# Patient Record
Sex: FEMALE | Race: WHITE | NOT HISPANIC OR LATINO | Employment: OTHER | URBAN - NONMETROPOLITAN AREA
[De-identification: names, ages, dates, MRNs, and addresses within clinical notes are randomized per-mention and may not be internally consistent; named-entity substitution may affect disease eponyms.]

---

## 2017-05-15 ENCOUNTER — LAB REQUISITION (OUTPATIENT)
Dept: LAB | Facility: HOSPITAL | Age: 72
End: 2017-05-15

## 2017-05-15 DIAGNOSIS — K21.9 GASTRO-ESOPHAGEAL REFLUX DISEASE WITHOUT ESOPHAGITIS: ICD-10-CM

## 2017-05-15 DIAGNOSIS — I10 ESSENTIAL (PRIMARY) HYPERTENSION: ICD-10-CM

## 2017-05-15 LAB
ANION GAP SERPL CALCULATED.3IONS-SCNC: 6.3 MMOL/L (ref 3.6–11.2)
BACTERIA UR QL AUTO: ABNORMAL /HPF
BASOPHILS # BLD AUTO: 0.05 10*3/MM3 (ref 0–0.3)
BASOPHILS NFR BLD AUTO: 0.7 % (ref 0–2)
BILIRUB UR QL STRIP: NEGATIVE
BUN BLD-MCNC: 24 MG/DL (ref 7–21)
BUN/CREAT SERPL: 17.3 (ref 7–25)
CALCIUM SPEC-SCNC: 10.1 MG/DL (ref 7.7–10)
CHLORIDE SERPL-SCNC: 106 MMOL/L (ref 99–112)
CLARITY UR: CLEAR
CO2 SERPL-SCNC: 29.7 MMOL/L (ref 24.3–31.9)
COLOR UR: YELLOW
CREAT BLD-MCNC: 1.39 MG/DL (ref 0.43–1.29)
DEPRECATED RDW RBC AUTO: 47.3 FL (ref 37–54)
EOSINOPHIL # BLD AUTO: 0.73 10*3/MM3 (ref 0–0.7)
EOSINOPHIL NFR BLD AUTO: 9.6 % (ref 0–7)
ERYTHROCYTE [DISTWIDTH] IN BLOOD BY AUTOMATED COUNT: 14.7 % (ref 11.5–14.5)
GFR SERPL CREATININE-BSD FRML MDRD: 37 ML/MIN/1.73
GLUCOSE BLD-MCNC: 93 MG/DL (ref 70–110)
GLUCOSE UR STRIP-MCNC: NEGATIVE MG/DL
HCT VFR BLD AUTO: 38.1 % (ref 37–47)
HGB BLD-MCNC: 12.2 G/DL (ref 12–16)
HGB UR QL STRIP.AUTO: NEGATIVE
HYALINE CASTS UR QL AUTO: ABNORMAL /LPF
IMM GRANULOCYTES # BLD: 0.03 10*3/MM3 (ref 0–0.03)
IMM GRANULOCYTES NFR BLD: 0.4 % (ref 0–0.5)
KETONES UR QL STRIP: NEGATIVE
LEUKOCYTE ESTERASE UR QL STRIP.AUTO: ABNORMAL
LYMPHOCYTES # BLD AUTO: 2.22 10*3/MM3 (ref 1–3)
LYMPHOCYTES NFR BLD AUTO: 29.1 % (ref 16–46)
MCH RBC QN AUTO: 29.1 PG (ref 27–33)
MCHC RBC AUTO-ENTMCNC: 32 G/DL (ref 33–37)
MCV RBC AUTO: 90.9 FL (ref 80–94)
MONOCYTES # BLD AUTO: 0.63 10*3/MM3 (ref 0.1–0.9)
MONOCYTES NFR BLD AUTO: 8.2 % (ref 0–12)
NEUTROPHILS # BLD AUTO: 3.98 10*3/MM3 (ref 1.4–6.5)
NEUTROPHILS NFR BLD AUTO: 52 % (ref 40–75)
NITRITE UR QL STRIP: POSITIVE
OSMOLALITY SERPL CALC.SUM OF ELEC: 286.9 MOSM/KG (ref 273–305)
PH UR STRIP.AUTO: <=5 [PH] (ref 5–8)
PLATELET # BLD AUTO: 223 10*3/MM3 (ref 130–400)
PMV BLD AUTO: 11 FL (ref 6–10)
POTASSIUM BLD-SCNC: 4.7 MMOL/L (ref 3.5–5.3)
PROT UR QL STRIP: NEGATIVE
RBC # BLD AUTO: 4.19 10*6/MM3 (ref 4.2–5.4)
RBC # UR: ABNORMAL /HPF
REF LAB TEST METHOD: ABNORMAL
SODIUM BLD-SCNC: 142 MMOL/L (ref 135–153)
SP GR UR STRIP: 1.01 (ref 1–1.03)
SQUAMOUS #/AREA URNS HPF: ABNORMAL /HPF
TSH SERPL DL<=0.05 MIU/L-ACNC: 1.47 MIU/ML (ref 0.55–4.78)
UROBILINOGEN UR QL STRIP: ABNORMAL
WBC NRBC COR # BLD: 7.64 10*3/MM3 (ref 4.5–12.5)
WBC UR QL AUTO: ABNORMAL /HPF

## 2017-05-15 PROCEDURE — 87077 CULTURE AEROBIC IDENTIFY: CPT | Performed by: INTERNAL MEDICINE

## 2017-05-15 PROCEDURE — 80048 BASIC METABOLIC PNL TOTAL CA: CPT | Performed by: INTERNAL MEDICINE

## 2017-05-15 PROCEDURE — 84443 ASSAY THYROID STIM HORMONE: CPT | Performed by: INTERNAL MEDICINE

## 2017-05-15 PROCEDURE — 85025 COMPLETE CBC W/AUTO DIFF WBC: CPT | Performed by: INTERNAL MEDICINE

## 2017-05-15 PROCEDURE — 87186 SC STD MICRODIL/AGAR DIL: CPT | Performed by: INTERNAL MEDICINE

## 2017-05-15 PROCEDURE — 81001 URINALYSIS AUTO W/SCOPE: CPT | Performed by: INTERNAL MEDICINE

## 2017-05-15 PROCEDURE — 87086 URINE CULTURE/COLONY COUNT: CPT | Performed by: INTERNAL MEDICINE

## 2017-05-19 LAB — BACTERIA SPEC AEROBE CULT: ABNORMAL

## 2017-08-14 ENCOUNTER — LAB REQUISITION (OUTPATIENT)
Dept: LAB | Facility: HOSPITAL | Age: 72
End: 2017-08-14

## 2017-08-14 DIAGNOSIS — I10 ESSENTIAL (PRIMARY) HYPERTENSION: ICD-10-CM

## 2017-08-14 DIAGNOSIS — D64.9 ANEMIA: ICD-10-CM

## 2017-08-14 LAB
ALBUMIN SERPL-MCNC: 4 G/DL (ref 3.4–4.8)
ALBUMIN/GLOB SERPL: 1.7 G/DL (ref 1.5–2.5)
ALP SERPL-CCNC: 113 U/L (ref 35–104)
ALT SERPL W P-5'-P-CCNC: 14 U/L (ref 10–36)
ANION GAP SERPL CALCULATED.3IONS-SCNC: 5.5 MMOL/L (ref 3.6–11.2)
AST SERPL-CCNC: 21 U/L (ref 10–30)
BASOPHILS # BLD AUTO: 0.03 10*3/MM3 (ref 0–0.3)
BASOPHILS NFR BLD AUTO: 0.4 % (ref 0–2)
BILIRUB SERPL-MCNC: 0.4 MG/DL (ref 0.2–1.8)
BUN BLD-MCNC: 22 MG/DL (ref 7–21)
BUN/CREAT SERPL: 19.6 (ref 7–25)
CALCIUM SPEC-SCNC: 9.9 MG/DL (ref 7.7–10)
CHLORIDE SERPL-SCNC: 107 MMOL/L (ref 99–112)
CO2 SERPL-SCNC: 27.5 MMOL/L (ref 24.3–31.9)
CREAT BLD-MCNC: 1.12 MG/DL (ref 0.43–1.29)
DEPRECATED RDW RBC AUTO: 43.6 FL (ref 37–54)
EOSINOPHIL # BLD AUTO: 0.79 10*3/MM3 (ref 0–0.7)
EOSINOPHIL NFR BLD AUTO: 9.7 % (ref 0–7)
ERYTHROCYTE [DISTWIDTH] IN BLOOD BY AUTOMATED COUNT: 13.2 % (ref 11.5–14.5)
GFR SERPL CREATININE-BSD FRML MDRD: 48 ML/MIN/1.73
GLOBULIN UR ELPH-MCNC: 2.4 GM/DL
GLUCOSE BLD-MCNC: 84 MG/DL (ref 70–110)
HCT VFR BLD AUTO: 38.6 % (ref 37–47)
HGB BLD-MCNC: 12.7 G/DL (ref 12–16)
IMM GRANULOCYTES # BLD: 0.03 10*3/MM3 (ref 0–0.03)
IMM GRANULOCYTES NFR BLD: 0.4 % (ref 0–0.5)
LYMPHOCYTES # BLD AUTO: 2.47 10*3/MM3 (ref 1–3)
LYMPHOCYTES NFR BLD AUTO: 30.5 % (ref 16–46)
MCH RBC QN AUTO: 30 PG (ref 27–33)
MCHC RBC AUTO-ENTMCNC: 32.9 G/DL (ref 33–37)
MCV RBC AUTO: 91.3 FL (ref 80–94)
MONOCYTES # BLD AUTO: 0.56 10*3/MM3 (ref 0.1–0.9)
MONOCYTES NFR BLD AUTO: 6.9 % (ref 0–12)
NEUTROPHILS # BLD AUTO: 4.23 10*3/MM3 (ref 1.4–6.5)
NEUTROPHILS NFR BLD AUTO: 52.1 % (ref 40–75)
OSMOLALITY SERPL CALC.SUM OF ELEC: 281.9 MOSM/KG (ref 273–305)
PLATELET # BLD AUTO: 228 10*3/MM3 (ref 130–400)
PMV BLD AUTO: 11 FL (ref 6–10)
POTASSIUM BLD-SCNC: 4.7 MMOL/L (ref 3.5–5.3)
PROT SERPL-MCNC: 6.4 G/DL (ref 6–8)
RBC # BLD AUTO: 4.23 10*6/MM3 (ref 4.2–5.4)
SODIUM BLD-SCNC: 140 MMOL/L (ref 135–153)
WBC NRBC COR # BLD: 8.11 10*3/MM3 (ref 4.5–12.5)

## 2017-08-14 PROCEDURE — 85025 COMPLETE CBC W/AUTO DIFF WBC: CPT | Performed by: INTERNAL MEDICINE

## 2017-08-14 PROCEDURE — 80053 COMPREHEN METABOLIC PANEL: CPT | Performed by: INTERNAL MEDICINE

## 2017-08-19 ENCOUNTER — LAB REQUISITION (OUTPATIENT)
Dept: LAB | Facility: HOSPITAL | Age: 72
End: 2017-08-19

## 2017-08-19 DIAGNOSIS — J02.9 ACUTE PHARYNGITIS: ICD-10-CM

## 2017-08-19 LAB — S PYO AG THROAT QL: NEGATIVE

## 2017-08-19 PROCEDURE — 87880 STREP A ASSAY W/OPTIC: CPT | Performed by: INTERNAL MEDICINE

## 2017-08-19 PROCEDURE — 87081 CULTURE SCREEN ONLY: CPT | Performed by: INTERNAL MEDICINE

## 2017-08-20 LAB — BACTERIA SPEC AEROBE CULT: NORMAL

## 2017-08-22 ENCOUNTER — APPOINTMENT (OUTPATIENT)
Dept: GENERAL RADIOLOGY | Facility: HOSPITAL | Age: 72
End: 2017-08-22

## 2017-08-22 ENCOUNTER — APPOINTMENT (OUTPATIENT)
Dept: CT IMAGING | Facility: HOSPITAL | Age: 72
End: 2017-08-22

## 2017-08-22 ENCOUNTER — HOSPITAL ENCOUNTER (OUTPATIENT)
Facility: HOSPITAL | Age: 72
Setting detail: OBSERVATION
Discharge: NURSING, REHAB OR LONG-TERM CARE | End: 2017-08-23
Attending: EMERGENCY MEDICINE | Admitting: INTERNAL MEDICINE

## 2017-08-22 DIAGNOSIS — R41.82 ALTERED MENTAL STATUS, UNSPECIFIED ALTERED MENTAL STATUS TYPE: Primary | ICD-10-CM

## 2017-08-22 LAB
ALBUMIN SERPL-MCNC: 4.3 G/DL (ref 3.4–4.8)
ALBUMIN/GLOB SERPL: 1.6 G/DL (ref 1.5–2.5)
ALP SERPL-CCNC: 107 U/L (ref 35–104)
ALT SERPL W P-5'-P-CCNC: 13 U/L (ref 10–36)
ANION GAP SERPL CALCULATED.3IONS-SCNC: 5.1 MMOL/L (ref 3.6–11.2)
AST SERPL-CCNC: 20 U/L (ref 10–30)
BACTERIA UR QL AUTO: ABNORMAL /HPF
BASOPHILS # BLD AUTO: 0.02 10*3/MM3 (ref 0–0.3)
BASOPHILS NFR BLD AUTO: 0.3 % (ref 0–2)
BILIRUB SERPL-MCNC: 0.6 MG/DL (ref 0.2–1.8)
BILIRUB UR QL STRIP: NEGATIVE
BUN BLD-MCNC: 20 MG/DL (ref 7–21)
BUN/CREAT SERPL: 15.6 (ref 7–25)
CALCIUM SPEC-SCNC: 10.3 MG/DL (ref 7.7–10)
CHLORIDE SERPL-SCNC: 109 MMOL/L (ref 99–112)
CK MB SERPL-CCNC: 1.43 NG/ML (ref 0–5)
CK MB SERPL-RTO: 2.3 % (ref 0–3)
CK SERPL-CCNC: 63 U/L (ref 24–173)
CLARITY UR: CLEAR
CO2 SERPL-SCNC: 27.9 MMOL/L (ref 24.3–31.9)
COLOR UR: YELLOW
CREAT BLD-MCNC: 1.28 MG/DL (ref 0.43–1.29)
DEPRECATED RDW RBC AUTO: 43.2 FL (ref 37–54)
EOSINOPHIL # BLD AUTO: 0.58 10*3/MM3 (ref 0–0.7)
EOSINOPHIL NFR BLD AUTO: 8.6 % (ref 0–7)
ERYTHROCYTE [DISTWIDTH] IN BLOOD BY AUTOMATED COUNT: 13.3 % (ref 11.5–14.5)
GFR SERPL CREATININE-BSD FRML MDRD: 41 ML/MIN/1.73
GLOBULIN UR ELPH-MCNC: 2.7 GM/DL
GLUCOSE BLD-MCNC: 103 MG/DL (ref 70–110)
GLUCOSE UR STRIP-MCNC: NEGATIVE MG/DL
HCT VFR BLD AUTO: 41.3 % (ref 37–47)
HGB BLD-MCNC: 13.6 G/DL (ref 12–16)
HGB UR QL STRIP.AUTO: NEGATIVE
HYALINE CASTS UR QL AUTO: ABNORMAL /LPF
IMM GRANULOCYTES # BLD: 0.03 10*3/MM3 (ref 0–0.03)
IMM GRANULOCYTES NFR BLD: 0.4 % (ref 0–0.5)
KETONES UR QL STRIP: NEGATIVE
LEUKOCYTE ESTERASE UR QL STRIP.AUTO: ABNORMAL
LIPASE SERPL-CCNC: 35 U/L (ref 13–60)
LYMPHOCYTES # BLD AUTO: 2.07 10*3/MM3 (ref 1–3)
LYMPHOCYTES NFR BLD AUTO: 30.8 % (ref 16–46)
MCH RBC QN AUTO: 30 PG (ref 27–33)
MCHC RBC AUTO-ENTMCNC: 32.9 G/DL (ref 33–37)
MCV RBC AUTO: 91 FL (ref 80–94)
MONOCYTES # BLD AUTO: 0.46 10*3/MM3 (ref 0.1–0.9)
MONOCYTES NFR BLD AUTO: 6.8 % (ref 0–12)
MYOGLOBIN SERPL-MCNC: 72 NG/ML (ref 0–109)
NEUTROPHILS # BLD AUTO: 3.56 10*3/MM3 (ref 1.4–6.5)
NEUTROPHILS NFR BLD AUTO: 53.1 % (ref 40–75)
NITRITE UR QL STRIP: NEGATIVE
OSMOLALITY SERPL CALC.SUM OF ELEC: 286 MOSM/KG (ref 273–305)
PH UR STRIP.AUTO: 6 [PH] (ref 5–8)
PLATELET # BLD AUTO: 225 10*3/MM3 (ref 130–400)
PMV BLD AUTO: 10.6 FL (ref 6–10)
POTASSIUM BLD-SCNC: 4.4 MMOL/L (ref 3.5–5.3)
PROT SERPL-MCNC: 7 G/DL (ref 6–8)
PROT UR QL STRIP: NEGATIVE
RBC # BLD AUTO: 4.54 10*6/MM3 (ref 4.2–5.4)
RBC # UR: ABNORMAL /HPF
REF LAB TEST METHOD: ABNORMAL
SODIUM BLD-SCNC: 142 MMOL/L (ref 135–153)
SP GR UR STRIP: 1.01 (ref 1–1.03)
SQUAMOUS #/AREA URNS HPF: ABNORMAL /HPF
TROPONIN I SERPL-MCNC: <0.006 NG/ML
UROBILINOGEN UR QL STRIP: ABNORMAL
WBC NRBC COR # BLD: 6.72 10*3/MM3 (ref 4.5–12.5)
WBC UR QL AUTO: ABNORMAL /HPF

## 2017-08-22 PROCEDURE — 70450 CT HEAD/BRAIN W/O DYE: CPT

## 2017-08-22 PROCEDURE — 82553 CREATINE MB FRACTION: CPT | Performed by: EMERGENCY MEDICINE

## 2017-08-22 PROCEDURE — G0378 HOSPITAL OBSERVATION PER HR: HCPCS

## 2017-08-22 PROCEDURE — 70450 CT HEAD/BRAIN W/O DYE: CPT | Performed by: RADIOLOGY

## 2017-08-22 PROCEDURE — 83690 ASSAY OF LIPASE: CPT | Performed by: EMERGENCY MEDICINE

## 2017-08-22 PROCEDURE — 93005 ELECTROCARDIOGRAM TRACING: CPT | Performed by: EMERGENCY MEDICINE

## 2017-08-22 PROCEDURE — 96360 HYDRATION IV INFUSION INIT: CPT

## 2017-08-22 PROCEDURE — 82550 ASSAY OF CK (CPK): CPT | Performed by: EMERGENCY MEDICINE

## 2017-08-22 PROCEDURE — P9612 CATHETERIZE FOR URINE SPEC: HCPCS

## 2017-08-22 PROCEDURE — 84484 ASSAY OF TROPONIN QUANT: CPT | Performed by: EMERGENCY MEDICINE

## 2017-08-22 PROCEDURE — 71010 HC CHEST PA OR AP: CPT

## 2017-08-22 PROCEDURE — 81001 URINALYSIS AUTO W/SCOPE: CPT | Performed by: EMERGENCY MEDICINE

## 2017-08-22 PROCEDURE — 85025 COMPLETE CBC W/AUTO DIFF WBC: CPT | Performed by: EMERGENCY MEDICINE

## 2017-08-22 PROCEDURE — 80053 COMPREHEN METABOLIC PANEL: CPT | Performed by: EMERGENCY MEDICINE

## 2017-08-22 PROCEDURE — 71010 XR CHEST 1 VW: CPT | Performed by: RADIOLOGY

## 2017-08-22 PROCEDURE — 83874 ASSAY OF MYOGLOBIN: CPT | Performed by: EMERGENCY MEDICINE

## 2017-08-22 PROCEDURE — 99285 EMERGENCY DEPT VISIT HI MDM: CPT

## 2017-08-22 PROCEDURE — 93010 ELECTROCARDIOGRAM REPORT: CPT | Performed by: INTERNAL MEDICINE

## 2017-08-22 RX ORDER — ARIPIPRAZOLE 2 MG/1
2 TABLET ORAL DAILY
Status: ON HOLD | COMMUNITY
End: 2017-09-22

## 2017-08-22 RX ORDER — CIPROFLOXACIN 250 MG/1
250 TABLET, FILM COATED ORAL 2 TIMES DAILY
COMMUNITY
End: 2017-08-23 | Stop reason: HOSPADM

## 2017-08-22 RX ORDER — FERROUS SULFATE 325(65) MG
325 TABLET ORAL DAILY
COMMUNITY

## 2017-08-22 RX ORDER — ACETAMINOPHEN 500 MG
500 TABLET ORAL EVERY 4 HOURS PRN
COMMUNITY

## 2017-08-22 RX ORDER — AMOXICILLIN 875 MG/1
875 TABLET, COATED ORAL EVERY 12 HOURS
COMMUNITY
End: 2017-08-23 | Stop reason: HOSPADM

## 2017-08-22 RX ORDER — ASPIRIN 81 MG/1
324 TABLET, CHEWABLE ORAL ONCE
Status: COMPLETED | OUTPATIENT
Start: 2017-08-22 | End: 2017-08-22

## 2017-08-22 RX ORDER — FLUOXETINE HYDROCHLORIDE 20 MG/1
20 CAPSULE ORAL DAILY
COMMUNITY
End: 2017-09-25 | Stop reason: HOSPADM

## 2017-08-22 RX ORDER — SODIUM CHLORIDE 9 MG/ML
125 INJECTION, SOLUTION INTRAVENOUS CONTINUOUS
Status: DISCONTINUED | OUTPATIENT
Start: 2017-08-22 | End: 2017-08-23 | Stop reason: HOSPADM

## 2017-08-22 RX ADMIN — ASPIRIN 324 MG: 81 TABLET, CHEWABLE ORAL at 21:50

## 2017-08-22 RX ADMIN — SODIUM CHLORIDE 500 ML: 9 INJECTION, SOLUTION INTRAVENOUS at 18:46

## 2017-08-22 RX ADMIN — SODIUM CHLORIDE 125 ML/HR: 9 INJECTION, SOLUTION INTRAVENOUS at 19:00

## 2017-08-22 NOTE — ED PROVIDER NOTES
"Subjective   HPI Comments: Patient is a 72-year-old white female sent from a local nursing home for evaluation.  They report a possible seizure and an episode of unresponsiveness.  No other acute history is available to me.  The family that is here at the time of the patient's arrival advise that she seems more confused than usual to them.  The patient states that she feels \"pretty good\", states that nothing is bothering her.  She does not answer my questions appropriately however.  No other family members are here and the patient is unable to provide further history; I'm just unable to obtain further history at this time.     Patient is a 72 y.o. female presenting with altered mental status.   History provided by:  Nursing home (Cousin and nephew)  Altered Mental Status   Context: dementia and nursing home resident        Review of Systems   Reason unable to perform ROS: Dementia/receptive aphasia.       Past Medical History:   Diagnosis Date   • Alzheimer disease    • Alzheimer's disease    • Anxiety    • Anxiety disorder, unspecified    • Constipation    • Deafness    • Delirium due to known physiological condition    • Depression    • Difficulty in walking, not elsewhere classified    • Gastro-esophageal reflux disease with esophagitis    • GERD (gastroesophageal reflux disease)    • Hyperlipidemia    • Hyperlipidemia    • Hypertension    • Major depressive disorder, single episode    • Muscle weakness (generalized)    • Other lack of coordination    • Paranoid personality (disorder)    • Shared psychotic disorder    • Unspecified dementia without behavioral disturbance    • Unspecified hearing loss, unspecified ear    • Unspecified lack of coordination    • Unspecified psychosis not due to a substance or known physiological condition        No Known Allergies    History reviewed. No pertinent surgical history.    History reviewed. No pertinent family history.    Social History     Social History   • Marital " status:      Spouse name: N/A   • Number of children: N/A   • Years of education: N/A     Social History Main Topics   • Smoking status: Never Smoker   • Smokeless tobacco: None   • Alcohol use No   • Drug use: No   • Sexual activity: Defer     Other Topics Concern   • None     Social History Narrative           Objective   Physical Exam   Constitutional: She appears well-developed and well-nourished. No distress.   HENT:   Head: Normocephalic and atraumatic.   Eyes: EOM are normal. Pupils are equal, round, and reactive to light. No scleral icterus.   Neck: Normal range of motion. Neck supple. No rigidity. No tracheal deviation present.   Cardiovascular: Normal rate, regular rhythm and intact distal pulses.    Pulmonary/Chest: Effort normal and breath sounds normal. No respiratory distress. She exhibits no tenderness.   Abdominal: Soft. Bowel sounds are normal. There is no tenderness. There is no rebound and no guarding.   Musculoskeletal: Normal range of motion. She exhibits no tenderness.   Neurological: She is alert. She has normal strength. No sensory deficit. GCS eye subscore is 4. GCS verbal subscore is 5. GCS motor subscore is 6.   There are no detectable focal motor deficits.  I cannot assess for sensory deficits.  The patient seems to have a receptive aphasia.  She talks to me fluently and says seemingly appropriate things to me, but she is unable to answer questions that I ask her.   Skin: Skin is warm and dry. She is not diaphoretic. No cyanosis. No pallor.   Psychiatric: Her behavior is normal.   Vitals reviewed.      Procedures  EKG shows sinus rhythm with rate 61.  No apparent acute ischemia.  XR Chest 1 View   ED Interpretation   No apparent acute disease pending radiologist.      CT Head Without Contrast   ED Interpretation   Per virtual radiology, mild chronic microvascular disease.  No   acute lesion or injury.        Results for orders placed or performed during the hospital encounter of  08/22/17   Comprehensive Metabolic Panel   Result Value Ref Range    Glucose 103 70 - 110 mg/dL    BUN 20 7 - 21 mg/dL    Creatinine 1.28 0.43 - 1.29 mg/dL    Sodium 142 135 - 153 mmol/L    Potassium 4.4 3.5 - 5.3 mmol/L    Chloride 109 99 - 112 mmol/L    CO2 27.9 24.3 - 31.9 mmol/L    Calcium 10.3 (H) 7.7 - 10.0 mg/dL    Total Protein 7.0 6.0 - 8.0 g/dL    Albumin 4.30 3.40 - 4.80 g/dL    ALT (SGPT) 13 10 - 36 U/L    AST (SGOT) 20 10 - 30 U/L    Alkaline Phosphatase 107 (H) 35 - 104 U/L    Total Bilirubin 0.6 0.2 - 1.8 mg/dL    eGFR Non African Amer 41 (L) >60 mL/min/1.73    Globulin 2.7 gm/dL    A/G Ratio 1.6 1.5 - 2.5 g/dL    BUN/Creatinine Ratio 15.6 7.0 - 25.0    Anion Gap 5.1 3.6 - 11.2 mmol/L   Lipase   Result Value Ref Range    Lipase 35 13 - 60 U/L   Urinalysis With / Culture If Indicated   Result Value Ref Range    Color, UA Yellow Yellow, Straw    Appearance, UA Clear Clear    pH, UA 6.0 5.0 - 8.0    Specific Gravity, UA 1.012 1.005 - 1.030    Glucose, UA Negative Negative    Ketones, UA Negative Negative    Bilirubin, UA Negative Negative    Blood, UA Negative Negative    Protein, UA Negative Negative    Leuk Esterase, UA Trace (A) Negative    Nitrite, UA Negative Negative    Urobilinogen, UA 0.2 E.U./dL 0.2 - 1.0 E.U./dL   CK   Result Value Ref Range    Creatine Kinase 63 24 - 173 U/L   Myoglobin, Serum   Result Value Ref Range    Myoglobin 72.0 0.0 - 109.0 ng/mL   CK-MB   Result Value Ref Range    CKMB 1.43 0.00 - 5.00 ng/mL   Troponin   Result Value Ref Range    Troponin I <0.006 <=0.040 ng/mL   CBC Auto Differential   Result Value Ref Range    WBC 6.72 4.50 - 12.50 10*3/mm3    RBC 4.54 4.20 - 5.40 10*6/mm3    Hemoglobin 13.6 12.0 - 16.0 g/dL    Hematocrit 41.3 37.0 - 47.0 %    MCV 91.0 80.0 - 94.0 fL    MCH 30.0 27.0 - 33.0 pg    MCHC 32.9 (L) 33.0 - 37.0 g/dL    RDW 13.3 11.5 - 14.5 %    RDW-SD 43.2 37.0 - 54.0 fl    MPV 10.6 (H) 6.0 - 10.0 fL    Platelets 225 130 - 400 10*3/mm3    Neutrophil % 53.1  40.0 - 75.0 %    Lymphocyte % 30.8 16.0 - 46.0 %    Monocyte % 6.8 0.0 - 12.0 %    Eosinophil % 8.6 (H) 0.0 - 7.0 %    Basophil % 0.3 0.0 - 2.0 %    Immature Grans % 0.4 0.0 - 0.5 %    Neutrophils, Absolute 3.56 1.40 - 6.50 10*3/mm3    Lymphocytes, Absolute 2.07 1.00 - 3.00 10*3/mm3    Monocytes, Absolute 0.46 0.10 - 0.90 10*3/mm3    Eosinophils, Absolute 0.58 0.00 - 0.70 10*3/mm3    Basophils, Absolute 0.02 0.00 - 0.30 10*3/mm3    Immature Grans, Absolute 0.03 0.00 - 0.03 10*3/mm3   Osmolality, Calculated   Result Value Ref Range    Osmolality Calc 286.0 273.0 - 305.0 mOsm/kg   CK-MB Index   Result Value Ref Range    CK-MB Index 2.3 0.0 - 3.0 %   Urinalysis, Microscopic Only   Result Value Ref Range    RBC, UA 0-2 (A) None Seen /HPF    WBC, UA 0-2 (A) None Seen /HPF    Bacteria, UA None Seen None Seen /HPF    Squamous Epithelial Cells, UA 0-2 None Seen, 0-2 /HPF    Hyaline Casts, UA None Seen None Seen /LPF    Methodology Automated Microscopy               ED Course  ED Course   Comment By Time   Patient's emergency department stay has been uneventful.  Never has she shown any signs of acute distress.  I discussed the case with Dr. Middleton, and he is admitting the patient to the MedSurg unit under the service of Dr. Alamo for further care. Panda Jones MD 08/22 2112                  [facility]    Final diagnoses:   Altered mental status, unspecified altered mental status type             Please note that portions of this note were completed with a voice recognition program. Efforts were made to edit the dictations, but occasionally words are mistranscribed.       Panda Jones MD  08/22/17 5501

## 2017-08-22 NOTE — ED NOTES
Patients family states that the Nursing home called them and told her the patient had a temp of 106.0 and was foaming at the mouth and lethargic. Patients family states they only call her when patient is bad off. Upon assessment patient was AAO and could answer questions correctly. Patient reports her left arm and hand hurts and feels cold. Patients family reports patient is hard of hearing. Patient currently afebrile.  Pt resting quietly on stretcher with no complaints.  Pt AAOx4 with no resp distress noted, respirations even and unlabored.  Pt denies any needs at this time.  Skin PWD.  Pt family at bedside. Will continue to monitor and follow plan of care.  Bed rails up x2, bed in lowest position, call light in reach.       Marlena Dunn RN  08/22/17 [id number]

## 2017-08-22 NOTE — ED NOTES
Patient reports she needs to use the bathroom. Assisted patient to use bed turcios.      Marlena Dunn RN  08/22/17 [id number]

## 2017-08-22 NOTE — ED NOTES
Patient noted to have swelling above IV site that was in place upon arrival. Fluid stopped, and able to obtain blood return. IV removed, warm compress placed on RAC.     Marlena Dunn RN  08/22/17 [id number]

## 2017-08-23 VITALS
OXYGEN SATURATION: 95 % | DIASTOLIC BLOOD PRESSURE: 64 MMHG | HEIGHT: 68 IN | RESPIRATION RATE: 16 BRPM | SYSTOLIC BLOOD PRESSURE: 116 MMHG | BODY MASS INDEX: 25.92 KG/M2 | HEART RATE: 52 BPM | TEMPERATURE: 98.1 F | WEIGHT: 171.01 LBS

## 2017-08-23 PROCEDURE — 96372 THER/PROPH/DIAG INJ SC/IM: CPT

## 2017-08-23 PROCEDURE — 25010000002 ENOXAPARIN PER 10 MG: Performed by: INTERNAL MEDICINE

## 2017-08-23 PROCEDURE — G0378 HOSPITAL OBSERVATION PER HR: HCPCS

## 2017-08-23 RX ORDER — ATORVASTATIN CALCIUM 10 MG/1
10 TABLET, FILM COATED ORAL NIGHTLY
Status: DISCONTINUED | OUTPATIENT
Start: 2017-08-23 | End: 2017-08-23 | Stop reason: HOSPADM

## 2017-08-23 RX ORDER — SENNA PLUS 8.6 MG/1
1 TABLET ORAL DAILY PRN
Status: CANCELLED | OUTPATIENT
Start: 2017-08-23

## 2017-08-23 RX ORDER — BUSPIRONE HYDROCHLORIDE 10 MG/1
5 TABLET ORAL 2 TIMES DAILY
Qty: 60 TABLET | Refills: 3 | Status: ON HOLD | OUTPATIENT
Start: 2017-08-23 | End: 2017-09-21

## 2017-08-23 RX ORDER — BUSPIRONE HYDROCHLORIDE 10 MG/1
10 TABLET ORAL 3 TIMES DAILY
Status: CANCELLED | OUTPATIENT
Start: 2017-08-23

## 2017-08-23 RX ORDER — LISINOPRIL 10 MG/1
10 TABLET ORAL DAILY
Status: CANCELLED | OUTPATIENT
Start: 2017-08-23

## 2017-08-23 RX ORDER — ASPIRIN 81 MG/1
81 TABLET, CHEWABLE ORAL DAILY
Status: CANCELLED | OUTPATIENT
Start: 2017-08-23

## 2017-08-23 RX ORDER — AMOXICILLIN 500 MG/1
1000 CAPSULE ORAL EVERY 12 HOURS SCHEDULED
Status: CANCELLED | OUTPATIENT
Start: 2017-08-23

## 2017-08-23 RX ORDER — RIVASTIGMINE TARTRATE 1.5 MG/1
6 CAPSULE ORAL 2 TIMES DAILY WITH MEALS
Status: CANCELLED | OUTPATIENT
Start: 2017-08-23

## 2017-08-23 RX ORDER — ARIPIPRAZOLE 2 MG/1
2 TABLET ORAL DAILY
Status: CANCELLED | OUTPATIENT
Start: 2017-08-23

## 2017-08-23 RX ORDER — LEVOFLOXACIN 250 MG/1
250 TABLET ORAL
Status: CANCELLED | OUTPATIENT
Start: 2017-08-23 | End: 2017-08-25

## 2017-08-23 RX ORDER — MEMANTINE HYDROCHLORIDE 5 MG/1
5 TABLET ORAL EVERY 12 HOURS SCHEDULED
Status: DISCONTINUED | OUTPATIENT
Start: 2017-08-23 | End: 2017-08-23 | Stop reason: HOSPADM

## 2017-08-23 RX ORDER — MEMANTINE HYDROCHLORIDE 5 MG/1
2.5 TABLET ORAL EVERY 12 HOURS SCHEDULED
Status: CANCELLED | OUTPATIENT
Start: 2017-08-23

## 2017-08-23 RX ORDER — FLUOXETINE HYDROCHLORIDE 20 MG/1
20 CAPSULE ORAL DAILY
Status: CANCELLED | OUTPATIENT
Start: 2017-08-23

## 2017-08-23 RX ORDER — LEVOFLOXACIN 250 MG/1
250 TABLET ORAL
Status: CANCELLED | OUTPATIENT
Start: 2017-08-23

## 2017-08-23 RX ORDER — FERROUS SULFATE 325(65) MG
325 TABLET ORAL
Status: CANCELLED | OUTPATIENT
Start: 2017-08-23

## 2017-08-23 RX ORDER — FAMOTIDINE 20 MG/1
20 TABLET, FILM COATED ORAL DAILY
Status: CANCELLED | OUTPATIENT
Start: 2017-08-23

## 2017-08-23 RX ORDER — ACETAMINOPHEN 325 MG/1
500 TABLET ORAL EVERY 4 HOURS PRN
Status: CANCELLED | OUTPATIENT
Start: 2017-08-23

## 2017-08-23 RX ORDER — AMOXICILLIN 500 MG/1
1000 CAPSULE ORAL EVERY 12 HOURS SCHEDULED
Status: DISCONTINUED | OUTPATIENT
Start: 2017-08-23 | End: 2017-08-23 | Stop reason: HOSPADM

## 2017-08-23 RX ORDER — GABAPENTIN 100 MG/1
100 CAPSULE ORAL 2 TIMES DAILY
Qty: 60 CAPSULE | Refills: 3 | Status: ON HOLD | OUTPATIENT
Start: 2017-08-23 | End: 2017-09-22

## 2017-08-23 RX ADMIN — ATORVASTATIN CALCIUM 10 MG: 10 TABLET, FILM COATED ORAL at 02:04

## 2017-08-23 RX ADMIN — MEMANTINE HYDROCHLORIDE 5 MG: 5 TABLET, FILM COATED ORAL at 02:04

## 2017-08-23 RX ADMIN — AMOXICILLIN 1000 MG: 500 CAPSULE ORAL at 10:14

## 2017-08-23 RX ADMIN — MEMANTINE HYDROCHLORIDE 5 MG: 5 TABLET, FILM COATED ORAL at 10:15

## 2017-08-23 RX ADMIN — ENOXAPARIN SODIUM 40 MG: 40 INJECTION SUBCUTANEOUS at 10:15

## 2017-08-23 RX ADMIN — AMOXICILLIN 1000 MG: 500 CAPSULE ORAL at 02:04

## 2017-08-23 NOTE — PLAN OF CARE
Problem: Patient Care Overview (Adult)  Goal: Discharge Needs Assessment  Outcome: Ongoing (interventions implemented as appropriate)  Discharge Planning Assessment  [facility]     Patient Name: Catherine Snyder                        MRN: [id number]  Today's Date: 8/23/2017                     Admit Date: 8/22/2017             Discharge Needs Assessment        08/23/17 0838     Discharge Needs Assessment     Discharge Disposition skilled nursing facility   SS contacted [facility] per Aruna [phone] who states pt has a 14 day skilled bed hold.        [facility]       Discharge Plan        08/23/17 0838     Case Management/Social Work Plan     Plan Pt was admitted from [facility] and has a 14 day skilled bed hold. SS to follow and assist as needed with discharge planning.          Discharge Placement      Facility/Agency Request Status Selected? Address Phone Number Fax Number     [facility] Pending - No Request Sent       [address] [facility] [address] [phone]                     Demographic Summary        08/23/17 0826     Referral Information     Referral Source nursing     Reason For Consult --   SS received consult NH resident. Pt was admitted from [facility].      Primary Care Physician Information     Name Dr. Jasmeet Sequeira

## 2017-08-23 NOTE — DISCHARGE PLACEMENT REQUEST
"Catherine Ocasio (72 y.o. Female)     Date of Birth Social Security Number Address Home Phone MRN    1945  [address]   [facility] [address] [phone] [id number]    [employer] Mar[employer] Status          None        Admission Date Admission Type Admitting Provider Attending Provider Department, Room/Bed    8/22/17 Emergency  Ricky Alamo MD [address], 3346/1S    Discharge Date Discharge Disposition Discharge Destination         Skilled Nursing Facility (DC - External)             Attending Provider: Ricky Alamo MD     Allergies:  No Known Allergies    Isolation:  None   Infection:  None   Code Status:  FULL    Ht:  68\" (172.7 cm)   Wt:  171 lb 0.2 oz (77.6 kg)    Admission Cmt:  None   Principal Problem:  None                Active Insurance as of 8/22/2017     Primary Coverage     Payor Plan Insurance Group Employer/Plan Group    MEDICARE MEDICARE B ONLY      Payor Plan Address Payor Plan Phone Number Effective From Effective To    [address] [address] [phone] 7/1/2008     [place] [address]       Subscriber Name Subscriber Birth Date Member ID       CATHERINE OCASIO 1945 [id number]           Secondary Coverage     Payor Plan Insurance Group Employer/Plan Group    [place]      Payor Plan Address Payor Plan Phone Number Effective From Effective To    [address] [phone] 12/15/2016     [place] [address]       Subscriber Name Subscriber Birth Date Member ID       CATHERINE OCASIO 1945 [id number]                 Emergency Contacts      (Rel.) Home Phone Work Phone Mobile Phone    LillianGita jain (Other) [phone] -- --               History & Physical      Ricky Alamo MD at 8/23/2017  8:34 AM                  Chief complaint confusion    Subjective     Patient is a 72 y.o. female resident of the [facility] rehabilitation has been there for several years due to severe dementia.  Nurses report that yesterday she " became more confused, agitated and seemed uncontrollable.  Due to increased agitation and confusion she was sent to the emergency room.  In the emergency room she was seen and evaluated.  She was diagnosed with mental status change.  A CT scan of the head revealed no acute intracranial process.  Laboratory studies including urinalysis comprehensive panel , UA and CBC were essentially normal with no obvious etiology for confusion.     This morning Mrs. Snyder is arousable.  She is answering questions with attempts but continues with ongoing confusion.  I seen her multiple times in the past over the last several years at the nursing home.  She has a chronic dementia.  At this point she doesn't seem off from her baseline.  Nurses describe an uneventful night with no acute changes or events.    Review of Systems -unobtainable secondary to confusion.    Past Medical History  Past Medical History:   Diagnosis Date   • Alzheimer disease    • Alzheimer's disease    • Anxiety    • Anxiety disorder, unspecified    • Constipation    • Deafness    • Delirium due to known physiological condition    • Depression    • Difficulty in walking, not elsewhere classified    • Gastro-esophageal reflux disease with esophagitis    • GERD (gastroesophageal reflux disease)    • Hyperlipidemia    • Hyperlipidemia    • Hypertension    • Major depressive disorder, single episode    • Muscle weakness (generalized)    • Other lack of coordination    • Paranoid personality (disorder)    • Shared psychotic disorder    • Unspecified dementia without behavioral disturbance    • Unspecified hearing loss, unspecified ear    • Unspecified lack of coordination    • Unspecified psychosis not due to a substance or known physiological condition      Surgical History  History reviewed. No pertinent surgical history.  Family History  History reviewed. No pertinent family history.  Social History  Social History   Substance Use Topics   • Smoking status:  Never Smoker   • Smokeless tobacco: None   • Alcohol use No     Home Medications  Prescriptions Prior to Admission   Medication Sig Dispense Refill Last Dose   • acetaminophen (TYLENOL) 500 MG tablet Take 500 mg by mouth Every 4 (Four) Hours As Needed for Mild Pain  or Fever.   8/19/2017 at unknown   • amoxicillin (AMOXIL) 875 MG tablet Take 875 mg by mouth Every 12 (Twelve) Hours. Started 8/21 for 7 days    8/22/2017 at 0800   • ARIPiprazole (ABILIFY) 2 MG tablet Take 2 mg by mouth Daily.   8/22/2017 at 0800   • aspirin 81 MG chewable tablet Chew 81 mg Daily.   8/22/2017 at 0800   • atorvastatin (LIPITOR) 10 MG tablet Take 10 mg by mouth Every Night.   8/21/2017 at 2000   • busPIRone (BUSPAR) 10 MG tablet Take 10 mg by mouth 3 (Three) Times a Day.   8/22/2017 at 1600   • ciprofloxacin (CIPRO) 250 MG tablet Take 250 mg by mouth 2 (Two) Times a Day. For 7 days, therapy started on 8/18/17 8/22/2017 at 0800   • ferrous sulfate 325 (65 FE) MG tablet Take 325 mg by mouth Daily.   8/22/2017 at 0800   • FLUoxetine (PROzac) 20 MG capsule Take 20 mg by mouth Daily.   8/20/2017 at 0800   • lisinopril (PRINIVIL,ZESTRIL) 10 MG tablet Take 10 mg by mouth Daily.   8/22/2017 at 0800   • memantine (NAMENDA XR) 7 MG capsule sustained-release 24 hr extended release capsule Take 14 mg by mouth Every Night.   8/21/2017 at 2000   • raNITIdine (ZANTAC) 150 MG tablet Take 150 mg by mouth Daily.   8/22/2017 at 0800   • rivastigmine (EXELON) 6 MG capsule Take 6 mg by mouth 2 (Two) Times a Day.   8/22/2017 at 1600   • magnesium hydroxide (MILK OF MAGNESIA) 400 MG/5ML suspension Take 30 mL by mouth Daily As Needed for constipation.   8/4/2017 at unknown   • senna (SENOKOT) 8.6 MG tablet tablet Take 1 tablet by mouth Daily As Needed for constipation.   8/4/2017 at unknown         Allergies:  Review of patient's allergies indicates no known allergies.    Objective     Vital Signs  Temp:  [98.1 °F (36.7 °C)-99.1 °F (37.3 °C)] 98.1 °F (36.7  °C)  Heart Rate:  [52-63] 52  Resp:  [16] 16  BP: (116-170)/(62-83) 116/64    Physical Exam:    Physical Exam   Constitutional: She appears well-developed and well-nourished. No distress.   HENT:   Head: Normocephalic and atraumatic.   Right Ear: External ear normal.   Left Ear: External ear normal.   Nose: Nose normal.   Mouth/Throat: Oropharynx is clear and moist. No oropharyngeal exudate.   Eyes: Conjunctivae and EOM are normal. Pupils are equal, round, and reactive to light. Right eye exhibits no discharge. Left eye exhibits no discharge. No scleral icterus.   Neck: Normal range of motion. Neck supple. No JVD present. No tracheal deviation present. No thyromegaly present.   Cardiovascular: Normal rate, regular rhythm, normal heart sounds and intact distal pulses.  Exam reveals no gallop and no friction rub.    No murmur heard.  Pulmonary/Chest: Effort normal and breath sounds normal. No stridor. No respiratory distress. She has no rales. She exhibits no tenderness.   Abdominal: Soft. Bowel sounds are normal. She exhibits no distension and no mass. There is no tenderness. There is no rebound and no guarding. No hernia.   Genitourinary:   Genitourinary Comments: No Dorado catheter   Musculoskeletal: Normal range of motion. She exhibits no edema or deformity.   Lymphadenopathy:     She has no cervical adenopathy.   Neurological: She is alert. She displays normal reflexes. No cranial nerve deficit. She exhibits normal muscle tone. Coordination normal.   Confused and unable to give a detailed answer.   Skin: Skin is warm and dry. No rash noted. She is not diaphoretic. No erythema. No pallor.   Psychiatric:   Blunted affect and confusion   Nursing note and vitals reviewed.      Results Review:    I reviewed the patient's clinical results from admission:  Imaging Results (last 72 hours)     Procedure Component Value Units Date/Time    CT Head Without Contrast [[id number]] Collected:  08/23/17 0729     Updated:  08/23/17  0732    Narrative:          EXAMINATION: CT HEAD WO CONTRAST-      CLINICAL INDICATION:     ams     TECHNIQUE: Contiguous axial CT images of the head were obtained without  contrast administration.      Radiation dose reduction techniques were utilized per ALARA protocol.  Automated exposure control was initiated through either or OKpanda or  DoseRight software packages by  protocol.       730.54 mGy.cm     COMPARISON:  None.       FINDINGS: Generalized cerebral atrophy is present. There is no mass  effect, midline displacement, or hydrocephalus. There are patchy areas  of decreased density within the periventricular white matter which  likely reflect chronic small vessel ischemic changes. There is no CT  evidence of acute infarct or hemorrhage. Bone windows reveal no osseous  abnormalities or fractures.        Impression:       Atrophy and chronic small vessel ischemic change, but there  are no acute intracranial abnormalities identified.         This report was finalized on 8/23/2017 7:29 AM by Dr. Teo Araujo MD.       XR Chest 1 View [[id number]] Collected:  08/23/17 0730     Updated:  08/23/17 0732    Narrative:       EXAMINATION: XR CHEST 1 VW-      CLINICAL INDICATION:     ams     TECHNIQUE:  XR CHEST 1 VW-      COMPARISON: NONE      FINDINGS:   The lungs remain aerated.  Heart and mediastinum contours are unremarkable.  No pleural effusion.  No pneumothorax.   Bony and soft tissue structures are unremarkable.       Impression:       No radiographic evidence of acute cardiac or pulmonary  disease.     This report was finalized on 8/23/2017 7:30 AM by Dr. Teo Araujo MD.             LABS:    Lab Results   Component Value Date    GLUCOSE 103 08/22/2017    GLUCOSE 84 08/14/2017    GLUCOSE 93 05/15/2017    BUN 20 08/22/2017    CREATININE 1.28 08/22/2017    EGFRIFNONA 41 (L) 08/22/2017    BCR 15.6 08/22/2017    CO2 27.9 08/22/2017    CALCIUM 10.3 (H) 08/22/2017    ALBUMIN 4.30 08/22/2017    LABIL2  1.6 08/22/2017    AST 20 08/22/2017    ALT 13 08/22/2017    WBC 6.72 08/22/2017    WBC 8.11 08/14/2017    WBC 7.64 05/15/2017    HGB 13.6 08/22/2017    HCT 41.3 08/22/2017    MCV 91.0 08/22/2017     08/22/2017     08/22/2017     08/14/2017     05/15/2017    K 4.4 08/22/2017    K 4.7 08/14/2017    K 4.7 05/15/2017     08/22/2017    ANIONGAP 5.1 08/22/2017       Lab Results   Component Value Date    INR 1.01 12/23/2016    INR <0.90 01/09/2016    PROTIME 11.4 12/23/2016    PROTIME 10.1 01/09/2016                 Assessment/Plan     1) acute mental status change.  Patient's been admitted to observation for monitoring.  Her mental status change this morning seems to be back at her baseline.  A CT scan reveals no acute intracranial pathology.  She has no focal deficits and is currently able to move all 4 extremities.  2) dementia  3) advanced age  4) hypertension  5) hyperlipidemia      I discussed the patients findings and my recommendations with patient and nursing staff.     Ricky Alamo MD  08/23/17  8:34 AM    Time: 33 minutes of time with history and physical Court next of care     Electronically signed by Ricky Alamo MD at 8/23/2017  8:39 AM        Vital Signs (last 24 hours)       08/22 0700  -  08/23 0659 08/23 0700  -  08/23 1133   Most Recent    Temp (°F) 98.2 -  99.1      98.1     98.1 (36.7)    Heart Rate 55 -  63      52     52    Resp   16      16     16    /62 -  170/74      116/64     116/64    SpO2 (%) 95 -  98       95          Intake & Output (last day)       08/22 0701 - 08/23 0700 08/23 0701 - 08/24 0700    P.O. 240     IV Piggyback 500     Total Intake(mL/kg) 740 (9.5)     Urine (mL/kg/hr) 0     Stool 0     Total Output 0      Net +740            Unmeasured Urine Occurrence 2 x         Hospital Medications (active)       Dose Frequency Start End    amoxicillin (AMOXIL) capsule 1,000 mg 1,000 mg Every 12 Hours Scheduled 8/23/2017 8/27/2017    Sig -  Route: Take 2 capsules by mouth Every 12 (Twelve) Hours. - Oral    aspirin chewable tablet 324 mg 324 mg Once 8/22/2017 8/22/2017    Sig - Route: Chew 4 tablets 1 (One) Time. - Oral    atorvastatin (LIPITOR) tablet 10 mg 10 mg Nightly 8/23/2017     Sig - Route: Take 1 tablet by mouth Every Night. - Oral    enoxaparin (LOVENOX) syringe 40 mg 40 mg Daily 8/23/2017     Sig - Route: Inject 0.4 mL under the skin Daily. - Subcutaneous    memantine (NAMENDA) tablet 5 mg 5 mg Every 12 Hours Scheduled 8/23/2017     Sig - Route: Take 1 tablet by mouth Every 12 (Twelve) Hours. - Oral    sodium chloride 0.9 % bolus 500 mL 500 mL Once 8/22/2017 8/22/2017    Sig - Route: Infuse 500 mL into a venous catheter 1 (One) Time. - Intravenous    sodium chloride 0.9 % infusion 125 mL/hr Continuous 8/22/2017     Sig - Route: Infuse 125 mL/hr into a venous catheter Continuous. - Intravenous            Lab Results (last 24 hours)     Procedure Component Value Units Date/Time    CBC & Differential [[id number]] Collected:  08/22/17 1848    Specimen:  Blood Updated:  08/22/17 1900    Narrative:       The following orders were created for panel order CBC & Differential.  Procedure                               Abnormality         Status                     ---------                               -----------         ------                     CBC Auto Differential[[id number]]         Abnormal            Final result                 Please view results for these tests on the individual orders.    CBC Auto Differential [[id number]]  (Abnormal) Collected:  08/22/17 1848    Specimen:  Blood from Arm, Left Updated:  08/22/17 1900     WBC 6.72 10*3/mm3      RBC 4.54 10*6/mm3      Hemoglobin 13.6 g/dL      Hematocrit 41.3 %      MCV 91.0 fL      MCH 30.0 pg      MCHC 32.9 (L) g/dL      RDW 13.3 %      RDW-SD 43.2 fl      MPV 10.6 (H) fL      Platelets 225 10*3/mm3      Neutrophil % 53.1 %      Lymphocyte % 30.8 %      Monocyte % 6.8 %      Eosinophil % 8.6  (H) %      Basophil % 0.3 %      Immature Grans % 0.4 %      Neutrophils, Absolute 3.56 10*3/mm3      Lymphocytes, Absolute 2.07 10*3/mm3      Monocytes, Absolute 0.46 10*3/mm3      Eosinophils, Absolute 0.58 10*3/mm3      Basophils, Absolute 0.02 10*3/mm3      Immature Grans, Absolute 0.03 10*3/mm3     Lipase [[id number]]  (Normal) Collected:  08/22/17 1848    Specimen:  Blood from Arm, Left Updated:  08/22/17 1920     Lipase 35 U/L     Comprehensive Metabolic Panel [[id number]]  (Abnormal) Collected:  08/22/17 1848    Specimen:  Blood from Arm, Left Updated:  08/22/17 1921     Glucose 103 mg/dL      BUN 20 mg/dL      Creatinine 1.28 mg/dL      Sodium 142 mmol/L      Potassium 4.4 mmol/L      Chloride 109 mmol/L      CO2 27.9 mmol/L      Calcium 10.3 (H) mg/dL      Total Protein 7.0 g/dL      Albumin 4.30 g/dL      ALT (SGPT) 13 U/L      AST (SGOT) 20 U/L      Alkaline Phosphatase 107 (H) U/L       Note New Reference Ranges        Total Bilirubin 0.6 mg/dL      eGFR Non African Amer 41 (L) mL/min/1.73      Globulin 2.7 gm/dL      A/G Ratio 1.6 g/dL      BUN/Creatinine Ratio 15.6     Anion Gap 5.1 mmol/L     Narrative:       The MDRD GFR formula is only valid for adults with stable renal function between ages 18 and 70.    Osmolality, Calculated [[id number]]  (Normal) Collected:  08/22/17 1848    Specimen:  Blood from Arm, Left Updated:  08/22/17 1921     Osmolality Calc 286.0 mOsm/kg     CK [[id number]]  (Normal) Collected:  08/22/17 1848    Specimen:  Blood from Arm, Left Updated:  08/22/17 1928     Creatine Kinase 63 U/L     Myoglobin, Serum [[id number]]  (Normal) Collected:  08/22/17 1848    Specimen:  Blood from Arm, Left Updated:  08/22/17 1928     Myoglobin 72.0 ng/mL     CK-MB [[id number]]  (Normal) Collected:  08/22/17 1848    Specimen:  Blood from Arm, Left Updated:  08/22/17 1928     CKMB 1.43 ng/mL     Troponin [[id number]]  (Normal) Collected:  08/22/17 1848    Specimen:  Blood from Arm, Left Updated:  08/22/17 1928      Troponin I <0.006 ng/mL     Narrative:       Ultra Troponin I Reference Range:         <=0.039 ng/mL: Negative    0.04-0.779 ng/mL: Indeterminate Range. Suspicious of MI.  Clinical correlation required.       >=0.78  ng/mL: Consistent with myocardial injury.  Clinical correlation required.    CK-MB Index [[id number]]  (Normal) Collected:  08/22/17 1848    Specimen:  Blood from Arm, Left Updated:  08/22/17 1928     CK-MB Index 2.3 %     Urinalysis With / Culture If Indicated [[id number]]  (Abnormal) Collected:  08/22/17 1924    Specimen:  Urine from Urine, Catheter Updated:  08/22/17 1934     Color, UA Yellow     Appearance, UA Clear     pH, UA 6.0     Specific Gravity, UA 1.012     Glucose, UA Negative     Ketones, UA Negative     Bilirubin, UA Negative     Blood, UA Negative     Protein, UA Negative     Leuk Esterase, UA Trace (A)     Nitrite, UA Negative     Urobilinogen, UA 0.2 E.U./dL    Urinalysis, Microscopic Only [[id number]]  (Abnormal) Collected:  08/22/17 1924    Specimen:  Urine from Urine, Catheter Updated:  08/22/17 1934     RBC, UA 0-2 (A) /HPF      WBC, UA 0-2 (A) /HPF      Bacteria, UA None Seen /HPF      Squamous Epithelial Cells, UA 0-2 /HPF      Hyaline Casts, UA None Seen /LPF      Methodology Automated Microscopy        Orders (last 24 hrs)     Start     Ordered    08/23/17 1000  enoxaparin (LOVENOX) syringe 40 mg  Daily      08/23/17 0832    08/23/17 0844  Discontinue IV  Once      08/23/17 0844    08/23/17 0843  Discharge patient  Once      08/23/17 0844    08/23/17 0836  scud pump  Once      08/23/17 0835    08/23/17 0833  VTE Risk Assessment - High Risk  Once      08/23/17 0832    08/23/17 0833  Place Sequential Compression Device  Once      08/23/17 0832    08/23/17 0833  Maintain Sequential Compression Device  Continuous      08/23/17 0832    08/23/17 0801  Inpatient Consult to Case Management   Once     Provider:  (Not yet assigned)    08/23/17 0800    08/23/17 0230   atorvastatin (LIPITOR) tablet 10 mg  Nightly      08/23/17 0118    08/23/17 0230  amoxicillin (AMOXIL) capsule 1,000 mg  Every 12 Hours Scheduled      08/23/17 0118    08/23/17 0230  memantine (NAMENDA) tablet 5 mg  Every 12 Hours Scheduled      08/23/17 0118    08/23/17 0014  Inpatient Consult to Case Management   Once     Provider:  (Not yet assigned)    08/23/17 0013    08/23/17 0014  Inpatient Consult to Nutrition  Once     Provider:  (Not yet assigned)    08/23/17 0013    08/23/17 0000  Vital Signs  Every 8 Hours      08/22/17 2322    08/23/17 0000  busPIRone (BUSPAR) 10 MG tablet  2 Times Daily      08/23/17 0844    08/23/17 0000  gabapentin (NEURONTIN) 100 MG capsule  2 Times Daily      08/23/17 0844    08/23/17 0000  Discharge Follow-up with PCP      08/23/17 0844    08/23/17 0000  Activity as Tolerated      08/23/17 0844    08/23/17 0000  Advance Diet As Tolerated      08/23/17 0844    08/22/17 2323  Full Code  Continuous      08/22/17 2322    08/22/17 2322  Diet Regular  Diet Effective Now      08/22/17 2322    08/22/17 2322  Saline Lock IV  Continuous      08/22/17 2322    08/22/17 2118  aspirin chewable tablet 324 mg  Once      08/22/17 2116 08/22/17 2115  Nursing Communication Upon arrival to unit, call Dr. Middleton for orders.  Continuous     Comments:  Upon arrival to unit, call Dr. Middleton for orders.    08/22/17 2115 08/22/17 2114  Internal Medicine (Gerard/Rashaun/Jasmeet)  Once     Specialty:  Internal Medicine  Provider:  Max Middleton MD    08/22/17 2113 08/22/17 2114  Med / Surg Bed Request  Once      08/22/17 2113 08/22/17 2114  Initiate Observation Status  Once      08/22/17 2115 08/22/17 1932  Urinalysis, Microscopic Only  Once      08/22/17 1931 08/22/17 1929  CK-MB Index  Once      08/22/17 1928 08/22/17 1922  Osmolality, Calculated  Once      08/22/17 1921 08/22/17 1821  sodium chloride 0.9 % bolus 500 mL  Once      08/22/17 1819 08/22/17 1821   sodium chloride 0.9 % infusion  Continuous      08/22/17 1819    08/22/17 1820  Fall Precautions  Continuous      08/22/17 1819    08/22/17 1819  CBC & Differential  Once      08/22/17 1819    08/22/17 1819  Comprehensive Metabolic Panel  Once      08/22/17 1819    08/22/17 1819  Lipase  Once      08/22/17 1819    08/22/17 1819  Urinalysis With / Culture If Indicated  Once      08/22/17 1819    08/22/17 1819  Straight Cath  Once      08/22/17 1819    08/22/17 1819  CK  Once      08/22/17 1819    08/22/17 1819  Myoglobin, Serum  Once      08/22/17 1819 08/22/17 1819  CK-MB  Once      08/22/17 1819    08/22/17 1819  Troponin  Once      08/22/17 1819 08/22/17 1819  ECG 12 Lead  Once      08/22/17 1819    08/22/17 1819  XR Chest 1 View  1 Time Imaging      08/22/17 1819 08/22/17 1819  CT Head Without Contrast  1 Time Imaging      08/22/17 1819 08/22/17 1819  CBC Auto Differential  PROCEDURE ONCE      08/22/17 1819    --  ARIPiprazole (ABILIFY) 2 MG tablet  Daily      08/22/17 2354    --  ferrous sulfate 325 (65 FE) MG tablet  Daily      08/22/17 2354    --  FLUoxetine (PROzac) 20 MG capsule  Daily      08/22/17 2354    --  ciprofloxacin (CIPRO) 250 MG tablet  2 Times Daily      08/22/17 2354    --  acetaminophen (TYLENOL) 500 MG tablet  Every 4 Hours PRN      08/22/17 2354    --  amoxicillin (AMOXIL) 875 MG tablet  Every 12 Hours      08/22/17 2354    Pending  acetaminophen (TYLENOL) tablet 487.5 mg  Every 4 Hours PRN      Pending    Pending  ARIPiprazole (ABILIFY) tablet 2 mg  Daily      Pending    Pending  aspirin chewable tablet 81 mg  Daily      Pending    Pending  busPIRone (BUSPAR) tablet 10 mg  3 Times Daily      Pending    Pending  levoFLOXacin (LEVAQUIN) tablet 250 mg  Every 24 Hours Scheduled      Pending    Pending  ferrous sulfate tablet 325 mg  Daily With Breakfast      Pending    Pending  FLUoxetine (PROzac) capsule 20 mg  Daily      Pending    Pending  lisinopril (PRINIVIL,ZESTRIL) tablet 10  mg  Daily      Pending    Pending  magnesium hydroxide (MILK OF MAGNESIA) suspension 2400 mg/10mL 10 mL  Daily PRN      Pending    Pending  famotidine (PEPCID) tablet 20 mg  Daily      Pending    Pending  rivastigmine (EXELON) capsule 6 mg  2 Times Daily With Meals      Pending    Pending  senna (SENOKOT) tablet 1 tablet  Daily PRN      Pending          Operative/Procedure Notes (most recent note)     No notes of this type exist for this encounter.        Physician Progress Notes (most recent note)     No notes of this type exist for this encounter.        Consult Notes (most recent note)     No notes of this type exist for this encounter.        Nutrition Notes (most recent note)     No notes of this type exist for this encounter.        Physical Therapy Notes (most recent note)     No notes of this type exist for this encounter.        Occupational Therapy Notes (most recent note)     No notes of this type exist for this encounter.        Speech Language Pathology Notes (most recent note)     No notes of this type exist for this encounter.        Respiratory Therapy Notes (most recent note)     No notes of this type exist for this encounter.               Discharge Summary      Ricky Alamo MD at 8/23/2017  8:45 AM          Date of Admission:   8/22/2017  5:40 PM    Date of Discharge: 08/23/2017       Discharge Diagnosis:   Active Problems:   1) acute mental status change.  Patient's been admitted to observation for monitoring.  Her mental status change this morning seems to be back at her baseline.  A CT scan reveals no acute intracranial pathology.  She has no focal deficits and is currently able to move all 4 extremities.  2) dementia  3) advanced age  4) hypertension  5) hyperlipidemia    Presenting Problem/History of Present Illness  See History and Physical for Presenting Problems / Illnesses.       Hospital Course  Patient is a 72 y.o. female presented with increasing confusion and agitation from the  nursing home.  Evaluation with a CT scan of the head revealed no acute intracranial pathology.  She does have age associated vascular changes as well as atrophy.  Otherwise her CBC, CMP and urinalysis are unremarkable.  This morning she is alert and talking but remains confused.  She seems to be at her baseline status.  There was some question as to whether she was having some possible seizure activity.  I see no obvious seizure activity this morning and nurses from the nursing home reported no obvious agitation.  This morning she does complain of some mild feet pain.  Pulses seem intact she's been started on gabapentin for neuropathy.  Otherwise she is being discharged back to the nursing home..      Procedures Performed         Consults:   Consults     Date and Time Order Name Status Description    8/22/2017 2113 Internal Medicine (Gerard/Rashaun/Jasmeet)            Condition on Discharge:    Poor   .  Vital Signs  Temp:  [98.1 °F (36.7 °C)-99.1 °F (37.3 °C)] 98.1 °F (36.7 °C)  Heart Rate:  [52-63] 52  Resp:  [16] 16  BP: (116-170)/(62-83) 116/64    Physical Exam:     Constitutional: She appears well-developed and well-nourished. No distress.   HENT:   Head: Normocephalic and atraumatic.   Right Ear: External ear normal.   Left Ear: External ear normal.   Nose: Nose normal.   Mouth/Throat: Oropharynx is clear and moist. No oropharyngeal exudate.   Eyes: Conjunctivae and EOM are normal. Pupils are equal, round, and reactive to light. Right eye exhibits no discharge. Left eye exhibits no discharge. No scleral icterus.   Neck: Normal range of motion. Neck supple. No JVD present. No tracheal deviation present. No thyromegaly present.   Cardiovascular: Normal rate, regular rhythm, normal heart sounds and intact distal pulses.  Exam reveals no gallop and no friction rub.    No murmur heard.  Pulmonary/Chest: Effort normal and breath sounds normal. No stridor. No respiratory distress. She has no rales. She exhibits no  tenderness.   Abdominal: Soft. Bowel sounds are normal. She exhibits no distension and no mass. There is no tenderness. There is no rebound and no guarding. No hernia.   Genitourinary:   Genitourinary Comments: No Dorado catheter   Musculoskeletal: Normal range of motion. She exhibits no edema or deformity.   Lymphadenopathy:     She has no cervical adenopathy.   Neurological: She is alert. She displays normal reflexes. No cranial nerve deficit. She exhibits normal muscle tone. Coordination normal.   Confused and unable to give a detailed answer.   Skin: Skin is warm and dry. No rash noted. She is not diaphoretic. No erythema. No pallor.   Psychiatric:   Blunted affect and confusion   Nursing note and vitals reviewed.  Discharge Disposition  Skilled Nursing Facility (DC - External)    Discharge Medications   LillianCatherine   Home Medication Instructions MELINDA:[id number]    Printed on:08/23/17 6706   Medication Information                      acetaminophen (TYLENOL) 500 MG tablet  Take 500 mg by mouth Every 4 (Four) Hours As Needed for Mild Pain  or Fever.             ARIPiprazole (ABILIFY) 2 MG tablet  Take 2 mg by mouth Daily.             aspirin 81 MG chewable tablet  Chew 81 mg Daily.             busPIRone (BUSPAR) 10 MG tablet  Take 0.5 tablets by mouth 2 (Two) Times a Day.             ferrous sulfate 325 (65 FE) MG tablet  Take 325 mg by mouth Daily.             FLUoxetine (PROzac) 20 MG capsule  Take 20 mg by mouth Daily.             gabapentin (NEURONTIN) 100 MG capsule  Take 1 capsule by mouth 2 (Two) Times a Day.             magnesium hydroxide (MILK OF MAGNESIA) 400 MG/5ML suspension  Take 30 mL by mouth Daily As Needed for constipation.             memantine (NAMENDA XR) 7 MG capsule sustained-release 24 hr extended release capsule  Take 14 mg by mouth Every Night.             rivastigmine (EXELON) 6 MG capsule  Take 6 mg by mouth 2 (Two) Times a Day.             senna (SENOKOT) 8.6 MG tablet tablet  Take  1 tablet by mouth Daily As Needed for constipation.                 Discharge Diet:   Diet Instructions     Advance Diet As Tolerated                     Activity at Discharge:   Activity Instructions     Activity as Tolerated                     Follow-up Appointments  Additional Instructions for the Follow-ups that You Need to Schedule     Discharge Follow-up with PCP    As directed    Follow Up Details:  Stephanie BRANDT at [facility] August 24             Follow-up Information     Follow up with Ricky Alamo MD .    Specialty:  Internal Medicine    Why:  Stephanie BRANDT at [facility] August 24    Contact information:    [address] [place] [address] [phone]             Ricky Alamo MD  08/23/17  8:45 AM    Time: Discharge 33 min           Electronically signed by Ricky Alamo MD at 8/23/2017  8:51 AM        Discharge Order     Start     Ordered    08/23/17 0843  Discharge patient  Once     Expected Discharge Date:  08/23/17    Discharge Disposition:  Skilled Nursing Facility (DC - External)        08/23/17 0844

## 2017-08-23 NOTE — PROGRESS NOTES
Discharge Planning Assessment  [facility]     Patient Name: Catherine Snyder  MRN: [id number]  Today's Date: 8/23/2017    Admit Date: 8/22/2017          Discharge Needs Assessment       08/23/17 0838    Discharge Needs Assessment    Discharge Disposition skilled nursing facility   SS contacted [facility] per Aruna [phone] who states pt has a 14 day skilled bed hold.             Discharge Plan       08/23/17 0838    Case Management/Social Work Plan    Plan Pt was admitted from [facility] and has a 14 day skilled bed hold. SS to follow and assist as needed with discharge planning.         Discharge Placement     Facility/Agency Request Status Selected? Address Phone Number Fax Number    [facility] Pending - No Request Sent     [address] [facility] [address] [phone]                Demographic Summary       08/23/17 0826    Referral Information    Referral Source nursing    Reason For Consult --   SS received consult NH resident. Pt was admitted from [facility].     Primary Care Physician Information    Name Dr. Jasmeet Sequeira

## 2017-08-23 NOTE — PROGRESS NOTES
Discharge Planning Assessment  [facility]     Patient Name: Catherine Snyder  MRN: [id number]  Today's Date: 8/23/2017    Admit Date: 8/22/2017       Discharge Plan       08/23/17 7235    Final Note    Final Note Pt was discharged back to [facility] today. SS contacted [facility] per Dariela and pt has bed available. SS faxed information including prescriptions and AVS to [facility]. SS contacted granddaughter, Gita Snyder [phone] who was agreeable for pt to be discharged back to [facility] today. Nurse to call report to [facility]. SS completed [facility] and contacted EMS per Regan. No other needs identified.         Discharge Placement     Facility/Agency Request Status Selected? Address Phone Number Fax Number    [facility] Accepted     [address], [facility] [address] [phone]        Expected Discharge Date and Time     Expected Discharge Date Expected Discharge Time    Aug 23, 2017                Ambika Sequeira

## 2017-08-23 NOTE — H&P
Chief complaint confusion    Subjective     Patient is a 72 y.o. female resident of the [facility] rehabilitation has been there for several years due to severe dementia.  Nurses report that yesterday she became more confused, agitated and seemed uncontrollable.  Due to increased agitation and confusion she was sent to the emergency room.  In the emergency room she was seen and evaluated.  She was diagnosed with mental status change.  A CT scan of the head revealed no acute intracranial process.  Laboratory studies including urinalysis comprehensive panel , UA and CBC were essentially normal with no obvious etiology for confusion.     This morning Mrs. Snyder is arousable.  She is answering questions with attempts but continues with ongoing confusion.  I seen her multiple times in the past over the last several years at the nursing home.  She has a chronic dementia.  At this point she doesn't seem off from her baseline.  Nurses describe an uneventful night with no acute changes or events.    Review of Systems -unobtainable secondary to confusion.    Past Medical History  Past Medical History:   Diagnosis Date   • Alzheimer disease    • Alzheimer's disease    • Anxiety    • Anxiety disorder, unspecified    • Constipation    • Deafness    • Delirium due to known physiological condition    • Depression    • Difficulty in walking, not elsewhere classified    • Gastro-esophageal reflux disease with esophagitis    • GERD (gastroesophageal reflux disease)    • Hyperlipidemia    • Hyperlipidemia    • Hypertension    • Major depressive disorder, single episode    • Muscle weakness (generalized)    • Other lack of coordination    • Paranoid personality (disorder)    • Shared psychotic disorder    • Unspecified dementia without behavioral disturbance    • Unspecified hearing loss, unspecified ear    • Unspecified lack of coordination    • Unspecified psychosis not due to a substance or known physiological condition       Surgical History  History reviewed. No pertinent surgical history.  Family History  History reviewed. No pertinent family history.  Social History  Social History   Substance Use Topics   • Smoking status: Never Smoker   • Smokeless tobacco: None   • Alcohol use No     Home Medications  Prescriptions Prior to Admission   Medication Sig Dispense Refill Last Dose   • acetaminophen (TYLENOL) 500 MG tablet Take 500 mg by mouth Every 4 (Four) Hours As Needed for Mild Pain  or Fever.   8/19/2017 at unknown   • amoxicillin (AMOXIL) 875 MG tablet Take 875 mg by mouth Every 12 (Twelve) Hours. Started 8/21 for 7 days    8/22/2017 at 0800   • ARIPiprazole (ABILIFY) 2 MG tablet Take 2 mg by mouth Daily.   8/22/2017 at 0800   • aspirin 81 MG chewable tablet Chew 81 mg Daily.   8/22/2017 at 0800   • atorvastatin (LIPITOR) 10 MG tablet Take 10 mg by mouth Every Night.   8/21/2017 at 2000   • busPIRone (BUSPAR) 10 MG tablet Take 10 mg by mouth 3 (Three) Times a Day.   8/22/2017 at 1600   • ciprofloxacin (CIPRO) 250 MG tablet Take 250 mg by mouth 2 (Two) Times a Day. For 7 days, therapy started on 8/18/17 8/22/2017 at 0800   • ferrous sulfate 325 (65 FE) MG tablet Take 325 mg by mouth Daily.   8/22/2017 at 0800   • FLUoxetine (PROzac) 20 MG capsule Take 20 mg by mouth Daily.   8/20/2017 at 0800   • lisinopril (PRINIVIL,ZESTRIL) 10 MG tablet Take 10 mg by mouth Daily.   8/22/2017 at 0800   • memantine (NAMENDA XR) 7 MG capsule sustained-release 24 hr extended release capsule Take 14 mg by mouth Every Night.   8/21/2017 at 2000   • raNITIdine (ZANTAC) 150 MG tablet Take 150 mg by mouth Daily.   8/22/2017 at 0800   • rivastigmine (EXELON) 6 MG capsule Take 6 mg by mouth 2 (Two) Times a Day.   8/22/2017 at 1600   • magnesium hydroxide (MILK OF MAGNESIA) 400 MG/5ML suspension Take 30 mL by mouth Daily As Needed for constipation.   8/4/2017 at unknown   • senna (SENOKOT) 8.6 MG tablet tablet Take 1 tablet by mouth Daily As Needed  for constipation.   8/4/2017 at unknown         Allergies:  Review of patient's allergies indicates no known allergies.    Objective     Vital Signs  Temp:  [98.1 °F (36.7 °C)-99.1 °F (37.3 °C)] 98.1 °F (36.7 °C)  Heart Rate:  [52-63] 52  Resp:  [16] 16  BP: (116-170)/(62-83) 116/64    Physical Exam:    Physical Exam   Constitutional: She appears well-developed and well-nourished. No distress.   HENT:   Head: Normocephalic and atraumatic.   Right Ear: External ear normal.   Left Ear: External ear normal.   Nose: Nose normal.   Mouth/Throat: Oropharynx is clear and moist. No oropharyngeal exudate.   Eyes: Conjunctivae and EOM are normal. Pupils are equal, round, and reactive to light. Right eye exhibits no discharge. Left eye exhibits no discharge. No scleral icterus.   Neck: Normal range of motion. Neck supple. No JVD present. No tracheal deviation present. No thyromegaly present.   Cardiovascular: Normal rate, regular rhythm, normal heart sounds and intact distal pulses.  Exam reveals no gallop and no friction rub.    No murmur heard.  Pulmonary/Chest: Effort normal and breath sounds normal. No stridor. No respiratory distress. She has no rales. She exhibits no tenderness.   Abdominal: Soft. Bowel sounds are normal. She exhibits no distension and no mass. There is no tenderness. There is no rebound and no guarding. No hernia.   Genitourinary:   Genitourinary Comments: No Dorado catheter   Musculoskeletal: Normal range of motion. She exhibits no edema or deformity.   Lymphadenopathy:     She has no cervical adenopathy.   Neurological: She is alert. She displays normal reflexes. No cranial nerve deficit. She exhibits normal muscle tone. Coordination normal.   Confused and unable to give a detailed answer.   Skin: Skin is warm and dry. No rash noted. She is not diaphoretic. No erythema. No pallor.   Psychiatric:   Blunted affect and confusion   Nursing note and vitals reviewed.      Results Review:    I reviewed the  patient's clinical results from admission:  Imaging Results (last 72 hours)     Procedure Component Value Units Date/Time    CT Head Without Contrast [[id number]] Collected:  08/23/17 0729     Updated:  08/23/17 0732    Narrative:          EXAMINATION: CT HEAD WO CONTRAST-      CLINICAL INDICATION:     ams     TECHNIQUE: Contiguous axial CT images of the head were obtained without  contrast administration.      Radiation dose reduction techniques were utilized per ALARA protocol.  Automated exposure control was initiated through either or Notorious or  DoseRight software packages by  protocol.       730.54 mGy.cm     COMPARISON:  None.       FINDINGS: Generalized cerebral atrophy is present. There is no mass  effect, midline displacement, or hydrocephalus. There are patchy areas  of decreased density within the periventricular white matter which  likely reflect chronic small vessel ischemic changes. There is no CT  evidence of acute infarct or hemorrhage. Bone windows reveal no osseous  abnormalities or fractures.        Impression:       Atrophy and chronic small vessel ischemic change, but there  are no acute intracranial abnormalities identified.         This report was finalized on 8/23/2017 7:29 AM by Dr. Teo Araujo MD.       XR Chest 1 View [[id number]] Collected:  08/23/17 0730     Updated:  08/23/17 0732    Narrative:       EXAMINATION: XR CHEST 1 VW-      CLINICAL INDICATION:     ams     TECHNIQUE:  XR CHEST 1 VW-      COMPARISON: NONE      FINDINGS:   The lungs remain aerated.  Heart and mediastinum contours are unremarkable.  No pleural effusion.  No pneumothorax.   Bony and soft tissue structures are unremarkable.       Impression:       No radiographic evidence of acute cardiac or pulmonary  disease.     This report was finalized on 8/23/2017 7:30 AM by Dr. Teo Araujo MD.             LABS:    Lab Results   Component Value Date    GLUCOSE 103 08/22/2017    GLUCOSE 84 08/14/2017    GLUCOSE 93  05/15/2017    BUN 20 08/22/2017    CREATININE 1.28 08/22/2017    EGFRIFNONA 41 (L) 08/22/2017    BCR 15.6 08/22/2017    CO2 27.9 08/22/2017    CALCIUM 10.3 (H) 08/22/2017    ALBUMIN 4.30 08/22/2017    LABIL2 1.6 08/22/2017    AST 20 08/22/2017    ALT 13 08/22/2017    WBC 6.72 08/22/2017    WBC 8.11 08/14/2017    WBC 7.64 05/15/2017    HGB 13.6 08/22/2017    HCT 41.3 08/22/2017    MCV 91.0 08/22/2017     08/22/2017     08/22/2017     08/14/2017     05/15/2017    K 4.4 08/22/2017    K 4.7 08/14/2017    K 4.7 05/15/2017     08/22/2017    ANIONGAP 5.1 08/22/2017       Lab Results   Component Value Date    INR 1.01 12/23/2016    INR <0.90 01/09/2016    PROTIME 11.4 12/23/2016    PROTIME 10.1 01/09/2016                 Assessment/Plan     1) acute mental status change.  Patient's been admitted to observation for monitoring.  Her mental status change this morning seems to be back at her baseline.  A CT scan reveals no acute intracranial pathology.  She has no focal deficits and is currently able to move all 4 extremities.  2) dementia  3) advanced age  4) hypertension  5) hyperlipidemia      I discussed the patients findings and my recommendations with patient and nursing staff.     Ricky Alamo MD  08/23/17  8:34 AM    Time: 33 minutes of time with history and physical Court next of care

## 2017-08-23 NOTE — PLAN OF CARE
Problem: Activity Intolerance (Adult)  Goal: Identify Related Risk Factors and Signs and Symptoms  Outcome: Ongoing (interventions implemented as appropriate)    Problem: Skin Integrity Impairment, Risk/Actual (Adult)  Goal: Skin Integrity/Wound Healing  Outcome: Ongoing (interventions implemented as appropriate)

## 2017-08-23 NOTE — DISCHARGE SUMMARY
Date of Admission:   8/22/2017  5:40 PM    Date of Discharge: 08/23/2017       Discharge Diagnosis:   Active Problems:   1) acute mental status change.  Patient's been admitted to observation for monitoring.  Her mental status change this morning seems to be back at her baseline.  A CT scan reveals no acute intracranial pathology.  She has no focal deficits and is currently able to move all 4 extremities.  2) dementia  3) advanced age  4) hypertension  5) hyperlipidemia    Presenting Problem/History of Present Illness  See History and Physical for Presenting Problems / Illnesses.       Hospital Course  Patient is a 72 y.o. female presented with increasing confusion and agitation from the nursing home.  Evaluation with a CT scan of the head revealed no acute intracranial pathology.  She does have age associated vascular changes as well as atrophy.  Otherwise her CBC, CMP and urinalysis are unremarkable.  This morning she is alert and talking but remains confused.  She seems to be at her baseline status.  There was some question as to whether she was having some possible seizure activity.  I see no obvious seizure activity this morning and nurses from the nursing home reported no obvious agitation.  This morning she does complain of some mild feet pain.  Pulses seem intact she's been started on gabapentin for neuropathy.  Otherwise she is being discharged back to the nursing home..      Procedures Performed         Consults:   Consults     Date and Time Order Name Status Description    8/22/2017 2113 Internal Medicine (Gerard/Rashaun/Jasmeet)            Condition on Discharge:    Poor   .  Vital Signs  Temp:  [98.1 °F (36.7 °C)-99.1 °F (37.3 °C)] 98.1 °F (36.7 °C)  Heart Rate:  [52-63] 52  Resp:  [16] 16  BP: (116-170)/(62-83) 116/64    Physical Exam:     Constitutional: She appears well-developed and well-nourished. No distress.   HENT:   Head: Normocephalic and atraumatic.   Right Ear: External ear normal.   Left  Ear: External ear normal.   Nose: Nose normal.   Mouth/Throat: Oropharynx is clear and moist. No oropharyngeal exudate.   Eyes: Conjunctivae and EOM are normal. Pupils are equal, round, and reactive to light. Right eye exhibits no discharge. Left eye exhibits no discharge. No scleral icterus.   Neck: Normal range of motion. Neck supple. No JVD present. No tracheal deviation present. No thyromegaly present.   Cardiovascular: Normal rate, regular rhythm, normal heart sounds and intact distal pulses.  Exam reveals no gallop and no friction rub.    No murmur heard.  Pulmonary/Chest: Effort normal and breath sounds normal. No stridor. No respiratory distress. She has no rales. She exhibits no tenderness.   Abdominal: Soft. Bowel sounds are normal. She exhibits no distension and no mass. There is no tenderness. There is no rebound and no guarding. No hernia.   Genitourinary:   Genitourinary Comments: No Dorado catheter   Musculoskeletal: Normal range of motion. She exhibits no edema or deformity.   Lymphadenopathy:     She has no cervical adenopathy.   Neurological: She is alert. She displays normal reflexes. No cranial nerve deficit. She exhibits normal muscle tone. Coordination normal.   Confused and unable to give a detailed answer.   Skin: Skin is warm and dry. No rash noted. She is not diaphoretic. No erythema. No pallor.   Psychiatric:   Blunted affect and confusion   Nursing note and vitals reviewed.  Discharge Disposition  Skilled Nursing Facility (DC - External)    Discharge Medications   Catherine Snyder   Home Medication Instructions MELINDA:[id number]    Printed on:08/23/17 5483   Medication Information                      acetaminophen (TYLENOL) 500 MG tablet  Take 500 mg by mouth Every 4 (Four) Hours As Needed for Mild Pain  or Fever.             ARIPiprazole (ABILIFY) 2 MG tablet  Take 2 mg by mouth Daily.             aspirin 81 MG chewable tablet  Chew 81 mg Daily.             busPIRone (BUSPAR) 10 MG  tablet  Take 0.5 tablets by mouth 2 (Two) Times a Day.             ferrous sulfate 325 (65 FE) MG tablet  Take 325 mg by mouth Daily.             FLUoxetine (PROzac) 20 MG capsule  Take 20 mg by mouth Daily.             gabapentin (NEURONTIN) 100 MG capsule  Take 1 capsule by mouth 2 (Two) Times a Day.             magnesium hydroxide (MILK OF MAGNESIA) 400 MG/5ML suspension  Take 30 mL by mouth Daily As Needed for constipation.             memantine (NAMENDA XR) 7 MG capsule sustained-release 24 hr extended release capsule  Take 14 mg by mouth Every Night.             rivastigmine (EXELON) 6 MG capsule  Take 6 mg by mouth 2 (Two) Times a Day.             senna (SENOKOT) 8.6 MG tablet tablet  Take 1 tablet by mouth Daily As Needed for constipation.                 Discharge Diet:   Diet Instructions     Advance Diet As Tolerated                     Activity at Discharge:   Activity Instructions     Activity as Tolerated                     Follow-up Appointments  Additional Instructions for the Follow-ups that You Need to Schedule     Discharge Follow-up with PCP    As directed    Follow Up Details:  Stephanie Keira BRANDT at [facility] August 24             Follow-up Information     Follow up with Ricky Alamo MD .    Specialty:  Internal Medicine    Why:  Stephanie Crockett RIKKI at [facility] August 24    Contact information:    [address] [place] [address]  [phone]             Ricky Alamo MD  08/23/17  8:45 AM    Time: Discharge 33 min

## 2017-09-21 ENCOUNTER — APPOINTMENT (OUTPATIENT)
Dept: CT IMAGING | Facility: HOSPITAL | Age: 72
End: 2017-09-21

## 2017-09-21 ENCOUNTER — HOSPITAL ENCOUNTER (INPATIENT)
Facility: HOSPITAL | Age: 72
LOS: 3 days | Discharge: NURSING, REHAB OR LONG-TERM CARE | End: 2017-09-25
Attending: EMERGENCY MEDICINE | Admitting: INTERNAL MEDICINE

## 2017-09-21 ENCOUNTER — APPOINTMENT (OUTPATIENT)
Dept: GENERAL RADIOLOGY | Facility: HOSPITAL | Age: 72
End: 2017-09-21

## 2017-09-21 DIAGNOSIS — I63.9 CEREBROVASCULAR ACCIDENT (CVA), UNSPECIFIED MECHANISM (HCC): Primary | ICD-10-CM

## 2017-09-21 LAB
ALBUMIN SERPL-MCNC: 3.8 G/DL (ref 3.4–4.8)
ALBUMIN/GLOB SERPL: 1.6 G/DL (ref 1.5–2.5)
ALP SERPL-CCNC: 102 U/L (ref 35–104)
ALT SERPL W P-5'-P-CCNC: 17 U/L (ref 10–36)
ANION GAP SERPL CALCULATED.3IONS-SCNC: 7 MMOL/L (ref 3.6–11.2)
APTT PPP: 28.4 SECONDS (ref 23.8–36.1)
AST SERPL-CCNC: 24 U/L (ref 10–30)
BASOPHILS # BLD AUTO: 0.02 10*3/MM3 (ref 0–0.3)
BASOPHILS NFR BLD AUTO: 0.3 % (ref 0–2)
BILIRUB SERPL-MCNC: 0.3 MG/DL (ref 0.2–1.8)
BUN BLD-MCNC: 20 MG/DL (ref 7–21)
BUN/CREAT SERPL: 18 (ref 7–25)
CALCIUM SPEC-SCNC: 9.9 MG/DL (ref 7.7–10)
CHLORIDE SERPL-SCNC: 105 MMOL/L (ref 99–112)
CO2 SERPL-SCNC: 28 MMOL/L (ref 24.3–31.9)
CREAT BLD-MCNC: 1.11 MG/DL (ref 0.43–1.29)
DEPRECATED RDW RBC AUTO: 43.4 FL (ref 37–54)
EOSINOPHIL # BLD AUTO: 0.43 10*3/MM3 (ref 0–0.7)
EOSINOPHIL NFR BLD AUTO: 6.5 % (ref 0–7)
ERYTHROCYTE [DISTWIDTH] IN BLOOD BY AUTOMATED COUNT: 13.2 % (ref 11.5–14.5)
GFR SERPL CREATININE-BSD FRML MDRD: 48 ML/MIN/1.73
GLOBULIN UR ELPH-MCNC: 2.4 GM/DL
GLUCOSE BLD-MCNC: 87 MG/DL (ref 70–110)
GLUCOSE BLDC GLUCOMTR-MCNC: 83 MG/DL (ref 70–130)
HCT VFR BLD AUTO: 40.2 % (ref 37–47)
HGB BLD-MCNC: 12.9 G/DL (ref 12–16)
HOLD SPECIMEN: NORMAL
HOLD SPECIMEN: NORMAL
IMM GRANULOCYTES # BLD: 0.02 10*3/MM3 (ref 0–0.03)
IMM GRANULOCYTES NFR BLD: 0.3 % (ref 0–0.5)
INR PPP: 0.98 (ref 0.9–1.1)
LYMPHOCYTES # BLD AUTO: 2.56 10*3/MM3 (ref 1–3)
LYMPHOCYTES NFR BLD AUTO: 38.5 % (ref 16–46)
MCH RBC QN AUTO: 29.2 PG (ref 27–33)
MCHC RBC AUTO-ENTMCNC: 32.1 G/DL (ref 33–37)
MCV RBC AUTO: 91 FL (ref 80–94)
MONOCYTES # BLD AUTO: 0.56 10*3/MM3 (ref 0.1–0.9)
MONOCYTES NFR BLD AUTO: 8.4 % (ref 0–12)
NEUTROPHILS # BLD AUTO: 3.06 10*3/MM3 (ref 1.4–6.5)
NEUTROPHILS NFR BLD AUTO: 46 % (ref 40–75)
OSMOLALITY SERPL CALC.SUM OF ELEC: 281.4 MOSM/KG (ref 273–305)
PLATELET # BLD AUTO: 226 10*3/MM3 (ref 130–400)
PMV BLD AUTO: 10.6 FL (ref 6–10)
POTASSIUM BLD-SCNC: 4.4 MMOL/L (ref 3.5–5.3)
PROT SERPL-MCNC: 6.2 G/DL (ref 6–8)
PROTHROMBIN TIME: 13.1 SECONDS (ref 11–15.4)
RBC # BLD AUTO: 4.42 10*6/MM3 (ref 4.2–5.4)
SODIUM BLD-SCNC: 140 MMOL/L (ref 135–153)
TROPONIN I SERPL-MCNC: <0.006 NG/ML
WBC NRBC COR # BLD: 6.65 10*3/MM3 (ref 4.5–12.5)
WHOLE BLOOD HOLD SPECIMEN: NORMAL
WHOLE BLOOD HOLD SPECIMEN: NORMAL

## 2017-09-21 PROCEDURE — 93010 ELECTROCARDIOGRAM REPORT: CPT | Performed by: INTERNAL MEDICINE

## 2017-09-21 PROCEDURE — 83036 HEMOGLOBIN GLYCOSYLATED A1C: CPT | Performed by: INTERNAL MEDICINE

## 2017-09-21 PROCEDURE — G0378 HOSPITAL OBSERVATION PER HR: HCPCS

## 2017-09-21 PROCEDURE — 71010 HC CHEST PA OR AP: CPT

## 2017-09-21 PROCEDURE — 85025 COMPLETE CBC W/AUTO DIFF WBC: CPT | Performed by: EMERGENCY MEDICINE

## 2017-09-21 PROCEDURE — 70450 CT HEAD/BRAIN W/O DYE: CPT

## 2017-09-21 PROCEDURE — 85730 THROMBOPLASTIN TIME PARTIAL: CPT | Performed by: EMERGENCY MEDICINE

## 2017-09-21 PROCEDURE — 82962 GLUCOSE BLOOD TEST: CPT

## 2017-09-21 PROCEDURE — 80053 COMPREHEN METABOLIC PANEL: CPT | Performed by: EMERGENCY MEDICINE

## 2017-09-21 PROCEDURE — 84484 ASSAY OF TROPONIN QUANT: CPT | Performed by: EMERGENCY MEDICINE

## 2017-09-21 PROCEDURE — 85610 PROTHROMBIN TIME: CPT | Performed by: EMERGENCY MEDICINE

## 2017-09-21 PROCEDURE — 99284 EMERGENCY DEPT VISIT MOD MDM: CPT

## 2017-09-21 PROCEDURE — 93005 ELECTROCARDIOGRAM TRACING: CPT | Performed by: EMERGENCY MEDICINE

## 2017-09-21 PROCEDURE — 70450 CT HEAD/BRAIN W/O DYE: CPT | Performed by: RADIOLOGY

## 2017-09-21 PROCEDURE — 71010 XR CHEST 1 VW: CPT | Performed by: RADIOLOGY

## 2017-09-21 RX ORDER — BUSPIRONE HYDROCHLORIDE 5 MG/1
5 TABLET ORAL 2 TIMES DAILY
Status: ON HOLD | COMMUNITY
End: 2017-09-22

## 2017-09-21 RX ORDER — GABAPENTIN 100 MG/1
100 CAPSULE ORAL 2 TIMES DAILY
Status: CANCELLED | OUTPATIENT
Start: 2017-09-21

## 2017-09-21 RX ORDER — BISACODYL 10 MG
10 SUPPOSITORY, RECTAL RECTAL DAILY PRN
COMMUNITY

## 2017-09-21 RX ORDER — MEMANTINE HYDROCHLORIDE 14 MG/1
14 CAPSULE, EXTENDED RELEASE ORAL NIGHTLY
COMMUNITY
End: 2017-09-25 | Stop reason: HOSPADM

## 2017-09-21 RX ORDER — SODIUM CHLORIDE 0.9 % (FLUSH) 0.9 %
10 SYRINGE (ML) INJECTION AS NEEDED
Status: DISCONTINUED | OUTPATIENT
Start: 2017-09-21 | End: 2017-09-25 | Stop reason: HOSPADM

## 2017-09-22 ENCOUNTER — APPOINTMENT (OUTPATIENT)
Dept: GENERAL RADIOLOGY | Facility: HOSPITAL | Age: 72
End: 2017-09-22

## 2017-09-22 ENCOUNTER — APPOINTMENT (OUTPATIENT)
Dept: ULTRASOUND IMAGING | Facility: HOSPITAL | Age: 72
End: 2017-09-22
Attending: INTERNAL MEDICINE

## 2017-09-22 ENCOUNTER — APPOINTMENT (OUTPATIENT)
Dept: CARDIOLOGY | Facility: HOSPITAL | Age: 72
End: 2017-09-22
Attending: INTERNAL MEDICINE

## 2017-09-22 LAB
GLUCOSE BLDC GLUCOMTR-MCNC: 89 MG/DL (ref 70–130)
HBA1C MFR BLD: 5.6 % (ref 4.5–5.7)
TROPONIN I SERPL-MCNC: <0.006 NG/ML
TROPONIN I SERPL-MCNC: <0.006 NG/ML

## 2017-09-22 PROCEDURE — 94799 UNLISTED PULMONARY SVC/PX: CPT

## 2017-09-22 PROCEDURE — 74230 X-RAY XM SWLNG FUNCJ C+: CPT | Performed by: RADIOLOGY

## 2017-09-22 PROCEDURE — 74230 X-RAY XM SWLNG FUNCJ C+: CPT

## 2017-09-22 PROCEDURE — 97116 GAIT TRAINING THERAPY: CPT

## 2017-09-22 PROCEDURE — G8997 SWALLOW GOAL STATUS: HCPCS | Performed by: SPEECH-LANGUAGE PATHOLOGIST

## 2017-09-22 PROCEDURE — 97163 PT EVAL HIGH COMPLEX 45 MIN: CPT

## 2017-09-22 PROCEDURE — 97530 THERAPEUTIC ACTIVITIES: CPT

## 2017-09-22 PROCEDURE — 25010000002 ENOXAPARIN PER 10 MG: Performed by: INTERNAL MEDICINE

## 2017-09-22 PROCEDURE — 84484 ASSAY OF TROPONIN QUANT: CPT | Performed by: INTERNAL MEDICINE

## 2017-09-22 PROCEDURE — G8978 MOBILITY CURRENT STATUS: HCPCS

## 2017-09-22 PROCEDURE — 92611 MOTION FLUOROSCOPY/SWALLOW: CPT | Performed by: SPEECH-LANGUAGE PATHOLOGIST

## 2017-09-22 PROCEDURE — G8996 SWALLOW CURRENT STATUS: HCPCS | Performed by: SPEECH-LANGUAGE PATHOLOGIST

## 2017-09-22 PROCEDURE — G8998 SWALLOW D/C STATUS: HCPCS | Performed by: SPEECH-LANGUAGE PATHOLOGIST

## 2017-09-22 PROCEDURE — G8979 MOBILITY GOAL STATUS: HCPCS

## 2017-09-22 PROCEDURE — 82962 GLUCOSE BLOOD TEST: CPT

## 2017-09-22 RX ORDER — ACETAMINOPHEN 325 MG/1
650 TABLET ORAL EVERY 4 HOURS PRN
Status: DISCONTINUED | OUTPATIENT
Start: 2017-09-22 | End: 2017-09-25 | Stop reason: HOSPADM

## 2017-09-22 RX ORDER — FLUOXETINE HYDROCHLORIDE 20 MG/1
20 CAPSULE ORAL DAILY
Status: DISCONTINUED | OUTPATIENT
Start: 2017-09-22 | End: 2017-09-23

## 2017-09-22 RX ORDER — ASPIRIN 81 MG/1
81 TABLET ORAL DAILY
Status: DISCONTINUED | OUTPATIENT
Start: 2017-09-22 | End: 2017-09-22 | Stop reason: SDUPTHER

## 2017-09-22 RX ORDER — GABAPENTIN 100 MG/1
100 CAPSULE ORAL EVERY 12 HOURS SCHEDULED
Status: CANCELLED | OUTPATIENT
Start: 2017-09-22

## 2017-09-22 RX ORDER — RIVASTIGMINE TARTRATE 1.5 MG/1
6 CAPSULE ORAL 2 TIMES DAILY WITH MEALS
Status: DISCONTINUED | OUTPATIENT
Start: 2017-09-22 | End: 2017-09-23

## 2017-09-22 RX ORDER — ONDANSETRON 4 MG/1
4 TABLET, ORALLY DISINTEGRATING ORAL EVERY 6 HOURS PRN
Status: DISCONTINUED | OUTPATIENT
Start: 2017-09-22 | End: 2017-09-25 | Stop reason: HOSPADM

## 2017-09-22 RX ORDER — ARIPIPRAZOLE 2 MG/1
2 TABLET ORAL DAILY
Status: CANCELLED | OUTPATIENT
Start: 2017-09-22

## 2017-09-22 RX ORDER — ATORVASTATIN CALCIUM 10 MG/1
10 TABLET, FILM COATED ORAL NIGHTLY
Status: DISCONTINUED | OUTPATIENT
Start: 2017-09-22 | End: 2017-09-25 | Stop reason: HOSPADM

## 2017-09-22 RX ORDER — FERROUS SULFATE 325(65) MG
325 TABLET ORAL DAILY
Status: DISCONTINUED | OUTPATIENT
Start: 2017-09-22 | End: 2017-09-25 | Stop reason: HOSPADM

## 2017-09-22 RX ORDER — SENNA PLUS 8.6 MG/1
1 TABLET ORAL DAILY PRN
Status: DISCONTINUED | OUTPATIENT
Start: 2017-09-22 | End: 2017-09-25 | Stop reason: HOSPADM

## 2017-09-22 RX ORDER — ASPIRIN 81 MG/1
81 TABLET, CHEWABLE ORAL DAILY
Status: DISCONTINUED | OUTPATIENT
Start: 2017-09-22 | End: 2017-09-25 | Stop reason: HOSPADM

## 2017-09-22 RX ORDER — SODIUM CHLORIDE 0.9 % (FLUSH) 0.9 %
1-10 SYRINGE (ML) INJECTION AS NEEDED
Status: DISCONTINUED | OUTPATIENT
Start: 2017-09-22 | End: 2017-09-25 | Stop reason: HOSPADM

## 2017-09-22 RX ORDER — ACETAMINOPHEN 325 MG/1
500 TABLET ORAL EVERY 4 HOURS PRN
Status: DISCONTINUED | OUTPATIENT
Start: 2017-09-22 | End: 2017-09-22 | Stop reason: SDUPTHER

## 2017-09-22 RX ORDER — MEMANTINE HYDROCHLORIDE 5 MG/1
5 TABLET ORAL EVERY 12 HOURS SCHEDULED
Status: DISCONTINUED | OUTPATIENT
Start: 2017-09-22 | End: 2017-09-25 | Stop reason: HOSPADM

## 2017-09-22 RX ORDER — ONDANSETRON 4 MG/1
4 TABLET, FILM COATED ORAL EVERY 6 HOURS PRN
Status: DISCONTINUED | OUTPATIENT
Start: 2017-09-22 | End: 2017-09-25 | Stop reason: HOSPADM

## 2017-09-22 RX ORDER — NITROGLYCERIN 0.4 MG/1
0.4 TABLET SUBLINGUAL
Status: DISCONTINUED | OUTPATIENT
Start: 2017-09-22 | End: 2017-09-25 | Stop reason: HOSPADM

## 2017-09-22 RX ORDER — BUSPIRONE HYDROCHLORIDE 5 MG/1
5 TABLET ORAL EVERY 12 HOURS SCHEDULED
Status: CANCELLED | OUTPATIENT
Start: 2017-09-22

## 2017-09-22 RX ORDER — CLOPIDOGREL BISULFATE 75 MG/1
75 TABLET ORAL DAILY
Status: DISCONTINUED | OUTPATIENT
Start: 2017-09-22 | End: 2017-09-25 | Stop reason: HOSPADM

## 2017-09-22 RX ORDER — BISACODYL 10 MG
10 SUPPOSITORY, RECTAL RECTAL DAILY PRN
Status: DISCONTINUED | OUTPATIENT
Start: 2017-09-22 | End: 2017-09-25 | Stop reason: HOSPADM

## 2017-09-22 RX ORDER — ONDANSETRON 2 MG/ML
4 INJECTION INTRAMUSCULAR; INTRAVENOUS EVERY 6 HOURS PRN
Status: DISCONTINUED | OUTPATIENT
Start: 2017-09-22 | End: 2017-09-25 | Stop reason: HOSPADM

## 2017-09-22 RX ADMIN — ATORVASTATIN CALCIUM 10 MG: 10 TABLET, FILM COATED ORAL at 20:02

## 2017-09-22 RX ADMIN — ASPIRIN 81 MG: 81 TABLET, CHEWABLE ORAL at 09:56

## 2017-09-22 RX ADMIN — ENOXAPARIN SODIUM 40 MG: 40 INJECTION SUBCUTANEOUS at 09:40

## 2017-09-22 RX ADMIN — MEMANTINE HYDROCHLORIDE 5 MG: 5 TABLET ORAL at 09:40

## 2017-09-22 RX ADMIN — FERROUS SULFATE TAB 325 MG (65 MG ELEMENTAL FE) 325 MG: 325 (65 FE) TAB at 09:40

## 2017-09-22 RX ADMIN — CLOPIDOGREL 75 MG: 75 TABLET, FILM COATED ORAL at 09:59

## 2017-09-22 RX ADMIN — RIVASTIGMINE TARTRATE 6 MG: 1.5 CAPSULE ORAL at 09:40

## 2017-09-22 RX ADMIN — MEMANTINE HYDROCHLORIDE 5 MG: 5 TABLET ORAL at 20:02

## 2017-09-22 RX ADMIN — FLUOXETINE 20 MG: 20 CAPSULE ORAL at 09:40

## 2017-09-23 ENCOUNTER — APPOINTMENT (OUTPATIENT)
Dept: CARDIOLOGY | Facility: HOSPITAL | Age: 72
End: 2017-09-23
Attending: INTERNAL MEDICINE

## 2017-09-23 ENCOUNTER — APPOINTMENT (OUTPATIENT)
Dept: ULTRASOUND IMAGING | Facility: HOSPITAL | Age: 72
End: 2017-09-23
Attending: INTERNAL MEDICINE

## 2017-09-23 LAB
ALBUMIN SERPL-MCNC: 3.7 G/DL (ref 3.4–4.8)
ALBUMIN/GLOB SERPL: 1.4 G/DL (ref 1.5–2.5)
ALP SERPL-CCNC: 105 U/L (ref 35–104)
ALT SERPL W P-5'-P-CCNC: 18 U/L (ref 10–36)
ANION GAP SERPL CALCULATED.3IONS-SCNC: 5.6 MMOL/L (ref 3.6–11.2)
AST SERPL-CCNC: 23 U/L (ref 10–30)
BASOPHILS # BLD AUTO: 0.03 10*3/MM3 (ref 0–0.3)
BASOPHILS NFR BLD AUTO: 0.4 % (ref 0–2)
BH CV ECHO MEAS - % IVS THICK: 12 %
BH CV ECHO MEAS - % LVPW THICK: 3.4 %
BH CV ECHO MEAS - ACS: 1.8 CM
BH CV ECHO MEAS - AO ROOT AREA (BSA CORRECTED): 2
BH CV ECHO MEAS - AO ROOT AREA: 10.9 CM^2
BH CV ECHO MEAS - AO ROOT DIAM: 3.7 CM
BH CV ECHO MEAS - BSA(HAYCOCK): 1.9 M^2
BH CV ECHO MEAS - BSA: 1.8 M^2
BH CV ECHO MEAS - BZI_BMI: 28.1 KILOGRAMS/M^2
BH CV ECHO MEAS - BZI_METRIC_HEIGHT: 165.1 CM
BH CV ECHO MEAS - BZI_METRIC_WEIGHT: 76.7 KG
BH CV ECHO MEAS - CONTRAST EF 4CH: 54.8 ML/M^2
BH CV ECHO MEAS - EDV(CUBED): 144.9 ML
BH CV ECHO MEAS - EDV(MOD-SP4): 31 ML
BH CV ECHO MEAS - EDV(TEICH): 132.6 ML
BH CV ECHO MEAS - EF(CUBED): 74.2 %
BH CV ECHO MEAS - EF(MOD-SP4): 54.8 %
BH CV ECHO MEAS - EF(TEICH): 65.7 %
BH CV ECHO MEAS - ESV(CUBED): 37.4 ML
BH CV ECHO MEAS - ESV(MOD-SP4): 14 ML
BH CV ECHO MEAS - ESV(TEICH): 45.5 ML
BH CV ECHO MEAS - FS: 36.4 %
BH CV ECHO MEAS - IVS/LVPW: 0.86
BH CV ECHO MEAS - IVSD: 1.1 CM
BH CV ECHO MEAS - IVSS: 1.2 CM
BH CV ECHO MEAS - LA DIMENSION: 2.3 CM
BH CV ECHO MEAS - LA/AO: 0.62
BH CV ECHO MEAS - LV DIASTOLIC VOL/BSA (35-75): 16.8 ML/M^2
BH CV ECHO MEAS - LV MASS(C)D: 244.8 GRAMS
BH CV ECHO MEAS - LV MASS(C)DI: 133 GRAMS/M^2
BH CV ECHO MEAS - LV MASS(C)S: 137 GRAMS
BH CV ECHO MEAS - LV MASS(C)SI: 74.4 GRAMS/M^2
BH CV ECHO MEAS - LV SYSTOLIC VOL/BSA (12-30): 7.6 ML/M^2
BH CV ECHO MEAS - LVIDD: 5.3 CM
BH CV ECHO MEAS - LVIDS: 3.3 CM
BH CV ECHO MEAS - LVLD AP4: 6.1 CM
BH CV ECHO MEAS - LVLS AP4: 4.7 CM
BH CV ECHO MEAS - LVOT AREA (M): 3.1 CM^2
BH CV ECHO MEAS - LVOT AREA: 3 CM^2
BH CV ECHO MEAS - LVOT DIAM: 2 CM
BH CV ECHO MEAS - LVPWD: 1.3 CM
BH CV ECHO MEAS - LVPWS: 1.3 CM
BH CV ECHO MEAS - MV A MAX VEL: 79.5 CM/SEC
BH CV ECHO MEAS - MV DEC SLOPE: 278.3 CM/SEC^2
BH CV ECHO MEAS - MV E MAX VEL: 65.2 CM/SEC
BH CV ECHO MEAS - MV E/A: 0.82
BH CV ECHO MEAS - MV P1/2T MAX VEL: 65.6 CM/SEC
BH CV ECHO MEAS - MV P1/2T: 69.1 MSEC
BH CV ECHO MEAS - MVA P1/2T LCG: 3.4 CM^2
BH CV ECHO MEAS - MVA(P1/2T): 3.2 CM^2
BH CV ECHO MEAS - RAP SYSTOLE: 10 MMHG
BH CV ECHO MEAS - RVDD: 1.9 CM
BH CV ECHO MEAS - RVSP: 35 MMHG
BH CV ECHO MEAS - SI(CUBED): 58.4 ML/M^2
BH CV ECHO MEAS - SI(MOD-SP4): 9.2 ML/M^2
BH CV ECHO MEAS - SI(TEICH): 47.3 ML/M^2
BH CV ECHO MEAS - SV(CUBED): 107.6 ML
BH CV ECHO MEAS - SV(MOD-SP4): 17 ML
BH CV ECHO MEAS - SV(TEICH): 87 ML
BH CV ECHO MEAS - TR MAX VEL: 249.8 CM/SEC
BILIRUB SERPL-MCNC: 0.4 MG/DL (ref 0.2–1.8)
BNP SERPL-MCNC: 13 PG/ML (ref 0–100)
BUN BLD-MCNC: 24 MG/DL (ref 7–21)
BUN/CREAT SERPL: 20.9 (ref 7–25)
CALCIUM SPEC-SCNC: 10.1 MG/DL (ref 7.7–10)
CHLORIDE SERPL-SCNC: 107 MMOL/L (ref 99–112)
CHOLEST SERPL-MCNC: 289 MG/DL (ref 0–200)
CHOLEST SERPL-MCNC: 294 MG/DL (ref 0–200)
CO2 SERPL-SCNC: 27.4 MMOL/L (ref 24.3–31.9)
CREAT BLD-MCNC: 1.15 MG/DL (ref 0.43–1.29)
DEPRECATED RDW RBC AUTO: 42.4 FL (ref 37–54)
EOSINOPHIL # BLD AUTO: 0.56 10*3/MM3 (ref 0–0.7)
EOSINOPHIL NFR BLD AUTO: 8.3 % (ref 0–7)
ERYTHROCYTE [DISTWIDTH] IN BLOOD BY AUTOMATED COUNT: 13.1 % (ref 11.5–14.5)
GFR SERPL CREATININE-BSD FRML MDRD: 46 ML/MIN/1.73
GLOBULIN UR ELPH-MCNC: 2.6 GM/DL
GLUCOSE BLD-MCNC: 101 MG/DL (ref 70–110)
GLUCOSE BLDC GLUCOMTR-MCNC: 89 MG/DL (ref 70–130)
GLUCOSE BLDC GLUCOMTR-MCNC: 91 MG/DL (ref 70–130)
HCT VFR BLD AUTO: 38.3 % (ref 37–47)
HDLC SERPL-MCNC: 40 MG/DL (ref 60–100)
HDLC SERPL-MCNC: 41 MG/DL (ref 60–100)
HGB BLD-MCNC: 12.7 G/DL (ref 12–16)
IMM GRANULOCYTES # BLD: 0.01 10*3/MM3 (ref 0–0.03)
IMM GRANULOCYTES NFR BLD: 0.1 % (ref 0–0.5)
LDLC SERPL CALC-MCNC: 220 MG/DL (ref 0–100)
LDLC SERPL CALC-MCNC: 224 MG/DL (ref 0–100)
LDLC/HDLC SERPL: 5.46 {RATIO}
LDLC/HDLC SERPL: 5.5 {RATIO}
LV EF 2D ECHO EST: 60 %
LYMPHOCYTES # BLD AUTO: 1.88 10*3/MM3 (ref 1–3)
LYMPHOCYTES NFR BLD AUTO: 27.8 % (ref 16–46)
MCH RBC QN AUTO: 30.1 PG (ref 27–33)
MCHC RBC AUTO-ENTMCNC: 33.2 G/DL (ref 33–37)
MCV RBC AUTO: 90.8 FL (ref 80–94)
MONOCYTES # BLD AUTO: 0.57 10*3/MM3 (ref 0.1–0.9)
MONOCYTES NFR BLD AUTO: 8.4 % (ref 0–12)
NEUTROPHILS # BLD AUTO: 3.71 10*3/MM3 (ref 1.4–6.5)
NEUTROPHILS NFR BLD AUTO: 55 % (ref 40–75)
OSMOLALITY SERPL CALC.SUM OF ELEC: 283.6 MOSM/KG (ref 273–305)
PLATELET # BLD AUTO: 236 10*3/MM3 (ref 130–400)
PMV BLD AUTO: 10.4 FL (ref 6–10)
POTASSIUM BLD-SCNC: 4 MMOL/L (ref 3.5–5.3)
PROT SERPL-MCNC: 6.3 G/DL (ref 6–8)
RBC # BLD AUTO: 4.22 10*6/MM3 (ref 4.2–5.4)
SODIUM BLD-SCNC: 140 MMOL/L (ref 135–153)
TRIGL SERPL-MCNC: 145 MG/DL (ref 0–150)
TRIGL SERPL-MCNC: 146 MG/DL (ref 0–150)
TROPONIN I SERPL-MCNC: <0.006 NG/ML
VLDLC SERPL-MCNC: 29 MG/DL
VLDLC SERPL-MCNC: 29.2 MG/DL
WBC NRBC COR # BLD: 6.76 10*3/MM3 (ref 4.5–12.5)

## 2017-09-23 PROCEDURE — 93306 TTE W/DOPPLER COMPLETE: CPT | Performed by: INTERNAL MEDICINE

## 2017-09-23 PROCEDURE — 85025 COMPLETE CBC W/AUTO DIFF WBC: CPT | Performed by: INTERNAL MEDICINE

## 2017-09-23 PROCEDURE — 94799 UNLISTED PULMONARY SVC/PX: CPT

## 2017-09-23 PROCEDURE — 82962 GLUCOSE BLOOD TEST: CPT

## 2017-09-23 PROCEDURE — 25010000002 ENOXAPARIN PER 10 MG: Performed by: INTERNAL MEDICINE

## 2017-09-23 PROCEDURE — 80061 LIPID PANEL: CPT | Performed by: INTERNAL MEDICINE

## 2017-09-23 PROCEDURE — 83880 ASSAY OF NATRIURETIC PEPTIDE: CPT | Performed by: INTERNAL MEDICINE

## 2017-09-23 PROCEDURE — 93880 EXTRACRANIAL BILAT STUDY: CPT | Performed by: RADIOLOGY

## 2017-09-23 PROCEDURE — 80053 COMPREHEN METABOLIC PANEL: CPT | Performed by: INTERNAL MEDICINE

## 2017-09-23 PROCEDURE — 93880 EXTRACRANIAL BILAT STUDY: CPT

## 2017-09-23 PROCEDURE — 93306 TTE W/DOPPLER COMPLETE: CPT

## 2017-09-23 PROCEDURE — 84484 ASSAY OF TROPONIN QUANT: CPT | Performed by: INTERNAL MEDICINE

## 2017-09-23 RX ORDER — FLUOXETINE 10 MG/1
10 CAPSULE ORAL DAILY
Status: DISCONTINUED | OUTPATIENT
Start: 2017-09-24 | End: 2017-09-25 | Stop reason: HOSPADM

## 2017-09-23 RX ADMIN — ASPIRIN 81 MG: 81 TABLET, CHEWABLE ORAL at 08:06

## 2017-09-23 RX ADMIN — MEMANTINE HYDROCHLORIDE 5 MG: 5 TABLET ORAL at 19:58

## 2017-09-23 RX ADMIN — ENOXAPARIN SODIUM 40 MG: 40 INJECTION SUBCUTANEOUS at 08:06

## 2017-09-23 RX ADMIN — RIVASTIGMINE TARTRATE 6 MG: 1.5 CAPSULE ORAL at 08:08

## 2017-09-23 RX ADMIN — FERROUS SULFATE TAB 325 MG (65 MG ELEMENTAL FE) 325 MG: 325 (65 FE) TAB at 08:06

## 2017-09-23 RX ADMIN — FLUOXETINE 20 MG: 20 CAPSULE ORAL at 08:06

## 2017-09-23 RX ADMIN — ATORVASTATIN CALCIUM 10 MG: 10 TABLET, FILM COATED ORAL at 19:58

## 2017-09-23 RX ADMIN — CLOPIDOGREL 75 MG: 75 TABLET, FILM COATED ORAL at 08:06

## 2017-09-23 RX ADMIN — MEMANTINE HYDROCHLORIDE 5 MG: 5 TABLET ORAL at 08:07

## 2017-09-24 LAB
ALBUMIN SERPL-MCNC: 3.7 G/DL (ref 3.4–4.8)
ALBUMIN/GLOB SERPL: 1.5 G/DL (ref 1.5–2.5)
ALP SERPL-CCNC: 103 U/L (ref 35–104)
ALT SERPL W P-5'-P-CCNC: 15 U/L (ref 10–36)
ANION GAP SERPL CALCULATED.3IONS-SCNC: 5.3 MMOL/L (ref 3.6–11.2)
AST SERPL-CCNC: 15 U/L (ref 10–30)
BASOPHILS # BLD AUTO: 0.04 10*3/MM3 (ref 0–0.3)
BASOPHILS NFR BLD AUTO: 0.6 % (ref 0–2)
BILIRUB SERPL-MCNC: 0.4 MG/DL (ref 0.2–1.8)
BUN BLD-MCNC: 22 MG/DL (ref 7–21)
BUN/CREAT SERPL: 19.1 (ref 7–25)
CALCIUM SPEC-SCNC: 10 MG/DL (ref 7.7–10)
CHLORIDE SERPL-SCNC: 108 MMOL/L (ref 99–112)
CO2 SERPL-SCNC: 26.7 MMOL/L (ref 24.3–31.9)
CREAT BLD-MCNC: 1.15 MG/DL (ref 0.43–1.29)
CRP SERPL-MCNC: <0.5 MG/DL (ref 0–0.99)
DEPRECATED RDW RBC AUTO: 43.3 FL (ref 37–54)
EOSINOPHIL # BLD AUTO: 0.68 10*3/MM3 (ref 0–0.7)
EOSINOPHIL NFR BLD AUTO: 9.6 % (ref 0–7)
ERYTHROCYTE [DISTWIDTH] IN BLOOD BY AUTOMATED COUNT: 13.1 % (ref 11.5–14.5)
GFR SERPL CREATININE-BSD FRML MDRD: 46 ML/MIN/1.73
GLOBULIN UR ELPH-MCNC: 2.5 GM/DL
GLUCOSE BLD-MCNC: 103 MG/DL (ref 70–110)
HCT VFR BLD AUTO: 39.2 % (ref 37–47)
HGB BLD-MCNC: 12.8 G/DL (ref 12–16)
IMM GRANULOCYTES # BLD: 0.02 10*3/MM3 (ref 0–0.03)
IMM GRANULOCYTES NFR BLD: 0.3 % (ref 0–0.5)
LYMPHOCYTES # BLD AUTO: 2.16 10*3/MM3 (ref 1–3)
LYMPHOCYTES NFR BLD AUTO: 30.6 % (ref 16–46)
MCH RBC QN AUTO: 29.8 PG (ref 27–33)
MCHC RBC AUTO-ENTMCNC: 32.7 G/DL (ref 33–37)
MCV RBC AUTO: 91.4 FL (ref 80–94)
MONOCYTES # BLD AUTO: 0.62 10*3/MM3 (ref 0.1–0.9)
MONOCYTES NFR BLD AUTO: 8.8 % (ref 0–12)
NEUTROPHILS # BLD AUTO: 3.53 10*3/MM3 (ref 1.4–6.5)
NEUTROPHILS NFR BLD AUTO: 50.1 % (ref 40–75)
OSMOLALITY SERPL CALC.SUM OF ELEC: 283 MOSM/KG (ref 273–305)
PLATELET # BLD AUTO: 226 10*3/MM3 (ref 130–400)
PMV BLD AUTO: 10.6 FL (ref 6–10)
POTASSIUM BLD-SCNC: 4.1 MMOL/L (ref 3.5–5.3)
PROT SERPL-MCNC: 6.2 G/DL (ref 6–8)
RBC # BLD AUTO: 4.29 10*6/MM3 (ref 4.2–5.4)
SODIUM BLD-SCNC: 140 MMOL/L (ref 135–153)
WBC NRBC COR # BLD: 7.05 10*3/MM3 (ref 4.5–12.5)

## 2017-09-24 PROCEDURE — 80053 COMPREHEN METABOLIC PANEL: CPT | Performed by: INTERNAL MEDICINE

## 2017-09-24 PROCEDURE — 85025 COMPLETE CBC W/AUTO DIFF WBC: CPT | Performed by: INTERNAL MEDICINE

## 2017-09-24 PROCEDURE — 86140 C-REACTIVE PROTEIN: CPT | Performed by: INTERNAL MEDICINE

## 2017-09-24 PROCEDURE — 94799 UNLISTED PULMONARY SVC/PX: CPT

## 2017-09-24 PROCEDURE — 25010000002 ENOXAPARIN PER 10 MG: Performed by: INTERNAL MEDICINE

## 2017-09-24 RX ADMIN — ATORVASTATIN CALCIUM 10 MG: 10 TABLET, FILM COATED ORAL at 20:54

## 2017-09-24 RX ADMIN — FLUOXETINE HYDROCHLORIDE 10 MG: 10 CAPSULE ORAL at 09:49

## 2017-09-24 RX ADMIN — ASPIRIN 81 MG: 81 TABLET, CHEWABLE ORAL at 09:49

## 2017-09-24 RX ADMIN — CLOPIDOGREL 75 MG: 75 TABLET, FILM COATED ORAL at 09:49

## 2017-09-24 RX ADMIN — FERROUS SULFATE TAB 325 MG (65 MG ELEMENTAL FE) 325 MG: 325 (65 FE) TAB at 09:49

## 2017-09-24 RX ADMIN — MEMANTINE HYDROCHLORIDE 5 MG: 5 TABLET ORAL at 20:54

## 2017-09-24 RX ADMIN — ENOXAPARIN SODIUM 40 MG: 40 INJECTION SUBCUTANEOUS at 09:49

## 2017-09-24 RX ADMIN — MEMANTINE HYDROCHLORIDE 5 MG: 5 TABLET ORAL at 09:49

## 2017-09-25 VITALS
BODY MASS INDEX: 28.32 KG/M2 | TEMPERATURE: 98.4 F | SYSTOLIC BLOOD PRESSURE: 114 MMHG | WEIGHT: 170 LBS | DIASTOLIC BLOOD PRESSURE: 63 MMHG | HEIGHT: 65 IN | OXYGEN SATURATION: 94 % | RESPIRATION RATE: 18 BRPM | HEART RATE: 63 BPM

## 2017-09-25 PROCEDURE — 25010000002 ENOXAPARIN PER 10 MG: Performed by: INTERNAL MEDICINE

## 2017-09-25 PROCEDURE — 94799 UNLISTED PULMONARY SVC/PX: CPT

## 2017-09-25 RX ORDER — ATORVASTATIN CALCIUM 10 MG/1
10 TABLET, FILM COATED ORAL NIGHTLY
Qty: 30 TABLET | Refills: 3 | Status: SHIPPED | OUTPATIENT
Start: 2017-09-25

## 2017-09-25 RX ORDER — FLUOXETINE 10 MG/1
10 CAPSULE ORAL DAILY
Qty: 30 CAPSULE | Refills: 3 | Status: ON HOLD | OUTPATIENT
Start: 2017-09-25 | End: 2021-10-15

## 2017-09-25 RX ORDER — CLOPIDOGREL BISULFATE 75 MG/1
75 TABLET ORAL DAILY
Qty: 30 TABLET | Refills: 3 | Status: SHIPPED | OUTPATIENT
Start: 2017-09-25

## 2017-09-25 RX ORDER — MEMANTINE HYDROCHLORIDE 5 MG/1
5 TABLET ORAL EVERY 12 HOURS SCHEDULED
Qty: 60 TABLET | Refills: 3 | Status: SHIPPED | OUTPATIENT
Start: 2017-09-25

## 2017-09-25 RX ADMIN — FLUOXETINE HYDROCHLORIDE 10 MG: 10 CAPSULE ORAL at 09:09

## 2017-09-25 RX ADMIN — MEMANTINE HYDROCHLORIDE 5 MG: 5 TABLET ORAL at 09:09

## 2017-09-25 RX ADMIN — CLOPIDOGREL 75 MG: 75 TABLET, FILM COATED ORAL at 09:09

## 2017-09-25 RX ADMIN — ENOXAPARIN SODIUM 40 MG: 40 INJECTION SUBCUTANEOUS at 09:09

## 2017-09-25 RX ADMIN — ASPIRIN 81 MG: 81 TABLET, CHEWABLE ORAL at 09:09

## 2017-09-25 RX ADMIN — FERROUS SULFATE TAB 325 MG (65 MG ELEMENTAL FE) 325 MG: 325 (65 FE) TAB at 09:09

## 2019-02-23 ENCOUNTER — LAB REQUISITION (OUTPATIENT)
Dept: LAB | Facility: HOSPITAL | Age: 74
End: 2019-02-23

## 2019-02-23 DIAGNOSIS — R50.9 FEVER: ICD-10-CM

## 2019-02-23 LAB
FLUAV AG NPH QL: NEGATIVE
FLUBV AG NPH QL IA: NEGATIVE

## 2019-02-23 PROCEDURE — 87804 INFLUENZA ASSAY W/OPTIC: CPT | Performed by: INTERNAL MEDICINE

## 2019-05-27 ENCOUNTER — LAB REQUISITION (OUTPATIENT)
Dept: LAB | Facility: HOSPITAL | Age: 74
End: 2019-05-27

## 2019-05-27 DIAGNOSIS — M62.81 MUSCLE WEAKNESS (GENERALIZED): ICD-10-CM

## 2019-05-27 DIAGNOSIS — R41.82 ALTERED MENTAL STATUS: ICD-10-CM

## 2019-05-27 LAB
ALBUMIN SERPL-MCNC: 4.17 G/DL (ref 3.5–5.2)
ALBUMIN/GLOB SERPL: 1.6 G/DL
ALP SERPL-CCNC: 130 U/L (ref 39–117)
ALT SERPL W P-5'-P-CCNC: 17 U/L (ref 1–33)
ANION GAP SERPL CALCULATED.3IONS-SCNC: 12.1 MMOL/L
AST SERPL-CCNC: 16 U/L (ref 1–32)
BASOPHILS # BLD AUTO: 0.05 10*3/MM3 (ref 0–0.2)
BASOPHILS NFR BLD AUTO: 0.5 % (ref 0–1.5)
BILIRUB SERPL-MCNC: 0.3 MG/DL (ref 0.2–1.2)
BUN BLD-MCNC: 26 MG/DL (ref 8–23)
BUN/CREAT SERPL: 20.2 (ref 7–25)
CALCIUM SPEC-SCNC: 10.4 MG/DL (ref 8.6–10.5)
CHLORIDE SERPL-SCNC: 103 MMOL/L (ref 98–107)
CO2 SERPL-SCNC: 24.9 MMOL/L (ref 22–29)
CREAT BLD-MCNC: 1.29 MG/DL (ref 0.57–1)
DEPRECATED RDW RBC AUTO: 46.1 FL (ref 37–54)
EOSINOPHIL # BLD AUTO: 1.58 10*3/MM3 (ref 0–0.4)
EOSINOPHIL NFR BLD AUTO: 17 % (ref 0.3–6.2)
ERYTHROCYTE [DISTWIDTH] IN BLOOD BY AUTOMATED COUNT: 14.1 % (ref 12.3–15.4)
GFR SERPL CREATININE-BSD FRML MDRD: 40 ML/MIN/1.73
GLOBULIN UR ELPH-MCNC: 2.5 GM/DL
GLUCOSE BLD-MCNC: 106 MG/DL (ref 65–99)
HCT VFR BLD AUTO: 42.3 % (ref 34–46.6)
HGB BLD-MCNC: 13.9 G/DL (ref 12–15.9)
IMM GRANULOCYTES # BLD AUTO: 0.03 10*3/MM3 (ref 0–0.05)
IMM GRANULOCYTES NFR BLD AUTO: 0.3 % (ref 0–0.5)
LYMPHOCYTES # BLD AUTO: 2.91 10*3/MM3 (ref 0.7–3.1)
LYMPHOCYTES NFR BLD AUTO: 31.4 % (ref 19.6–45.3)
MCH RBC QN AUTO: 30 PG (ref 26.6–33)
MCHC RBC AUTO-ENTMCNC: 32.9 G/DL (ref 31.5–35.7)
MCV RBC AUTO: 91.2 FL (ref 79–97)
MONOCYTES # BLD AUTO: 0.64 10*3/MM3 (ref 0.1–0.9)
MONOCYTES NFR BLD AUTO: 6.9 % (ref 5–12)
NEUTROPHILS # BLD AUTO: 4.06 10*3/MM3 (ref 1.7–7)
NEUTROPHILS NFR BLD AUTO: 43.9 % (ref 42.7–76)
PLATELET # BLD AUTO: 224 10*3/MM3 (ref 140–450)
PMV BLD AUTO: 11 FL (ref 6–12)
POTASSIUM BLD-SCNC: 4.6 MMOL/L (ref 3.5–5.2)
PROT SERPL-MCNC: 6.7 G/DL (ref 6–8.5)
RBC # BLD AUTO: 4.64 10*6/MM3 (ref 3.77–5.28)
SODIUM BLD-SCNC: 140 MMOL/L (ref 136–145)
WBC NRBC COR # BLD: 9.27 10*3/MM3 (ref 3.4–10.8)

## 2019-05-27 PROCEDURE — 80053 COMPREHEN METABOLIC PANEL: CPT | Performed by: INTERNAL MEDICINE

## 2019-05-27 PROCEDURE — 85025 COMPLETE CBC W/AUTO DIFF WBC: CPT | Performed by: INTERNAL MEDICINE

## 2020-06-14 ENCOUNTER — LAB (OUTPATIENT)
Dept: LAB | Facility: HOSPITAL | Age: 75
End: 2020-06-14

## 2020-06-14 ENCOUNTER — TRANSCRIBE ORDERS (OUTPATIENT)
Dept: ADMINISTRATIVE | Facility: HOSPITAL | Age: 75
End: 2020-06-14

## 2020-06-14 DIAGNOSIS — Z20.828 EXPOSURE TO SARS-ASSOCIATED CORONAVIRUS: ICD-10-CM

## 2020-06-14 DIAGNOSIS — Z20.828 EXPOSURE TO SARS-ASSOCIATED CORONAVIRUS: Primary | ICD-10-CM

## 2020-06-14 PROCEDURE — U0002 COVID-19 LAB TEST NON-CDC: HCPCS

## 2020-06-14 PROCEDURE — C9803 HOPD COVID-19 SPEC COLLECT: HCPCS

## 2020-06-14 PROCEDURE — U0004 COV-19 TEST NON-CDC HGH THRU: HCPCS

## 2020-06-15 LAB
REF LAB TEST METHOD: NORMAL
SARS-COV-2 RNA RESP QL NAA+PROBE: NOT DETECTED

## 2021-03-17 ENCOUNTER — HOSPITAL ENCOUNTER (EMERGENCY)
Facility: HOSPITAL | Age: 76
Discharge: NURSING, REHAB OR LONG-TERM CARE | End: 2021-03-17
Attending: EMERGENCY MEDICINE | Admitting: EMERGENCY MEDICINE

## 2021-03-17 ENCOUNTER — APPOINTMENT (OUTPATIENT)
Dept: GENERAL RADIOLOGY | Facility: HOSPITAL | Age: 76
End: 2021-03-17

## 2021-03-17 VITALS
TEMPERATURE: 98.6 F | OXYGEN SATURATION: 97 % | HEART RATE: 78 BPM | SYSTOLIC BLOOD PRESSURE: 143 MMHG | WEIGHT: 190 LBS | HEIGHT: 65 IN | DIASTOLIC BLOOD PRESSURE: 84 MMHG | RESPIRATION RATE: 18 BRPM | BODY MASS INDEX: 31.65 KG/M2

## 2021-03-17 DIAGNOSIS — R07.89 NON-CARDIAC CHEST PAIN: Primary | ICD-10-CM

## 2021-03-17 LAB
ALBUMIN SERPL-MCNC: 3.62 G/DL (ref 3.5–5.2)
ALBUMIN/GLOB SERPL: 1.5 G/DL
ALP SERPL-CCNC: 109 U/L (ref 39–117)
ALT SERPL W P-5'-P-CCNC: 23 U/L (ref 1–33)
ANION GAP SERPL CALCULATED.3IONS-SCNC: 12.4 MMOL/L (ref 5–15)
AST SERPL-CCNC: 22 U/L (ref 1–32)
BASOPHILS # BLD AUTO: 0.05 10*3/MM3 (ref 0–0.2)
BASOPHILS NFR BLD AUTO: 0.7 % (ref 0–1.5)
BILIRUB SERPL-MCNC: 0.3 MG/DL (ref 0–1.2)
BUN SERPL-MCNC: 26 MG/DL (ref 8–23)
BUN/CREAT SERPL: 22.8 (ref 7–25)
CALCIUM SPEC-SCNC: 9.9 MG/DL (ref 8.6–10.5)
CHLORIDE SERPL-SCNC: 106 MMOL/L (ref 98–107)
CO2 SERPL-SCNC: 22.6 MMOL/L (ref 22–29)
CREAT SERPL-MCNC: 1.14 MG/DL (ref 0.57–1)
DEPRECATED RDW RBC AUTO: 43.4 FL (ref 37–54)
EOSINOPHIL # BLD AUTO: 0.93 10*3/MM3 (ref 0–0.4)
EOSINOPHIL NFR BLD AUTO: 12.3 % (ref 0.3–6.2)
ERYTHROCYTE [DISTWIDTH] IN BLOOD BY AUTOMATED COUNT: 13.1 % (ref 12.3–15.4)
GFR SERPL CREATININE-BSD FRML MDRD: 46 ML/MIN/1.73
GLOBULIN UR ELPH-MCNC: 2.5 GM/DL
GLUCOSE SERPL-MCNC: 117 MG/DL (ref 65–99)
HCT VFR BLD AUTO: 42.9 % (ref 34–46.6)
HGB BLD-MCNC: 14.1 G/DL (ref 12–15.9)
HOLD SPECIMEN: NORMAL
HOLD SPECIMEN: NORMAL
IMM GRANULOCYTES # BLD AUTO: 0.02 10*3/MM3 (ref 0–0.05)
IMM GRANULOCYTES NFR BLD AUTO: 0.3 % (ref 0–0.5)
LYMPHOCYTES # BLD AUTO: 2.3 10*3/MM3 (ref 0.7–3.1)
LYMPHOCYTES NFR BLD AUTO: 30.3 % (ref 19.6–45.3)
MCH RBC QN AUTO: 29.9 PG (ref 26.6–33)
MCHC RBC AUTO-ENTMCNC: 32.9 G/DL (ref 31.5–35.7)
MCV RBC AUTO: 90.9 FL (ref 79–97)
MONOCYTES # BLD AUTO: 0.68 10*3/MM3 (ref 0.1–0.9)
MONOCYTES NFR BLD AUTO: 9 % (ref 5–12)
NEUTROPHILS NFR BLD AUTO: 3.6 10*3/MM3 (ref 1.7–7)
NEUTROPHILS NFR BLD AUTO: 47.4 % (ref 42.7–76)
NRBC BLD AUTO-RTO: 0 /100 WBC (ref 0–0.2)
PLATELET # BLD AUTO: 188 10*3/MM3 (ref 140–450)
PMV BLD AUTO: 10.6 FL (ref 6–12)
POTASSIUM SERPL-SCNC: 4.3 MMOL/L (ref 3.5–5.2)
PROT SERPL-MCNC: 6.1 G/DL (ref 6–8.5)
RBC # BLD AUTO: 4.72 10*6/MM3 (ref 3.77–5.28)
SODIUM SERPL-SCNC: 141 MMOL/L (ref 136–145)
TROPONIN T SERPL-MCNC: <0.01 NG/ML (ref 0–0.03)
TROPONIN T SERPL-MCNC: <0.01 NG/ML (ref 0–0.03)
WBC # BLD AUTO: 7.58 10*3/MM3 (ref 3.4–10.8)
WHOLE BLOOD HOLD SPECIMEN: NORMAL
WHOLE BLOOD HOLD SPECIMEN: NORMAL

## 2021-03-17 PROCEDURE — 84484 ASSAY OF TROPONIN QUANT: CPT | Performed by: PHYSICIAN ASSISTANT

## 2021-03-17 PROCEDURE — 71045 X-RAY EXAM CHEST 1 VIEW: CPT

## 2021-03-17 PROCEDURE — 99284 EMERGENCY DEPT VISIT MOD MDM: CPT

## 2021-03-17 PROCEDURE — 85025 COMPLETE CBC W/AUTO DIFF WBC: CPT | Performed by: PHYSICIAN ASSISTANT

## 2021-03-17 PROCEDURE — 80053 COMPREHEN METABOLIC PANEL: CPT | Performed by: PHYSICIAN ASSISTANT

## 2021-03-17 PROCEDURE — 93005 ELECTROCARDIOGRAM TRACING: CPT | Performed by: EMERGENCY MEDICINE

## 2021-03-17 PROCEDURE — 93010 ELECTROCARDIOGRAM REPORT: CPT | Performed by: INTERNAL MEDICINE

## 2021-03-17 PROCEDURE — 71045 X-RAY EXAM CHEST 1 VIEW: CPT | Performed by: RADIOLOGY

## 2021-03-17 PROCEDURE — 36415 COLL VENOUS BLD VENIPUNCTURE: CPT

## 2021-03-17 RX ORDER — SODIUM CHLORIDE 0.9 % (FLUSH) 0.9 %
10 SYRINGE (ML) INJECTION AS NEEDED
Status: DISCONTINUED | OUTPATIENT
Start: 2021-03-17 | End: 2021-03-17 | Stop reason: HOSPADM

## 2021-03-17 NOTE — ED NOTES
Patient's granddaughter asks for us to call her with updates when test results come back. Gita Snyder - [phone].     Mohit Hollis RN  03/17/21 0284

## 2021-03-17 NOTE — ED PROVIDER NOTES
Subjective   75-year-old female presents to the ER with complaints of left arm pain.  Patient verbalized that she received a shot within her left arm for her diabetes 1 night prior.  Patient verbalized that this caused an onset of sharp brief chest pain.  Patient verbalized she does have a cardiovascular history.  Patient states she is not having active chest pain right now.  Patient is a resident of [facility].          Review of Systems   Constitutional: Negative.  Negative for fever.   HENT: Positive for hearing loss.    Respiratory: Negative.    Cardiovascular: Positive for chest pain.   Gastrointestinal: Negative.  Negative for abdominal pain.   Endocrine: Negative.    Genitourinary: Negative.  Negative for dysuria.   Musculoskeletal: Positive for myalgias.   Skin: Negative.    Neurological: Negative.    Psychiatric/Behavioral: Negative.    All other systems reviewed and are negative.      Past Medical History:   Diagnosis Date   • Alzheimer disease (CMS/[facility])    • Alzheimer's disease (CMS/[facility])    • Anxiety    • Anxiety disorder, unspecified    • Constipation    • Deafness    • Delirium due to known physiological condition    • Depression    • Difficulty in walking, not elsewhere classified    • Gastro-esophageal reflux disease with esophagitis    • GERD (gastroesophageal reflux disease)    • Hyperlipidemia    • Hyperlipidemia    • Hypertension    • Major depressive disorder, single episode    • Muscle weakness (generalized)    • Other lack of coordination    • 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    No family history on file.    Social History      Socioeconomic History   • Marital status:      Spouse name: Not on file   • Number of children: Not on file   • Years of education: Not on file   • Highest education level: Not on file   Tobacco Use   • Smoking status: Never Smoker   Substance and Sexual Activity   • Alcohol use: No   • Drug use: No   • Sexual activity: Defer           Objective   Physical Exam  Vitals and nursing note reviewed.   Constitutional:       General: She is not in acute distress.     Appearance: She is well-developed. She is not diaphoretic.   HENT:      Head: Normocephalic and atraumatic.      Right Ear: External ear normal.      Left Ear: External ear normal.      Nose: Nose normal.   Eyes:      Conjunctiva/sclera: Conjunctivae normal.   Neck:      Vascular: No JVD.      Trachea: No tracheal deviation.   Cardiovascular:      Rate and Rhythm: Normal rate and regular rhythm.      Heart sounds: Normal heart sounds. No murmur heard.     Pulmonary:      Effort: Pulmonary effort is normal. No respiratory distress.      Breath sounds: Normal breath sounds. No wheezing.   Abdominal:      General: Bowel sounds are normal.      Palpations: Abdomen is soft.      Tenderness: There is no abdominal tenderness.   Musculoskeletal:         General: No deformity.      Cervical back: Normal range of motion and neck supple.   Skin:     General: Skin is warm and dry.      Coloration: Skin is not pale.      Findings: No erythema or rash.      Comments: No noticeable abnormalities to the left upper extremity.   Neurological:      Mental Status: She is alert and oriented to person, place, and time.      Cranial Nerves: No cranial nerve deficit.   Psychiatric:         Behavior: Behavior normal.         Thought Content: Thought content normal.         Procedures           ED Course  ED Course as of Mar 24 1222   Wed Mar 17, 2021   1040 EKG at 1033 shows sinus rhythm, rate 85.  WA interval 156, QRS duration 78, QTc 435 ms.  Baseline artifact present,  nonspecific ST-T changes.  No apparent acute ischemia.  No evidence for STEMI.    [CM]   1214 CXR rad interpreted:  No acute cardiopulmonary findings identified.    [RB]   1214 Pt daughter reports patient receiving JJ covid vac last week.     [RB]   1316 Patient's cardiac work-up has been unremarkable.  Patient will be discharged back to [facility].  No abnormalities were noted to the left upper extremity.    [RB]      ED Course User Index  [CM] Panda Jones MD  [RB] Hakeem Porter II, PA                                           MDM  Number of Diagnoses or Management Options  Non-cardiac chest pain: new and requires workup     Amount and/or Complexity of Data Reviewed  Clinical lab tests: ordered and reviewed  Tests in the radiology section of CPT®: ordered and reviewed    Risk of Complications, Morbidity, and/or Mortality  Presenting problems: moderate  Diagnostic procedures: moderate  Management options: low    Patient Progress  Patient progress: stable      Final diagnoses:   Non-cardiac chest pain       ED Disposition  ED Disposition     ED Disposition Condition Comment    Discharge Stable           Ricky Alamo MD  [address] [id number] [phone]    Schedule an appointment as soon as possible for a visit            Medication List      No changes were made to your prescriptions during this visit.          Hakeem Porter II, PA  03/17/21 1317       Hakeem Porter II, PA  03/24/21 1223

## 2021-03-17 NOTE — ED NOTES
ekg preformed by tech at 1033 given to Dr. Jones at 1035     [facility]  03/17/21 1046

## 2021-03-17 NOTE — ED NOTES
Called report to Joe BLACKWELL at [facility].   Awaiting EMS for patient transfer back to nursing [place].      Mohit Hollis, RN  03/17/21 6745

## 2021-03-17 NOTE — ED NOTES
Called [facility] for transport, she stated she would page it out.      Tash Moser  03/17/21 8310

## 2021-03-19 LAB
QT INTERVAL: 366 MS
QTC INTERVAL: 435 MS

## 2021-06-02 ENCOUNTER — LAB REQUISITION (OUTPATIENT)
Dept: LAB | Facility: HOSPITAL | Age: 76
End: 2021-06-02

## 2021-06-02 DIAGNOSIS — D64.9 ANEMIA, UNSPECIFIED: ICD-10-CM

## 2021-06-02 LAB
BASOPHILS # BLD AUTO: 0.06 10*3/MM3 (ref 0–0.2)
BASOPHILS NFR BLD AUTO: 0.7 % (ref 0–1.5)
DEPRECATED RDW RBC AUTO: 44.1 FL (ref 37–54)
EOSINOPHIL # BLD AUTO: 1.37 10*3/MM3 (ref 0–0.4)
EOSINOPHIL NFR BLD AUTO: 15.7 % (ref 0.3–6.2)
ERYTHROCYTE [DISTWIDTH] IN BLOOD BY AUTOMATED COUNT: 13.2 % (ref 12.3–15.4)
HCT VFR BLD AUTO: 42.7 % (ref 34–46.6)
HGB BLD-MCNC: 14.1 G/DL (ref 12–15.9)
IMM GRANULOCYTES # BLD AUTO: 0.03 10*3/MM3 (ref 0–0.05)
IMM GRANULOCYTES NFR BLD AUTO: 0.3 % (ref 0–0.5)
LYMPHOCYTES # BLD AUTO: 2.79 10*3/MM3 (ref 0.7–3.1)
LYMPHOCYTES NFR BLD AUTO: 32 % (ref 19.6–45.3)
MCH RBC QN AUTO: 29.9 PG (ref 26.6–33)
MCHC RBC AUTO-ENTMCNC: 33 G/DL (ref 31.5–35.7)
MCV RBC AUTO: 90.5 FL (ref 79–97)
MONOCYTES # BLD AUTO: 0.6 10*3/MM3 (ref 0.1–0.9)
MONOCYTES NFR BLD AUTO: 6.9 % (ref 5–12)
NEUTROPHILS NFR BLD AUTO: 3.87 10*3/MM3 (ref 1.7–7)
NEUTROPHILS NFR BLD AUTO: 44.4 % (ref 42.7–76)
NRBC BLD AUTO-RTO: 0 /100 WBC (ref 0–0.2)
PLATELET # BLD AUTO: 205 10*3/MM3 (ref 140–450)
PMV BLD AUTO: 10.7 FL (ref 6–12)
RBC # BLD AUTO: 4.72 10*6/MM3 (ref 3.77–5.28)
WBC # BLD AUTO: 8.72 10*3/MM3 (ref 3.4–10.8)

## 2021-07-04 ENCOUNTER — HOSPITAL ENCOUNTER (EMERGENCY)
Facility: HOSPITAL | Age: 76
Discharge: HOME | End: 2021-07-04
Attending: EMERGENCY MEDICINE | Admitting: FAMILY MEDICINE

## 2021-07-04 ENCOUNTER — APPOINTMENT (OUTPATIENT)
Dept: CT IMAGING | Facility: HOSPITAL | Age: 76
End: 2021-07-04

## 2021-07-04 ENCOUNTER — APPOINTMENT (OUTPATIENT)
Dept: GENERAL RADIOLOGY | Facility: HOSPITAL | Age: 76
End: 2021-07-04

## 2021-07-04 VITALS
SYSTOLIC BLOOD PRESSURE: 142 MMHG | OXYGEN SATURATION: 96 % | DIASTOLIC BLOOD PRESSURE: 72 MMHG | WEIGHT: 194 LBS | HEART RATE: 63 BPM | RESPIRATION RATE: 16 BRPM | BODY MASS INDEX: 31.18 KG/M2 | TEMPERATURE: 97.8 F | HEIGHT: 66 IN

## 2021-07-04 DIAGNOSIS — G30.1 BEHAVIORAL DISTURBANCE DUE TO LATE ONSET ALZHEIMER DEMENTIA (HCC): ICD-10-CM

## 2021-07-04 DIAGNOSIS — N39.0 ACUTE UTI: Primary | ICD-10-CM

## 2021-07-04 DIAGNOSIS — K57.92 DIVERTICULITIS: ICD-10-CM

## 2021-07-04 DIAGNOSIS — F02.818 BEHAVIORAL DISTURBANCE DUE TO LATE ONSET ALZHEIMER DEMENTIA (HCC): ICD-10-CM

## 2021-07-04 LAB
ALBUMIN SERPL-MCNC: 3.81 G/DL (ref 3.5–5.2)
ALBUMIN/GLOB SERPL: 1.5 G/DL
ALP SERPL-CCNC: 107 U/L (ref 39–117)
ALT SERPL W P-5'-P-CCNC: 17 U/L (ref 1–33)
ANION GAP SERPL CALCULATED.3IONS-SCNC: 13.2 MMOL/L (ref 5–15)
AST SERPL-CCNC: 17 U/L (ref 1–32)
BACTERIA UR QL AUTO: ABNORMAL /HPF
BASOPHILS # BLD AUTO: 0.07 10*3/MM3 (ref 0–0.2)
BASOPHILS NFR BLD AUTO: 0.7 % (ref 0–1.5)
BILIRUB SERPL-MCNC: 0.3 MG/DL (ref 0–1.2)
BILIRUB UR QL STRIP: NEGATIVE
BUN SERPL-MCNC: 21 MG/DL (ref 8–23)
BUN/CREAT SERPL: 16.2 (ref 7–25)
CALCIUM SPEC-SCNC: 10.1 MG/DL (ref 8.6–10.5)
CHLORIDE SERPL-SCNC: 103 MMOL/L (ref 98–107)
CLARITY UR: ABNORMAL
CO2 SERPL-SCNC: 22.8 MMOL/L (ref 22–29)
COLOR UR: YELLOW
CREAT SERPL-MCNC: 1.3 MG/DL (ref 0.57–1)
DEPRECATED RDW RBC AUTO: 44.3 FL (ref 37–54)
EOSINOPHIL # BLD AUTO: 1.31 10*3/MM3 (ref 0–0.4)
EOSINOPHIL NFR BLD AUTO: 13.5 % (ref 0.3–6.2)
ERYTHROCYTE [DISTWIDTH] IN BLOOD BY AUTOMATED COUNT: 13.6 % (ref 12.3–15.4)
FLUAV RNA RESP QL NAA+PROBE: NOT DETECTED
FLUBV RNA RESP QL NAA+PROBE: NOT DETECTED
GFR SERPL CREATININE-BSD FRML MDRD: 40 ML/MIN/1.73
GLOBULIN UR ELPH-MCNC: 2.5 GM/DL
GLUCOSE SERPL-MCNC: 159 MG/DL (ref 65–99)
GLUCOSE UR STRIP-MCNC: NEGATIVE MG/DL
HCT VFR BLD AUTO: 42.2 % (ref 34–46.6)
HGB BLD-MCNC: 13.8 G/DL (ref 12–15.9)
HGB UR QL STRIP.AUTO: NEGATIVE
HYALINE CASTS UR QL AUTO: ABNORMAL /LPF
IMM GRANULOCYTES # BLD AUTO: 0.05 10*3/MM3 (ref 0–0.05)
IMM GRANULOCYTES NFR BLD AUTO: 0.5 % (ref 0–0.5)
KETONES UR QL STRIP: NEGATIVE
LEUKOCYTE ESTERASE UR QL STRIP.AUTO: ABNORMAL
LYMPHOCYTES # BLD AUTO: 3.25 10*3/MM3 (ref 0.7–3.1)
LYMPHOCYTES NFR BLD AUTO: 33.5 % (ref 19.6–45.3)
MAGNESIUM SERPL-MCNC: 1.8 MG/DL (ref 1.6–2.4)
MCH RBC QN AUTO: 29.4 PG (ref 26.6–33)
MCHC RBC AUTO-ENTMCNC: 32.7 G/DL (ref 31.5–35.7)
MCV RBC AUTO: 90 FL (ref 79–97)
MONOCYTES # BLD AUTO: 0.63 10*3/MM3 (ref 0.1–0.9)
MONOCYTES NFR BLD AUTO: 6.5 % (ref 5–12)
NEUTROPHILS NFR BLD AUTO: 4.4 10*3/MM3 (ref 1.7–7)
NEUTROPHILS NFR BLD AUTO: 45.3 % (ref 42.7–76)
NITRITE UR QL STRIP: POSITIVE
NRBC BLD AUTO-RTO: 0 /100 WBC (ref 0–0.2)
NT-PROBNP SERPL-MCNC: 32.2 PG/ML (ref 0–1800)
PH UR STRIP.AUTO: <=5 [PH] (ref 5–8)
PLATELET # BLD AUTO: 221 10*3/MM3 (ref 140–450)
PMV BLD AUTO: 10.5 FL (ref 6–12)
POTASSIUM SERPL-SCNC: 3.9 MMOL/L (ref 3.5–5.2)
PROT SERPL-MCNC: 6.3 G/DL (ref 6–8.5)
PROT UR QL STRIP: NEGATIVE
RBC # BLD AUTO: 4.69 10*6/MM3 (ref 3.77–5.28)
RBC # UR: ABNORMAL /HPF
REF LAB TEST METHOD: ABNORMAL
SARS-COV-2 RNA RESP QL NAA+PROBE: NOT DETECTED
SODIUM SERPL-SCNC: 139 MMOL/L (ref 136–145)
SP GR UR STRIP: 1.01 (ref 1–1.03)
SQUAMOUS #/AREA URNS HPF: ABNORMAL /HPF
TROPONIN T SERPL-MCNC: <0.01 NG/ML (ref 0–0.03)
UROBILINOGEN UR QL STRIP: ABNORMAL
WBC # BLD AUTO: 9.71 10*3/MM3 (ref 3.4–10.8)
WBC UR QL AUTO: ABNORMAL /HPF

## 2021-07-04 PROCEDURE — 87636 SARSCOV2 & INF A&B AMP PRB: CPT | Performed by: EMERGENCY MEDICINE

## 2021-07-04 PROCEDURE — 80053 COMPREHEN METABOLIC PANEL: CPT | Performed by: EMERGENCY MEDICINE

## 2021-07-04 PROCEDURE — 85025 COMPLETE CBC W/AUTO DIFF WBC: CPT | Performed by: EMERGENCY MEDICINE

## 2021-07-04 PROCEDURE — 99284 EMERGENCY DEPT VISIT MOD MDM: CPT

## 2021-07-04 PROCEDURE — 74176 CT ABD & PELVIS W/O CONTRAST: CPT

## 2021-07-04 PROCEDURE — 70450 CT HEAD/BRAIN W/O DYE: CPT

## 2021-07-04 PROCEDURE — 87086 URINE CULTURE/COLONY COUNT: CPT | Performed by: EMERGENCY MEDICINE

## 2021-07-04 PROCEDURE — 87186 SC STD MICRODIL/AGAR DIL: CPT | Performed by: EMERGENCY MEDICINE

## 2021-07-04 PROCEDURE — 83735 ASSAY OF MAGNESIUM: CPT | Performed by: EMERGENCY MEDICINE

## 2021-07-04 PROCEDURE — 87077 CULTURE AEROBIC IDENTIFY: CPT | Performed by: EMERGENCY MEDICINE

## 2021-07-04 PROCEDURE — 81001 URINALYSIS AUTO W/SCOPE: CPT | Performed by: EMERGENCY MEDICINE

## 2021-07-04 PROCEDURE — P9612 CATHETERIZE FOR URINE SPEC: HCPCS

## 2021-07-04 PROCEDURE — 84484 ASSAY OF TROPONIN QUANT: CPT | Performed by: EMERGENCY MEDICINE

## 2021-07-04 PROCEDURE — 83880 ASSAY OF NATRIURETIC PEPTIDE: CPT | Performed by: EMERGENCY MEDICINE

## 2021-07-04 PROCEDURE — 71045 X-RAY EXAM CHEST 1 VIEW: CPT

## 2021-07-04 RX ORDER — CIPROFLOXACIN 500 MG/1
250 TABLET, FILM COATED ORAL ONCE
Status: COMPLETED | OUTPATIENT
Start: 2021-07-04 | End: 2021-07-04

## 2021-07-04 RX ORDER — AMOXICILLIN AND CLAVULANATE POTASSIUM 500; 125 MG/1; MG/1
1 TABLET, FILM COATED ORAL 2 TIMES DAILY
Qty: 14 TABLET | Refills: 0 | OUTPATIENT
Start: 2021-07-04 | End: 2021-10-06

## 2021-07-04 RX ADMIN — CIPROFLOXACIN 250 MG: 500 TABLET, FILM COATED ORAL at 18:26

## 2021-07-04 NOTE — ED NOTES
Report called to Xiao at [facility]. Told her patient had a UTI and that we gave her an antibiotic when she was here and we are sending her back with a prescription for augmentin     Tania Sandhu RN  07/04/21 9390

## 2021-07-04 NOTE — ED PROVIDER NOTES
Subjective   History of Present Illness    Review of Systems    Past Medical History:   Diagnosis Date   • Alzheimer disease (CMS/[facility])    • Alzheimer's disease (CMS/[facility])    • Anxiety    • Anxiety disorder, unspecified    • Cerebrovascular accident (CVA) (CMS/[facility])    • Constipation    • Deafness    • Delirium due to known physiological condition    • Depression    • Difficulty in walking, not elsewhere classified    • Gastro-esophageal reflux disease with esophagitis    • GERD (gastroesophageal reflux disease)    • Hyperlipidemia    • Hyperlipidemia    • Hypertension    • Major depressive disorder, single episode    • Muscle weakness (generalized)    • Other lack of coordination    • 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    No family history on file.    Social History     Socioeconomic History   • Marital status:      Spouse name: Not on file   • Number of children: Not on file   • Years of education: Not on file   • Highest education level: Not on file   Tobacco Use   • Smoking status: Never Smoker   Substance and Sexual Activity   • Alcohol use: No   • Drug use: No   • Sexual activity: Defer           Objective   Physical Exam    Procedures           ED Course  ED Course as of Jul 04 1904   Sun Jul 04, 2021   1743 CXR : negative      [MARÍA]   1743 CT abdomen : hiatal hernia    [MARÍA]   1744 CT head : negative    [MARÍA]   1901 Patient CT scan of abdomen pelvis is concerning for possible diverticulitis.  Patient was evaluated by Dr. Morelos who felt patient was medically clear for psych evaluation patient was made by psychiatry who felt the patient could be discharged  back to nursing home.  Patient replaced on oral antibiotics and return back to nursing home.    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           Where to Get Your Medications      You can get these medications from any pharmacy    Bring a paper prescription for each of these medications  · amoxicillin-clavulanate 500-125 MG per tablet          Jon Gallo MD  07/04/21 1167

## 2021-07-04 NOTE — NURSING NOTE
Unable to complete intake assessment d/t patient mental status. Patient rambles about different things such as her medicine and the blood pressure cuff. Unable to assess if patient is si, hi, or avh. According to [facility] patient has been more violent and hypersexual than her baseline.

## 2021-07-04 NOTE — ED PROVIDER NOTES
Subjective   Patient here in ER for psychiatric evaluation      Mental Health Problem  Presenting symptoms: aggressive behavior, agitation and depression    Timing:  Constant  Progression:  Worsening  Relieved by:  Nothing  Worsened by:  Nothing  Associated symptoms: abdominal pain and anhedonia        Review of Systems   Constitutional: Positive for activity change.   HENT: Negative.    Eyes: Negative.    Respiratory: Negative.    Cardiovascular: Negative.    Gastrointestinal: Positive for abdominal pain.   Endocrine: Negative.    Genitourinary: Positive for dysuria.   Musculoskeletal: Negative.    Skin: Negative.    Allergic/Immunologic: Negative.    Hematological: Negative.    Psychiatric/Behavioral: Positive for agitation.       Past Medical History:   Diagnosis Date   • Alzheimer disease (CMS/[facility])    • Alzheimer's disease (CMS/[facility])    • Anxiety    • Anxiety disorder, unspecified    • Cerebrovascular accident (CVA) (CMS/[facility])    • Constipation    • Deafness    • Delirium due to known physiological condition    • Depression    • Difficulty in walking, not elsewhere classified    • Gastro-esophageal reflux disease with esophagitis    • GERD (gastroesophageal reflux disease)    • Hyperlipidemia    • Hyperlipidemia    • Hypertension    • Major depressive disorder, single episode    • Muscle weakness (generalized)    • Other lack of coordination    • 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    No family history on file.    Social History     Socioeconomic History   • Marital status:      Spouse name: Not on file   • Number of children: Not on file   • Years of  education: Not on file   • Highest education level: Not on file   Tobacco Use   • Smoking status: Never Smoker   Substance and Sexual Activity   • Alcohol use: No   • Drug use: No   • Sexual activity: Defer           Objective   Physical Exam  Constitutional:       Appearance: She is well-developed.   HENT:      Head: Normocephalic.      Mouth/Throat:      Mouth: Mucous membranes are moist.   Eyes:      Extraocular Movements: Extraocular movements intact.   Cardiovascular:      Rate and Rhythm: Normal rate.      Heart sounds: Normal heart sounds.   Pulmonary:      Effort: Pulmonary effort is normal.   Abdominal:      General: Bowel sounds are normal. There is distension.      Palpations: Abdomen is soft.      Tenderness: There is abdominal tenderness in the suprapubic area.   Skin:     General: Skin is warm.      Capillary Refill: Capillary refill takes less than 2 seconds.   Neurological:      General: No focal deficit present.      Mental Status: She is alert.         Procedures           ED Course  ED Course as of Jul 05 1822   Sun Jul 04, 2021   1743 CXR : negative      [MARÍA]   1743 CT abdomen : hiatal hernia    [MARÍA]   1744 CT head : negative    [MARÍA]   1901 Patient CT scan of abdomen pelvis is concerning for possible diverticulitis.  Patient was evaluated by Dr. Morelos who felt patient was medically clear for psych evaluation patient was made by psychiatry who felt the patient could be discharged back to nursing home.  Patient replaced on oral antibiotics and return back to nursing home.    [BB]      ED Course User Index  [BB] Jon Gallo MD  [MARÍA] Kermit Morelos MD                                           [facility]    Final diagnoses:   Acute UTI   Behavioral disturbance due to late onset Alzheimer dementia (CMS/[facility])   Diverticulitis       ED Disposition  ED Disposition     ED Disposition Condition Comment    Discharge Stable           Ricky Alamo MD  [address] [facility]  [address]  [phone]    In 2 days           Medication List      New Prescriptions    amoxicillin-clavulanate 500-125 MG per tablet  Commonly known as: AUGMENTIN  Take 1 tablet by mouth 2 (two) times a day.           Where to Get Your Medications      You can get these medications from any pharmacy    Bring a paper prescription for each of these medications  · amoxicillin-clavulanate 500-125 MG per tablet          Kermit Morelos MD  07/04/21 1748       Kermit Morelos MD  07/04/21 1757       Jon Gallo MD  07/05/21 6968

## 2021-07-04 NOTE — NURSING NOTE
Presented pt to Dr. Lowe. He feels that patients UTI needs to be treated and other possible causes for patients change in behavior. He recommends patient be treated for her UTI, he also recommends refraining from giving the patient any benzos, antihistamines, and anticholinergics. He feels patient does not meet inpatient criteria and should return to the nursing home.

## 2021-07-04 NOTE — ED NOTES
Called [facility] to transport patient back to [facility].     Selena Headley  07/04/21 1926

## 2021-07-05 NOTE — ED NOTES
Report given to [facility] ems. Face sheet and pcs given to ems along with patient's prescription and discharge paperwork     Sandhu, Tania, RN  07/04/21 6466

## 2021-07-06 LAB — BACTERIA SPEC AEROBE CULT: ABNORMAL

## 2021-10-06 ENCOUNTER — APPOINTMENT (OUTPATIENT)
Dept: CT IMAGING | Facility: HOSPITAL | Age: 76
End: 2021-10-06

## 2021-10-06 ENCOUNTER — HOSPITAL ENCOUNTER (EMERGENCY)
Facility: HOSPITAL | Age: 76
Discharge: HOME | End: 2021-10-06
Attending: STUDENT IN AN ORGANIZED HEALTH CARE EDUCATION/TRAINING PROGRAM | Admitting: STUDENT IN AN ORGANIZED HEALTH CARE EDUCATION/TRAINING PROGRAM

## 2021-10-06 ENCOUNTER — APPOINTMENT (OUTPATIENT)
Dept: GENERAL RADIOLOGY | Facility: HOSPITAL | Age: 76
End: 2021-10-06

## 2021-10-06 VITALS
WEIGHT: 190 LBS | HEART RATE: 70 BPM | SYSTOLIC BLOOD PRESSURE: 117 MMHG | HEIGHT: 66 IN | RESPIRATION RATE: 17 BRPM | OXYGEN SATURATION: 94 % | BODY MASS INDEX: 30.53 KG/M2 | TEMPERATURE: 97.6 F | DIASTOLIC BLOOD PRESSURE: 64 MMHG

## 2021-10-06 DIAGNOSIS — F03.91 DEMENTIA WITH BEHAVIORAL DISTURBANCE, UNSPECIFIED DEMENTIA TYPE: ICD-10-CM

## 2021-10-06 DIAGNOSIS — N39.0 URINARY TRACT INFECTION WITHOUT HEMATURIA, SITE UNSPECIFIED: Primary | ICD-10-CM

## 2021-10-06 LAB
ALBUMIN SERPL-MCNC: 4.12 G/DL (ref 3.5–5.2)
ALBUMIN/GLOB SERPL: 1.6 G/DL
ALP SERPL-CCNC: 106 U/L (ref 39–117)
ALT SERPL W P-5'-P-CCNC: 25 U/L (ref 1–33)
AMPHET+METHAMPHET UR QL: NEGATIVE
AMPHETAMINES UR QL: NEGATIVE
ANION GAP SERPL CALCULATED.3IONS-SCNC: 15 MMOL/L (ref 5–15)
AST SERPL-CCNC: 25 U/L (ref 1–32)
BACTERIA UR QL AUTO: ABNORMAL /HPF
BARBITURATES UR QL SCN: NEGATIVE
BASOPHILS # BLD AUTO: 0.06 10*3/MM3 (ref 0–0.2)
BASOPHILS NFR BLD AUTO: 0.6 % (ref 0–1.5)
BENZODIAZ UR QL SCN: NEGATIVE
BILIRUB SERPL-MCNC: 0.4 MG/DL (ref 0–1.2)
BILIRUB UR QL STRIP: NEGATIVE
BUN SERPL-MCNC: 20 MG/DL (ref 8–23)
BUN/CREAT SERPL: 14.2 (ref 7–25)
BUPRENORPHINE SERPL-MCNC: NEGATIVE NG/ML
CALCIUM SPEC-SCNC: 10.5 MG/DL (ref 8.6–10.5)
CANNABINOIDS SERPL QL: NEGATIVE
CHLORIDE SERPL-SCNC: 102 MMOL/L (ref 98–107)
CLARITY UR: CLEAR
CO2 SERPL-SCNC: 19 MMOL/L (ref 22–29)
COCAINE UR QL: NEGATIVE
COLOR UR: YELLOW
CREAT SERPL-MCNC: 1.41 MG/DL (ref 0.57–1)
DEPRECATED RDW RBC AUTO: 42.5 FL (ref 37–54)
EOSINOPHIL # BLD AUTO: 0.9 10*3/MM3 (ref 0–0.4)
EOSINOPHIL NFR BLD AUTO: 9.6 % (ref 0.3–6.2)
ERYTHROCYTE [DISTWIDTH] IN BLOOD BY AUTOMATED COUNT: 13.2 % (ref 12.3–15.4)
ETHANOL BLD-MCNC: <10 MG/DL (ref 0–10)
ETHANOL UR QL: <0.01 %
FLUAV RNA RESP QL NAA+PROBE: NOT DETECTED
FLUBV RNA RESP QL NAA+PROBE: NOT DETECTED
GFR SERPL CREATININE-BSD FRML MDRD: 36 ML/MIN/1.73
GLOBULIN UR ELPH-MCNC: 2.6 GM/DL
GLUCOSE SERPL-MCNC: 194 MG/DL (ref 65–99)
GLUCOSE UR STRIP-MCNC: NEGATIVE MG/DL
HCT VFR BLD AUTO: 41.6 % (ref 34–46.6)
HGB BLD-MCNC: 13.8 G/DL (ref 12–15.9)
HGB UR QL STRIP.AUTO: NEGATIVE
HOLD SPECIMEN: NORMAL
HOLD SPECIMEN: NORMAL
HYALINE CASTS UR QL AUTO: ABNORMAL /LPF
IMM GRANULOCYTES # BLD AUTO: 0.02 10*3/MM3 (ref 0–0.05)
IMM GRANULOCYTES NFR BLD AUTO: 0.2 % (ref 0–0.5)
KETONES UR QL STRIP: NEGATIVE
LEUKOCYTE ESTERASE UR QL STRIP.AUTO: ABNORMAL
LYMPHOCYTES # BLD AUTO: 2.9 10*3/MM3 (ref 0.7–3.1)
LYMPHOCYTES NFR BLD AUTO: 30.9 % (ref 19.6–45.3)
MAGNESIUM SERPL-MCNC: 1.9 MG/DL (ref 1.6–2.4)
MCH RBC QN AUTO: 29.3 PG (ref 26.6–33)
MCHC RBC AUTO-ENTMCNC: 33.2 G/DL (ref 31.5–35.7)
MCV RBC AUTO: 88.3 FL (ref 79–97)
METHADONE UR QL SCN: NEGATIVE
MONOCYTES # BLD AUTO: 0.55 10*3/MM3 (ref 0.1–0.9)
MONOCYTES NFR BLD AUTO: 5.9 % (ref 5–12)
NEUTROPHILS NFR BLD AUTO: 4.94 10*3/MM3 (ref 1.7–7)
NEUTROPHILS NFR BLD AUTO: 52.8 % (ref 42.7–76)
NITRITE UR QL STRIP: POSITIVE
NRBC BLD AUTO-RTO: 0 /100 WBC (ref 0–0.2)
OPIATES UR QL: NEGATIVE
OXYCODONE UR QL SCN: NEGATIVE
PCP UR QL SCN: NEGATIVE
PH UR STRIP.AUTO: <=5 [PH] (ref 5–8)
PLATELET # BLD AUTO: 220 10*3/MM3 (ref 140–450)
PMV BLD AUTO: 10.5 FL (ref 6–12)
POTASSIUM SERPL-SCNC: 4.3 MMOL/L (ref 3.5–5.2)
PROPOXYPH UR QL: NEGATIVE
PROT SERPL-MCNC: 6.7 G/DL (ref 6–8.5)
PROT UR QL STRIP: NEGATIVE
QT INTERVAL: 194 MS
QTC INTERVAL: 218 MS
RBC # BLD AUTO: 4.71 10*6/MM3 (ref 3.77–5.28)
RBC # UR: ABNORMAL /HPF
REF LAB TEST METHOD: ABNORMAL
SARS-COV-2 RNA RESP QL NAA+PROBE: NOT DETECTED
SODIUM SERPL-SCNC: 136 MMOL/L (ref 136–145)
SP GR UR STRIP: 1.01 (ref 1–1.03)
SQUAMOUS #/AREA URNS HPF: ABNORMAL /HPF
TRICYCLICS UR QL SCN: NEGATIVE
TROPONIN T SERPL-MCNC: <0.01 NG/ML (ref 0–0.03)
TSH SERPL DL<=0.05 MIU/L-ACNC: 6.17 UIU/ML (ref 0.27–4.2)
UROBILINOGEN UR QL STRIP: ABNORMAL
WBC # BLD AUTO: 9.37 10*3/MM3 (ref 3.4–10.8)
WBC UR QL AUTO: ABNORMAL /HPF
WHOLE BLOOD HOLD SPECIMEN: NORMAL
WHOLE BLOOD HOLD SPECIMEN: NORMAL

## 2021-10-06 PROCEDURE — 84484 ASSAY OF TROPONIN QUANT: CPT | Performed by: PHYSICIAN ASSISTANT

## 2021-10-06 PROCEDURE — 96365 THER/PROPH/DIAG IV INF INIT: CPT

## 2021-10-06 PROCEDURE — 87186 SC STD MICRODIL/AGAR DIL: CPT | Performed by: PHYSICIAN ASSISTANT

## 2021-10-06 PROCEDURE — 82077 ASSAY SPEC XCP UR&BREATH IA: CPT | Performed by: PHYSICIAN ASSISTANT

## 2021-10-06 PROCEDURE — 85025 COMPLETE CBC W/AUTO DIFF WBC: CPT | Performed by: PHYSICIAN ASSISTANT

## 2021-10-06 PROCEDURE — 87088 URINE BACTERIA CULTURE: CPT | Performed by: PHYSICIAN ASSISTANT

## 2021-10-06 PROCEDURE — 80306 DRUG TEST PRSMV INSTRMNT: CPT | Performed by: PHYSICIAN ASSISTANT

## 2021-10-06 PROCEDURE — 70450 CT HEAD/BRAIN W/O DYE: CPT

## 2021-10-06 PROCEDURE — P9612 CATHETERIZE FOR URINE SPEC: HCPCS

## 2021-10-06 PROCEDURE — 71045 X-RAY EXAM CHEST 1 VIEW: CPT

## 2021-10-06 PROCEDURE — 87086 URINE CULTURE/COLONY COUNT: CPT | Performed by: PHYSICIAN ASSISTANT

## 2021-10-06 PROCEDURE — 93010 ELECTROCARDIOGRAM REPORT: CPT | Performed by: INTERNAL MEDICINE

## 2021-10-06 PROCEDURE — 81001 URINALYSIS AUTO W/SCOPE: CPT | Performed by: PHYSICIAN ASSISTANT

## 2021-10-06 PROCEDURE — 25010000002 CEFTRIAXONE PER 250 MG: Performed by: PHYSICIAN ASSISTANT

## 2021-10-06 PROCEDURE — 93005 ELECTROCARDIOGRAM TRACING: CPT | Performed by: PHYSICIAN ASSISTANT

## 2021-10-06 PROCEDURE — 71045 X-RAY EXAM CHEST 1 VIEW: CPT | Performed by: RADIOLOGY

## 2021-10-06 PROCEDURE — 83735 ASSAY OF MAGNESIUM: CPT | Performed by: PHYSICIAN ASSISTANT

## 2021-10-06 PROCEDURE — 84443 ASSAY THYROID STIM HORMONE: CPT | Performed by: PHYSICIAN ASSISTANT

## 2021-10-06 PROCEDURE — 99285 EMERGENCY DEPT VISIT HI MDM: CPT

## 2021-10-06 PROCEDURE — 70450 CT HEAD/BRAIN W/O DYE: CPT | Performed by: RADIOLOGY

## 2021-10-06 PROCEDURE — 80053 COMPREHEN METABOLIC PANEL: CPT | Performed by: PHYSICIAN ASSISTANT

## 2021-10-06 PROCEDURE — 87636 SARSCOV2 & INF A&B AMP PRB: CPT | Performed by: PHYSICIAN ASSISTANT

## 2021-10-06 RX ORDER — CEFDINIR 300 MG/1
300 CAPSULE ORAL 2 TIMES DAILY
Qty: 20 CAPSULE | Refills: 0 | Status: ON HOLD | OUTPATIENT
Start: 2021-10-06 | End: 2021-10-15

## 2021-10-06 RX ORDER — ACETAMINOPHEN 325 MG/1
650 TABLET ORAL ONCE
Status: COMPLETED | OUTPATIENT
Start: 2021-10-06 | End: 2021-10-06

## 2021-10-06 RX ORDER — LORAZEPAM 0.5 MG/1
0.5 TABLET ORAL ONCE
Status: COMPLETED | OUTPATIENT
Start: 2021-10-06 | End: 2021-10-06

## 2021-10-06 RX ADMIN — LORAZEPAM 0.5 MG: 0.5 TABLET ORAL at 11:07

## 2021-10-06 RX ADMIN — SODIUM CHLORIDE 500 ML: 9 INJECTION, SOLUTION INTRAVENOUS at 09:41

## 2021-10-06 RX ADMIN — ACETAMINOPHEN 650 MG: 325 TABLET ORAL at 09:55

## 2021-10-06 RX ADMIN — CEFTRIAXONE SODIUM 1 G: 1 INJECTION, POWDER, FOR SOLUTION INTRAMUSCULAR; INTRAVENOUS at 10:55

## 2021-10-06 NOTE — ED NOTES
Attempted to call report to [facility] x 2, placed on hold both times without a .      Zuleika Ortiz, RN  10/06/21 9491

## 2021-10-06 NOTE — NURSING NOTE
Intake assessment on the behest of ED PA Patient was not cooperative on intake assessment I spoke PA Pt. Has a UTI This information will be relayed to DR Jin

## 2021-10-06 NOTE — ED NOTES
Attempted to once again call report to [facility] transport has arrived to transport patient per discharge order.      Zuleika Ortiz, RN  10/06/21 4732
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
## 2021-10-06 NOTE — ED NOTES
[facility] to transport pt back to [facility]     Beata Linares  10/06/21 5112

## 2021-10-06 NOTE — NURSING NOTE
Called and provided intake information including all abnormal  labs to Dr Jin .discharge orders received.RBVOx2. Patient and ED provider aware.

## 2021-10-08 LAB — BACTERIA SPEC AEROBE CULT: ABNORMAL

## 2021-10-14 ENCOUNTER — HOSPITAL ENCOUNTER (EMERGENCY)
Facility: HOSPITAL | Age: 76
Discharge: NURSING, REHAB OR LONG-TERM CARE | End: 2021-10-15
Attending: EMERGENCY MEDICINE | Admitting: EMERGENCY MEDICINE

## 2021-10-14 VITALS
TEMPERATURE: 97.9 F | DIASTOLIC BLOOD PRESSURE: 92 MMHG | SYSTOLIC BLOOD PRESSURE: 168 MMHG | RESPIRATION RATE: 20 BRPM | OXYGEN SATURATION: 97 % | HEIGHT: 66 IN | HEART RATE: 65 BPM | BODY MASS INDEX: 30.53 KG/M2 | WEIGHT: 190 LBS

## 2021-10-14 DIAGNOSIS — F41.9 ANXIETY: Primary | ICD-10-CM

## 2021-10-14 DIAGNOSIS — G30.9 ALZHEIMER'S DEMENTIA WITH BEHAVIORAL DISTURBANCE, UNSPECIFIED TIMING OF DEMENTIA ONSET: ICD-10-CM

## 2021-10-14 DIAGNOSIS — F02.81 ALZHEIMER'S DEMENTIA WITH BEHAVIORAL DISTURBANCE, UNSPECIFIED TIMING OF DEMENTIA ONSET: ICD-10-CM

## 2021-10-14 LAB
ALBUMIN SERPL-MCNC: 3.98 G/DL (ref 3.5–5.2)
ALBUMIN/GLOB SERPL: 1.6 G/DL
ALP SERPL-CCNC: 99 U/L (ref 39–117)
ALT SERPL W P-5'-P-CCNC: 25 U/L (ref 1–33)
AMPHET+METHAMPHET UR QL: NEGATIVE
AMPHETAMINES UR QL: NEGATIVE
ANION GAP SERPL CALCULATED.3IONS-SCNC: 11.2 MMOL/L (ref 5–15)
AST SERPL-CCNC: 22 U/L (ref 1–32)
BARBITURATES UR QL SCN: NEGATIVE
BASOPHILS # BLD AUTO: 0.05 10*3/MM3 (ref 0–0.2)
BASOPHILS NFR BLD AUTO: 0.6 % (ref 0–1.5)
BENZODIAZ UR QL SCN: NEGATIVE
BILIRUB SERPL-MCNC: 0.3 MG/DL (ref 0–1.2)
BILIRUB UR QL STRIP: NEGATIVE
BUN SERPL-MCNC: 19 MG/DL (ref 8–23)
BUN/CREAT SERPL: 13.6 (ref 7–25)
BUPRENORPHINE SERPL-MCNC: NEGATIVE NG/ML
CALCIUM SPEC-SCNC: 9.8 MG/DL (ref 8.6–10.5)
CANNABINOIDS SERPL QL: NEGATIVE
CHLORIDE SERPL-SCNC: 106 MMOL/L (ref 98–107)
CLARITY UR: CLEAR
CO2 SERPL-SCNC: 22.8 MMOL/L (ref 22–29)
COCAINE UR QL: NEGATIVE
COLOR UR: YELLOW
CREAT SERPL-MCNC: 1.4 MG/DL (ref 0.57–1)
DEPRECATED RDW RBC AUTO: 44.5 FL (ref 37–54)
EOSINOPHIL # BLD AUTO: 0.94 10*3/MM3 (ref 0–0.4)
EOSINOPHIL NFR BLD AUTO: 11 % (ref 0.3–6.2)
ERYTHROCYTE [DISTWIDTH] IN BLOOD BY AUTOMATED COUNT: 13.3 % (ref 12.3–15.4)
ETHANOL BLD-MCNC: <10 MG/DL (ref 0–10)
ETHANOL UR QL: <0.01 %
FLUAV RNA RESP QL NAA+PROBE: NOT DETECTED
FLUBV RNA RESP QL NAA+PROBE: NOT DETECTED
GFR SERPL CREATININE-BSD FRML MDRD: 36 ML/MIN/1.73
GLOBULIN UR ELPH-MCNC: 2.4 GM/DL
GLUCOSE SERPL-MCNC: 130 MG/DL (ref 65–99)
GLUCOSE UR STRIP-MCNC: NEGATIVE MG/DL
HCT VFR BLD AUTO: 40.6 % (ref 34–46.6)
HGB BLD-MCNC: 13.2 G/DL (ref 12–15.9)
HGB UR QL STRIP.AUTO: NEGATIVE
IMM GRANULOCYTES # BLD AUTO: 0.03 10*3/MM3 (ref 0–0.05)
IMM GRANULOCYTES NFR BLD AUTO: 0.4 % (ref 0–0.5)
KETONES UR QL STRIP: NEGATIVE
LEUKOCYTE ESTERASE UR QL STRIP.AUTO: NEGATIVE
LYMPHOCYTES # BLD AUTO: 2.63 10*3/MM3 (ref 0.7–3.1)
LYMPHOCYTES NFR BLD AUTO: 30.7 % (ref 19.6–45.3)
MAGNESIUM SERPL-MCNC: 2.1 MG/DL (ref 1.6–2.4)
MCH RBC QN AUTO: 29.5 PG (ref 26.6–33)
MCHC RBC AUTO-ENTMCNC: 32.5 G/DL (ref 31.5–35.7)
MCV RBC AUTO: 90.8 FL (ref 79–97)
METHADONE UR QL SCN: NEGATIVE
MONOCYTES # BLD AUTO: 0.63 10*3/MM3 (ref 0.1–0.9)
MONOCYTES NFR BLD AUTO: 7.4 % (ref 5–12)
NEUTROPHILS NFR BLD AUTO: 4.28 10*3/MM3 (ref 1.7–7)
NEUTROPHILS NFR BLD AUTO: 49.9 % (ref 42.7–76)
NITRITE UR QL STRIP: NEGATIVE
NRBC BLD AUTO-RTO: 0 /100 WBC (ref 0–0.2)
OPIATES UR QL: NEGATIVE
OXYCODONE UR QL SCN: NEGATIVE
PCP UR QL SCN: NEGATIVE
PH UR STRIP.AUTO: 7 [PH] (ref 5–8)
PLATELET # BLD AUTO: 185 10*3/MM3 (ref 140–450)
PMV BLD AUTO: 10.2 FL (ref 6–12)
POTASSIUM SERPL-SCNC: 4 MMOL/L (ref 3.5–5.2)
PROPOXYPH UR QL: NEGATIVE
PROT SERPL-MCNC: 6.4 G/DL (ref 6–8.5)
PROT UR QL STRIP: NEGATIVE
RBC # BLD AUTO: 4.47 10*6/MM3 (ref 3.77–5.28)
SARS-COV-2 RNA RESP QL NAA+PROBE: NOT DETECTED
SODIUM SERPL-SCNC: 140 MMOL/L (ref 136–145)
SP GR UR STRIP: 1.01 (ref 1–1.03)
TRICYCLICS UR QL SCN: NEGATIVE
UROBILINOGEN UR QL STRIP: NORMAL
WBC # BLD AUTO: 8.56 10*3/MM3 (ref 3.4–10.8)

## 2021-10-14 PROCEDURE — 87636 SARSCOV2 & INF A&B AMP PRB: CPT | Performed by: EMERGENCY MEDICINE

## 2021-10-14 PROCEDURE — 81003 URINALYSIS AUTO W/O SCOPE: CPT | Performed by: EMERGENCY MEDICINE

## 2021-10-14 PROCEDURE — 36415 COLL VENOUS BLD VENIPUNCTURE: CPT

## 2021-10-14 PROCEDURE — 85025 COMPLETE CBC W/AUTO DIFF WBC: CPT | Performed by: EMERGENCY MEDICINE

## 2021-10-14 PROCEDURE — 82077 ASSAY SPEC XCP UR&BREATH IA: CPT | Performed by: EMERGENCY MEDICINE

## 2021-10-14 PROCEDURE — 80306 DRUG TEST PRSMV INSTRMNT: CPT | Performed by: EMERGENCY MEDICINE

## 2021-10-14 PROCEDURE — 99285 EMERGENCY DEPT VISIT HI MDM: CPT

## 2021-10-14 PROCEDURE — 83735 ASSAY OF MAGNESIUM: CPT | Performed by: EMERGENCY MEDICINE

## 2021-10-14 PROCEDURE — C9803 HOPD COVID-19 SPEC COLLECT: HCPCS

## 2021-10-14 PROCEDURE — 80053 COMPREHEN METABOLIC PANEL: CPT | Performed by: EMERGENCY MEDICINE

## 2021-10-14 PROCEDURE — P9612 CATHETERIZE FOR URINE SPEC: HCPCS

## 2021-10-14 RX ORDER — LORAZEPAM 1 MG/1
1 TABLET ORAL ONCE
Status: COMPLETED | OUTPATIENT
Start: 2021-10-14 | End: 2021-10-14

## 2021-10-14 RX ADMIN — LORAZEPAM 1 MG: 1 TABLET ORAL at 22:44

## 2021-10-15 ENCOUNTER — HOSPITAL ENCOUNTER (INPATIENT)
Facility: HOSPITAL | Age: 76
LOS: 4 days | Discharge: NURSING, REHAB OR LONG-TERM CARE | End: 2021-10-19
Attending: PSYCHIATRY & NEUROLOGY | Admitting: PSYCHIATRY & NEUROLOGY

## 2021-10-15 DIAGNOSIS — F03.92 DEMENTIA WITH PSYCHOSIS (HCC): Primary | ICD-10-CM

## 2021-10-15 PROBLEM — R46.89 AGGRESSIVE BEHAVIOR: Status: ACTIVE | Noted: 2021-10-15

## 2021-10-15 LAB
QT INTERVAL: 378 MS
QTC INTERVAL: 422 MS

## 2021-10-15 PROCEDURE — 99223 1ST HOSP IP/OBS HIGH 75: CPT | Performed by: PSYCHIATRY & NEUROLOGY

## 2021-10-15 PROCEDURE — 93005 ELECTROCARDIOGRAM TRACING: CPT | Performed by: PSYCHIATRY & NEUROLOGY

## 2021-10-15 PROCEDURE — 93010 ELECTROCARDIOGRAM REPORT: CPT | Performed by: INTERNAL MEDICINE

## 2021-10-15 RX ORDER — ACETAMINOPHEN 500 MG
500 TABLET ORAL EVERY 4 HOURS PRN
Status: CANCELLED | OUTPATIENT
Start: 2021-10-15

## 2021-10-15 RX ORDER — ASPIRIN 81 MG/1
81 TABLET, CHEWABLE ORAL DAILY
Status: DISCONTINUED | OUTPATIENT
Start: 2021-10-15 | End: 2021-10-19 | Stop reason: HOSPADM

## 2021-10-15 RX ORDER — TRAZODONE HYDROCHLORIDE 50 MG/1
12.5 TABLET ORAL NIGHTLY PRN
Status: DISPENSED | OUTPATIENT
Start: 2021-10-15 | End: 2021-10-17

## 2021-10-15 RX ORDER — ALUMINA, MAGNESIA, AND SIMETHICONE 2400; 2400; 240 MG/30ML; MG/30ML; MG/30ML
15 SUSPENSION ORAL EVERY 6 HOURS PRN
Status: DISCONTINUED | OUTPATIENT
Start: 2021-10-15 | End: 2021-10-19 | Stop reason: HOSPADM

## 2021-10-15 RX ORDER — FLUOXETINE HYDROCHLORIDE 20 MG/1
20 CAPSULE ORAL DAILY
Status: DISCONTINUED | OUTPATIENT
Start: 2021-10-15 | End: 2021-10-15

## 2021-10-15 RX ORDER — FLUOXETINE HYDROCHLORIDE 20 MG/1
20 CAPSULE ORAL DAILY
COMMUNITY
End: 2021-10-19 | Stop reason: HOSPADM

## 2021-10-15 RX ORDER — HYDROXYZINE HYDROCHLORIDE 10 MG/1
10 TABLET, FILM COATED ORAL EVERY 8 HOURS PRN
Status: DISCONTINUED | OUTPATIENT
Start: 2021-10-15 | End: 2021-10-19 | Stop reason: HOSPADM

## 2021-10-15 RX ORDER — OLANZAPINE 5 MG/1
5 TABLET, ORALLY DISINTEGRATING ORAL NIGHTLY
Status: DISCONTINUED | OUTPATIENT
Start: 2021-10-15 | End: 2021-10-16

## 2021-10-15 RX ORDER — MELATONIN
2000 DAILY
Status: CANCELLED | OUTPATIENT
Start: 2021-10-15

## 2021-10-15 RX ORDER — BISACODYL 5 MG/1
5 TABLET, DELAYED RELEASE ORAL DAILY PRN
Status: DISCONTINUED | OUTPATIENT
Start: 2021-10-15 | End: 2021-10-19 | Stop reason: HOSPADM

## 2021-10-15 RX ORDER — HYDROXYZINE HYDROCHLORIDE 10 MG/1
10 TABLET, FILM COATED ORAL EVERY 8 HOURS PRN
COMMUNITY

## 2021-10-15 RX ORDER — ONDANSETRON 4 MG/1
4 TABLET, FILM COATED ORAL EVERY 6 HOURS PRN
Status: DISCONTINUED | OUTPATIENT
Start: 2021-10-15 | End: 2021-10-19 | Stop reason: HOSPADM

## 2021-10-15 RX ORDER — QUETIAPINE FUMARATE 100 MG/1
25 TABLET, FILM COATED ORAL NIGHTLY
Status: DISCONTINUED | OUTPATIENT
Start: 2021-10-15 | End: 2021-10-15

## 2021-10-15 RX ORDER — QUETIAPINE FUMARATE 25 MG/1
25 TABLET, FILM COATED ORAL NIGHTLY
COMMUNITY
End: 2021-10-19 | Stop reason: HOSPADM

## 2021-10-15 RX ORDER — ECHINACEA PURPUREA EXTRACT 125 MG
2 TABLET ORAL AS NEEDED
Status: DISCONTINUED | OUTPATIENT
Start: 2021-10-15 | End: 2021-10-19 | Stop reason: HOSPADM

## 2021-10-15 RX ORDER — IBUPROFEN 400 MG/1
400 TABLET ORAL EVERY 6 HOURS PRN
Status: DISCONTINUED | OUTPATIENT
Start: 2021-10-15 | End: 2021-10-19 | Stop reason: HOSPADM

## 2021-10-15 RX ORDER — BISACODYL 10 MG
10 SUPPOSITORY, RECTAL RECTAL DAILY PRN
Status: DISCONTINUED | OUTPATIENT
Start: 2021-10-15 | End: 2021-10-19 | Stop reason: HOSPADM

## 2021-10-15 RX ORDER — ACETAMINOPHEN 325 MG/1
650 TABLET ORAL EVERY 6 HOURS PRN
Status: DISCONTINUED | OUTPATIENT
Start: 2021-10-15 | End: 2021-10-19 | Stop reason: HOSPADM

## 2021-10-15 RX ORDER — LANOLIN ALCOHOL/MO/W.PET/CERES
3 CREAM (GRAM) TOPICAL NIGHTLY PRN
Status: DISCONTINUED | OUTPATIENT
Start: 2021-10-17 | End: 2021-10-19 | Stop reason: HOSPADM

## 2021-10-15 RX ORDER — KETOTIFEN FUMARATE 0.35 MG/ML
1 SOLUTION/ DROPS OPHTHALMIC 2 TIMES DAILY
Status: DISCONTINUED | OUTPATIENT
Start: 2021-10-15 | End: 2021-10-19 | Stop reason: HOSPADM

## 2021-10-15 RX ORDER — MELATONIN
2000 DAILY
COMMUNITY

## 2021-10-15 RX ORDER — ASCORBIC ACID 500 MG
500 TABLET ORAL DAILY
COMMUNITY

## 2021-10-15 RX ORDER — BENZONATATE 100 MG/1
100 CAPSULE ORAL 3 TIMES DAILY PRN
Status: DISCONTINUED | OUTPATIENT
Start: 2021-10-15 | End: 2021-10-19 | Stop reason: HOSPADM

## 2021-10-15 RX ORDER — FERROUS SULFATE 325(65) MG
325 TABLET ORAL DAILY
Status: DISCONTINUED | OUTPATIENT
Start: 2021-10-15 | End: 2021-10-19 | Stop reason: HOSPADM

## 2021-10-15 RX ORDER — OLOPATADINE HYDROCHLORIDE 2 MG/ML
1 SOLUTION/ DROPS OPHTHALMIC DAILY
COMMUNITY

## 2021-10-15 RX ORDER — LOPERAMIDE HYDROCHLORIDE 2 MG/1
2 CAPSULE ORAL
Status: DISCONTINUED | OUTPATIENT
Start: 2021-10-15 | End: 2021-10-19 | Stop reason: HOSPADM

## 2021-10-15 RX ORDER — OMEGA-3S/DHA/EPA/FISH OIL/D3 300MG-1000
2000 CAPSULE ORAL DAILY
Status: DISCONTINUED | OUTPATIENT
Start: 2021-10-15 | End: 2021-10-19 | Stop reason: HOSPADM

## 2021-10-15 RX ORDER — ASCORBIC ACID 500 MG
500 TABLET ORAL DAILY
Status: DISCONTINUED | OUTPATIENT
Start: 2021-10-15 | End: 2021-10-19 | Stop reason: HOSPADM

## 2021-10-15 RX ORDER — FLUOXETINE 10 MG/1
10 CAPSULE ORAL DAILY
Status: DISCONTINUED | OUTPATIENT
Start: 2021-10-16 | End: 2021-10-18

## 2021-10-15 RX ORDER — ZINC SULFATE 50(220)MG
220 CAPSULE ORAL 2 TIMES DAILY
COMMUNITY

## 2021-10-15 RX ORDER — GABAPENTIN 100 MG/1
100 CAPSULE ORAL 3 TIMES DAILY
Status: DISCONTINUED | OUTPATIENT
Start: 2021-10-15 | End: 2021-10-19 | Stop reason: HOSPADM

## 2021-10-15 RX ORDER — ATORVASTATIN CALCIUM 10 MG/1
10 TABLET, FILM COATED ORAL NIGHTLY
Status: DISCONTINUED | OUTPATIENT
Start: 2021-10-15 | End: 2021-10-18

## 2021-10-15 RX ORDER — CLOPIDOGREL BISULFATE 75 MG/1
75 TABLET ORAL DAILY
Status: DISCONTINUED | OUTPATIENT
Start: 2021-10-15 | End: 2021-10-19 | Stop reason: HOSPADM

## 2021-10-15 RX ORDER — ZINC SULFATE 50(220)MG
220 CAPSULE ORAL 2 TIMES DAILY
Status: DISCONTINUED | OUTPATIENT
Start: 2021-10-15 | End: 2021-10-19 | Stop reason: HOSPADM

## 2021-10-15 RX ORDER — MEMANTINE HYDROCHLORIDE 5 MG/1
5 TABLET ORAL EVERY 12 HOURS SCHEDULED
Status: DISCONTINUED | OUTPATIENT
Start: 2021-10-15 | End: 2021-10-19 | Stop reason: HOSPADM

## 2021-10-15 RX ORDER — SENNA PLUS 8.6 MG/1
1 TABLET ORAL DAILY PRN
Status: DISCONTINUED | OUTPATIENT
Start: 2021-10-15 | End: 2021-10-19 | Stop reason: HOSPADM

## 2021-10-15 RX ADMIN — KETOTIFEN FUMARATE 1 DROP: 0.35 SOLUTION/ DROPS OPHTHALMIC at 08:16

## 2021-10-15 RX ADMIN — MEMANTINE HYDROCHLORIDE 5 MG: 5 TABLET, FILM COATED ORAL at 20:03

## 2021-10-15 RX ADMIN — TRAZODONE HYDROCHLORIDE 12.5 MG: 50 TABLET ORAL at 20:03

## 2021-10-15 RX ADMIN — OLANZAPINE 5 MG: 5 TABLET, ORALLY DISINTEGRATING ORAL at 20:03

## 2021-10-15 RX ADMIN — Medication 220 MG: at 20:04

## 2021-10-15 RX ADMIN — Medication 220 MG: at 08:15

## 2021-10-15 RX ADMIN — FLUOXETINE HYDROCHLORIDE 20 MG: 20 CAPSULE ORAL at 08:16

## 2021-10-15 RX ADMIN — MEMANTINE HYDROCHLORIDE 5 MG: 5 TABLET, FILM COATED ORAL at 08:16

## 2021-10-15 RX ADMIN — OXYCODONE HYDROCHLORIDE AND ACETAMINOPHEN 500 MG: 500 TABLET ORAL at 08:15

## 2021-10-15 RX ADMIN — CLOPIDOGREL 75 MG: 75 TABLET, FILM COATED ORAL at 08:16

## 2021-10-15 RX ADMIN — FERROUS SULFATE TAB 325 MG (65 MG ELEMENTAL FE) 325 MG: 325 (65 FE) TAB at 08:16

## 2021-10-15 RX ADMIN — Medication 5000 UNITS: at 08:15

## 2021-10-15 RX ADMIN — GABAPENTIN 100 MG: 100 CAPSULE ORAL at 20:03

## 2021-10-15 RX ADMIN — KETOTIFEN FUMARATE 1 DROP: 0.35 SOLUTION/ DROPS OPHTHALMIC at 20:03

## 2021-10-15 RX ADMIN — CHOLECALCIFEROL TAB 10 MCG (400 UNIT) 2000 UNITS: 10 TAB at 08:16

## 2021-10-15 RX ADMIN — ASPIRIN 81 MG: 81 TABLET, CHEWABLE ORAL at 08:15

## 2021-10-15 RX ADMIN — ATORVASTATIN CALCIUM 10 MG: 10 TABLET, FILM COATED ORAL at 20:03
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
## 2021-10-15 NOTE — NURSING NOTE
Presented pt and abnormal labs to Dr. Strauss. New orders to admit patient. SP3. Routine orders.[id number].
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
## 2021-10-15 NOTE — PLAN OF CARE
Goal Outcome Evaluation:  Plan of Care Reviewed With: patient  Patient Agreement with Plan of Care: agrees     Progress: improving  Outcome Summary: patient has been in bed sleeping since after lunch.  Could not answer assessment questions  and mainly rambles for answers.  Cooperative and feeds self, staff providng ADL care and decision making.

## 2021-10-15 NOTE — PLAN OF CARE
Goal Outcome Evaluation:  Plan of Care Reviewed With: patient  Patient Agreement with Plan of Care: agrees     Progress: no change  Outcome Summary: Pt new admit this shift. Pt has slept well since her admission. Pt talks frequently in her sleep.

## 2021-10-15 NOTE — DISCHARGE INSTR - APPOINTMENTS
[facility]  [address] [place] [id number]  [phone]    Return at discharge.

## 2021-10-15 NOTE — NURSING NOTE
Telephone consent obtained from patients responsible party, patients granddaughter Gita Snyder [phone] to admit patient to [facility] and for any medications or needed treatment.

## 2021-10-15 NOTE — PROGRESS NOTES
Navigator is helping Primary Therapist with discharge planning for patient. Navigator contacted [facility] and [facility] and spoke with Ivan. He states patient is fine to return when ready for discharge. Since patient will be a hospital discharge she will be quarantined when she returns. Ivan asks that we call a day or two before discharge so they can be expecting patient. Patient will need a Covid test the day before discharge. Covid test results and discharge summary can be faxed to the facility. Fax number [phone]. Any new MD orders can be called to the facility on the day of discharge.     [facility] and [facility] - [phone]

## 2021-10-15 NOTE — ED PROVIDER NOTES
Subjective   Patient exhibited aggressive behavior at the nursing home      Anxiety  Presents for initial visit. The problem has been waxing and waning. Symptoms include irritability. The symptoms are aggravated by family issues and social activities.           Review of Systems   Constitutional: Positive for irritability.   HENT: Negative.    Eyes: Negative.    Respiratory: Negative.    Cardiovascular: Negative.    Gastrointestinal: Negative.    Endocrine: Negative.    Genitourinary: Negative.    Musculoskeletal:        Pain left forearm     Skin: Negative.    Allergic/Immunologic: Negative.    Neurological: Negative.    Hematological: Negative.    Psychiatric/Behavioral: Negative.        Past Medical History:   Diagnosis Date   • Alzheimer disease ([facility])    • Alzheimer's disease ([facility])    • Anxiety    • Anxiety disorder, unspecified    • Cerebrovascular accident (CVA) ([facility])    • Constipation    • Deafness    • Delirium due to known physiological condition    • Depression    • Difficulty in walking, not elsewhere classified    • Gastro-esophageal reflux disease with esophagitis    • GERD (gastroesophageal reflux disease)    • Hyperlipidemia    • Hyperlipidemia    • Hypertension    • Major depressive disorder, single episode    • Muscle weakness (generalized)    • Other lack of coordination    • 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    No family history on file.    Social History     Socioeconomic History   • Marital status:    Tobacco Use   • Smoking status: Never Smoker   Substance and Sexual Activity   • Alcohol use: No   • Drug use: No   • Sexual activity: Not Currently      Partners: Male           Objective   Physical Exam  Vitals and nursing note reviewed.   HENT:      Head: Normocephalic.      Nose: Nose normal.      Mouth/Throat:      Mouth: Mucous membranes are moist.   Eyes:      Pupils: Pupils are equal, round, and reactive to light.   Cardiovascular:      Rate and Rhythm: Normal rate.      Pulses: Normal pulses.   Pulmonary:      Effort: Pulmonary effort is normal.   Abdominal:      General: Abdomen is flat.   Musculoskeletal:      Cervical back: Normal range of motion.      Comments: Tender left forearm     Skin:     General: Skin is warm.      Capillary Refill: Capillary refill takes less than 2 seconds.   Neurological:      General: No focal deficit present.      Mental Status: She is alert.   Psychiatric:         Mood and Affect: Mood normal.         Procedures           ED Course                                           MDM    Final diagnoses:   Anxiety       ED Disposition  ED Disposition     ED Disposition Condition Comment    Discharge Stable           Ricky Alamo MD  [address] [facility] [address] [phone]    Schedule an appointment as soon as possible for a visit   If symptoms worsen         Medication List      No changes were made to your prescriptions during this visit.          Kermit Morelos MD  10/14/21 9028
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
## 2021-10-15 NOTE — PLAN OF CARE
Problem: Adult Behavioral Health Plan of Care  Goal: Plan of Care Review  Outcome: Ongoing, Progressing  Flowsheets  Taken 10/15/2021 1115 by Rimma Webb  Consent Given to Review Plan with: Catherine Snyder  Plan of Care Reviewed With:   patient   grandchild(andres)  Outcome Summary:   Therapist met with patient to review plan of care   patient unable to participate. Therapist contacted patient's granddaughter   she is agreeable.  Taken 10/15/2021 0758 by Pricila Vasquez RN  Patient Agreement with Plan of Care: agrees  Taken 10/15/2021 0537 by Nila Boyce RN  Progress: no change  Goal: Patient-Specific Goal (Individualization)  Outcome: Ongoing, Progressing  Flowsheets  Taken 10/15/2021 1115 by Rimma Webb  Patient-Specific Goals (Include Timeframe): Identify 2-3 healthy coping skills, deny SI/HI, complete safety planning, and complete aftercare planning prior to discharge.  Individualized Care Needs: Therapist to offer 1-4 individual sessions, family education, daily groups, safety planning, aftercare planning, and brief CBT/MI interventions during hospitalization.  Taken 10/15/2021 1105 by Rimma Webb  Patient Personal Strengths:   positive attitude   family/social support  Patient Vulnerabilities: lacks insight into illness  Taken 10/15/2021 0050 by Salma Goldman RN  Anxieties, Fears or Concerns: unable to assess, patient is cooperative with direction.  Goal: Optimized Coping Skills in Response to Life Stressors  Outcome: Ongoing, Progressing  Intervention: Promote Effective Coping Strategies  Flowsheets (Taken 10/15/2021 1120)  Supportive Measures:   active listening utilized   positive reinforcement provided   self-reflection promoted   verbalization of feelings encouraged   self-care encouraged   relaxation techniques promoted  Goal: Develops/Participates in Therapeutic [place] to Support Successful Transition  Outcome: Ongoing, Progressing  Intervention: Foster Therapeutic [place]  Flowsheets  (Taken 10/15/2021 1120)  Trust Relationship/Rapport:   care explained   questions encouraged   choices provided   reassurance provided   emotional support provided   thoughts/feelings acknowledged   empathic listening provided   questions answered  Intervention: Mutually Develop Transition Plan  Flowsheets  Taken 10/15/2021 1120 by Rimma Webb  Outpatient/Agency/Support Group Needs: public health nursing (specify)  Discharge Coordination/Progress: Patient has insurance for discharge and will need transportation. Patient will go back to the nursing home at discharge.  Transition Support:   community resources reviewed   follow-up care coordinated   follow-up care discussed   crisis management plan promoted   crisis management plan verbalized  Anticipated Discharge Disposition: nursing/skilled nursing care  Transportation Concerns: car, none  Current Discharge Risk: cognitively impaired  Concerns to be Addressed:   cognitive/perceptual   coping/stress   adjustment to diagnosis/illness  Readmission Within the Last 30 Days: no previous admission in last 30 days  Patient's Choice of Community Agency(s): [facility]  Offered/Gave Vendor List: no  Taken 10/15/2021 0050 by Salma Goldman RN  Transportation Anticipated:   public transportation   health plan transportation  Patient/Family Anticipated Services at Transition: mental health services  Patient/Family Anticipates Transition to: long-term care facility   Goal Outcome Evaluation:  Plan of Care Reviewed With: patient, grandchild(andres)   Consent Given to Review Plan with: Catherine Snyder   Outcome Summary: Therapist met with patient to review plan of care; patient unable to participate. Therapist contacted patient's granddaughter; she is agreeable.       DATA:      Therapist discussed case with nursing staff and met with patient today to complete social history assessment, review plan of care, and discuss discharge. Patient was unable to participate in  "assessment and discharge planning due to mental status.    Patient is from [facility]. Navigator to call and confirm discharge disposition.     Therapist contacted patient's granddaughter, Gita. Gita reports the patient has had dementia for 19-20 years. She reports the patient's behaviors tend to be worse when she has a male nurse.     Gita reports the patient does not tolerate ativan, stating it \"has the opposite affect\" and makes the patient's symptoms worse.     Gita is supportive.     Clinical Maneuvering/Intervention:     Therapist assisted patient in processing above session content; acknowledged and normalized patient’s thoughts, feelings, and concerns.  Discussed the therapist/patient relationship and explain the parameters and limitations of relative confidentiality.  Also discussed the importance of active participation, and honesty to the treatment process.  Encouraged the patient to discuss/vent their feelings, frustrations, and fears concerning their ongoing medical issues and validated their feelings.     Allowed patient to freely discuss issues without interruption or judgment. Provided safe, confidential environment to facilitate the development of positive therapeutic relationship and encourage open, honest communication.      Therapist addressed discharge safety planning this date. Assisted patient in identifying risk factors which would indicate the need for higher level of care after discharge;  including thoughts to harm self or others and/or self-harming behavior. Encouraged patient to call 911, or present to the nearest emergency room should any of these events occur. Discussed crisis intervention services and means to access.  Encouraged securing any objects of harm.    Therapist completed integrated summary, treatment plan, and initiated social history this date.  Therapist is strongly encouraging family involvement in treatment.       Encouraged mask wearing, social distancing, and " regular hand washing due to COVID19 risk.      ASSESSMENT:      Patient is a 76 year old female who presented to the ED from [facility] for aggressive behaviors. Per ED charting, patient reported she is pregnant and owns the nursing home.    Therapist met with the patient in the day room. Patient is disoriented to time, place, and situation. Patient is oriented to self only. Patient reports she is feeling good today. She denies any concerns. Patient is pleasantly confused and difficult to understand due to fast, garbled speech. Patient presents with poor boundaries but is redirectable. She has not had any behavioral disturbances since her admission.     PLAN:       Patient to remain hospitalized this date.      Treatment team will focus efforts on stabilizing patient's acute symptoms while providing education on healthy coping and crisis management to reduce hospitalizations.   Patient requires daily psychiatrist evaluation and 24/7 nursing supervision to promote patient  safety.     Therapist will offer 1-4 individual sessions, 1 therapy group daily, family education, and appropriate referral.

## 2021-10-16 LAB
CHOLEST SERPL-MCNC: 192 MG/DL (ref 0–200)
HBA1C MFR BLD: 6.2 % (ref 4.8–5.6)
HDLC SERPL-MCNC: 34 MG/DL (ref 40–60)
LDLC SERPL CALC-MCNC: 107 MG/DL (ref 0–100)
LDLC/HDLC SERPL: 2.91 {RATIO}
TRIGL SERPL-MCNC: 295 MG/DL (ref 0–150)
VLDLC SERPL-MCNC: 51 MG/DL (ref 5–40)

## 2021-10-16 PROCEDURE — 80061 LIPID PANEL: CPT | Performed by: PSYCHIATRY & NEUROLOGY

## 2021-10-16 PROCEDURE — 99232 SBSQ HOSP IP/OBS MODERATE 35: CPT | Performed by: PSYCHIATRY & NEUROLOGY

## 2021-10-16 PROCEDURE — 25010000002 ZIPRASIDONE MESYLATE PER 10 MG: Performed by: PSYCHIATRY & NEUROLOGY

## 2021-10-16 PROCEDURE — 83036 HEMOGLOBIN GLYCOSYLATED A1C: CPT | Performed by: PSYCHIATRY & NEUROLOGY

## 2021-10-16 RX ORDER — OLANZAPINE 5 MG/1
5 TABLET, ORALLY DISINTEGRATING ORAL ONCE
Status: COMPLETED | OUTPATIENT
Start: 2021-10-16 | End: 2021-10-16

## 2021-10-16 RX ORDER — OLANZAPINE 5 MG/1
5 TABLET, ORALLY DISINTEGRATING ORAL
Status: DISCONTINUED | OUTPATIENT
Start: 2021-10-16 | End: 2021-10-19 | Stop reason: HOSPADM

## 2021-10-16 RX ORDER — OLANZAPINE 5 MG/1
5 TABLET, ORALLY DISINTEGRATING ORAL ONCE
Status: DISCONTINUED | OUTPATIENT
Start: 2021-10-16 | End: 2021-10-16

## 2021-10-16 RX ADMIN — ATORVASTATIN CALCIUM 10 MG: 10 TABLET, FILM COATED ORAL at 20:49

## 2021-10-16 RX ADMIN — HYDROXYZINE HYDROCHLORIDE 10 MG: 10 TABLET ORAL at 20:49

## 2021-10-16 RX ADMIN — KETOTIFEN FUMARATE 1 DROP: 0.35 SOLUTION/ DROPS OPHTHALMIC at 11:11

## 2021-10-16 RX ADMIN — ZIPRASIDONE MESYLATE 20 MG: 20 INJECTION, POWDER, LYOPHILIZED, FOR SOLUTION INTRAMUSCULAR at 00:49

## 2021-10-16 RX ADMIN — MEMANTINE HYDROCHLORIDE 5 MG: 5 TABLET, FILM COATED ORAL at 20:49

## 2021-10-16 RX ADMIN — TRAZODONE HYDROCHLORIDE 12.5 MG: 50 TABLET ORAL at 20:47

## 2021-10-16 RX ADMIN — GABAPENTIN 100 MG: 100 CAPSULE ORAL at 20:49

## 2021-10-16 RX ADMIN — KETOTIFEN FUMARATE 1 DROP: 0.35 SOLUTION/ DROPS OPHTHALMIC at 20:51

## 2021-10-16 RX ADMIN — FERROUS SULFATE TAB 325 MG (65 MG ELEMENTAL FE) 325 MG: 325 (65 FE) TAB at 10:52

## 2021-10-16 RX ADMIN — MEMANTINE HYDROCHLORIDE 5 MG: 5 TABLET, FILM COATED ORAL at 10:52

## 2021-10-16 RX ADMIN — Medication 220 MG: at 20:49

## 2021-10-16 RX ADMIN — ASPIRIN 81 MG: 81 TABLET, CHEWABLE ORAL at 10:51

## 2021-10-16 RX ADMIN — CLOPIDOGREL 75 MG: 75 TABLET, FILM COATED ORAL at 10:52

## 2021-10-16 RX ADMIN — OLANZAPINE 5 MG: 5 TABLET, ORALLY DISINTEGRATING ORAL at 13:21

## 2021-10-16 RX ADMIN — Medication 5000 UNITS: at 10:52

## 2021-10-16 RX ADMIN — OXYCODONE HYDROCHLORIDE AND ACETAMINOPHEN 500 MG: 500 TABLET ORAL at 10:52

## 2021-10-16 RX ADMIN — CHOLECALCIFEROL TAB 10 MCG (400 UNIT) 2000 UNITS: 10 TAB at 10:52

## 2021-10-16 RX ADMIN — OLANZAPINE 5 MG: 5 TABLET, ORALLY DISINTEGRATING ORAL at 18:21
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
## 2021-10-16 NOTE — PROGRESS NOTES
INPATIENT PSYCHIATRIC PROGRESS NOTE    Name:  Catherine Snyder  :  1945  MRN:  [id number]  Visit Number:  [id number]  Length of stay:  1    SUBJECTIVE  CC/Focus of Exam: Agitation, psychosis    INTERVAL HISTORY:  First time seeing patient.  Chart, notes, vitals, labs and EKG personally reviewed.    Patient with intermittent episodes of significant agitation, combative and physically and verbally aggressive towards staff.  Required multiple as needed medications overnight and is sedated but stable on exam today.  Still confused and disoriented.    Depression rating 3/10  Anxiety rating 8/10  Sleep: Fair  Withdrawal sx: Denied  Cravin/10    Review of Systems   Constitutional: Positive for activity change.   Respiratory: Negative.    Cardiovascular: Negative.    Gastrointestinal: Negative.    Musculoskeletal: Negative.    Psychiatric/Behavioral: Positive for agitation and confusion. The patient is nervous/anxious.        OBJECTIVE    Temp:  [97 °F (36.1 °C)-97.2 °F (36.2 °C)] 97.2 °F (36.2 °C)  Heart Rate:  [76-92] 76  Resp:  [18] 18  BP: ()/(69-88) 145/88    MENTAL STATUS EXAM:  Appearance: Casually dressed, good hygeine.   Cooperation: Combative  Psychomotor: Significant psychomotor agitation, No EPS, No motor tics  Speech: Rambling  Mood: Unable to obtain  Affect: congruent, elevated, agitated  Thought Content: + Delusional material present  Thought process: Disorganized  Suicidality: No SI  Homicidality: No HI  Perception: Unable to demonstrate  Insight: Poor  Judgment: Poor    Lab Results (last 24 hours)     ** No results found for the last 24 hours. **             Imaging Results (Last 24 Hours)     ** No results found for the last 24 hours. **             ECG/EMG Results (most recent)     Procedure Component Value Units Date/Time    ECG 12 Lead [[id number]] Collected: 10/15/21 0058     Updated: 10/15/21 1952     QT Interval 378 ms      QTC Interval 422 ms     Narrative:      Test Reason : Baseline  Cardiac Status  Blood Pressure :   */*   mmHG  Vent. Rate :  75 BPM     Atrial Rate :  75 BPM     P-R Int : 138 ms          QRS Dur :  76 ms      QT Int : 378 ms       P-R-T Axes :  42  75  59 degrees     QTc Int : 422 ms    Normal sinus rhythm  Nonspecific ST abnormality  Abnormal ECG  When compared with ECG of 06-OCT-2021 08:41,  Non-specific change in ST segment in Lateral leads  T wave inversion less evident in Anterolateral leads  QT has lengthened  Confirmed by Yair Guadarrama (2019) on 10/15/2021 7:52:40 PM    Referred By: JULIO CESAR           Confirmed By: Yair Guadarrama           ALLERGIES: Patient has no known allergies.      Current Facility-Administered Medications:   •  acetaminophen (TYLENOL) tablet 650 mg, 650 mg, Oral, Q6H PRN, Jc Strauss MD  •  aluminum-magnesium hydroxide-simethicone (MAALOX MAX) 400-400-40 MG/5ML suspension 15 mL, 15 mL, Oral, Q6H PRN, Jc Strauss MD  •  ascorbic acid (VITAMIN C) tablet 500 mg, 500 mg, Oral, Daily, Jc Strauss MD, 500 mg at 10/16/21 1052  •  aspirin chewable tablet 81 mg, 81 mg, Oral, Daily, Jc Strauss MD, 81 mg at 10/16/21 1051  •  atorvastatin (LIPITOR) tablet 10 mg, 10 mg, Oral, Nightly, Jc Strauss MD, 10 mg at 10/15/21 2003  •  benzonatate (TESSALON) capsule 100 mg, 100 mg, Oral, TID PRN, Jc Strauss MD  •  bisacodyl (DULCOLAX) EC tablet 5 mg, 5 mg, Oral, Daily PRN, Jc Strauss MD  •  bisacodyl (DULCOLAX) suppository 10 mg, 10 mg, Rectal, Daily PRN, Jc Strauss MD  •  cholecalciferol (VITAMIN D3) tablet 2,000 Units, 2,000 Units, Oral, Daily, Jc Strauss MD, 2,000 Units at 10/16/21 1052  •  clopidogrel (PLAVIX) tablet 75 mg, 75 mg, Oral, Daily, Jc Srtauss MD, 75 mg at 10/16/21 1052  •  ferrous sulfate tablet 325 mg, 325 mg, Oral, Daily, Jc Strauss MD, 325 mg at 10/16/21 1052  •  FLUoxetine (PROzac) capsule 10 mg, 10 mg, Oral, Daily, Panda Staruss MD  •  gabapentin (NEURONTIN) capsule 100  mg, 100 mg, Oral, TID, Panda Strauss MD, 100 mg at 10/15/21 2003  •  hydrOXYzine (ATARAX) tablet 10 mg, 10 mg, Oral, Q8H PRN, Jc Strauss MD  •  ibuprofen (ADVIL,MOTRIN) tablet 400 mg, 400 mg, Oral, Q6H PRN, Jc Strauss MD  •  ketotifen (ZADITOR) 0.025 % ophthalmic solution 1 drop, 1 drop, Both Eyes, BID, Jc Strauss MD, 1 drop at 10/16/21 1111  •  loperamide (IMODIUM) capsule 2 mg, 2 mg, Oral, Q2H PRN, Jc Strauss MD  •  magnesium hydroxide (MILK OF MAGNESIA) suspension 10 mL, 10 mL, Oral, Daily PRN, Jc Strauss MD  •  [START ON 10/17/2021] melatonin tablet 3 mg, 3 mg, Oral, Nightly PRN, Jc Strauss MD  •  memantine (NAMENDA) tablet 5 mg, 5 mg, Oral, Q12H, Jc Strauss MD, 5 mg at 10/16/21 1052  •  OLANZapine zydis (zyPREXA) disintegrating tablet 5 mg, 5 mg, Oral, Daily With Dinner, Pradip Sterling MD  •  ondansetron (ZOFRAN) tablet 4 mg, 4 mg, Oral, Q6H PRN, Jc Strauss MD  •  senna (SENOKOT) tablet 1 tablet, 1 tablet, Oral, Daily PRN, Jc Strauss MD  •  sodium chloride nasal spray 2 spray, 2 spray, Each Nare, PRN, Jc Strauss MD  •  traZODone (DESYREL) tablet 12.5 mg, 12.5 mg, Oral, Nightly PRN, Jc Strauss MD, 12.5 mg at 10/15/21 2003  •  vitamin D3 capsule 5,000 Units, 5,000 Units, Oral, Daily, Jc Strauss MD, 5,000 Units at 10/16/21 1052  •  zinc sulfate (ZINCATE) capsule 220 mg, 220 mg, Oral, BID, Jc Strauss MD, 220 mg at 10/15/21 2004  •  ziprasidone (GEODON) 10 mg in sterile water (preservative free) 0.5 mL injection, 10 mg, Intramuscular, Q12H PRN, Pradip Sterling MD    Reviewed chart, notes, vitals, labs and EKG personally    ASSESSMENT & PLAN:    Dementia with psychosis (HCC)  -On admission, started both gabapentin and olanzapine, while reducing the dose of fluoxetine and discontinuing quetiapine.    -We will continue the Namenda and provide for supportive nursing effort and therapy.    -Patient with increased  agitation, requiring as needed medication this weekend.  Moving olanzapine to dinnertime dosing in hopes to help with sundowning effect.    -Patient will return to nursing home once stable       Hyperlipidemia  Lipitor       Hypertension  We will start lisinopril       Anticoagulated  Continue the Plavix      Special precautions: Special Precautions Level 3 (q15 min checks)     Behavioral Health Treatment Plan and Problem List: I have reviewed and approved the Behavioral Health Treatment Plan and Problem list.  The patient has had a chance to review and agrees with the treatment plan.     Clinician:  Pradip Sterling MD  10/16/21  15:17 EDT

## 2021-10-16 NOTE — NURSING NOTE
Patient moved back to her original room because would not move closer to station.  Bed alarm initiated.  Patient calm.
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
## 2021-10-17 PROCEDURE — 63710000001 ONDANSETRON PER 8 MG: Performed by: PSYCHIATRY & NEUROLOGY

## 2021-10-17 PROCEDURE — 99232 SBSQ HOSP IP/OBS MODERATE 35: CPT | Performed by: PSYCHIATRY & NEUROLOGY

## 2021-10-17 RX ADMIN — FLUOXETINE HYDROCHLORIDE 10 MG: 10 CAPSULE ORAL at 08:11

## 2021-10-17 RX ADMIN — MEMANTINE HYDROCHLORIDE 5 MG: 5 TABLET, FILM COATED ORAL at 08:11

## 2021-10-17 RX ADMIN — GABAPENTIN 100 MG: 100 CAPSULE ORAL at 21:08

## 2021-10-17 RX ADMIN — CLOPIDOGREL 75 MG: 75 TABLET, FILM COATED ORAL at 08:11

## 2021-10-17 RX ADMIN — Medication 3 MG: at 21:08

## 2021-10-17 RX ADMIN — KETOTIFEN FUMARATE 1 DROP: 0.35 SOLUTION/ DROPS OPHTHALMIC at 21:08

## 2021-10-17 RX ADMIN — GABAPENTIN 100 MG: 100 CAPSULE ORAL at 08:16

## 2021-10-17 RX ADMIN — ATORVASTATIN CALCIUM 10 MG: 10 TABLET, FILM COATED ORAL at 21:08

## 2021-10-17 RX ADMIN — Medication 220 MG: at 08:11

## 2021-10-17 RX ADMIN — Medication 220 MG: at 21:08

## 2021-10-17 RX ADMIN — FERROUS SULFATE TAB 325 MG (65 MG ELEMENTAL FE) 325 MG: 325 (65 FE) TAB at 08:11

## 2021-10-17 RX ADMIN — OLANZAPINE 5 MG: 5 TABLET, ORALLY DISINTEGRATING ORAL at 15:39

## 2021-10-17 RX ADMIN — OXYCODONE HYDROCHLORIDE AND ACETAMINOPHEN 500 MG: 500 TABLET ORAL at 08:10

## 2021-10-17 RX ADMIN — Medication 5000 UNITS: at 08:11

## 2021-10-17 RX ADMIN — GABAPENTIN 100 MG: 100 CAPSULE ORAL at 15:20

## 2021-10-17 RX ADMIN — MAGNESIUM HYDROXIDE 10 ML: 2400 SUSPENSION ORAL at 10:49

## 2021-10-17 RX ADMIN — MEMANTINE HYDROCHLORIDE 5 MG: 5 TABLET, FILM COATED ORAL at 21:07

## 2021-10-17 RX ADMIN — ASPIRIN 81 MG: 81 TABLET, CHEWABLE ORAL at 08:10

## 2021-10-17 RX ADMIN — CHOLECALCIFEROL TAB 10 MCG (400 UNIT) 2000 UNITS: 10 TAB at 08:10

## 2021-10-17 RX ADMIN — ONDANSETRON HYDROCHLORIDE 4 MG: 4 TABLET, FILM COATED ORAL at 10:49

## 2021-10-17 NOTE — PLAN OF CARE
Goal Outcome Evaluation:  Plan of Care Reviewed With: patient  Patient Agreement with Plan of Care: unable to participate     Progress: no change  Pt was unable to answer assessment questions and was hard to understand with conor and altagracia. Pt had no behaviors during the night got up once to the bathroom. Placed bed alarm on because pt was unsteady when in attempting to go to the bathroom.

## 2021-10-17 NOTE — PROGRESS NOTES
"INPATIENT PSYCHIATRIC PROGRESS NOTE    Name:  Catherine Snyder  :  1945  MRN:  [id number]  Visit Number:  [id number]  Length of stay:  2    SUBJECTIVE  CC/Focus of Exam: Agitation, psychosis    INTERVAL HISTORY:  Patient with improving agitation.  She was arousable and appropriate on exam today.  Still mildly confused.  Discussed history of receiving medical care in [place] and talked about her son.  Reports feeling groggy, likely from medication previously given for agitation.    Depression rating 3/10  Anxiety rating 8/10  Sleep: Fair  Withdrawal sx: Denied  Cravin/10    Review of Systems   Constitutional: Negative.    Respiratory: Negative.    Cardiovascular: Negative.    Gastrointestinal: Negative.    Musculoskeletal: Negative.    Psychiatric/Behavioral: Positive for confusion. The patient is nervous/anxious.        OBJECTIVE    Temp:  [97 °F (36.1 °C)-97.2 °F (36.2 °C)] 97 °F (36.1 °C)  Heart Rate:  [59-99] 66  Resp:  [16] 16  BP: (105-133)/() 133/109    MENTAL STATUS EXAM:  Appearance: Casually dressed, good hygeine.   Cooperation: Cooperative  Psychomotor: Improving psychomotor agitation, No EPS, No motor tics  Speech: Normal rate and rhythm  Mood: \"Okay\"  Affect: congruent, anxious  Thought Content: No delusional material appreciated today  Thought process: Disorganized  Suicidality: No SI  Homicidality: No HI  Perception: No AVH reported  Insight: Poor  Judgment: Poor    Lab Results (last 24 hours)     Procedure Component Value Units Date/Time    Lipid Panel [[id number]]  (Abnormal) Collected: 10/16/21 1607    Specimen: Blood Updated: 10/16/21 1642     Total Cholesterol 192 mg/dL      Triglycerides 295 mg/dL      HDL Cholesterol 34 mg/dL      LDL Cholesterol  107 mg/dL      VLDL Cholesterol 51 mg/dL      LDL/HDL Ratio 2.91    Narrative:      Cholesterol Reference Ranges  (U.S. Department of Health and Human Services ATP III Classifications)    Desirable          <200 mg/dL  Borderline High  "   200-239 mg/dL  High Risk          >240 mg/dL      Triglyceride Reference Ranges  (U.S. Department of Health and Human Services ATP III Classifications)    Normal           <150 mg/dL  Borderline High  150-199 mg/dL  High             200-499 mg/dL  Very High        >500 mg/dL    HDL Reference Ranges  (U.S. Department of Health and Human Services ATP III Classifcations)    Low     <40 mg/dl (major risk factor for CHD)  High    >60 mg/dl ('negative' risk factor for CHD)        LDL Reference Ranges  (U.S. Department of Health and Human Services ATP III Classifcations)    Optimal          <100 mg/dL  Near Optimal     100-129 mg/dL  Borderline High  130-159 mg/dL  High             160-189 mg/dL  Very High        >189 mg/dL    Hemoglobin A1c [[id number]]  (Abnormal) Collected: 10/16/21 1607    Specimen: Blood Updated: 10/16/21 1633     Hemoglobin A1C 6.20 %     Narrative:      Hemoglobin A1C Ranges:    Increased Risk for Diabetes  5.7% to 6.4%  Diabetes                     >= 6.5%  Diabetic Goal                < 7.0%             Imaging Results (Last 24 Hours)     ** No results found for the last 24 hours. **             ECG/EMG Results (most recent)     Procedure Component Value Units Date/Time    ECG 12 Lead [[id number]] Collected: 10/15/21 0058     Updated: 10/15/21 1952     QT Interval 378 ms      QTC Interval 422 ms     Narrative:      Test Reason : Baseline Cardiac Status  Blood Pressure :   */*   mmHG  Vent. Rate :  75 BPM     Atrial Rate :  75 BPM     P-R Int : 138 ms          QRS Dur :  76 ms      QT Int : 378 ms       P-R-T Axes :  42  75  59 degrees     QTc Int : 422 ms    Normal sinus rhythm  Nonspecific ST abnormality  Abnormal ECG  When compared with ECG of 06-OCT-2021 08:41,  Non-specific change in ST segment in Lateral leads  T wave inversion less evident in Anterolateral leads  QT has lengthened  Confirmed by Yair Guadarrama (2019) on 10/15/2021 7:52:40 PM    Referred By: JULIO CESAR           Confirmed By: Yair  Ahmed           ALLERGIES: Patient has no known allergies.      Current Facility-Administered Medications:   •  acetaminophen (TYLENOL) tablet 650 mg, 650 mg, Oral, Q6H PRN, Jc Strauss MD  •  aluminum-magnesium hydroxide-simethicone (MAALOX MAX) 400-400-40 MG/5ML suspension 15 mL, 15 mL, Oral, Q6H PRN, Jc Strauss MD  •  ascorbic acid (VITAMIN C) tablet 500 mg, 500 mg, Oral, Daily, Jc Strauss MD, 500 mg at 10/17/21 0810  •  aspirin chewable tablet 81 mg, 81 mg, Oral, Daily, Jc Strauss MD, 81 mg at 10/17/21 0810  •  atorvastatin (LIPITOR) tablet 10 mg, 10 mg, Oral, Nightly, Jc Strauss MD, 10 mg at 10/16/21 2049  •  benzonatate (TESSALON) capsule 100 mg, 100 mg, Oral, TID PRN, Jc Strauss MD  •  bisacodyl (DULCOLAX) EC tablet 5 mg, 5 mg, Oral, Daily PRN, Jc Strauss MD  •  bisacodyl (DULCOLAX) suppository 10 mg, 10 mg, Rectal, Daily PRN, Jc Strauss MD  •  cholecalciferol (VITAMIN D3) tablet 2,000 Units, 2,000 Units, Oral, Daily, Jc Strauss MD, 2,000 Units at 10/17/21 0810  •  clopidogrel (PLAVIX) tablet 75 mg, 75 mg, Oral, Daily, Jc Strauss MD, 75 mg at 10/17/21 0811  •  ferrous sulfate tablet 325 mg, 325 mg, Oral, Daily, Jc Strauss MD, 325 mg at 10/17/21 0811  •  FLUoxetine (PROzac) capsule 10 mg, 10 mg, Oral, Daily, Panda Strauss MD, 10 mg at 10/17/21 0811  •  gabapentin (NEURONTIN) capsule 100 mg, 100 mg, Oral, TID, Panda Strauss MD, 100 mg at 10/17/21 0816  •  hydrOXYzine (ATARAX) tablet 10 mg, 10 mg, Oral, Q8H PRN, Jc Strauss MD, 10 mg at 10/16/21 2049  •  ibuprofen (ADVIL,MOTRIN) tablet 400 mg, 400 mg, Oral, Q6H PRN, Jc Strauss MD  •  ketotifen (ZADITOR) 0.025 % ophthalmic solution 1 drop, 1 drop, Both Eyes, BID, Jc Strauss MD, 1 drop at 10/16/21 2051  •  loperamide (IMODIUM) capsule 2 mg, 2 mg, Oral, Q2H PRN, Jc Strauss MD  •  magnesium hydroxide (MILK OF MAGNESIA) suspension 10 mL, 10  mL, Oral, Daily PRN, Jc Strauss MD, 10 mL at 10/17/21 1049  •  melatonin tablet 3 mg, 3 mg, Oral, Nightly PRN, Jc Strauss MD  •  memantine (NAMENDA) tablet 5 mg, 5 mg, Oral, Q12H, Jc Strauss MD, 5 mg at 10/17/21 0811  •  OLANZapine zydis (zyPREXA) disintegrating tablet 5 mg, 5 mg, Oral, Daily With Dinner, Pradip Sterling MD, 5 mg at 10/16/21 1821  •  ondansetron (ZOFRAN) tablet 4 mg, 4 mg, Oral, Q6H PRN, Jc Strauss MD, 4 mg at 10/17/21 1049  •  senna (SENOKOT) tablet 1 tablet, 1 tablet, Oral, Daily PRN, Jc Strauss MD  •  sodium chloride nasal spray 2 spray, 2 spray, Each Nare, PRN, Jc Strauss MD  •  vitamin D3 capsule 5,000 Units, 5,000 Units, Oral, Daily, Jc Strauss MD, 5,000 Units at 10/17/21 0811  •  zinc sulfate (ZINCATE) capsule 220 mg, 220 mg, Oral, BID, Jc Strauss MD, 220 mg at 10/17/21 0811  •  ziprasidone (GEODON) 10 mg in sterile water (preservative free) 0.5 mL injection, 10 mg, Intramuscular, Q12H PRN, Pradip Sterling MD    Reviewed chart, notes, vitals, labs and EKG personally    ASSESSMENT & PLAN:    Dementia with psychosis (HCC)  -On admission, started both gabapentin and olanzapine, while reducing the dose of fluoxetine and discontinuing quetiapine.    -We will continue the Namenda and provide for supportive nursing effort and therapy.    -Patient with increased agitation, requiring as needed medication this weekend.  Moved olanzapine to dinnertime dosing in hopes to help with sundowning effect.    -Patient will return to nursing home once stable       Hyperlipidemia  Lipitor       Hypertension  We will start lisinopril       Anticoagulated  Continue the Plavix      Special precautions: Special Precautions Level 3 (q15 min checks)     Behavioral Health Treatment Plan and Problem List: I have reviewed and approved the Behavioral Health Treatment Plan and Problem list.  The patient has had a chance to review and agrees with the treatment  plan.     Clinician:  Pradip Sterling MD  10/17/21  11:16 EDT

## 2021-10-17 NOTE — PLAN OF CARE
Goal Outcome Evaluation:  Plan of Care Reviewed With: patient  Patient Agreement with Plan of Care: unable to participate     Progress: no change  Outcome Summary: Pt has been calm and cooperative this shift with no issues or concerns. Pt c/o and upset stomach early in the shift and having trouble moving her bowels and pt was medicated. During nursing rounds pt was observed lying in bed speaking with someone who was not there. Pt is [place] and can not participate in any of the assessment or any questions that need to be ask.

## 2021-10-18 PROCEDURE — 99232 SBSQ HOSP IP/OBS MODERATE 35: CPT | Performed by: PSYCHIATRY & NEUROLOGY

## 2021-10-18 RX ORDER — ATORVASTATIN CALCIUM 10 MG/1
10 TABLET, FILM COATED ORAL NIGHTLY
Status: DISCONTINUED | OUTPATIENT
Start: 2021-10-18 | End: 2021-10-18

## 2021-10-18 RX ORDER — ATORVASTATIN CALCIUM 20 MG/1
20 TABLET, FILM COATED ORAL NIGHTLY
Status: DISCONTINUED | OUTPATIENT
Start: 2021-10-18 | End: 2021-10-19 | Stop reason: HOSPADM

## 2021-10-18 RX ADMIN — Medication 220 MG: at 08:06

## 2021-10-18 RX ADMIN — OLANZAPINE 5 MG: 5 TABLET, ORALLY DISINTEGRATING ORAL at 15:12

## 2021-10-18 RX ADMIN — KETOTIFEN FUMARATE 1 DROP: 0.35 SOLUTION/ DROPS OPHTHALMIC at 20:38

## 2021-10-18 RX ADMIN — KETOTIFEN FUMARATE 1 DROP: 0.35 SOLUTION/ DROPS OPHTHALMIC at 08:07

## 2021-10-18 RX ADMIN — CHOLECALCIFEROL TAB 10 MCG (400 UNIT) 2000 UNITS: 10 TAB at 08:05

## 2021-10-18 RX ADMIN — MEMANTINE HYDROCHLORIDE 5 MG: 5 TABLET, FILM COATED ORAL at 08:06

## 2021-10-18 RX ADMIN — GABAPENTIN 100 MG: 100 CAPSULE ORAL at 08:05

## 2021-10-18 RX ADMIN — Medication 220 MG: at 20:38

## 2021-10-18 RX ADMIN — GABAPENTIN 100 MG: 100 CAPSULE ORAL at 15:13

## 2021-10-18 RX ADMIN — ACETAMINOPHEN 650 MG: 325 TABLET ORAL at 20:38

## 2021-10-18 RX ADMIN — Medication 5000 UNITS: at 08:06

## 2021-10-18 RX ADMIN — OXYCODONE HYDROCHLORIDE AND ACETAMINOPHEN 500 MG: 500 TABLET ORAL at 08:05

## 2021-10-18 RX ADMIN — MEMANTINE HYDROCHLORIDE 5 MG: 5 TABLET, FILM COATED ORAL at 20:38

## 2021-10-18 RX ADMIN — GABAPENTIN 100 MG: 100 CAPSULE ORAL at 20:38

## 2021-10-18 RX ADMIN — CLOPIDOGREL 75 MG: 75 TABLET, FILM COATED ORAL at 08:06

## 2021-10-18 RX ADMIN — ASPIRIN 81 MG: 81 TABLET, CHEWABLE ORAL at 08:06

## 2021-10-18 RX ADMIN — ATORVASTATIN CALCIUM 20 MG: 20 TABLET, FILM COATED ORAL at 20:38

## 2021-10-18 RX ADMIN — Medication 3 MG: at 20:38

## 2021-10-18 RX ADMIN — FERROUS SULFATE TAB 325 MG (65 MG ELEMENTAL FE) 325 MG: 325 (65 FE) TAB at 08:06

## 2021-10-18 RX ADMIN — FLUOXETINE HYDROCHLORIDE 10 MG: 10 CAPSULE ORAL at 08:06

## 2021-10-18 NOTE — PLAN OF CARE
Goal Outcome Evaluation:  Plan of Care Reviewed With: patient  Patient Agreement with Plan of Care: unable to participate     Progress: no change  Outcome Summary: Patient is cooperative with care, staff assisted patient with a shower and shampoo. Linens changed this shift also. Patient noted with pressured speech and talking continuously in dayroom with someone but no one is there. Patient is alert to self only and confused at times. Patient is eating well with good sleep last night. Incontinent care provided by staff as needed. No acute distress noted, will continue to monitor.

## 2021-10-18 NOTE — PROGRESS NOTES
"INPATIENT PSYCHIATRIC PROGRESS NOTE    Name:  Catherine Snyder  :  1945  MRN:  [id number]  Visit Number:  [id number]  Length of stay:  3    Behavioral Health Treatment Plan and Problem List: I have reviewed and approved the Behavioral Health Treatment Plan and Problem list.    SUBJECTIVE    CC/ Focus of exam: dementia with psychosis    Patient's subjective status: \"I'm alright\"     INTERVAL HISTORY:.  Still episodes of agitation during her initial several days in the hospital but seems to have mellowed since yesterday, will stay with the current medications (Zyprex and gabapentin) and doses for now. At this rate should be able to transfer back to the NH soon.     Depression rating 0/10  Anxiety rating 0/10  Sleep:  Slept well.        ROS: No cardiovascular, GI, or Neurological complaints.       OBJECTIVE    Vitals:    10/18/21 0728   BP: 116/57   Pulse: 68   Resp:    Temp:    SpO2:       Temp:  [97.4 °F (36.3 °C)-97.7 °F (36.5 °C)] 97.4 °F (36.3 °C)  Heart Rate:  [65-75] 68  Resp:  [18] 18  BP: (115-119)/(53-70) 116/57    MENTAL STATUS EXAM:        Psychomotor: No psychomotor agitation/retardation, No EPS, No motor tics  Speech-normal rate, amount.  Mood/Affect:  Appropriate  Thought Processes:  disorganized  Thought Content:  distorted  Hallucination(s): none  Hopelessness: No  Optimistic:Yes  Suicidal Thoughts:   No  Suicidal Plan/Intent:  No  Homicidal Thoughts:  absent  Orientation: Person  Memory: Global deficits    Lab Results (last 24 hours)     ** No results found for the last 24 hours. **           Imaging Results (Last 24 Hours)     ** No results found for the last 24 hours. **           ECG/EMG Results (most recent)     Procedure Component Value Units Date/Time    ECG 12 Lead [[id number]] Collected: 10/15/21 0058     Updated: 10/15/21 1952     QT Interval 378 ms      QTC Interval 422 ms     Narrative:      Test Reason : Baseline Cardiac Status  Blood Pressure :   */*   mmHG  Vent. Rate :  75 BPM     " Atrial Rate :  75 BPM     P-R Int : 138 ms          QRS Dur :  76 ms      QT Int : 378 ms       P-R-T Axes :  42  75  59 degrees     QTc Int : 422 ms    Normal sinus rhythm  Nonspecific ST abnormality  Abnormal ECG  When compared with ECG of 06-OCT-2021 08:41,  Non-specific change in ST segment in Lateral leads  T wave inversion less evident in Anterolateral leads  QT has lengthened  Confirmed by Yair Guadarrama ([id number]) on 10/15/2021 7:52:40 PM    Referred By: JULIO CESAR           Confirmed By: Yair Guadarrama           ALLERGIES: Patient has no known allergies.      Current Facility-Administered Medications:   •  acetaminophen (TYLENOL) tablet 650 mg, 650 mg, Oral, Q6H PRN, Jc Strauss MD  •  aluminum-magnesium hydroxide-simethicone (MAALOX MAX) 400-400-40 MG/5ML suspension 15 mL, 15 mL, Oral, Q6H PRN, Jc Strauss MD  •  ascorbic acid (VITAMIN C) tablet 500 mg, 500 mg, Oral, Daily, Jc Strauss MD, 500 mg at 10/18/21 0805  •  aspirin chewable tablet 81 mg, 81 mg, Oral, Daily, Jc Strauss MD, 81 mg at 10/18/21 0806  •  atorvastatin (LIPITOR) tablet 10 mg, 10 mg, Oral, Nightly, Jc Strauss MD, 10 mg at 10/17/21 2108  •  benzonatate (TESSALON) capsule 100 mg, 100 mg, Oral, TID PRN, Jc Strauss MD  •  bisacodyl (DULCOLAX) EC tablet 5 mg, 5 mg, Oral, Daily PRN, Jc Strauss MD  •  bisacodyl (DULCOLAX) suppository 10 mg, 10 mg, Rectal, Daily PRN, Jc Strauss MD  •  cholecalciferol (VITAMIN D3) tablet 2,000 Units, 2,000 Units, Oral, Daily, Jc Strauss MD, 2,000 Units at 10/18/21 0805  •  clopidogrel (PLAVIX) tablet 75 mg, 75 mg, Oral, Daily, Jc Strauss MD, 75 mg at 10/18/21 0806  •  ferrous sulfate tablet 325 mg, 325 mg, Oral, Daily, Jc Strauss MD, 325 mg at 10/18/21 0806  •  FLUoxetine (PROzac) capsule 10 mg, 10 mg, Oral, Daily, Panda Strauss MD, 10 mg at 10/18/21 0806  •  gabapentin (NEURONTIN) capsule 100 mg, 100 mg, Oral, TID, Panda Strauss,  MD, 100 mg at 10/18/21 0805  •  hydrOXYzine (ATARAX) tablet 10 mg, 10 mg, Oral, Q8H PRN, Jc Strauss MD, 10 mg at 10/16/21 2049  •  ibuprofen (ADVIL,MOTRIN) tablet 400 mg, 400 mg, Oral, Q6H PRN, Jc Strauss MD  •  ketotifen (ZADITOR) 0.025 % ophthalmic solution 1 drop, 1 drop, Both Eyes, BID, Jc Strauss MD, 1 drop at 10/18/21 0807  •  loperamide (IMODIUM) capsule 2 mg, 2 mg, Oral, Q2H PRN, Jc Strauss MD  •  magnesium hydroxide (MILK OF MAGNESIA) suspension 10 mL, 10 mL, Oral, Daily PRN, Jc Strauss MD, 10 mL at 10/17/21 1049  •  melatonin tablet 3 mg, 3 mg, Oral, Nightly PRN, Jc Strauss MD, 3 mg at 10/17/21 2108  •  memantine (NAMENDA) tablet 5 mg, 5 mg, Oral, Q12H, Jc Strauss MD, 5 mg at 10/18/21 0806  •  OLANZapine zydis (zyPREXA) disintegrating tablet 5 mg, 5 mg, Oral, Daily With Dinner, Pradip Sterling MD, 5 mg at 10/17/21 1539  •  ondansetron (ZOFRAN) tablet 4 mg, 4 mg, Oral, Q6H PRN, Jc Strauss MD, 4 mg at 10/17/21 1049  •  senna (SENOKOT) tablet 1 tablet, 1 tablet, Oral, Daily PRN, Jc Strauss MD  •  sodium chloride nasal spray 2 spray, 2 spray, Each Nare, PRN, Jc Strauss MD  •  vitamin D3 capsule 5,000 Units, 5,000 Units, Oral, Daily, Jc Strauss MD, 5,000 Units at 10/18/21 0806  •  zinc sulfate (ZINCATE) capsule 220 mg, 220 mg, Oral, BID, Jc Strauss MD, 220 mg at 10/18/21 0806  •  ziprasidone (GEODON) 5 mg in sterile water (preservative free) 0.25 mL injection, 5 mg, Intramuscular, Q12H PRN, Pradip Sterling MD    ASSESSMENT & PLAN      Dementia with psychosis (HCC)  -On admission, started both gabapentin and olanzapine, discontinued both the Prozac and the Seroquel. We will continue the Namenda and provide for supportive nursing effort and therapy.    -Patient with increased agitation, requiring as needed medication this weekend.  Moved olanzapine to dinnertime dosing in hopes to help with sundowning effect.    -Patient will  return to nursing home once stable       Hyperlipidemia  Lipitor       Hypertension  We will start lisinopril       Anticoagulated  Continue the Plavix    Special precautions: Special Precautions Level 3 (q15 min checks)     Behavioral Health Treatment Plan and Problem List: I have reviewed and approved the Behavioral Health Treatment Plan and Problem list.    I spent a total of 26 minutes in direct patient care including  17 minutes face to face with the patient assessment, coordination of care, and counseling the patient on the current and follow-up treatment plans regarding he status and situation.  Patient had no additional questions.     MICHELE Strauss MD    Clinician:  Panda Strauss MD  10/18/21  09:50 EDT    Dictated utilizing Dragon dictation

## 2021-10-18 NOTE — PROGRESS NOTES
DATA:      Therapist discussed case with nursing staff and met with patient today to review coping skills, review plan of care, and discuss discharge.    Therapist contacted patient's granddaughter, Gita for an update.    Clinical Maneuvering/Intervention:     Therapist assisted patient in processing above session content; acknowledged and normalized patient’s thoughts, feelings, and concerns.  Discussed the therapist/patient relationship and explain the parameters and limitations of relative confidentiality.  Also discussed the importance of active participation, and honesty to the treatment process.  Encouraged the patient to discuss/vent their feelings, frustrations, and fears concerning their ongoing medical issues and validated their feelings.     Allowed patient to freely discuss issues without interruption or judgment. Provided safe, confidential environment to facilitate the development of positive therapeutic relationship and encourage open, honest communication.      Therapist addressed discharge safety planning this date. Assisted patient in identifying risk factors which would indicate the need for higher level of care after discharge;  including thoughts to harm self or others and/or self-harming behavior. Encouraged patient to call 911, or present to the nearest emergency room should any of these events occur. Discussed crisis intervention services and means to access.  Encouraged securing any objects of harm.    Therapist completed integrated summary, treatment plan, and initiated social history this date.  Therapist is strongly encouraging family involvement in treatment.       Encouraged mask wearing, social distancing, and regular hand washing due to COVID19 risk.      ASSESSMENT:      Therapist met 1:1 with patient today. Patient continues to be confused and disoriented. Patient had aggressive behaviors on Saturday, but has improved since. Patient continues to experience AVH, though does not  appear distressed by this. Patient gets irritable at times, wanting to leave, but is redirectable. Patient interacting appropriately with patients and staff on the unit. Patient will return to NH at discharge.     PLAN:       Patient to remain hospitalized this date.      Treatment team will focus efforts on stabilizing patient's acute symptoms while providing education on healthy coping and crisis management to reduce hospitalizations.   Patient requires daily psychiatrist evaluation and 24/7 nursing supervision to promote patient  safety.     Therapist will offer 1-4 individual sessions, 1 therapy group daily, family education, and appropriate referral.

## 2021-10-18 NOTE — PLAN OF CARE
Goal Outcome Evaluation:  Plan of Care Reviewed With: patient  Patient Agreement with Plan of Care: refuses to participate     Progress: no change  Pt did not answer assessment questions d/t unable to hear the questions that were ask. Pt pleasant and calm during shift. Pt slept well through the night.

## 2021-10-19 VITALS
RESPIRATION RATE: 18 BRPM | OXYGEN SATURATION: 90 % | SYSTOLIC BLOOD PRESSURE: 102 MMHG | HEART RATE: 76 BPM | DIASTOLIC BLOOD PRESSURE: 58 MMHG | TEMPERATURE: 97.6 F

## 2021-10-19 PROCEDURE — 99239 HOSP IP/OBS DSCHRG MGMT >30: CPT | Performed by: PSYCHIATRY & NEUROLOGY

## 2021-10-19 RX ORDER — OLANZAPINE 5 MG/1
5 TABLET, ORALLY DISINTEGRATING ORAL
Qty: 30 TABLET | Refills: 0 | Status: SHIPPED | OUTPATIENT
Start: 2021-10-19

## 2021-10-19 RX ORDER — GABAPENTIN 100 MG/1
100 CAPSULE ORAL 3 TIMES DAILY
Qty: 90 CAPSULE | Refills: 0 | Status: SHIPPED | OUTPATIENT
Start: 2021-10-19

## 2021-10-19 RX ORDER — LANOLIN ALCOHOL/MO/W.PET/CERES
3 CREAM (GRAM) TOPICAL NIGHTLY PRN
Qty: 30 TABLET | Refills: 0 | Status: SHIPPED | OUTPATIENT
Start: 2021-10-19

## 2021-10-19 RX ADMIN — MEMANTINE HYDROCHLORIDE 5 MG: 5 TABLET, FILM COATED ORAL at 08:02

## 2021-10-19 RX ADMIN — OXYCODONE HYDROCHLORIDE AND ACETAMINOPHEN 500 MG: 500 TABLET ORAL at 08:02

## 2021-10-19 RX ADMIN — CLOPIDOGREL 75 MG: 75 TABLET, FILM COATED ORAL at 08:02

## 2021-10-19 RX ADMIN — FERROUS SULFATE TAB 325 MG (65 MG ELEMENTAL FE) 325 MG: 325 (65 FE) TAB at 08:02

## 2021-10-19 RX ADMIN — GABAPENTIN 100 MG: 100 CAPSULE ORAL at 08:02

## 2021-10-19 RX ADMIN — ASPIRIN 81 MG: 81 TABLET, CHEWABLE ORAL at 08:02

## 2021-10-19 RX ADMIN — KETOTIFEN FUMARATE 1 DROP: 0.35 SOLUTION/ DROPS OPHTHALMIC at 08:03

## 2021-10-19 RX ADMIN — Medication 5000 UNITS: at 08:02

## 2021-10-19 RX ADMIN — Medication 220 MG: at 08:02

## 2021-10-19 RX ADMIN — CHOLECALCIFEROL TAB 10 MCG (400 UNIT) 2000 UNITS: 10 TAB at 08:02

## 2021-10-19 NOTE — DISCHARGE SUMMARY
Date of Discharge:  10/19/2021    Discharge Diagnosis:Principal Problem:    Dementia with psychosis (HCC)  Active Problems:    Hyperlipidemia    Hypertension    Anticoagulated        Presenting Problem/History of Present Illness:Patient was admitted to the hospital on October 15 having presented psychotic confused and agitated, voluntarily admitted for safety evaluation and treatment, see admission note for details.         Hospital Course:      Patient was admitted for safety and stabilization and was placed on standard precautions.  Routine labs were checked.  Patient was assigned a masters level therapist and provided with an opportunity to participate in group and individual therapy on the unit.  Patient seen on a daily basis for evaluation and supportive therapy.  Patient started on olanzapine 5 mg daily along with gabapentin 100 mg 3 times daily addressing her episodic periods of irritability and agitation superimposed on her cognitive dysfunction that included psychotic phenomena such as hallucinations.  Patient's status stabilized and for the 2 days prior to discharge she was cooperative redirectable pleasant enough without periods of agitation.  Patient to return to the [facility] date of discharge see below for medications.    Consults:   Consults     No orders found from 9/16/2021 to 10/16/2021.          Labs:  Lab Results (all)     Procedure Component Value Units Date/Time    Lipid Panel [[id number]]  (Abnormal) Collected: 10/16/21 1607    Specimen: Blood Updated: 10/16/21 1642     Total Cholesterol 192 mg/dL      Triglycerides 295 mg/dL      HDL Cholesterol 34 mg/dL      LDL Cholesterol  107 mg/dL      VLDL Cholesterol 51 mg/dL      LDL/HDL Ratio 2.91    Narrative:      Cholesterol Reference Ranges  (U.S. Department of Health and Human Services ATP III Classifications)    Desirable          <200 mg/dL  Borderline High    200-239 mg/dL  High Risk          >240 mg/dL      Triglyceride Reference  Ranges  (U.S. Department of Health and Human Services ATP III Classifications)    Normal           <150 mg/dL  Borderline High  150-199 mg/dL  High             200-499 mg/dL  Very High        >500 mg/dL    HDL Reference Ranges  (U.S. Department of Health and Human Services ATP III Classifcations)    Low     <40 mg/dl (major risk factor for CHD)  High    >60 mg/dl ('negative' risk factor for CHD)        LDL Reference Ranges  (U.S. Department of Health and Human Services ATP III Classifcations)    Optimal          <100 mg/dL  Near Optimal     100-129 mg/dL  Borderline High  130-159 mg/dL  High             160-189 mg/dL  Very High        >189 mg/dL    Hemoglobin A1c [[id number]]  (Abnormal) Collected: 10/16/21 1607    Specimen: Blood Updated: 10/16/21 1633     Hemoglobin A1C 6.20 %     Narrative:      Hemoglobin A1C Ranges:    Increased Risk for Diabetes  5.7% to 6.4%  Diabetes                     >= 6.5%  Diabetic Goal                < 7.0%          Imaging:  Imaging Results (All)     None          Condition on Discharge:  improved    Prognosis: Poor    Vital Signs  Temp:  [97.1 °F (36.2 °C)-97.6 °F (36.4 °C)] 97.6 °F (36.4 °C)  Heart Rate:  [61-81] 76  Resp:  [18] 18  BP: (102-172)/(51-60) 102/58    Discharge Disposition  Skilled Nursing Facility (DC - External)    Discharge Medications     Discharge Medications      New Medications      Instructions Start Date   gabapentin 100 MG capsule  Commonly known as: NEURONTIN   100 mg, Oral, 3 Times Daily      melatonin 3 MG tablet   3 mg, Oral, Nightly PRN      OLANZapine zydis 5 MG disintegrating tablet  Commonly known as: zyPREXA   5 mg, Translingual, Daily With Dinner         Continue These Medications      Instructions Start Date   acetaminophen 500 MG tablet  Commonly known as: TYLENOL   500 mg, Oral, Every 4 Hours PRN      ascorbic acid 500 MG tablet  Commonly known as: VITAMIN C   500 mg, Oral, Daily      aspirin 81 MG chewable tablet   81 mg, Oral, Daily       atorvastatin 10 MG tablet  Commonly known as: LIPITOR   10 mg, Oral, Nightly      bisacodyl 10 MG suppository  Commonly known as: DULCOLAX   10 mg, Rectal, Daily PRN      cholecalciferol 25 MCG (1000 UT) tablet  Commonly known as: VITAMIN D3   2,000 Units, Oral, Daily, PTA: Pt takes a 2000 unit and 5000 unit vitamin D3 to equal a total of 7000 units daily      vitamin D3 125 MCG (5000 UT) capsule capsule   5,000 Units, Oral, Daily, PTA: Pt takes a 2000 unit and 5000 unit vitamin D3 to equal a total of 7000 units daily      clopidogrel 75 MG tablet  Commonly known as: PLAVIX   75 mg, Oral, Daily      ferrous sulfate 325 (65 FE) MG tablet   325 mg, Oral, Daily      hydrOXYzine 10 MG tablet  Commonly known as: ATARAX   10 mg, Oral, Every 8 Hours PRN      magnesium hydroxide 400 MG/5ML suspension  Commonly known as: MILK OF MAGNESIA   30 mL, Oral, Daily PRN      memantine 5 MG tablet  Commonly known as: NAMENDA   5 mg, Oral, Every 12 Hours Scheduled      olopatadine 0.2 % solution ophthalmic solution  Commonly known as: PATADAY   1 drop, Both Eyes, Daily      senna 8.6 MG tablet  Commonly known as: SENOKOT   1 tablet, Oral, Daily PRN      zinc sulfate 220 (50 Zn) MG capsule  Commonly known as: ZINCATE   220 mg, Oral, 2 Times Daily         Stop These Medications    FLUoxetine 20 MG capsule  Commonly known as: PROzac     QUEtiapine 25 MG tablet  Commonly known as: SEROquel            Discharge Diet: Mechanical soft high-fiber    Activity at Discharge: To the nursing home facility    Follow-up Appointments:    Patient returning to the [facility].          Panda Strauss MD  10/19/21  09:07 EDT  Time spent with the discharge process >30 minutes.     Dictated utilizing Dragon dictation

## 2021-10-19 NOTE — PLAN OF CARE
Goal Outcome Evaluation:  Plan of Care Reviewed With: patient  Patient Agreement with Plan of Care: unable to participate     Progress: no change  Pt pleasant but unable to participate with assessment d/t dementia and [place]. Pt rested during the night but had an episode where she was up and trying to go out the unit doors thinking she was going to the bathroom staff redirected pt to the bathroom pt was irritable and had an episode of incontinence. Once pt was back in the bed pt went back to sleep.

## 2021-10-19 NOTE — PROGRESS NOTES
Navigator spoke with Ivan from [facility]. They are willing to accept patient back today. Ivan asks that we fax over discharge summary with med list to [phone]. Faxed at this time.  Report can be called to IC. Treatment team to call Ivan back once transportation is arranged.   [facility] - [phone]    Spoke with [facility] [facility]. They will plan to pick patient up here in about an hour and transport her back to [facility].   [facility] - [phone]

## 2021-12-15 ENCOUNTER — LAB REQUISITION (OUTPATIENT)
Dept: LAB | Facility: HOSPITAL | Age: 76
End: 2021-12-15

## 2021-12-15 DIAGNOSIS — N39.0 URINARY TRACT INFECTION, SITE NOT SPECIFIED: ICD-10-CM

## 2021-12-15 LAB
BACTERIA UR QL AUTO: ABNORMAL /HPF
BILIRUB UR QL STRIP: NEGATIVE
CLARITY UR: CLEAR
COLOR UR: YELLOW
GLUCOSE UR STRIP-MCNC: NEGATIVE MG/DL
HGB UR QL STRIP.AUTO: NEGATIVE
HYALINE CASTS UR QL AUTO: ABNORMAL /LPF
KETONES UR QL STRIP: NEGATIVE
LEUKOCYTE ESTERASE UR QL STRIP.AUTO: NEGATIVE
NITRITE UR QL STRIP: POSITIVE
PH UR STRIP.AUTO: 5.5 [PH] (ref 5–8)
PROT UR QL STRIP: NEGATIVE
RBC # UR STRIP: ABNORMAL /HPF
REF LAB TEST METHOD: ABNORMAL
SP GR UR STRIP: 1.01 (ref 1–1.03)
SQUAMOUS #/AREA URNS HPF: ABNORMAL /HPF
UROBILINOGEN UR QL STRIP: ABNORMAL
WBC # UR STRIP: ABNORMAL /HPF

## 2021-12-15 PROCEDURE — 87077 CULTURE AEROBIC IDENTIFY: CPT | Performed by: INTERNAL MEDICINE

## 2021-12-15 PROCEDURE — 87086 URINE CULTURE/COLONY COUNT: CPT | Performed by: INTERNAL MEDICINE

## 2021-12-15 PROCEDURE — 87186 SC STD MICRODIL/AGAR DIL: CPT | Performed by: INTERNAL MEDICINE

## 2021-12-15 PROCEDURE — 81001 URINALYSIS AUTO W/SCOPE: CPT | Performed by: INTERNAL MEDICINE

## 2021-12-18 LAB — BACTERIA SPEC AEROBE CULT: ABNORMAL

## 2022-01-25 ENCOUNTER — LAB REQUISITION (OUTPATIENT)
Dept: LAB | Facility: HOSPITAL | Age: 77
End: 2022-01-25

## 2022-01-25 DIAGNOSIS — R41.82 ALTERED MENTAL STATUS, UNSPECIFIED: ICD-10-CM

## 2022-01-25 LAB
BACTERIA UR QL AUTO: ABNORMAL /HPF
BILIRUB UR QL STRIP: NEGATIVE
CLARITY UR: ABNORMAL
COLOR UR: YELLOW
GLUCOSE UR STRIP-MCNC: NEGATIVE MG/DL
HGB UR QL STRIP.AUTO: NEGATIVE
HYALINE CASTS UR QL AUTO: ABNORMAL /LPF
KETONES UR QL STRIP: NEGATIVE
LEUKOCYTE ESTERASE UR QL STRIP.AUTO: ABNORMAL
NITRITE UR QL STRIP: POSITIVE
PH UR STRIP.AUTO: 6 [PH] (ref 5–8)
PROT UR QL STRIP: NEGATIVE
RBC # UR STRIP: ABNORMAL /HPF
REF LAB TEST METHOD: ABNORMAL
SP GR UR STRIP: 1.01 (ref 1–1.03)
SQUAMOUS #/AREA URNS HPF: ABNORMAL /HPF
UROBILINOGEN UR QL STRIP: ABNORMAL
WBC # UR STRIP: ABNORMAL /HPF

## 2022-01-25 PROCEDURE — 87086 URINE CULTURE/COLONY COUNT: CPT | Performed by: NURSE PRACTITIONER

## 2022-01-25 PROCEDURE — 87186 SC STD MICRODIL/AGAR DIL: CPT | Performed by: NURSE PRACTITIONER

## 2022-01-25 PROCEDURE — 81001 URINALYSIS AUTO W/SCOPE: CPT | Performed by: NURSE PRACTITIONER

## 2022-01-25 PROCEDURE — 87077 CULTURE AEROBIC IDENTIFY: CPT | Performed by: NURSE PRACTITIONER

## 2022-01-27 LAB — BACTERIA SPEC AEROBE CULT: ABNORMAL

## 2022-02-15 ENCOUNTER — APPOINTMENT (OUTPATIENT)
Dept: CT IMAGING | Facility: HOSPITAL | Age: 77
End: 2022-02-15

## 2022-02-15 ENCOUNTER — HOSPITAL ENCOUNTER (EMERGENCY)
Facility: HOSPITAL | Age: 77
Discharge: NURSING, REHAB OR LONG-TERM CARE | End: 2022-02-15
Attending: STUDENT IN AN ORGANIZED HEALTH CARE EDUCATION/TRAINING PROGRAM | Admitting: STUDENT IN AN ORGANIZED HEALTH CARE EDUCATION/TRAINING PROGRAM

## 2022-02-15 VITALS
OXYGEN SATURATION: 93 % | RESPIRATION RATE: 16 BRPM | TEMPERATURE: 98 F | HEART RATE: 88 BPM | WEIGHT: 190 LBS | BODY MASS INDEX: 30.53 KG/M2 | SYSTOLIC BLOOD PRESSURE: 151 MMHG | DIASTOLIC BLOOD PRESSURE: 67 MMHG | HEIGHT: 66 IN

## 2022-02-15 DIAGNOSIS — S16.1XXA STRAIN OF NECK MUSCLE, INITIAL ENCOUNTER: ICD-10-CM

## 2022-02-15 DIAGNOSIS — S09.90XA CLOSED HEAD INJURY, INITIAL ENCOUNTER: Primary | ICD-10-CM

## 2022-02-15 PROCEDURE — 70450 CT HEAD/BRAIN W/O DYE: CPT | Performed by: RADIOLOGY

## 2022-02-15 PROCEDURE — 72125 CT NECK SPINE W/O DYE: CPT | Performed by: RADIOLOGY

## 2022-02-15 PROCEDURE — 72125 CT NECK SPINE W/O DYE: CPT

## 2022-02-15 PROCEDURE — 99283 EMERGENCY DEPT VISIT LOW MDM: CPT

## 2022-02-15 PROCEDURE — 70450 CT HEAD/BRAIN W/O DYE: CPT

## 2022-02-15 NOTE — ED PROVIDER NOTES
Subjective     Fall  Mechanism of injury: fall    Injury location:  Head/neck  Head/neck injury location:  Head  Incident location:  penitentiary  Time since incident: just PTA.  Arrived directly from scene: no    Fall:     Fall occurred: tripped.    Point of impact:  Head    Entrapped after fall: no    Suspicion of alcohol use: no    Suspicion of drug use: no    Associated symptoms: no chest pain, no difficulty breathing, no headaches, no hearing loss, no nausea, no neck pain and no vomiting    Risk factors: no AICD, no anticoagulation therapy, no beta blocker therapy, no COPD, no dialysis, no pacemaker and not pregnant        Review of Systems   Constitutional: Negative.    HENT: Negative.  Negative for hearing loss.    Eyes: Negative.    Respiratory: Negative.    Cardiovascular: Negative.  Negative for chest pain.   Gastrointestinal: Negative.  Negative for nausea and vomiting.   Endocrine: Negative.    Genitourinary: Negative.    Musculoskeletal: Negative.  Negative for neck pain.   Skin: Negative.    Allergic/Immunologic: Negative.    Neurological: Negative.  Negative for headaches.   Hematological: Negative.    Psychiatric/Behavioral: Negative.        Past Medical History:   Diagnosis Date   • Alzheimer disease ([facility])    • Alzheimer's disease ([facility])    • Anxiety    • Anxiety disorder, unspecified    • Cerebrovascular accident (CVA) ([facility])    • Constipation    • Deafness    • Delirium due to known physiological condition    • Depression    • Difficulty in walking, not elsewhere classified    • Gastro-esophageal reflux disease with esophagitis    • GERD (gastroesophageal reflux disease)    • Hyperlipidemia    • Hyperlipidemia    • Hypertension    • Major depressive disorder, single episode    • Muscle weakness (generalized)    • Other lack of coordination    • 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 No pertinent surgical history.    Family History   Family history unknown: Yes       Social History     Socioeconomic History   • Marital status:    Tobacco Use   • Smoking status: Never Smoker   Vaping Use   • Vaping Use: Never used   Substance and Sexual Activity   • Alcohol use: No   • Drug use: No   • Sexual activity: Not Currently     Partners: Male           Objective   Physical Exam  Vitals and nursing note reviewed.   Constitutional:       Appearance: She is well-developed.   HENT:      Head: Normocephalic.      Right Ear: External ear normal.      Left Ear: External ear normal.   Eyes:      Conjunctiva/sclera: Conjunctivae normal.      Pupils: Pupils are equal, round, and reactive to light.   Cardiovascular:      Rate and Rhythm: Normal rate and regular rhythm.      Heart sounds: Normal heart sounds.   Pulmonary:      Effort: Pulmonary effort is normal.      Breath sounds: Normal breath sounds.   Abdominal:      General: Bowel sounds are normal.      Palpations: Abdomen is soft.   Musculoskeletal:         General: Normal range of motion.      Cervical back: Normal range of motion and neck supple.   Skin:     General: Skin is warm and dry.      Capillary Refill: Capillary refill takes less than 2 seconds.   Neurological:      Mental Status: She is alert and oriented to person, place, and time.   Psychiatric:         Behavior: Behavior normal.         Thought Content: Thought content normal.         Procedures           ED Course                                                 MDM    Final diagnoses:   Closed head injury, initial encounter   Strain of neck muscle, initial encounter       ED Disposition  ED Disposition     ED Disposition Condition Comment    Discharge Stable           Ricky Alamo,  MD  [address] [facility] [facility] [address]  [phone]    Schedule an appointment as soon as possible for a visit   For further evaluation         Medication List      No changes were made to your prescriptions during this visit.          Jose F Porter, APRN  02/24/22 8461

## 2022-02-15 NOTE — DISCHARGE INSTRUCTIONS
Follow up with patient's primary care provider    Return to the emergency room for worsening symptoms.

## 2022-02-15 NOTE — ED NOTES
Called karen fairbanks for transport back to [facility]     Viet Layne  02/15/22 1813

## 2022-06-01 ENCOUNTER — LAB REQUISITION (OUTPATIENT)
Dept: LAB | Facility: HOSPITAL | Age: 77
End: 2022-06-01

## 2022-06-01 DIAGNOSIS — N18.9 CHRONIC KIDNEY DISEASE, UNSPECIFIED: ICD-10-CM

## 2022-06-01 LAB
BACTERIA UR QL AUTO: ABNORMAL /HPF
BILIRUB UR QL STRIP: NEGATIVE
CLARITY UR: ABNORMAL
COLOR UR: YELLOW
GLUCOSE UR STRIP-MCNC: NEGATIVE MG/DL
HGB UR QL STRIP.AUTO: ABNORMAL
HYALINE CASTS UR QL AUTO: ABNORMAL /LPF
KETONES UR QL STRIP: ABNORMAL
LEUKOCYTE ESTERASE UR QL STRIP.AUTO: ABNORMAL
NITRITE UR QL STRIP: POSITIVE
PH UR STRIP.AUTO: 5.5 [PH] (ref 5–8)
PROT UR QL STRIP: NEGATIVE
RBC # UR STRIP: ABNORMAL /HPF
REF LAB TEST METHOD: ABNORMAL
SP GR UR STRIP: 1.02 (ref 1–1.03)
SQUAMOUS #/AREA URNS HPF: ABNORMAL /HPF
UROBILINOGEN UR QL STRIP: ABNORMAL
WBC # UR STRIP: ABNORMAL /HPF

## 2022-06-01 PROCEDURE — 87186 SC STD MICRODIL/AGAR DIL: CPT | Performed by: INTERNAL MEDICINE

## 2022-06-01 PROCEDURE — 81001 URINALYSIS AUTO W/SCOPE: CPT | Performed by: INTERNAL MEDICINE

## 2022-06-01 PROCEDURE — 87086 URINE CULTURE/COLONY COUNT: CPT | Performed by: INTERNAL MEDICINE

## 2022-06-01 PROCEDURE — 87077 CULTURE AEROBIC IDENTIFY: CPT | Performed by: INTERNAL MEDICINE

## 2022-06-03 LAB — BACTERIA SPEC AEROBE CULT: ABNORMAL

## 2022-06-13 ENCOUNTER — TRANSCRIBE ORDERS (OUTPATIENT)
Dept: ADMINISTRATIVE | Facility: HOSPITAL | Age: 77
End: 2022-06-13

## 2022-06-13 DIAGNOSIS — R51.9 NONINTRACTABLE HEADACHE, UNSPECIFIED CHRONICITY PATTERN, UNSPECIFIED HEADACHE TYPE: Primary | ICD-10-CM

## 2022-06-23 ENCOUNTER — INFUSION (OUTPATIENT)
Dept: ONCOLOGY | Facility: HOSPITAL | Age: 77
End: 2022-06-23

## 2022-06-23 ENCOUNTER — TRANSCRIBE ORDERS (OUTPATIENT)
Dept: ONCOLOGY | Facility: HOSPITAL | Age: 77
End: 2022-06-23

## 2022-06-23 VITALS
SYSTOLIC BLOOD PRESSURE: 142 MMHG | DIASTOLIC BLOOD PRESSURE: 65 MMHG | HEART RATE: 77 BPM | OXYGEN SATURATION: 96 % | RESPIRATION RATE: 18 BRPM | TEMPERATURE: 97.7 F

## 2022-06-23 DIAGNOSIS — R46.89 AGGRESSIVE BEHAVIOR: ICD-10-CM

## 2022-06-23 PROCEDURE — C1751 CATH, INF, PER/CENT/MIDLINE: HCPCS

## 2022-06-23 PROCEDURE — 36410 VNPNXR 3YR/> PHY/QHP DX/THER: CPT

## 2022-07-01 ENCOUNTER — HOSPITAL ENCOUNTER (OUTPATIENT)
Dept: CT IMAGING | Facility: HOSPITAL | Age: 77
Discharge: HOME | End: 2022-07-01
Admitting: INTERNAL MEDICINE

## 2022-07-01 DIAGNOSIS — R51.9 NONINTRACTABLE HEADACHE, UNSPECIFIED CHRONICITY PATTERN, UNSPECIFIED HEADACHE TYPE: ICD-10-CM

## 2022-07-01 PROCEDURE — 70450 CT HEAD/BRAIN W/O DYE: CPT

## 2022-07-01 PROCEDURE — 70450 CT HEAD/BRAIN W/O DYE: CPT | Performed by: RADIOLOGY

## 2022-08-26 ENCOUNTER — LAB REQUISITION (OUTPATIENT)
Dept: LAB | Facility: HOSPITAL | Age: 77
End: 2022-08-26

## 2022-08-26 DIAGNOSIS — R30.0 DYSURIA: ICD-10-CM

## 2022-08-26 LAB
BACTERIA UR QL AUTO: ABNORMAL /HPF
BILIRUB UR QL STRIP: NEGATIVE
CLARITY UR: CLEAR
COLOR UR: YELLOW
GLUCOSE UR STRIP-MCNC: NEGATIVE MG/DL
HGB UR QL STRIP.AUTO: NEGATIVE
HYALINE CASTS UR QL AUTO: ABNORMAL /LPF
KETONES UR QL STRIP: NEGATIVE
LEUKOCYTE ESTERASE UR QL STRIP.AUTO: ABNORMAL
NITRITE UR QL STRIP: POSITIVE
PH UR STRIP.AUTO: 6 [PH] (ref 5–8)
PROT UR QL STRIP: NEGATIVE
RBC # UR STRIP: ABNORMAL /HPF
REF LAB TEST METHOD: ABNORMAL
SP GR UR STRIP: 1.02 (ref 1–1.03)
SQUAMOUS #/AREA URNS HPF: ABNORMAL /HPF
UROBILINOGEN UR QL STRIP: ABNORMAL
WBC # UR STRIP: ABNORMAL /HPF

## 2022-08-26 PROCEDURE — 87086 URINE CULTURE/COLONY COUNT: CPT | Performed by: INTERNAL MEDICINE

## 2022-08-26 PROCEDURE — 81001 URINALYSIS AUTO W/SCOPE: CPT | Performed by: INTERNAL MEDICINE

## 2022-08-27 LAB — BACTERIA SPEC AEROBE CULT: NO GROWTH

## 2022-09-02 ENCOUNTER — APPOINTMENT (OUTPATIENT)
Dept: CT IMAGING | Facility: HOSPITAL | Age: 77
End: 2022-09-02

## 2022-09-02 ENCOUNTER — APPOINTMENT (OUTPATIENT)
Dept: GENERAL RADIOLOGY | Facility: HOSPITAL | Age: 77
End: 2022-09-02

## 2022-09-02 ENCOUNTER — HOSPITAL ENCOUNTER (EMERGENCY)
Facility: HOSPITAL | Age: 77
Discharge: NURSING, REHAB OR LONG-TERM CARE | End: 2022-09-02
Attending: STUDENT IN AN ORGANIZED HEALTH CARE EDUCATION/TRAINING PROGRAM | Admitting: STUDENT IN AN ORGANIZED HEALTH CARE EDUCATION/TRAINING PROGRAM

## 2022-09-02 VITALS
RESPIRATION RATE: 18 BRPM | DIASTOLIC BLOOD PRESSURE: 57 MMHG | HEIGHT: 66 IN | TEMPERATURE: 98.1 F | SYSTOLIC BLOOD PRESSURE: 131 MMHG | BODY MASS INDEX: 30.54 KG/M2 | HEART RATE: 73 BPM | OXYGEN SATURATION: 99 % | WEIGHT: 190.04 LBS
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
## 2022-09-02 DIAGNOSIS — Z00.00 WELL ADULT HEALTH CHECK: ICD-10-CM

## 2022-09-02 PROCEDURE — 72170 X-RAY EXAM OF PELVIS: CPT | Performed by: RADIOLOGY

## 2022-09-02 PROCEDURE — 72170 X-RAY EXAM OF PELVIS: CPT

## 2022-09-02 PROCEDURE — 70450 CT HEAD/BRAIN W/O DYE: CPT

## 2022-09-02 PROCEDURE — 72125 CT NECK SPINE W/O DYE: CPT | Performed by: RADIOLOGY

## 2022-09-02 PROCEDURE — 70450 CT HEAD/BRAIN W/O DYE: CPT | Performed by: RADIOLOGY

## 2022-09-02 PROCEDURE — 71045 X-RAY EXAM CHEST 1 VIEW: CPT

## 2022-09-02 PROCEDURE — 71045 X-RAY EXAM CHEST 1 VIEW: CPT | Performed by: RADIOLOGY

## 2022-09-02 PROCEDURE — 99283 EMERGENCY DEPT VISIT LOW MDM: CPT

## 2022-09-02 PROCEDURE — 72125 CT NECK SPINE W/O DYE: CPT

## 2022-09-02 RX ORDER — LORAZEPAM 2 MG/ML
INJECTION INTRAMUSCULAR
Status: DISCONTINUED
Start: 2022-09-02 | End: 2022-09-02 | Stop reason: HOSPADM

## 2022-09-02 NOTE — DISCHARGE INSTRUCTIONS
No clear evidence of acute injury on exam, CT of the head, CT cervical spine, chest x-ray or pelvic x-ray.  Please not hesitate to return for any new onset or worsening symptoms.

## 2022-09-02 NOTE — ED PROVIDER NOTES
[facility]  emergency department encounter        Pt Name: Catherine Snyder  MRN: [id number]  Birthdate 1945  Date of evaluation: 9/2/2022    Chief Complaint   Patient presents with   • Fall             HISTORY OF PRESENT ILLNESS:   Catherine Snyder is a 77 y.o. female PMH significant for HTN, HLD, GERD, Alzheimer's disease with dementia, CVA, anxiety, MDD, psychotic disorder.  On DAPT, no anticoagulation.    Patient presents by EMS from [facility] for suspected unwitnessed fall.  Patient was found on bathroom floor eating her breakfast.  No clear evidence of actual fall.  Patient has significant dementia, can only answer her name.  History unreliable.    No other exacerbating or alleviating factors other than as noted above.  Severity: Severe    PCP: Ricky Alamo MD          REVIEW OF SYSTEMS:     Review of Systems   Unable to perform ROS: Dementia       Review of systems otherwise as per HPI.          PREVIOUS HISTORY:         Past Medical History:   Diagnosis Date   • Alzheimer disease (HCC)    • Alzheimer's disease (HCC)    • Anxiety    • Anxiety disorder, unspecified    • Cerebrovascular accident (CVA) ([facility])    • Constipation    • Deafness    • Delirium due to known physiological condition    • Depression    • Difficulty in walking, not elsewhere classified    • Gastro-esophageal reflux disease with esophagitis    • GERD (gastroesophageal reflux disease)    • Hyperlipidemia    • Hyperlipidemia    • Hypertension    • Major depressive disorder, single episode    • Muscle weakness (generalized)    • Other lack of coordination    • 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        Social History     Socioeconomic History   • Marital status:    Tobacco Use   • Smoking status: Never Smoker   Vaping Use   • Vaping Use: Never used   Substance and Sexual Activity   • Alcohol use: No   • Drug use: No   • Sexual activity: Not Currently     Partners: Male           Family History   Family history unknown: Yes         Current Outpatient Medications   Medication Instructions   • acetaminophen (TYLENOL) 500 mg, Oral, Every 4 Hours PRN   • ascorbic acid (VITAMIN C) 500 mg, Oral, Daily   • aspirin 81 mg, Oral, Daily   • atorvastatin (LIPITOR) 10 mg, Oral, Nightly   • bisacodyl (DULCOLAX) 10 mg, Rectal, Daily PRN   • cholecalciferol (VITAMIN D3) 2,000 Units, Oral, Daily, PTA: Pt takes a 2000 unit and 5000 unit vitamin D3 to equal a total of 7000 units daily   • clopidogrel (PLAVIX) 75 mg, Oral, Daily   • ferrous sulfate 325 mg, Oral, Daily   • gabapentin (NEURONTIN) 100 mg, Oral, 3 Times Daily   • hydrOXYzine (ATARAX) 10 mg, Oral, Every 8 Hours PRN   • magnesium hydroxide (MILK OF MAGNESIA) 400 MG/5ML suspension 30 mL, Oral, Daily PRN   • melatonin 3 mg, Oral, Nightly PRN   • memantine (NAMENDA) 5 mg, Oral, Every 12 Hours Scheduled   • OLANZapine zydis (ZYPREXA) 5 mg, Translingual, Daily With Dinner   • olopatadine (PATADAY) 0.2 % solution ophthalmic solution 1 drop, Both Eyes, Daily   • senna (SENOKOT) 8.6 MG tablet tablet 1 tablet, Oral, Daily PRN   • vitamin D3 5,000 Units, Oral, Daily, PTA: Pt takes a 2000 unit and 5000 unit vitamin D3 to equal a total of 7000 units daily   • zinc sulfate (ZINCATE) 220 mg, Oral, 2 Times Daily         Allergies:  Patient has no known allergies.    Medications, allergies and past medical, surgical, family, and social history reviewed.        PHYSICAL EXAM:     Physical Exam  Vitals and nursing note reviewed.   Constitutional:       General: She is not in acute distress.     Appearance: She is not ill-appearing or toxic-appearing.   HENT:      Head:  Normocephalic and atraumatic.   Eyes:      Extraocular Movements: Extraocular movements intact.      Conjunctiva/sclera: Conjunctivae normal.   Neck:      Comments: Cervical spine nontender  Cardiovascular:      Rate and Rhythm: Normal rate and regular rhythm.   Pulmonary:      Effort: Pulmonary effort is normal. No respiratory distress.      Breath sounds: No wheezing, rhonchi or rales.   Chest:      Chest wall: No tenderness.   Abdominal:      General: Abdomen is flat. There is no distension.      Palpations: Abdomen is soft.      Tenderness: There is no abdominal tenderness.   Musculoskeletal:         General: No deformity. Normal range of motion.      Cervical back: Normal range of motion and neck supple. No rigidity or tenderness.      Comments: No tenderness to the thoracic or lumbar spine.  Nontender throughout all joints and long bones of extremities.  Nontender to palpation of pelvis.  Full passive range of motion throughout.   Neurological:      General: No focal deficit present.      Mental Status: She is alert and oriented to person, place, and time.   Psychiatric:         Mood and Affect: Mood normal.         Behavior: Behavior normal.             COMPLETED DIAGNOSTIC STUDIES AND INTERVENTIONS:     Lab Results (last 24 hours)     ** No results found for the last 24 hours. **            XR Pelvis 1 or 2 View   Final Result     No acute findings in the pelvis.       This report was finalized on 9/2/2022 12:24 PM by Dr. Manuel Holden MD.          XR Chest AP   Final Result   No radiographic evidence of acute cardiac or pulmonary disease.       This report was finalized on 9/2/2022 12:24 PM by Dr. Manuel Holden MD.          CT Head Without Contrast   Final Result        1. No parenchymal mass, hemorrhage, or midline shift   2. Atrophy and ventriculomegaly.       This report was finalized on 9/2/2022 12:24 PM by Dr. Manuel Holden MD.          CT Cervical Spine Without Contrast   Final Result       1. No  "acute bony abnormality.       2. Other incidental findings as above       This report was finalized on 9/2/2022 12:25 PM by Dr. Manuel Holden MD.                No orders of the defined types were placed in this encounter.        Procedures      Emergency Medicine: Utilization of CT for Minor Blunt Head Trauma (Adult)    []  Patient has one or more of the following conditions that are excluded from the measure (select all that apply)        []  Patient has ventricular shunt        []  Patient has brain tumor        []  Patient is pregnant        []  Patient has multi-system trauma        []  Patient taking an antiplatelet medication (excluding aspirin)        []  Head CT not ordered by emergency care clinician        []  Head CT ordered for reasons other than trauma    [x]  Patient is 18 or older, presenting with minor blunt head trauma.  Head CT was ordered by  an emergency care clinician for trauma because:           [x]  Patient is 65 or older         []  Patient GCS< 15         []  Patient has focal neurologic deficit         []  Patient has severe headache         []  Patient is vomiting         []  Severe/Mechanism of injury was identified (Also, select one or more below)              []  MVA with: patient ejection, death of another passenger, rollover, speed >40 mph, airbag deployment,  or passenger on ATV or motorcycle              []  pedestrian or bicyclist without helmet: struck my motorized vehicle, in bicycle crash               []  fall > 3 feet or 5 stairs              []  head struck by high-impact object (hammer, baseball, baseball bat, heavy object such as falling brick              []  Other _________________________________________________    []  Patient has physical signs of basilar skull fracture present (including hemotympanum, \"raccoon\" eyes, CSF leakage from ear or nose, Vogt's sign)    [x]  Patient suspected of taking anticoagulant medication    []  Patient has " thrombocytopenia    []  Patient has coagulopathy    []  Patient has loss of consciousness and (must, select one of the following):                 []  Headache              []  Short term memory deficit              []  Alcohol/drug intoxication              []  Evidence of trauma above the clavicles              []  Age 60 or older              []  Post-traumatic seizure    []  Patient has post-traumatic amnesia and (must, select one of the following):                  []  Headache              []  Short term memory deficit              []  Alcohol/drug intoxication              []  Evidence of trauma above the clavicles              []  Age 60 or older              []  Post-traumatic seizure  []  Patient is 18 or older, presenting with minor blunt head trauma, Head CT (including cosigned orders) was ordered by an emergency care clinician for trauma, no     indication specified.  (Does not satisfy MIPS performance).                MEDICAL DECISION-MAKING AND ED COURSE:     ED Course as of 09/02/22 1340   Fri Sep 02, 2022   1329 MDM: 77-year-old female presents with suspected fall.  Patient has dementia, unable to provide reliable history.  No outward evidence of trauma.  On antiplatelet therapy.  CT imaging of the head cervical spine and chest x-ray as well as x-ray pelvis ordered without acute findings.  Patient to be discharged back to nursing facility. [KP]      ED Course User Index  [KP] Madhav Dee MD       ?      FINAL IMPRESSION:       1. Fall, initial encounter    2. Well adult health check         The complaints listed here are new problems to this examiner.      FOLLOW-UP  Ricky Alamo MD  [address] [facility] [address] [phone]    Schedule an appointment as soon as possible for a visit         DISPOSITION  ED Disposition     ED Disposition   Discharge    Condition   Stable    Comment   --                   This care is provided during an unprecedented national emergency due to  the Novel Coronavirus (COVID-19). COVID-19 infections and transmission risks place heavy strains on healthcare resources. As this pandemic evolves, the Hospital and providers strive to respond fluidly, to remain operational, and to provide care relative to available resources and information. Outcomes are unpredictable and treatments are without well-defined guidelines. Further, the impact of COVID-19 on all aspects of emergency care, including the impact to patients seeking care for reasons other than COVID-19, is unavoidable during this national emergency.    This note was dictated using a [id number] tool. Occasional wrong-word or 'sound-a-like' substitutions may have occurred due to the inherent limitations of voice recognition software. ?Read the chart carefully and recognize, using context, where substitutions have occurred.    Madhav Dee MD  13:40 EDT  9/2/2022             Madhav Dee MD  09/02/22 [id number]

## 2022-09-02 NOTE — ED NOTES
Patient leaving at this time via [facility] ambulance. Patients pants, shirt, necklace and earrings sent with patient in a patient belonging bags

## 2022-09-08 ENCOUNTER — LAB REQUISITION (OUTPATIENT)
Dept: LAB | Facility: HOSPITAL | Age: 77
End: 2022-09-08

## 2022-09-08 DIAGNOSIS — R30.0 DYSURIA: ICD-10-CM

## 2022-09-08 LAB
BACTERIA UR QL AUTO: ABNORMAL /HPF
BILIRUB UR QL STRIP: NEGATIVE
CLARITY UR: ABNORMAL
COLOR UR: YELLOW
GLUCOSE UR STRIP-MCNC: NEGATIVE MG/DL
HGB UR QL STRIP.AUTO: NEGATIVE
HYALINE CASTS UR QL AUTO: ABNORMAL /LPF
KETONES UR QL STRIP: NEGATIVE
LEUKOCYTE ESTERASE UR QL STRIP.AUTO: ABNORMAL
NITRITE UR QL STRIP: POSITIVE
PH UR STRIP.AUTO: 8.5 [PH] (ref 5–8)
PROT UR QL STRIP: ABNORMAL
RBC # UR STRIP: ABNORMAL /HPF
REF LAB TEST METHOD: ABNORMAL
SP GR UR STRIP: 1.02 (ref 1–1.03)
SQUAMOUS #/AREA URNS HPF: ABNORMAL /HPF
UROBILINOGEN UR QL STRIP: ABNORMAL
WBC # UR STRIP: ABNORMAL /HPF

## 2022-09-08 PROCEDURE — 87086 URINE CULTURE/COLONY COUNT: CPT | Performed by: INTERNAL MEDICINE

## 2022-09-08 PROCEDURE — 87077 CULTURE AEROBIC IDENTIFY: CPT | Performed by: INTERNAL MEDICINE

## 2022-09-08 PROCEDURE — 87186 SC STD MICRODIL/AGAR DIL: CPT | Performed by: INTERNAL MEDICINE

## 2022-09-08 PROCEDURE — 81001 URINALYSIS AUTO W/SCOPE: CPT | Performed by: INTERNAL MEDICINE

## 2022-09-11 LAB — BACTERIA SPEC AEROBE CULT: ABNORMAL

## 2022-11-10 ENCOUNTER — APPOINTMENT (OUTPATIENT)
Dept: CT IMAGING | Facility: HOSPITAL | Age: 77
End: 2022-11-10

## 2022-11-10 ENCOUNTER — APPOINTMENT (OUTPATIENT)
Dept: GENERAL RADIOLOGY | Facility: HOSPITAL | Age: 77
End: 2022-11-10

## 2022-11-10 ENCOUNTER — HOSPITAL ENCOUNTER (EMERGENCY)
Facility: HOSPITAL | Age: 77
Discharge: HOME | End: 2022-11-10
Attending: STUDENT IN AN ORGANIZED HEALTH CARE EDUCATION/TRAINING PROGRAM | Admitting: STUDENT IN AN ORGANIZED HEALTH CARE EDUCATION/TRAINING PROGRAM

## 2022-11-10 VITALS
RESPIRATION RATE: 18 BRPM | HEIGHT: 65 IN | HEART RATE: 65 BPM | SYSTOLIC BLOOD PRESSURE: 132 MMHG | TEMPERATURE: 97.6 F | OXYGEN SATURATION: 98 % | DIASTOLIC BLOOD PRESSURE: 72 MMHG | WEIGHT: 176.37 LBS | BODY MASS INDEX: 29.38 KG/M2

## 2022-11-10 DIAGNOSIS — R41.82 ALTERED MENTAL STATUS, UNSPECIFIED ALTERED MENTAL STATUS TYPE: ICD-10-CM

## 2022-11-10 DIAGNOSIS — N39.0 ACUTE UTI: Primary | ICD-10-CM

## 2022-11-10 LAB
A-A DO2: 28.4 MMHG (ref 0–300)
ALBUMIN SERPL-MCNC: 3.69 G/DL (ref 3.5–5.2)
ALBUMIN/GLOB SERPL: 1.6 G/DL
ALP SERPL-CCNC: 97 U/L (ref 39–117)
ALT SERPL W P-5'-P-CCNC: 14 U/L (ref 1–33)
AMMONIA BLD-SCNC: 31 UMOL/L (ref 11–51)
AMPHET+METHAMPHET UR QL: NEGATIVE
AMPHETAMINES UR QL: NEGATIVE
ANION GAP SERPL CALCULATED.3IONS-SCNC: 10.6 MMOL/L (ref 5–15)
APAP SERPL-MCNC: <5 MCG/ML (ref 0–30)
APTT PPP: 26.8 SECONDS (ref 26.5–34.5)
ARTERIAL PATENCY WRIST A: POSITIVE
AST SERPL-CCNC: 15 U/L (ref 1–32)
ATMOSPHERIC PRESS: 730 MMHG
BACTERIA UR QL AUTO: ABNORMAL /HPF
BARBITURATES UR QL SCN: NEGATIVE
BASE EXCESS BLDA CALC-SCNC: 4.6 MMOL/L (ref 0–2)
BASOPHILS # BLD AUTO: 0.01 10*3/MM3 (ref 0–0.2)
BASOPHILS NFR BLD AUTO: 0.1 % (ref 0–1.5)
BDY SITE: ABNORMAL
BENZODIAZ UR QL SCN: NEGATIVE
BILIRUB SERPL-MCNC: 0.2 MG/DL (ref 0–1.2)
BILIRUB UR QL STRIP: NEGATIVE
BODY TEMPERATURE: 0 C
BUN SERPL-MCNC: 18 MG/DL (ref 8–23)
BUN/CREAT SERPL: 18.2 (ref 7–25)
BUPRENORPHINE SERPL-MCNC: NEGATIVE NG/ML
CALCIUM SPEC-SCNC: 10 MG/DL (ref 8.6–10.5)
CANNABINOIDS SERPL QL: NEGATIVE
CHLORIDE SERPL-SCNC: 104 MMOL/L (ref 98–107)
CLARITY UR: ABNORMAL
CO2 BLDA-SCNC: 30.6 MMOL/L (ref 22–33)
CO2 SERPL-SCNC: 27.4 MMOL/L (ref 22–29)
COCAINE UR QL: NEGATIVE
COHGB MFR BLD: 1.1 % (ref 0–5)
COLOR UR: YELLOW
CREAT SERPL-MCNC: 0.99 MG/DL (ref 0.57–1)
CRP SERPL-MCNC: <0.3 MG/DL (ref 0–0.5)
D-LACTATE SERPL-SCNC: 1.4 MMOL/L (ref 0.5–2)
DEPRECATED RDW RBC AUTO: 44.5 FL (ref 37–54)
EGFRCR SERPLBLD CKD-EPI 2021: 58.1 ML/MIN/1.73
EOSINOPHIL # BLD AUTO: 1.27 10*3/MM3 (ref 0–0.4)
EOSINOPHIL NFR BLD AUTO: 15.2 % (ref 0.3–6.2)
ERYTHROCYTE [DISTWIDTH] IN BLOOD BY AUTOMATED COUNT: 13.1 % (ref 12.3–15.4)
ETHANOL BLD-MCNC: <10 MG/DL (ref 0–10)
ETHANOL UR QL: <0.01 %
FLUAV RNA RESP QL NAA+PROBE: NOT DETECTED
FLUBV RNA RESP QL NAA+PROBE: NOT DETECTED
GLOBULIN UR ELPH-MCNC: 2.3 GM/DL
GLUCOSE BLDC GLUCOMTR-MCNC: 85 MG/DL (ref 70–130)
GLUCOSE SERPL-MCNC: 82 MG/DL (ref 65–99)
GLUCOSE UR STRIP-MCNC: NEGATIVE MG/DL
HCO3 BLDA-SCNC: 29.3 MMOL/L (ref 20–26)
HCT VFR BLD AUTO: 42.1 % (ref 34–46.6)
HCT VFR BLD CALC: 43.2 % (ref 38–51)
HGB BLD-MCNC: 13.7 G/DL (ref 12–15.9)
HGB BLDA-MCNC: 14.1 G/DL (ref 13.5–17.5)
HGB UR QL STRIP.AUTO: ABNORMAL
HOLD SPECIMEN: NORMAL
HOLD SPECIMEN: NORMAL
HYALINE CASTS UR QL AUTO: ABNORMAL /LPF
IMM GRANULOCYTES # BLD AUTO: 0.02 10*3/MM3 (ref 0–0.05)
IMM GRANULOCYTES NFR BLD AUTO: 0.2 % (ref 0–0.5)
INHALED O2 CONCENTRATION: 21 %
INR PPP: 0.93 (ref 0.9–1.1)
KETONES UR QL STRIP: NEGATIVE
LEUKOCYTE ESTERASE UR QL STRIP.AUTO: ABNORMAL
LYMPHOCYTES # BLD AUTO: 2.56 10*3/MM3 (ref 0.7–3.1)
LYMPHOCYTES NFR BLD AUTO: 30.6 % (ref 19.6–45.3)
Lab: ABNORMAL
MAGNESIUM SERPL-MCNC: 2 MG/DL (ref 1.6–2.4)
MCH RBC QN AUTO: 30.1 PG (ref 26.6–33)
MCHC RBC AUTO-ENTMCNC: 32.5 G/DL (ref 31.5–35.7)
MCV RBC AUTO: 92.5 FL (ref 79–97)
METHADONE UR QL SCN: NEGATIVE
METHGB BLD QL: 0 % (ref 0–3)
MODALITY: ABNORMAL
MONOCYTES # BLD AUTO: 0.56 10*3/MM3 (ref 0.1–0.9)
MONOCYTES NFR BLD AUTO: 6.7 % (ref 5–12)
NEUTROPHILS NFR BLD AUTO: 3.94 10*3/MM3 (ref 1.7–7)
NEUTROPHILS NFR BLD AUTO: 47.2 % (ref 42.7–76)
NITRITE UR QL STRIP: POSITIVE
NOTE: ABNORMAL
NRBC BLD AUTO-RTO: 0 /100 WBC (ref 0–0.2)
OPIATES UR QL: NEGATIVE
OXYCODONE UR QL SCN: NEGATIVE
OXYHGB MFR BLDV: 93.2 % (ref 94–99)
PCO2 BLDA: 42.9 MM HG (ref 35–45)
PCO2 TEMP ADJ BLD: ABNORMAL MM[HG]
PCP UR QL SCN: NEGATIVE
PH BLDA: 7.44 PH UNITS (ref 7.35–7.45)
PH UR STRIP.AUTO: 7.5 [PH] (ref 5–8)
PH, TEMP CORRECTED: ABNORMAL
PLATELET # BLD AUTO: 199 10*3/MM3 (ref 140–450)
PMV BLD AUTO: 10.4 FL (ref 6–12)
PO2 BLDA: 66.5 MM HG (ref 83–108)
PO2 TEMP ADJ BLD: ABNORMAL MM[HG]
POTASSIUM SERPL-SCNC: 4.5 MMOL/L (ref 3.5–5.2)
PROCALCITONIN SERPL-MCNC: 0.02 NG/ML (ref 0–0.25)
PROPOXYPH UR QL: NEGATIVE
PROT SERPL-MCNC: 6 G/DL (ref 6–8.5)
PROT UR QL STRIP: NEGATIVE
PROTHROMBIN TIME: 12.7 SECONDS (ref 12.1–14.7)
QT INTERVAL: 418 MS
QTC INTERVAL: 418 MS
RBC # BLD AUTO: 4.55 10*6/MM3 (ref 3.77–5.28)
RBC # UR STRIP: ABNORMAL /HPF
REF LAB TEST METHOD: ABNORMAL
SALICYLATES SERPL-MCNC: <0.3 MG/DL
SAO2 % BLDCOA: 94.2 % (ref 94–99)
SARS-COV-2 RNA RESP QL NAA+PROBE: NOT DETECTED
SODIUM SERPL-SCNC: 142 MMOL/L (ref 136–145)
SP GR UR STRIP: 1.01 (ref 1–1.03)
SQUAMOUS #/AREA URNS HPF: ABNORMAL /HPF
T4 FREE SERPL-MCNC: 1.07 NG/DL (ref 0.93–1.7)
TRICYCLICS UR QL SCN: NEGATIVE
TROPONIN T SERPL-MCNC: <0.01 NG/ML (ref 0–0.03)
TROPONIN T SERPL-MCNC: <0.01 NG/ML (ref 0–0.03)
TSH SERPL DL<=0.05 MIU/L-ACNC: 3.39 UIU/ML (ref 0.27–4.2)
UROBILINOGEN UR QL STRIP: ABNORMAL
VALPROATE SERPL-MCNC: 18.1 MCG/ML (ref 50–125)
VENTILATOR MODE: ABNORMAL
WBC # UR STRIP: ABNORMAL /HPF
WBC NRBC COR # BLD: 8.36 10*3/MM3 (ref 3.4–10.8)
WHOLE BLOOD HOLD COAG: NORMAL
WHOLE BLOOD HOLD SPECIMEN: NORMAL

## 2022-11-10 PROCEDURE — 83735 ASSAY OF MAGNESIUM: CPT | Performed by: PHYSICIAN ASSISTANT

## 2022-11-10 PROCEDURE — 87086 URINE CULTURE/COLONY COUNT: CPT | Performed by: PHYSICIAN ASSISTANT

## 2022-11-10 PROCEDURE — 71045 X-RAY EXAM CHEST 1 VIEW: CPT | Performed by: RADIOLOGY

## 2022-11-10 PROCEDURE — 85730 THROMBOPLASTIN TIME PARTIAL: CPT | Performed by: PHYSICIAN ASSISTANT

## 2022-11-10 PROCEDURE — 80164 ASSAY DIPROPYLACETIC ACD TOT: CPT | Performed by: PHYSICIAN ASSISTANT

## 2022-11-10 PROCEDURE — 80179 DRUG ASSAY SALICYLATE: CPT | Performed by: PHYSICIAN ASSISTANT

## 2022-11-10 PROCEDURE — 82077 ASSAY SPEC XCP UR&BREATH IA: CPT | Performed by: PHYSICIAN ASSISTANT

## 2022-11-10 PROCEDURE — 80143 DRUG ASSAY ACETAMINOPHEN: CPT | Performed by: PHYSICIAN ASSISTANT

## 2022-11-10 PROCEDURE — 87636 SARSCOV2 & INF A&B AMP PRB: CPT | Performed by: PHYSICIAN ASSISTANT

## 2022-11-10 PROCEDURE — 36600 WITHDRAWAL OF ARTERIAL BLOOD: CPT

## 2022-11-10 PROCEDURE — 71045 X-RAY EXAM CHEST 1 VIEW: CPT

## 2022-11-10 PROCEDURE — 99285 EMERGENCY DEPT VISIT HI MDM: CPT

## 2022-11-10 PROCEDURE — P9612 CATHETERIZE FOR URINE SPEC: HCPCS

## 2022-11-10 PROCEDURE — 70450 CT HEAD/BRAIN W/O DYE: CPT | Performed by: RADIOLOGY

## 2022-11-10 PROCEDURE — 84439 ASSAY OF FREE THYROXINE: CPT | Performed by: PHYSICIAN ASSISTANT

## 2022-11-10 PROCEDURE — 86140 C-REACTIVE PROTEIN: CPT | Performed by: PHYSICIAN ASSISTANT

## 2022-11-10 PROCEDURE — 82140 ASSAY OF AMMONIA: CPT | Performed by: PHYSICIAN ASSISTANT

## 2022-11-10 PROCEDURE — 82375 ASSAY CARBOXYHB QUANT: CPT

## 2022-11-10 PROCEDURE — 87186 SC STD MICRODIL/AGAR DIL: CPT | Performed by: PHYSICIAN ASSISTANT

## 2022-11-10 PROCEDURE — 83050 HGB METHEMOGLOBIN QUAN: CPT

## 2022-11-10 PROCEDURE — 84443 ASSAY THYROID STIM HORMONE: CPT | Performed by: PHYSICIAN ASSISTANT

## 2022-11-10 PROCEDURE — 85025 COMPLETE CBC W/AUTO DIFF WBC: CPT | Performed by: PHYSICIAN ASSISTANT

## 2022-11-10 PROCEDURE — 93005 ELECTROCARDIOGRAM TRACING: CPT | Performed by: PHYSICIAN ASSISTANT

## 2022-11-10 PROCEDURE — 87077 CULTURE AEROBIC IDENTIFY: CPT | Performed by: PHYSICIAN ASSISTANT

## 2022-11-10 PROCEDURE — 82962 GLUCOSE BLOOD TEST: CPT

## 2022-11-10 PROCEDURE — 70450 CT HEAD/BRAIN W/O DYE: CPT

## 2022-11-10 PROCEDURE — 82805 BLOOD GASES W/O2 SATURATION: CPT

## 2022-11-10 PROCEDURE — 80053 COMPREHEN METABOLIC PANEL: CPT | Performed by: PHYSICIAN ASSISTANT

## 2022-11-10 PROCEDURE — 83605 ASSAY OF LACTIC ACID: CPT | Performed by: PHYSICIAN ASSISTANT

## 2022-11-10 PROCEDURE — 81001 URINALYSIS AUTO W/SCOPE: CPT | Performed by: PHYSICIAN ASSISTANT

## 2022-11-10 PROCEDURE — 93010 ELECTROCARDIOGRAM REPORT: CPT | Performed by: INTERNAL MEDICINE

## 2022-11-10 PROCEDURE — 80306 DRUG TEST PRSMV INSTRMNT: CPT | Performed by: PHYSICIAN ASSISTANT

## 2022-11-10 PROCEDURE — 84484 ASSAY OF TROPONIN QUANT: CPT | Performed by: PHYSICIAN ASSISTANT

## 2022-11-10 PROCEDURE — 87040 BLOOD CULTURE FOR BACTERIA: CPT | Performed by: PHYSICIAN ASSISTANT

## 2022-11-10 PROCEDURE — 85610 PROTHROMBIN TIME: CPT | Performed by: PHYSICIAN ASSISTANT

## 2022-11-10 PROCEDURE — 36415 COLL VENOUS BLD VENIPUNCTURE: CPT

## 2022-11-10 PROCEDURE — 84145 PROCALCITONIN (PCT): CPT | Performed by: PHYSICIAN ASSISTANT

## 2022-11-10 RX ORDER — SODIUM CHLORIDE 0.9 % (FLUSH) 0.9 %
10 SYRINGE (ML) INJECTION AS NEEDED
Status: DISCONTINUED | OUTPATIENT
Start: 2022-11-10 | End: 2022-11-10 | Stop reason: HOSPADM

## 2022-11-10 NOTE — ED NOTES
"Called [facility] to confirm patients baseline. Spoke with Emelia on the phone and she stated \"pt is hard of hearing and usually read lips. Pt is able to ambulate and has no deficits from the previous strokes she has had\".   "

## 2022-11-10 NOTE — DISCHARGE INSTRUCTIONS
Continue the patient's antibiotic as prescribed.  Her urine has been cultured today.  We will contact you with any abnormalities.  Return to the ED at any time if symptoms change or worsen.

## 2022-11-10 NOTE — ED PROVIDER NOTES
Subjective   History of Present Illness  77-year-old female who presents to the ED today from [facility] for altered mental status.  They report that she is not acting like her normal self.  It reportedly started sometime this morning.  They report that she typically is alert but confused and is able to walk around.  They report that she is very hard of hearing but can read lips and does talk some.  She was recently diagnosed with a urinary tract infection and was started on cefdinir yesterday.  She does have a history of recurrent urinary tract infections.  Initially upon me entering the room, the patient would look at me but would not answer questions or follow commands.  About 10 minutes after this I walked back into the room and the patient was talking and told me that her legs were hurting and this is why she did not want to walk today.  She was able to follow commands.  Appears to be back at her baseline.    History provided by:  Nursing home  History limited by:  Mental status change and dementia  Altered Mental Status  Presenting symptoms: behavior changes    Severity:  Moderate  Most recent episode:  Today  Episode history:  Continuous  Duration: started sometime this morning.  Timing:  Constant  Progression:  Unchanged  Chronicity:  New  Context: dementia, nursing home resident and recent infection        Review of Systems   Unable to perform ROS: Dementia       Past Medical History:   Diagnosis Date   • Alzheimer disease (HCC)    • Alzheimer's disease (HCC)    • Anxiety    • Anxiety disorder, unspecified    • Cerebrovascular accident (CVA) (HCC)    • Constipation    • Deafness    • Delirium due to known physiological condition    • Depression    • Difficulty in walking, not elsewhere classified    • Gastro-esophageal reflux disease with esophagitis    • GERD (gastroesophageal reflux disease)    • Hyperlipidemia    • Hyperlipidemia    • Hypertension    • Major depressive disorder, single episode     • Muscle weakness (generalized)    • Other lack of coordination    • Paranoid personality (disorder) (HCC)    • Shared psychotic disorder (HCC)    • Unspecified dementia without behavioral disturbance (HCC)    • Unspecified hearing loss, unspecified ear    • Unspecified lack of coordination    • Unspecified psychosis not due to a substance or known physiological condition (HCC)        No Known Allergies    No past surgical history on file.    Family History   Family history unknown: Yes       Social History     Socioeconomic History   • Marital status:    Tobacco Use   • Smoking status: Never   Vaping Use   • Vaping Use: Never used   Substance and Sexual Activity   • Alcohol use: No   • Drug use: No   • Sexual activity: Not Currently     Partners: Male           Objective   Physical Exam  Vitals and nursing note reviewed.   Constitutional:       General: She is not in acute distress.     Appearance: She is well-developed. She is ill-appearing (chronically). She is not diaphoretic.   HENT:      Head: Normocephalic and atraumatic.      Mouth/Throat:      Mouth: Mucous membranes are moist.      Comments: Tongue has a black layer over it  Eyes:      Extraocular Movements: Extraocular movements intact.      Pupils: Pupils are equal, round, and reactive to light.   Cardiovascular:      Rate and Rhythm: Normal rate and regular rhythm.      Heart sounds: Normal heart sounds.   Pulmonary:      Effort: Pulmonary effort is normal.      Breath sounds: Normal breath sounds.   Abdominal:      General: Bowel sounds are normal.      Palpations: Abdomen is soft.      Tenderness: There is no abdominal tenderness.   Musculoskeletal:         General: Normal range of motion.      Cervical back: Normal range of motion and neck supple. No rigidity.      Comments: Strength is equal bilaterally in upper and lower extremities   Lymphadenopathy:      Cervical: No cervical adenopathy.   Skin:     General: Skin is warm and dry.       Capillary Refill: Capillary refill takes less than 2 seconds.   Neurological:      Mental Status: She is alert. Mental status is at baseline.         Procedures           ED Course  ED Course as of 11/10/22 1752   Thu Nov 10, 2022   1218 Case discussed with Dr. Dee, he will evaluate the patient as well [AH]   1321 CT Head Without Contrast  FINDINGS:    Brain:  Generalized cerebral atrophy is noted.  Moderate chronic small  vessel ischemic disease with chronic appearing lacunar infarcts, stable  from previous exam.  No hemorrhage.    Ventricles:  Unremarkable.  No ventriculomegaly.    Bones/joints:  Unremarkable.  No acute fracture.    Soft tissues:  Unremarkable.    Sinuses:  Unremarkable as visualized.  No acute sinusitis.    Mastoid air cells:  Unremarkable as visualized.  No mastoid effusion.     IMPRESSION:  1.  Senescent changes and chronic small vessel ischemic disease.  2.  No CT evidence of acute intracranial abnormality. [AH]   1351 EKG at 1338 NSR 60 bpm, , cures 82, QTc 418, regular axis, no significant ST deviation or T wave abnormalities concerning for acute ischemia. Electronically signed by Madhav Dee MD, 11/10/22, 1:57 PM EST.   []   1355 XR Chest 1 View  FINDINGS:    Lungs:  Unremarkable.  No consolidation.    Pleural space:  Unremarkable.  No pneumothorax.    Heart:  Unremarkable.  No cardiomegaly.    Mediastinum:  Unremarkable.    Bones/joints:  Unremarkable.     IMPRESSION:    Stable unremarkable chest [AH]   1533 Awaiting UA - there was not enough urine on initial sample that was sent down [AH]   1732 Patient is back to her baseline.  She is awake and alert, no acute distress.  She is communicating per her baseline that the nursing home described.  She does have a UTI.  She has been on cefdinir since yesterday.  Urine is being cultured at this time and will be followed.  She will be able to be discharged back to the nursing home and will return to the ED if symptoms change or  worsen. [AH]      ED Course User Index  [] Gita Owens PA  [] Madhav Dee MD                                           MDM  Number of Diagnoses or Management Options     Amount and/or Complexity of Data Reviewed  Clinical lab tests: reviewed  Tests in the radiology section of CPT®: reviewed  Tests in the medicine section of CPT®: reviewed  Discuss the patient with other providers: yes    Patient Progress  Patient progress: improved      Final diagnoses:   Acute UTI   Altered mental status, unspecified altered mental status type       ED Disposition  ED Disposition     ED Disposition   Discharge    Condition   Stable    Comment   Pt taken out of ed by EMS.              Ricky Alamo MD  [address] [facility] [address]  [phone]    Schedule an appointment as soon as possible for a visit in 1 week           Medication List      No changes were made to your prescriptions during this visit.          Gita Owens PA  11/10/22 [id number]

## 2022-11-12 LAB — BACTERIA SPEC AEROBE CULT: ABNORMAL

## 2022-11-15 LAB
BACTERIA SPEC AEROBE CULT: NORMAL
BACTERIA SPEC AEROBE CULT: NORMAL

## 2023-03-07 ENCOUNTER — LAB REQUISITION (OUTPATIENT)
Dept: LAB | Facility: HOSPITAL | Age: 78
End: 2023-03-07
Payer: MEDICARE

## 2023-03-07 DIAGNOSIS — Z87.440 PERSONAL HISTORY OF URINARY (TRACT) INFECTIONS: ICD-10-CM

## 2023-03-07 LAB
BACTERIA UR QL AUTO: ABNORMAL /HPF
BILIRUB UR QL STRIP: NEGATIVE
CLARITY UR: ABNORMAL
COLOR UR: YELLOW
GLUCOSE UR STRIP-MCNC: NEGATIVE MG/DL
HGB UR QL STRIP.AUTO: NEGATIVE
HYALINE CASTS UR QL AUTO: ABNORMAL /LPF
KETONES UR QL STRIP: NEGATIVE
LEUKOCYTE ESTERASE UR QL STRIP.AUTO: ABNORMAL
NITRITE UR QL STRIP: POSITIVE
PH UR STRIP.AUTO: 6 [PH] (ref 5–8)
PROT UR QL STRIP: NEGATIVE
RBC # UR STRIP: ABNORMAL /HPF
REF LAB TEST METHOD: ABNORMAL
SP GR UR STRIP: 1.02 (ref 1–1.03)
SQUAMOUS #/AREA URNS HPF: ABNORMAL /HPF
UROBILINOGEN UR QL STRIP: ABNORMAL
WBC # UR STRIP: ABNORMAL /HPF

## 2023-03-07 PROCEDURE — 87086 URINE CULTURE/COLONY COUNT: CPT | Performed by: INTERNAL MEDICINE

## 2023-03-07 PROCEDURE — 81001 URINALYSIS AUTO W/SCOPE: CPT | Performed by: INTERNAL MEDICINE

## 2023-03-08 LAB — BACTERIA SPEC AEROBE CULT: NORMAL

## 2023-03-28 ENCOUNTER — LAB REQUISITION (OUTPATIENT)
Dept: LAB | Facility: HOSPITAL | Age: 78
End: 2023-03-28
Payer: MEDICARE

## 2023-03-28 DIAGNOSIS — R30.0 DYSURIA: ICD-10-CM

## 2023-03-28 LAB
BACTERIA UR QL AUTO: ABNORMAL /HPF
BILIRUB UR QL STRIP: NEGATIVE
CLARITY UR: ABNORMAL
COLOR UR: ABNORMAL
GLUCOSE UR STRIP-MCNC: NEGATIVE MG/DL
HGB UR QL STRIP.AUTO: ABNORMAL
HYALINE CASTS UR QL AUTO: ABNORMAL /LPF
KETONES UR QL STRIP: NEGATIVE
LEUKOCYTE ESTERASE UR QL STRIP.AUTO: NEGATIVE
NITRITE UR QL STRIP: POSITIVE
PH UR STRIP.AUTO: 5.5 [PH] (ref 5–8)
PROT UR QL STRIP: NEGATIVE
RBC # UR STRIP: ABNORMAL /HPF
REF LAB TEST METHOD: ABNORMAL
SP GR UR STRIP: 1.02 (ref 1–1.03)
SQUAMOUS #/AREA URNS HPF: ABNORMAL /HPF
UROBILINOGEN UR QL STRIP: ABNORMAL
WBC # UR STRIP: ABNORMAL /HPF

## 2023-03-28 PROCEDURE — 81001 URINALYSIS AUTO W/SCOPE: CPT | Performed by: NURSE PRACTITIONER

## 2023-03-28 PROCEDURE — 87186 SC STD MICRODIL/AGAR DIL: CPT | Performed by: NURSE PRACTITIONER

## 2023-03-28 PROCEDURE — 87086 URINE CULTURE/COLONY COUNT: CPT | Performed by: NURSE PRACTITIONER

## 2023-03-28 PROCEDURE — 87077 CULTURE AEROBIC IDENTIFY: CPT | Performed by: NURSE PRACTITIONER

## 2023-03-30 LAB — BACTERIA SPEC AEROBE CULT: ABNORMAL

## 2023-06-26 PROBLEM — N20.0 RIGHT RENAL STONE: Status: ACTIVE | Noted: 2023-06-26

## 2023-07-21 ENCOUNTER — TRANSCRIBE ORDERS (OUTPATIENT)
Dept: ADMINISTRATIVE | Facility: HOSPITAL | Age: 78
End: 2023-07-21
Payer: MEDICARE

## 2023-07-28 ENCOUNTER — HOSPITAL ENCOUNTER (OUTPATIENT)
Dept: CT IMAGING | Facility: HOSPITAL | Age: 78
Discharge: HOME | End: 2023-07-28
Payer: MEDICARE

## 2023-07-28 DIAGNOSIS — N20.0 RIGHT RENAL STONE: ICD-10-CM

## 2023-07-28 PROCEDURE — 74176 CT ABD & PELVIS W/O CONTRAST: CPT

## 2023-07-28 PROCEDURE — 74176 CT ABD & PELVIS W/O CONTRAST: CPT | Performed by: RADIOLOGY

## 2023-08-09 ENCOUNTER — LAB REQUISITION (OUTPATIENT)
Dept: LAB | Facility: HOSPITAL | Age: 78
End: 2023-08-09
Payer: MEDICARE

## 2023-08-09 DIAGNOSIS — M62.81 MUSCLE WEAKNESS (GENERALIZED): ICD-10-CM

## 2023-08-09 DIAGNOSIS — D64.9 ANEMIA, UNSPECIFIED: ICD-10-CM

## 2023-08-09 DIAGNOSIS — E55.9 VITAMIN D DEFICIENCY, UNSPECIFIED: ICD-10-CM

## 2023-08-09 DIAGNOSIS — M19.90 UNSPECIFIED OSTEOARTHRITIS, UNSPECIFIED SITE: ICD-10-CM

## 2023-08-09 LAB
ALBUMIN SERPL-MCNC: 3.8 G/DL (ref 3.5–5.2)
ALBUMIN/GLOB SERPL: 1.8 G/DL
ALP SERPL-CCNC: 80 U/L (ref 39–117)
ALT SERPL W P-5'-P-CCNC: 13 U/L (ref 1–33)
ANION GAP SERPL CALCULATED.3IONS-SCNC: 10.3 MMOL/L (ref 5–15)
AST SERPL-CCNC: 13 U/L (ref 1–32)
BASOPHILS # BLD AUTO: 0.01 10*3/MM3 (ref 0–0.2)
BASOPHILS NFR BLD AUTO: 0.1 % (ref 0–1.5)
BILIRUB SERPL-MCNC: 0.3 MG/DL (ref 0–1.2)
BUN SERPL-MCNC: 34 MG/DL (ref 8–23)
BUN/CREAT SERPL: 32.7 (ref 7–25)
CALCIUM SPEC-SCNC: 10.1 MG/DL (ref 8.6–10.5)
CHLORIDE SERPL-SCNC: 109 MMOL/L (ref 98–107)
CO2 SERPL-SCNC: 25.7 MMOL/L (ref 22–29)
CREAT SERPL-MCNC: 1.04 MG/DL (ref 0.57–1)
CRP SERPL-MCNC: <0.3 MG/DL (ref 0–0.5)
DEPRECATED RDW RBC AUTO: 43.9 FL (ref 37–54)
EGFRCR SERPLBLD CKD-EPI 2021: 54.4 ML/MIN/1.73
EOSINOPHIL # BLD AUTO: 0.9 10*3/MM3 (ref 0–0.4)
EOSINOPHIL NFR BLD AUTO: 12.7 % (ref 0.3–6.2)
ERYTHROCYTE [DISTWIDTH] IN BLOOD BY AUTOMATED COUNT: 13 % (ref 12.3–15.4)
ERYTHROCYTE [SEDIMENTATION RATE] IN BLOOD: 2 MM/HR (ref 0–30)
GLOBULIN UR ELPH-MCNC: 2.1 GM/DL
GLUCOSE SERPL-MCNC: 100 MG/DL (ref 65–99)
HCT VFR BLD AUTO: 40.8 % (ref 34–46.6)
HGB BLD-MCNC: 13 G/DL (ref 12–15.9)
IMM GRANULOCYTES # BLD AUTO: 0.02 10*3/MM3 (ref 0–0.05)
IMM GRANULOCYTES NFR BLD AUTO: 0.3 % (ref 0–0.5)
LYMPHOCYTES # BLD AUTO: 2.44 10*3/MM3 (ref 0.7–3.1)
LYMPHOCYTES NFR BLD AUTO: 34.4 % (ref 19.6–45.3)
MCH RBC QN AUTO: 29.5 PG (ref 26.6–33)
MCHC RBC AUTO-ENTMCNC: 31.9 G/DL (ref 31.5–35.7)
MCV RBC AUTO: 92.7 FL (ref 79–97)
MONOCYTES # BLD AUTO: 0.55 10*3/MM3 (ref 0.1–0.9)
MONOCYTES NFR BLD AUTO: 7.8 % (ref 5–12)
NEUTROPHILS NFR BLD AUTO: 3.17 10*3/MM3 (ref 1.7–7)
NEUTROPHILS NFR BLD AUTO: 44.7 % (ref 42.7–76)
NRBC BLD AUTO-RTO: 0 /100 WBC (ref 0–0.2)
PLATELET # BLD AUTO: 183 10*3/MM3 (ref 140–450)
PMV BLD AUTO: 10.9 FL (ref 6–12)
POTASSIUM SERPL-SCNC: 4.3 MMOL/L (ref 3.5–5.2)
PROT SERPL-MCNC: 5.9 G/DL (ref 6–8.5)
RBC # BLD AUTO: 4.4 10*6/MM3 (ref 3.77–5.28)
SODIUM SERPL-SCNC: 145 MMOL/L (ref 136–145)
WBC NRBC COR # BLD: 7.09 10*3/MM3 (ref 3.4–10.8)

## 2023-08-09 PROCEDURE — 86140 C-REACTIVE PROTEIN: CPT | Performed by: INTERNAL MEDICINE

## 2023-08-09 PROCEDURE — 80053 COMPREHEN METABOLIC PANEL: CPT | Performed by: INTERNAL MEDICINE

## 2023-08-09 PROCEDURE — 85652 RBC SED RATE AUTOMATED: CPT | Performed by: INTERNAL MEDICINE

## 2023-08-09 PROCEDURE — 85025 COMPLETE CBC W/AUTO DIFF WBC: CPT | Performed by: INTERNAL MEDICINE

## 2023-08-18 ENCOUNTER — OFFICE VISIT (OUTPATIENT)
Dept: UROLOGY | Facility: CLINIC | Age: 78
End: 2023-08-18
Payer: MEDICARE

## 2023-08-18 VITALS — BODY MASS INDEX: 32.38 KG/M2 | HEIGHT: 65 IN

## 2023-08-18 DIAGNOSIS — N20.0 RIGHT RENAL STONE: Primary | ICD-10-CM

## 2023-08-18 NOTE — PROGRESS NOTES
"Chief Complaint:    Chief Complaint   Patient presents with    right renal stone        Vital Signs:   Ht 165.1 cm (65\")   BMI 32.38 kg/mý   Body mass index is 32.38 kg/mý.      HPI:  Catherine Snyder is a 78 y.o. female who presents today for follow up    History of Present Illness  Ms. Snyder presents for follow-up for nephrolithiasis and UTI.  She was unable to give us a urine sample in office at last visit.  She is a nursing home resident who has delirium and deafness.  She had a CT scan of the abdomen pelvis completed Recently that showed no concerns for nephrolithiasis, hydronephrosis, or ureteral obstruction.  There was a hiatal hernia however no other abnormalities.  She was unable to urinate in office again today.  I am recommending observation at this time.  We will see her back on an as-needed basis.    Past Medical History:  Past Medical History:   Diagnosis Date    Alzheimer disease     Alzheimer's disease     Anxiety     Anxiety disorder, unspecified     Cerebrovascular accident (CVA)     Constipation     Deafness     Delirium due to known physiological condition     Depression     Difficulty in walking, not elsewhere classified     Gastro-esophageal reflux disease with esophagitis     GERD (gastroesophageal reflux disease)     Hyperlipidemia     Hyperlipidemia     Hypertension     Major depressive disorder, single episode     Muscle weakness (generalized)     Other lack of coordination     Paranoid personality (disorder)     Shared psychotic disorder     Unspecified dementia without behavioral disturbance     Unspecified hearing loss, unspecified ear     Unspecified lack of coordination     Unspecified psychosis not due to a substance or known physiological condition        Current Meds:  Current Outpatient Medications   Medication Sig Dispense Refill    acetaminophen (TYLENOL) 500 MG tablet Take 1 tablet by mouth Every 4 (Four) Hours As Needed for Mild Pain or Fever. Indications: Pain      ascorbic " acid (VITAMIN C) 500 MG tablet Take 1 tablet by mouth Daily. Indications: Inadequate Vitamin C      aspirin 81 MG chewable tablet Chew 1 tablet Daily. Indications: Disease involving Lipid Deposits in the Arteries      atorvastatin (LIPITOR) 10 MG tablet Take 1 tablet by mouth Every Night. 30 tablet 3    bisacodyl (DULCOLAX) 10 MG suppository Insert 1 suppository into the rectum Daily As Needed for Constipation. Indications: Constipation      cholecalciferol (VITAMIN D3) 25 MCG (1000 UT) tablet Take 2 tablets by mouth Daily. PTA: Pt takes a 2000 unit and 5000 unit vitamin D3 to equal a total of 7000 units daily  Indications: Vitamin D Deficiency      cholestyramine light 4 g packet Take 1 packet by mouth 2 (Two) Times a Day.      clopidogrel (PLAVIX) 75 MG tablet Take 1 tablet by mouth Daily. 30 tablet 3    Divalproex Sodium (DEPAKOTE SPRINKLE) 125 MG capsule Take 1 capsule by mouth 2 (Two) Times a Day.      ferrous sulfate 325 (65 FE) MG tablet Take 1 tablet by mouth Daily. Indications: Iron Deficiency      gabapentin (NEURONTIN) 100 MG capsule Take 1 capsule by mouth 3 (Three) Times a Day. Indications: behavioral agitation 90 capsule 0    hydrOXYzine (ATARAX) 10 MG tablet Take 1 tablet by mouth Every 8 (Eight) Hours As Needed for Itching or Anxiety. Indications: Feeling Anxious      magnesium hydroxide (MILK OF MAGNESIA) 400 MG/5ML suspension Take 30 mL by mouth Daily As Needed for Constipation. Indications: constipation      melatonin 3 MG tablet Take 1 tablet by mouth At Night As Needed for Sleep. Indications: Trouble Sleeping 30 tablet 0    memantine (NAMENDA) 5 MG tablet Take 1 tablet by mouth Every 12 (Twelve) Hours. 60 tablet 3    OLANZapine zydis (zyPREXA) 5 MG disintegrating tablet Place 1 tablet on the tongue Daily With Dinner. Indications: Behavioral Disorders associated with Dementia, and psychosis 30 tablet 0    olopatadine (PATADAY) 0.2 % solution ophthalmic solution Administer 1 drop to both eyes  Daily. Indications: Allergic Conjunctivitis      senna (SENOKOT) 8.6 MG tablet tablet Take 1 tablet by mouth Daily As Needed for Constipation. Indications: Constipation      tuberculin 5 UNIT/0.1ML injection Inject  into the appropriate area of the skin as directed by provider 1 (One) Time.      vitamin D3 125 MCG (5000 UT) capsule capsule Take 1 capsule by mouth Daily. PTA: Pt takes a 2000 unit and 5000 unit vitamin D3 to equal a total of 7000 units daily  Indications: Osteoporosis      zinc sulfate (ZINCATE) 220 (50 Zn) MG capsule Take 1 capsule by mouth 2 (Two) Times a Day. Indications: Zinc Deficiency       No current facility-administered medications for this visit.        Allergies:   No Known Allergies     Past Surgical History:  No past surgical history on file.    Social History:  Social History     Socioeconomic History    Marital status:    Tobacco Use    Smoking status: Never    Smokeless tobacco: Never   Vaping Use    Vaping Use: Never used   Substance and Sexual Activity    Alcohol use: No    Drug use: No    Sexual activity: Not Currently     Partners: Male       Family History:  Family History   Family history unknown: Yes       Review of Systems:  Review of Systems   Constitutional:  Negative for fatigue, fever and unexpected weight change.   Respiratory:  Negative for chest tightness and shortness of breath.    Cardiovascular:  Negative for chest pain.   Gastrointestinal:  Positive for abdominal pain. Negative for constipation, diarrhea, nausea and vomiting.   Genitourinary:  Positive for flank pain and frequency. Negative for difficulty urinating, dysuria and urgency.   Musculoskeletal:  Positive for back pain.   Skin:  Negative for rash.   Psychiatric/Behavioral:  Negative for confusion and suicidal ideas.      Physical Exam:  Physical Exam  Constitutional:       General: She is not in acute distress.     Comments: Wheelchair for ambulation   HENT:      Head: Normocephalic and atraumatic.       Nose: Nose normal.      Mouth/Throat:      Mouth: Mucous membranes are moist.   Eyes:      Conjunctiva/sclera: Conjunctivae normal.   Cardiovascular:      Rate and Rhythm: Normal rate and regular rhythm.      Pulses: Normal pulses.      Heart sounds: Normal heart sounds.   Pulmonary:      Effort: Pulmonary effort is normal.      Breath sounds: Normal breath sounds.   Abdominal:      General: Bowel sounds are normal.      Palpations: Abdomen is soft.      Tenderness: There is no right CVA tenderness or left CVA tenderness.   Musculoskeletal:      Cervical back: Normal range of motion.   Skin:     General: Skin is warm.   Neurological:      General: No focal deficit present.      Mental Status: She is alert and oriented to person, place, and time.   Psychiatric:         Mood and Affect: Mood normal.         Behavior: Behavior normal.         Thought Content: Thought content normal.         Judgment: Judgment normal.       Recent Image (CT and/or KUB):   CT Abdomen and Pelvis: No results found for this or any previous visit.     CT Stone Protocol: Results for orders placed during the hospital encounter of 07/28/23    CT Abdomen Pelvis Stone Protocol    Narrative  EXAM:  CT Abdomen and Pelvis Without Intravenous Contrast    EXAM DATE:  7/28/2023 7:32 AM    CLINICAL HISTORY:  right renal stone; N20.0-Calculus of kidney    TECHNIQUE:  Axial computed tomography images of the abdomen and pelvis without  intravenous contrast.  Sagittal and coronal reformatted images were  created and reviewed.  This CT exam was performed using one or more of  the following dose reduction techniques:  automated exposure control,  adjustment of the mA and/or kV according to patient size, and/or use of  iterative reconstruction technique.    COMPARISON:  No relevant prior studies available.    FINDINGS:  LUNG BASES:  Unremarkable.  No mass.  No consolidation.  MEDIASTINUM:  Large hiatal hernia.    ABDOMEN:  LIVER:   Unremarkable.  GALLBLADDER AND BILE DUCTS:  Unremarkable.  No calcified stones.  No  ductal dilation.  PANCREAS:  Unremarkable.  No ductal dilation.  SPLEEN:  Unremarkable.  No splenomegaly.  ADRENALS:  Unremarkable.  No mass.  KIDNEYS AND URETERS:  No hydronephrosis or obstructive urolithiasis.  STOMACH AND BOWEL:  Scattered diverticula in the colon.  No  diverticulitis.  No obstruction.    PELVIS:  APPENDIX:  No findings to suggest acute appendicitis.  BLADDER:  Unremarkable.  No stones.  REPRODUCTIVE:  Unremarkable as visualized.    ABDOMEN and PELVIS:  INTRAPERITONEAL SPACE:  Unremarkable.  No free air.  No significant  fluid collection.  BONES/JOINTS:  No acute fracture.  No dislocation.  SOFT TISSUES:  Unremarkable.  VASCULATURE:  Atherosclerotic disease.  No abdominal aortic aneurysm.  LYMPH NODES:  Unremarkable.  No enlarged lymph nodes.    Impression  1.  Large hiatal hernia.  2.  Scattered diverticula in the colon.  No diverticulitis.  3.  No hydronephrosis or obstructive urolithiasis.    This report was finalized on 7/28/2023 8:58 AM by Dr. Teo Araujo MD.     KUB: No results found for this or any previous visit.       Labs:  Brief Urine Lab Results  (Last result in the past 365 days)        Color   Clarity   Blood   Leuk Est   Nitrite   Protein   CREAT   Urine HCG        03/28/23 1652 Dark Yellow   Turbid   Trace   Negative   Positive   Negative                 Lab Requisition on 08/09/2023   Component Date Value Ref Range Status    Glucose 08/09/2023 100 (H)  65 - 99 mg/dL Final    BUN 08/09/2023 34 (H)  8 - 23 mg/dL Final    Creatinine 08/09/2023 1.04 (H)  0.57 - 1.00 mg/dL Final    Sodium 08/09/2023 145  136 - 145 mmol/L Final    Potassium 08/09/2023 4.3  3.5 - 5.2 mmol/L Final    Chloride 08/09/2023 109 (H)  98 - 107 mmol/L Final    CO2 08/09/2023 25.7  22.0 - 29.0 mmol/L Final    Calcium 08/09/2023 10.1  8.6 - 10.5 mg/dL Final    Total Protein 08/09/2023 5.9 (L)  6.0 - 8.5 g/dL Final    Albumin  08/09/2023 3.8  3.5 - 5.2 g/dL Final    ALT (SGPT) 08/09/2023 13  1 - 33 U/L Final    AST (SGOT) 08/09/2023 13  1 - 32 U/L Final    Alkaline Phosphatase 08/09/2023 80  39 - 117 U/L Final    Total Bilirubin 08/09/2023 0.3  0.0 - 1.2 mg/dL Final    Globulin 08/09/2023 2.1  gm/dL Final    A/G Ratio 08/09/2023 1.8  g/dL Final    BUN/Creatinine Ratio 08/09/2023 32.7 (H)  7.0 - 25.0 Final    Anion Gap 08/09/2023 10.3  5.0 - 15.0 mmol/L Final    eGFR 08/09/2023 54.4 (L)  >60.0 mL/min/1.73 Final    C-Reactive Protein 08/09/2023 <0.30  0.00 - 0.50 mg/dL Final    Sed Rate 08/09/2023 2  0 - 30 mm/hr Final    WBC 08/09/2023 7.09  3.40 - 10.80 10*3/mm3 Final    RBC 08/09/2023 4.40  3.77 - 5.28 10*6/mm3 Final    Hemoglobin 08/09/2023 13.0  12.0 - 15.9 g/dL Final    Hematocrit 08/09/2023 40.8  34.0 - 46.6 % Final    MCV 08/09/2023 92.7  79.0 - 97.0 fL Final    MCH 08/09/2023 29.5  26.6 - 33.0 pg Final    MCHC 08/09/2023 31.9  31.5 - 35.7 g/dL Final    RDW 08/09/2023 13.0  12.3 - 15.4 % Final    RDW-SD 08/09/2023 43.9  37.0 - 54.0 fl Final    MPV 08/09/2023 10.9  6.0 - 12.0 fL Final    Platelets 08/09/2023 183  140 - 450 10*3/mm3 Final    Neutrophil % 08/09/2023 44.7  42.7 - 76.0 % Final    Lymphocyte % 08/09/2023 34.4  19.6 - 45.3 % Final    Monocyte % 08/09/2023 7.8  5.0 - 12.0 % Final    Eosinophil % 08/09/2023 12.7 (H)  0.3 - 6.2 % Final    Basophil % 08/09/2023 0.1  0.0 - 1.5 % Final    Immature Grans % 08/09/2023 0.3  0.0 - 0.5 % Final    Neutrophils, Absolute 08/09/2023 3.17  1.70 - 7.00 10*3/mm3 Final    Lymphocytes, Absolute 08/09/2023 2.44  0.70 - 3.10 10*3/mm3 Final    Monocytes, Absolute 08/09/2023 0.55  0.10 - 0.90 10*3/mm3 Final    Eosinophils, Absolute 08/09/2023 0.90 (H)  0.00 - 0.40 10*3/mm3 Final    Basophils, Absolute 08/09/2023 0.01  0.00 - 0.20 10*3/mm3 Final    Immature Grans, Absolute 08/09/2023 0.02  0.00 - 0.05 10*3/mm3 Final    nRBC 08/09/2023 0.0  0.0 - 0.2 /100 WBC Final        Procedure:  None  Procedures     I have reviewed and agree with the above PMH, PSH, FMH, social history, medications, allergies, and labs.     Assessment/Plan:   Problem List Items Addressed This Visit          Genitourinary and Reproductive     RESOLVED: Right renal stone - Primary       Health Maintenance:   Health Maintenance Due   Topic Date Due    URINE MICROALBUMIN  Never done    DXA SCAN  Never done    COLORECTAL CANCER SCREENING  Never done    Pneumococcal Vaccine 65+ (1 - PCV) Never done    ZOSTER VACCINE (1 of 2) Never done    TDAP/TD VACCINES (2 - Tdap) 04/02/2012    ANNUAL WELLNESS VISIT  Never done    DIABETIC EYE EXAM  11/06/2020    COVID-19 Vaccine (4 - Booster for Mahesh series) 10/20/2022        Smoking Counseling: Never smoked or use smokeless tobacco    Urine Incontinence: Patient reports that she is not currently experiencing any symptoms of urinary incontinence.    Patient was given instructions and counseling regarding her condition or for health maintenance advice. Please see specific information pulled into the AVS if appropriate.    Patient Education:   Renal calculus -CT scan was negative for any stones.  She has no other complaints and we will see her back on an as-needed basis.    Visit Diagnoses:    ICD-10-CM ICD-9-CM   1. Right renal stone  N20.0 592.0       Meds Ordered During Visit:  No orders of the defined types were placed in this encounter.      Follow Up Appointment:   No follow-ups on file.      This document has been electronically signed by Олег Jiménez PA-C   August 18, 2023 16:11 EDT    Part of this note may be an electronic transcription/translation of spoken language to printed text using the Dragon Dictation System.

## 2023-10-04 ENCOUNTER — LAB REQUISITION (OUTPATIENT)
Dept: LAB | Facility: HOSPITAL | Age: 78
End: 2023-10-04
Payer: MEDICARE

## 2023-10-04 DIAGNOSIS — R53.83 OTHER FATIGUE: ICD-10-CM

## 2023-10-04 LAB
BACTERIA UR QL AUTO: ABNORMAL /HPF
BILIRUB UR QL STRIP: NEGATIVE
CLARITY UR: CLEAR
COLOR UR: YELLOW
GLUCOSE UR STRIP-MCNC: NEGATIVE MG/DL
HGB UR QL STRIP.AUTO: NEGATIVE
HYALINE CASTS UR QL AUTO: ABNORMAL /LPF
KETONES UR QL STRIP: NEGATIVE
LEUKOCYTE ESTERASE UR QL STRIP.AUTO: NEGATIVE
NITRITE UR QL STRIP: POSITIVE
PH UR STRIP.AUTO: <=5 [PH] (ref 5–8)
PROT UR QL STRIP: NEGATIVE
RBC # UR STRIP: ABNORMAL /HPF
REF LAB TEST METHOD: ABNORMAL
SP GR UR STRIP: 1.01 (ref 1–1.03)
SQUAMOUS #/AREA URNS HPF: ABNORMAL /HPF
UROBILINOGEN UR QL STRIP: ABNORMAL
WBC # UR STRIP: ABNORMAL /HPF

## 2023-10-04 PROCEDURE — 81001 URINALYSIS AUTO W/SCOPE: CPT | Performed by: INTERNAL MEDICINE

## 2023-10-04 PROCEDURE — 87186 SC STD MICRODIL/AGAR DIL: CPT | Performed by: INTERNAL MEDICINE

## 2023-10-04 PROCEDURE — 87077 CULTURE AEROBIC IDENTIFY: CPT | Performed by: INTERNAL MEDICINE

## 2023-10-04 PROCEDURE — 87086 URINE CULTURE/COLONY COUNT: CPT | Performed by: INTERNAL MEDICINE

## 2023-10-07 LAB — BACTERIA SPEC AEROBE CULT: ABNORMAL

## 2023-11-06 ENCOUNTER — HOSPITAL ENCOUNTER (EMERGENCY)
Facility: HOSPITAL | Age: 78
Discharge: HOME | End: 2023-11-07
Attending: STUDENT IN AN ORGANIZED HEALTH CARE EDUCATION/TRAINING PROGRAM | Admitting: STUDENT IN AN ORGANIZED HEALTH CARE EDUCATION/TRAINING PROGRAM
Payer: MEDICARE

## 2023-11-06 ENCOUNTER — APPOINTMENT (OUTPATIENT)
Dept: CT IMAGING | Facility: HOSPITAL | Age: 78
End: 2023-11-06
Payer: MEDICARE

## 2023-11-06 ENCOUNTER — APPOINTMENT (OUTPATIENT)
Dept: MRI IMAGING | Facility: HOSPITAL | Age: 78
End: 2023-11-06
Payer: MEDICARE

## 2023-11-06 ENCOUNTER — APPOINTMENT (OUTPATIENT)
Dept: GENERAL RADIOLOGY | Facility: HOSPITAL | Age: 78
End: 2023-11-06
Payer: MEDICARE

## 2023-11-06 ENCOUNTER — APPOINTMENT (OUTPATIENT)
Dept: RESPIRATORY THERAPY | Facility: HOSPITAL | Age: 78
End: 2023-11-06
Payer: MEDICARE

## 2023-11-06 DIAGNOSIS — N30.01 ACUTE CYSTITIS WITH HEMATURIA: Primary | ICD-10-CM

## 2023-11-06 LAB
ALBUMIN SERPL-MCNC: 3.5 G/DL (ref 3.5–5.2)
ALBUMIN/GLOB SERPL: 1.2 G/DL
ALP SERPL-CCNC: 118 U/L (ref 39–117)
ALT SERPL W P-5'-P-CCNC: 10 U/L (ref 1–33)
ANION GAP SERPL CALCULATED.3IONS-SCNC: 9.5 MMOL/L (ref 5–15)
AST SERPL-CCNC: 13 U/L (ref 1–32)
BACTERIA UR QL AUTO: ABNORMAL /HPF
BASOPHILS # BLD AUTO: 0.01 10*3/MM3 (ref 0–0.2)
BASOPHILS NFR BLD AUTO: 0.1 % (ref 0–1.5)
BILIRUB SERPL-MCNC: 0.7 MG/DL (ref 0–1.2)
BILIRUB UR QL STRIP: NEGATIVE
BUN SERPL-MCNC: 24 MG/DL (ref 8–23)
BUN/CREAT SERPL: 21.2 (ref 7–25)
CALCIUM SPEC-SCNC: 9.8 MG/DL (ref 8.6–10.5)
CHLORIDE SERPL-SCNC: 103 MMOL/L (ref 98–107)
CLARITY UR: ABNORMAL
CO2 SERPL-SCNC: 25.5 MMOL/L (ref 22–29)
COLOR UR: YELLOW
CREAT BLDA-MCNC: 1.2 MG/DL (ref 0.6–1.3)
CREAT SERPL-MCNC: 1.13 MG/DL (ref 0.57–1)
CRP SERPL-MCNC: 2.65 MG/DL (ref 0–0.5)
DEPRECATED RDW RBC AUTO: 43.4 FL (ref 37–54)
EGFRCR SERPLBLD CKD-EPI 2021: 49.3 ML/MIN/1.73
EOSINOPHIL # BLD AUTO: 1.06 10*3/MM3 (ref 0–0.4)
EOSINOPHIL NFR BLD AUTO: 12.7 % (ref 0.3–6.2)
ERYTHROCYTE [DISTWIDTH] IN BLOOD BY AUTOMATED COUNT: 12.5 % (ref 12.3–15.4)
FLUAV RNA RESP QL NAA+PROBE: NOT DETECTED
FLUBV RNA RESP QL NAA+PROBE: NOT DETECTED
GEN 5 2HR TROPONIN T REFLEX: 19 NG/L
GLOBULIN UR ELPH-MCNC: 3 GM/DL
GLUCOSE BLDC GLUCOMTR-MCNC: 73 MG/DL (ref 70–130)
GLUCOSE SERPL-MCNC: 102 MG/DL (ref 65–99)
GLUCOSE UR STRIP-MCNC: NEGATIVE MG/DL
HCT VFR BLD AUTO: 43.6 % (ref 34–46.6)
HGB BLD-MCNC: 13.8 G/DL (ref 12–15.9)
HGB UR QL STRIP.AUTO: ABNORMAL
HOLD SPECIMEN: NORMAL
HYALINE CASTS UR QL AUTO: ABNORMAL /LPF
IMM GRANULOCYTES # BLD AUTO: 0.03 10*3/MM3 (ref 0–0.05)
IMM GRANULOCYTES NFR BLD AUTO: 0.4 % (ref 0–0.5)
KETONES UR QL STRIP: NEGATIVE
LEUKOCYTE ESTERASE UR QL STRIP.AUTO: ABNORMAL
LYMPHOCYTES # BLD AUTO: 2.21 10*3/MM3 (ref 0.7–3.1)
LYMPHOCYTES NFR BLD AUTO: 26.6 % (ref 19.6–45.3)
MCH RBC QN AUTO: 29.9 PG (ref 26.6–33)
MCHC RBC AUTO-ENTMCNC: 31.7 G/DL (ref 31.5–35.7)
MCV RBC AUTO: 94.4 FL (ref 79–97)
MONOCYTES # BLD AUTO: 0.67 10*3/MM3 (ref 0.1–0.9)
MONOCYTES NFR BLD AUTO: 8.1 % (ref 5–12)
NEUTROPHILS NFR BLD AUTO: 4.34 10*3/MM3 (ref 1.7–7)
NEUTROPHILS NFR BLD AUTO: 52.1 % (ref 42.7–76)
NITRITE UR QL STRIP: POSITIVE
NRBC BLD AUTO-RTO: 0 /100 WBC (ref 0–0.2)
NT-PROBNP SERPL-MCNC: 79.2 PG/ML (ref 0–1800)
PH UR STRIP.AUTO: 5.5 [PH] (ref 5–8)
PLATELET # BLD AUTO: 204 10*3/MM3 (ref 140–450)
PMV BLD AUTO: 10.5 FL (ref 6–12)
POTASSIUM SERPL-SCNC: 4.6 MMOL/L (ref 3.5–5.2)
PROT SERPL-MCNC: 6.5 G/DL (ref 6–8.5)
PROT UR QL STRIP: NEGATIVE
QT INTERVAL: 398 MS
QTC INTERVAL: 432 MS
RBC # BLD AUTO: 4.62 10*6/MM3 (ref 3.77–5.28)
RBC # UR STRIP: ABNORMAL /HPF
REF LAB TEST METHOD: ABNORMAL
SARS-COV-2 RNA RESP QL NAA+PROBE: NOT DETECTED
SODIUM SERPL-SCNC: 138 MMOL/L (ref 136–145)
SP GR UR STRIP: 1.02 (ref 1–1.03)
SQUAMOUS #/AREA URNS HPF: ABNORMAL /HPF
TROPONIN T DELTA: -1 NG/L
TROPONIN T SERPL HS-MCNC: 20 NG/L
UROBILINOGEN UR QL STRIP: ABNORMAL
VALPROATE SERPL-MCNC: 11.4 MCG/ML (ref 50–125)
WBC # UR STRIP: ABNORMAL /HPF
WBC NRBC COR # BLD: 8.32 10*3/MM3 (ref 3.4–10.8)

## 2023-11-06 PROCEDURE — 71045 X-RAY EXAM CHEST 1 VIEW: CPT | Performed by: RADIOLOGY

## 2023-11-06 PROCEDURE — 72125 CT NECK SPINE W/O DYE: CPT | Performed by: RADIOLOGY

## 2023-11-06 PROCEDURE — 87086 URINE CULTURE/COLONY COUNT: CPT | Performed by: PHYSICIAN ASSISTANT

## 2023-11-06 PROCEDURE — 70450 CT HEAD/BRAIN W/O DYE: CPT

## 2023-11-06 PROCEDURE — 70498 CT ANGIOGRAPHY NECK: CPT

## 2023-11-06 PROCEDURE — 96365 THER/PROPH/DIAG IV INF INIT: CPT

## 2023-11-06 PROCEDURE — 93005 ELECTROCARDIOGRAM TRACING: CPT | Performed by: PHYSICIAN ASSISTANT

## 2023-11-06 PROCEDURE — 80164 ASSAY DIPROPYLACETIC ACD TOT: CPT | Performed by: PHYSICIAN ASSISTANT

## 2023-11-06 PROCEDURE — 25510000001 IOPAMIDOL PER 1 ML: Performed by: STUDENT IN AN ORGANIZED HEALTH CARE EDUCATION/TRAINING PROGRAM

## 2023-11-06 PROCEDURE — 70551 MRI BRAIN STEM W/O DYE: CPT

## 2023-11-06 PROCEDURE — 70496 CT ANGIOGRAPHY HEAD: CPT

## 2023-11-06 PROCEDURE — 72131 CT LUMBAR SPINE W/O DYE: CPT | Performed by: RADIOLOGY

## 2023-11-06 PROCEDURE — 80053 COMPREHEN METABOLIC PANEL: CPT | Performed by: PHYSICIAN ASSISTANT

## 2023-11-06 PROCEDURE — 72131 CT LUMBAR SPINE W/O DYE: CPT

## 2023-11-06 PROCEDURE — 36415 COLL VENOUS BLD VENIPUNCTURE: CPT

## 2023-11-06 PROCEDURE — 87077 CULTURE AEROBIC IDENTIFY: CPT | Performed by: PHYSICIAN ASSISTANT

## 2023-11-06 PROCEDURE — 84484 ASSAY OF TROPONIN QUANT: CPT | Performed by: PHYSICIAN ASSISTANT

## 2023-11-06 PROCEDURE — 81001 URINALYSIS AUTO W/SCOPE: CPT | Performed by: PHYSICIAN ASSISTANT

## 2023-11-06 PROCEDURE — 93010 ELECTROCARDIOGRAM REPORT: CPT | Performed by: INTERNAL MEDICINE

## 2023-11-06 PROCEDURE — 82565 ASSAY OF CREATININE: CPT

## 2023-11-06 PROCEDURE — 87186 SC STD MICRODIL/AGAR DIL: CPT | Performed by: PHYSICIAN ASSISTANT

## 2023-11-06 PROCEDURE — 95819 EEG AWAKE AND ASLEEP: CPT

## 2023-11-06 PROCEDURE — 25810000003 SODIUM CHLORIDE 0.9 % SOLUTION: Performed by: PHYSICIAN ASSISTANT

## 2023-11-06 PROCEDURE — 25010000002 CEFTRIAXONE PER 250 MG: Performed by: PHYSICIAN ASSISTANT

## 2023-11-06 PROCEDURE — 72128 CT CHEST SPINE W/O DYE: CPT

## 2023-11-06 PROCEDURE — 95819 EEG AWAKE AND ASLEEP: CPT | Performed by: PSYCHIATRY & NEUROLOGY

## 2023-11-06 PROCEDURE — 99285 EMERGENCY DEPT VISIT HI MDM: CPT

## 2023-11-06 PROCEDURE — 72125 CT NECK SPINE W/O DYE: CPT

## 2023-11-06 PROCEDURE — 83880 ASSAY OF NATRIURETIC PEPTIDE: CPT | Performed by: PHYSICIAN ASSISTANT

## 2023-11-06 PROCEDURE — 72128 CT CHEST SPINE W/O DYE: CPT | Performed by: RADIOLOGY

## 2023-11-06 PROCEDURE — 70551 MRI BRAIN STEM W/O DYE: CPT | Performed by: RADIOLOGY

## 2023-11-06 PROCEDURE — 86140 C-REACTIVE PROTEIN: CPT | Performed by: PHYSICIAN ASSISTANT

## 2023-11-06 PROCEDURE — 70496 CT ANGIOGRAPHY HEAD: CPT | Performed by: RADIOLOGY

## 2023-11-06 PROCEDURE — 85025 COMPLETE CBC W/AUTO DIFF WBC: CPT | Performed by: PHYSICIAN ASSISTANT

## 2023-11-06 PROCEDURE — 71045 X-RAY EXAM CHEST 1 VIEW: CPT

## 2023-11-06 PROCEDURE — 87636 SARSCOV2 & INF A&B AMP PRB: CPT | Performed by: PHYSICIAN ASSISTANT

## 2023-11-06 PROCEDURE — 82948 REAGENT STRIP/BLOOD GLUCOSE: CPT

## 2023-11-06 PROCEDURE — 70498 CT ANGIOGRAPHY NECK: CPT | Performed by: RADIOLOGY

## 2023-11-06 RX ORDER — DIVALPROEX SODIUM 125 MG/1
250 CAPSULE, COATED PELLETS ORAL 2 TIMES DAILY
Qty: 40 CAPSULE | Refills: 0 | Status: SHIPPED | OUTPATIENT
Start: 2023-11-06 | End: 2023-11-16

## 2023-11-06 RX ORDER — SODIUM CHLORIDE 0.9 % (FLUSH) 0.9 %
10 SYRINGE (ML) INJECTION AS NEEDED
Status: DISCONTINUED | OUTPATIENT
Start: 2023-11-06 | End: 2023-11-07 | Stop reason: HOSPADM

## 2023-11-06 RX ORDER — NITROFURANTOIN 25; 75 MG/1; MG/1
100 CAPSULE ORAL 2 TIMES DAILY
Qty: 14 CAPSULE | Refills: 0 | Status: SHIPPED | OUTPATIENT
Start: 2023-11-06 | End: 2023-11-13

## 2023-11-06 RX ADMIN — SODIUM CHLORIDE 500 ML: 9 INJECTION, SOLUTION INTRAVENOUS at 15:36

## 2023-11-06 RX ADMIN — IOPAMIDOL 100 ML: 755 INJECTION, SOLUTION INTRAVENOUS at 13:33

## 2023-11-06 RX ADMIN — CEFTRIAXONE 1000 MG: 1 INJECTION, POWDER, FOR SOLUTION INTRAMUSCULAR; INTRAVENOUS at 15:35

## 2023-11-06 NOTE — ED NOTES
LIZ Ulloa called to bedside at this time to evaluate patient.     Dr. Rivera called to beside as well to evaluate patient.

## 2023-11-06 NOTE — DISCHARGE INSTRUCTIONS
Please see new medication changes. Please follow up with your PCP in 2 days or return to ER if symptoms worsen.

## 2023-11-06 NOTE — ED PROVIDER NOTES
Subjective   History of Present Illness  This is an 78 year old female patient who presents to the ER with chief complaint of fall. History is obtained via EMS staff and NH staff as the patient has significant dementia and is only mildly verbal and deaf. PMH significant for HLD, CAD, GERD, Alzheimers, depression and anxiety, deafness, CVA. Today, NH staff found the patient face down on the floor of her room. Unsure how long she had been down. Unsure if she hit her head. Unsure if she experience syncope or LOC. She has bleeding of the left ear which I'm told is chronic due to an ear drum rupture. She also has a right sided facial droop which I'm also told is chronic from a previous CVA.       Review of Systems   Unable to perform ROS: Dementia       Past Medical History:   Diagnosis Date    Alzheimer disease     Alzheimer's disease     Anxiety     Anxiety disorder, unspecified     Cerebrovascular accident (CVA)     Constipation     Deafness     Delirium due to known physiological condition     Depression     Difficulty in walking, not elsewhere classified     Gastro-esophageal reflux disease with esophagitis     GERD (gastroesophageal reflux disease)     Hyperlipidemia     Hyperlipidemia     Hypertension     Major depressive disorder, single episode     Muscle weakness (generalized)     Other lack of coordination     Paranoid personality (disorder)     Shared psychotic disorder     Unspecified dementia without behavioral disturbance     Unspecified hearing loss, unspecified ear     Unspecified lack of coordination     Unspecified psychosis not due to a substance or known physiological condition        No Known Allergies    No past surgical history on file.    Family History   Family history unknown: Yes       Social History     Socioeconomic History    Marital status:    Tobacco Use    Smoking status: Never    Smokeless tobacco: Never   Vaping Use    Vaping Use: Never used   Substance and Sexual Activity     Alcohol use: No    Drug use: No    Sexual activity: Not Currently     Partners: Male           Objective   Physical Exam  Vitals and nursing note reviewed.   Constitutional:       General: She is not in acute distress.     Appearance: She is well-developed. She is not diaphoretic.   HENT:      Head: Normocephalic.      Right Ear: External ear normal.      Left Ear: External ear normal.      Nose: Nose normal.   Eyes:      Conjunctiva/sclera: Conjunctivae normal.      Pupils: Pupils are equal, round, and reactive to light.   Neck:      Vascular: No JVD.      Trachea: No tracheal deviation.   Cardiovascular:      Rate and Rhythm: Normal rate and regular rhythm.      Heart sounds: Normal heart sounds. No murmur heard.  Pulmonary:      Effort: Pulmonary effort is normal. No respiratory distress.      Breath sounds: Normal breath sounds. No wheezing.   Abdominal:      General: Bowel sounds are normal.      Palpations: Abdomen is soft.      Tenderness: There is no abdominal tenderness.   Musculoskeletal:         General: No deformity. Normal range of motion.      Cervical back: Normal range of motion and neck supple.   Skin:     General: Skin is warm and dry.      Coloration: Skin is not pale.      Findings: No erythema or rash.   Neurological:      Mental Status: She is alert. She is disoriented.      Cranial Nerves: No cranial nerve deficit.      Comments: Right sided facial droop noted. When we lift up her extremities, she can hold them against gravity. She  our fingers when asked to do so.    Psychiatric:         Behavior: Behavior normal.         Thought Content: Thought content normal.         Procedures       Results for orders placed or performed during the hospital encounter of 11/06/23   COVID-19 and FLU A/B PCR - Swab, Nasopharynx    Specimen: Nasopharynx; Swab   Result Value Ref Range    COVID19 Not Detected Not Detected - Ref. Range    Influenza A PCR Not Detected Not Detected    Influenza B PCR Not  Detected Not Detected   Valproic Acid Level, Total    Specimen: Arm, Right; Blood   Result Value Ref Range    Valproic Acid 11.4 (L) 50.0 - 125.0 mcg/mL   Comprehensive Metabolic Panel    Specimen: Arm, Right; Blood   Result Value Ref Range    Glucose 102 (H) 65 - 99 mg/dL    BUN 24 (H) 8 - 23 mg/dL    Creatinine 1.13 (H) 0.57 - 1.00 mg/dL    Sodium 138 136 - 145 mmol/L    Potassium 4.6 3.5 - 5.2 mmol/L    Chloride 103 98 - 107 mmol/L    CO2 25.5 22.0 - 29.0 mmol/L    Calcium 9.8 8.6 - 10.5 mg/dL    Total Protein 6.5 6.0 - 8.5 g/dL    Albumin 3.5 3.5 - 5.2 g/dL    ALT (SGPT) 10 1 - 33 U/L    AST (SGOT) 13 1 - 32 U/L    Alkaline Phosphatase 118 (H) 39 - 117 U/L    Total Bilirubin 0.7 0.0 - 1.2 mg/dL    Globulin 3.0 gm/dL    A/G Ratio 1.2 g/dL    BUN/Creatinine Ratio 21.2 7.0 - 25.0    Anion Gap 9.5 5.0 - 15.0 mmol/L    eGFR 49.3 (L) >60.0 mL/min/1.73   Urinalysis With Microscopic If Indicated (No Culture) - Urine, Catheter In/Out    Specimen: Urine, Catheter In/Out   Result Value Ref Range    Color, UA Yellow Yellow, Straw    Appearance, UA Cloudy (A) Clear    pH, UA 5.5 5.0 - 8.0    Specific Gravity, UA 1.020 1.005 - 1.030    Glucose, UA Negative Negative    Ketones, UA Negative Negative    Bilirubin, UA Negative Negative    Blood, UA Small (1+) (A) Negative    Protein, UA Negative Negative    Leuk Esterase, UA Small (1+) (A) Negative    Nitrite, UA Positive (A) Negative    Urobilinogen, UA 0.2 E.U./dL 0.2 - 1.0 E.U./dL   High Sensitivity Troponin T    Specimen: Arm, Right; Blood   Result Value Ref Range    HS Troponin T 20 (H) <10 ng/L   BNP    Specimen: Arm, Right; Blood   Result Value Ref Range    proBNP 79.2 0.0 - 1,800.0 pg/mL   C-reactive Protein    Specimen: Arm, Right; Blood   Result Value Ref Range    C-Reactive Protein 2.65 (H) 0.00 - 0.50 mg/dL   CBC Auto Differential    Specimen: Arm, Right; Blood   Result Value Ref Range    WBC 8.32 3.40 - 10.80 10*3/mm3    RBC 4.62 3.77 - 5.28 10*6/mm3    Hemoglobin  13.8 12.0 - 15.9 g/dL    Hematocrit 43.6 34.0 - 46.6 %    MCV 94.4 79.0 - 97.0 fL    MCH 29.9 26.6 - 33.0 pg    MCHC 31.7 31.5 - 35.7 g/dL    RDW 12.5 12.3 - 15.4 %    RDW-SD 43.4 37.0 - 54.0 fl    MPV 10.5 6.0 - 12.0 fL    Platelets 204 140 - 450 10*3/mm3    Neutrophil % 52.1 42.7 - 76.0 %    Lymphocyte % 26.6 19.6 - 45.3 %    Monocyte % 8.1 5.0 - 12.0 %    Eosinophil % 12.7 (H) 0.3 - 6.2 %    Basophil % 0.1 0.0 - 1.5 %    Immature Grans % 0.4 0.0 - 0.5 %    Neutrophils, Absolute 4.34 1.70 - 7.00 10*3/mm3    Lymphocytes, Absolute 2.21 0.70 - 3.10 10*3/mm3    Monocytes, Absolute 0.67 0.10 - 0.90 10*3/mm3    Eosinophils, Absolute 1.06 (H) 0.00 - 0.40 10*3/mm3    Basophils, Absolute 0.01 0.00 - 0.20 10*3/mm3    Immature Grans, Absolute 0.03 0.00 - 0.05 10*3/mm3    nRBC 0.0 0.0 - 0.2 /100 WBC   High Sensitivity Troponin T 2Hr    Specimen: Arm, Right; Blood   Result Value Ref Range    HS Troponin T 19 (H) <10 ng/L    Troponin T Delta -1 >=-4 - <+4 ng/L   Urinalysis, Microscopic Only - Urine, Catheter In/Out    Specimen: Urine, Catheter In/Out   Result Value Ref Range    RBC, UA 3-5 (A) None Seen, 0-2 /HPF    WBC, UA 6-10 (A) None Seen, 0-2 /HPF    Bacteria, UA 4+ (A) None Seen /HPF    Squamous Epithelial Cells, UA 13-20 (A) None Seen, 0-2 /HPF    Hyaline Casts, UA 7-12 None Seen /LPF    Methodology Automated Microscopy    [place] Urine Culture Tube - Urine, Catheter In/Out    Specimen: Urine, Catheter In/Out   Result Value Ref Range    Extra Tube Hold for add-ons.    POC Glucose Once    Specimen: Blood   Result Value Ref Range    Glucose 73 70 - 130 mg/dL   POC Creatinine    Specimen: Blood   Result Value Ref Range    Creatinine 1.20 0.60 - 1.30 mg/dL   ECG 12 Lead Stroke Evaluation   Result Value Ref Range    QT Interval 398 ms    QTC Interval 432 ms          ED Course  ED Course as of 11/06/23 2200   Mon Nov 06, 2023   1258 CT Head Without Contrast Stroke Protocol  IMPRESSION:  1.  No CT evidence of acute intracranial  abnormality.  2.  Stable atrophy and moderate chronic small vessel ischemic disease  with chronic appearing lacunar infarcts.  3.  Marked acute on chronic pansinusitis.        This report was finalized on 11/6/2023 12:53 PM by Dr. Madhav Lang MD   [MM]   1258 Dr. Rivera came over to assess the patient and together we decided that calling a code stroke would be appropriate at this time.  [MM]   1332 CT Lumbar Spine Without Contrast  IMPRESSION:    Abundant distal sigmoid colonic and probable rectal stool.        This report was finalized on 11/6/2023 1:25 PM by Dr. Teo Araujo MD   [MM]   1333 CT Thoracic Spine Without Contrast  RECOMMENDATIONS:    Spiculated subpleural opacity measuring about 1.5 cm in the right  lower lobe that probably represents scarring but PET/CT on a nonemergent  basis could be useful for further evaluation.        This report was finalized on 11/6/2023 1:27 PM by Dr. Teo Araujo MD   [MM]   1351 ECG 12 Lead Stroke Evaluation  Normal sinus rhythm, rate 71, QTc 432, no acute ST or T wave changes [CW]   1429 CT Angiogram Head  IMPRESSION:    No acute findings in the arteries of the head/brain.        This report was finalized on 11/6/2023 2:04 PM by Dr. Teo Araujo MD   [MM]   1429 CT Angiogram Neck  IMPRESSION:  1.  Mild atherosclerotic plaque of the bilateral ICA.  2.  Sphenoid, maxillary sinus and ethmoid air cell as well as frontal  sinus mucoperiosteal thickening.        This report was finalized on 11/6/2023 2:05 PM by Dr. Teo Araujo MD   [MM]   1430 XR Chest 1 View     IMPRESSION:  1.  Coarsened interstitial markings.  2.  Left lung base atelectasis.        This report was finalized on 11/6/2023 2:10 PM by Dr. Teo Araujo MD   [MM]   1430 CT Cervical Spine Without Contrast  IMPRESSION:    Degenerative changes cervical spine as described.        This report was finalized on 11/6/2023 2:11 PM by Dr. Teo Araujo MD   [MM]   5595 Patient's mentation, facial droop and neuro  exam are at her baseline verified with NH staff and family members.  [MM]   2153 Signed out to Hakeem Porter PA-C at shift change.  [MM]      ED Course User Index  [CW] Rasheed Rivera DO  [MM] Erica Cordero PA                                           Medical Decision Making    This is an 78 year old female patient who presents to the ER with chief complaint of fall. History is obtained via EMS staff and NH staff as the patient has significant dementia and is only mildly verbal and deaf. PMH significant for HLD, CAD, GERD, Alzheimers, depression and anxiety, deafness, CVA. Today, NH staff found the patient face down on the floor of her room. Unsure how long she had been down. Unsure if she hit her head. Unsure if she experience syncope or LOC. She has bleeding of the left ear which I'm told is chronic due to an ear drum rupture. She also has a right sided facial droop which I'm also told is chronic from a previous CVA.       Problems Addressed:  Acute cystitis with hematuria: complicated acute illness or injury    Amount and/or Complexity of Data Reviewed  Labs: ordered. Decision-making details documented in ED Course.  Radiology: ordered. Decision-making details documented in ED Course.  ECG/medicine tests: ordered. Decision-making details documented in ED Course.    Risk  Prescription drug management.        Final diagnoses:   Acute cystitis with hematuria       ED Disposition  ED Disposition       ED Disposition   Discharge    Condition   Stable    Comment   --               Ricky Alamo MD  [address] [id number] [phone]    In 2 days           Medication List        New Prescriptions      nitrofurantoin (macrocrystal-monohydrate) 100 MG capsule  Commonly known as: MACROBID  Take 1 capsule by mouth 2 (Two) Times a Day for 7 days.            Changed      Divalproex Sodium 125 MG capsule  Commonly known as: DEPAKOTE SPRINKLE  Take 2 capsules by mouth 2 (Two) Times a Day  for 10 days.  What changed: how much to take               Where to Get Your Medications        These medications were sent to [facility] - [place], [place] - [address] - [phone]  - [phone]   [address] [place] [address]      Phone: [phone]   Divalproex Sodium 125 MG capsule  nitrofurantoin (macrocrystal-monohydrate) 100 MG capsule            Erica Cordero PA  11/06/23 1800       Erica Cordero PA  11/06/23 2147       Erica Cordero PA  11/06/23 2200

## 2023-11-06 NOTE — ED NOTES
Called [facility] to ask about fall, spoke with Mary Jane. She states that had an unwitnessed fall earlier this day and was found around noon. Mary Jane states that patient is hard at hearing and uses a communication board. She also reports that patient had a rupture eardrum and the blood to left ear is from that. Patient has some left side mouth drooping but per Mary Jane that is her normal. Pt is not on any blood thinners.

## 2023-11-07 VITALS
TEMPERATURE: 98.2 F | HEIGHT: 65 IN | SYSTOLIC BLOOD PRESSURE: 197 MMHG | RESPIRATION RATE: 18 BRPM | WEIGHT: 190 LBS | HEART RATE: 77 BPM | DIASTOLIC BLOOD PRESSURE: 111 MMHG | OXYGEN SATURATION: 95 % | BODY MASS INDEX: 31.65 KG/M2

## 2023-11-07 NOTE — ED NOTES
Spoke with patient's granddaughter who is patient's next of kin, went over MRI screening criteria with patient's family. All questioned were answered and patient was screened per protocol. ED provider and primary nurse made aware.

## 2023-11-07 NOTE — ED NOTES
Attempted to call down to MRI to get a status update about getting MRI done, no answer. Called CT and was able to get a hold of Aruna. I was informed by Aruna that Bernardo told her that patient could not be still and he was unable to perform MRI. Patient has not been down to MRI at any point today. Informed Aruna that was false and that patient has been ready for MRI for quiet sometime now. I was informed that the MRI could not be performed anyway due to it being scheduled for tomorrow. Spoke with provider who states that the MRI needs to be done tonight. Aruna states that she cannot get the MRI to pull patient up until tomorrow due to it being scheduled for tomorrow, provider made aware. Provider discontinuing the MRI that is supposedly scheduled for tomorrow and is re-ordering the MRI.

## 2023-11-07 NOTE — ED NOTES
Spoke with House supervisor Olayinka for transport back to [facility]. He approved run and will let our crew know.

## 2023-11-08 LAB — BACTERIA SPEC AEROBE CULT: ABNORMAL

## 2023-11-10 ENCOUNTER — APPOINTMENT (OUTPATIENT)
Dept: GENERAL RADIOLOGY | Facility: HOSPITAL | Age: 78
End: 2023-11-10
Payer: MEDICARE

## 2023-11-10 ENCOUNTER — HOSPITAL ENCOUNTER (EMERGENCY)
Facility: HOSPITAL | Age: 78
Discharge: HOME | End: 2023-11-10
Attending: EMERGENCY MEDICINE
Payer: MEDICARE

## 2023-11-10 ENCOUNTER — APPOINTMENT (OUTPATIENT)
Dept: CT IMAGING | Facility: HOSPITAL | Age: 78
End: 2023-11-10
Payer: MEDICARE

## 2023-11-10 VITALS
HEIGHT: 65 IN | HEART RATE: 57 BPM | SYSTOLIC BLOOD PRESSURE: 148 MMHG | RESPIRATION RATE: 18 BRPM | BODY MASS INDEX: 31.65 KG/M2 | OXYGEN SATURATION: 98 % | TEMPERATURE: 98 F | DIASTOLIC BLOOD PRESSURE: 81 MMHG | WEIGHT: 190 LBS

## 2023-11-10 DIAGNOSIS — S09.90XA CLOSED HEAD INJURY, INITIAL ENCOUNTER: Primary | ICD-10-CM

## 2023-11-10 PROCEDURE — 99284 EMERGENCY DEPT VISIT MOD MDM: CPT

## 2023-11-10 PROCEDURE — 72170 X-RAY EXAM OF PELVIS: CPT | Performed by: RADIOLOGY

## 2023-11-10 PROCEDURE — 72125 CT NECK SPINE W/O DYE: CPT

## 2023-11-10 PROCEDURE — 70450 CT HEAD/BRAIN W/O DYE: CPT | Performed by: RADIOLOGY

## 2023-11-10 PROCEDURE — 72170 X-RAY EXAM OF PELVIS: CPT

## 2023-11-10 PROCEDURE — 70450 CT HEAD/BRAIN W/O DYE: CPT

## 2023-11-10 PROCEDURE — 72125 CT NECK SPINE W/O DYE: CPT | Performed by: RADIOLOGY

## 2023-11-10 NOTE — ED PROVIDER NOTES
Subjective   History of Present Illness  Patient is an 78-year-old female from nursing home sent due to a fall.  They report the patient had a small hematoma on her head as well as complaint of pain in her hips.  Patient denies significant pain currently.  Patient does have a history of dementia and therefore is a poor historian.    Fall      Review of Systems   Constitutional: Negative.    HENT: Negative.     Eyes: Negative.    Respiratory: Negative.     Cardiovascular: Negative.    Gastrointestinal: Negative.    Endocrine: Negative.    Genitourinary: Negative.    Musculoskeletal: Negative.    Skin: Negative.    Allergic/Immunologic: Negative.    Neurological: Negative.    Hematological: Negative.    Psychiatric/Behavioral: Negative.         Past Medical History:   Diagnosis Date    Alzheimer disease     Alzheimer's disease     Anxiety     Anxiety disorder, unspecified     Cerebrovascular accident (CVA)     Constipation     Deafness     Delirium due to known physiological condition     Depression     Difficulty in walking, not elsewhere classified     Gastro-esophageal reflux disease with esophagitis     GERD (gastroesophageal reflux disease)     Hyperlipidemia     Hyperlipidemia     Hypertension     Major depressive disorder, single episode     Muscle weakness (generalized)     Other lack of coordination     Paranoid personality (disorder)     Shared psychotic disorder     Unspecified dementia without behavioral disturbance     Unspecified hearing loss, unspecified ear     Unspecified lack of coordination     Unspecified psychosis not due to a substance or known physiological condition        No Known Allergies    No past surgical history on file.    Family History   Family history unknown: Yes       Social History     Socioeconomic History    Marital status:    Tobacco Use    Smoking status: Never    Smokeless tobacco: Never   Vaping Use    Vaping Use: Never used   Substance and Sexual Activity    Alcohol  use: No    Drug use: No    Sexual activity: Not Currently     Partners: Male           Objective   Physical Exam  Vitals and nursing note reviewed.   Constitutional:       Appearance: She is well-developed.   HENT:      Head: Normocephalic.      Right Ear: External ear normal.      Left Ear: External ear normal.   Eyes:      Conjunctiva/sclera: Conjunctivae normal.      Pupils: Pupils are equal, round, and reactive to light.   Cardiovascular:      Rate and Rhythm: Normal rate and regular rhythm.      Heart sounds: Normal heart sounds.   Pulmonary:      Effort: Pulmonary effort is normal.      Breath sounds: Normal breath sounds.   Abdominal:      General: Bowel sounds are normal.      Palpations: Abdomen is soft.   Musculoskeletal:         General: Normal range of motion.      Cervical back: Normal range of motion and neck supple.   Skin:     General: Skin is warm and dry.      Capillary Refill: Capillary refill takes less than 2 seconds.   Neurological:      Mental Status: She is alert and oriented to person, place, and time.   Psychiatric:         Behavior: Behavior normal.         Thought Content: Thought content normal.         Procedures           ED Course                                           Medical Decision Making  Problems Addressed:  Closed head injury, initial encounter: complicated acute illness or injury    Amount and/or Complexity of Data Reviewed  Radiology: ordered.        Final diagnoses:   Closed head injury, initial encounter       ED Disposition  ED Disposition       ED Disposition   Discharge    Condition   Stable    Comment   --               Ricky Alamo MD  [address] [facility] [address] [phone]    Schedule an appointment as soon as possible for a visit in 2 days  For further evaluation         Medication List      No changes were made to your prescriptions during this visit.            Lavon Hogna MD  11/11/23 0894

## 2024-01-04 ENCOUNTER — TRANSCRIBE ORDERS (OUTPATIENT)
Dept: ADMINISTRATIVE | Facility: HOSPITAL | Age: 79
End: 2024-01-04
Payer: MEDICARE

## 2024-01-04 DIAGNOSIS — Z87.891 FORMER SMOKER: ICD-10-CM

## 2024-01-04 DIAGNOSIS — Z87.891 STOPPED SMOKING WITH GREATER THAN 20 PACK YEAR HISTORY: ICD-10-CM

## 2024-01-04 DIAGNOSIS — R91.1 LUNG NODULE: ICD-10-CM

## 2024-01-04 DIAGNOSIS — Z85.118 HISTORY OF LUNG CANCER: ICD-10-CM

## 2024-01-04 DIAGNOSIS — R91.1 LUNG NODULE: Primary | ICD-10-CM

## 2024-04-03 ENCOUNTER — HOSPITAL ENCOUNTER (EMERGENCY)
Facility: HOSPITAL | Age: 79
Discharge: HOME | End: 2024-04-03
Attending: STUDENT IN AN ORGANIZED HEALTH CARE EDUCATION/TRAINING PROGRAM
Payer: MEDICARE

## 2024-04-03 ENCOUNTER — APPOINTMENT (OUTPATIENT)
Dept: GENERAL RADIOLOGY | Facility: HOSPITAL | Age: 79
End: 2024-04-03
Payer: MEDICARE

## 2024-04-03 ENCOUNTER — APPOINTMENT (OUTPATIENT)
Dept: CT IMAGING | Facility: HOSPITAL | Age: 79
End: 2024-04-03
Payer: MEDICARE

## 2024-04-03 VITALS
HEIGHT: 65 IN | SYSTOLIC BLOOD PRESSURE: 175 MMHG | WEIGHT: 190.04 LBS | HEART RATE: 71 BPM | BODY MASS INDEX: 31.66 KG/M2 | OXYGEN SATURATION: 96 % | DIASTOLIC BLOOD PRESSURE: 81 MMHG | RESPIRATION RATE: 16 BRPM | TEMPERATURE: 98.1 F
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
## 2024-04-03 PROCEDURE — 70450 CT HEAD/BRAIN W/O DYE: CPT | Performed by: RADIOLOGY

## 2024-04-03 PROCEDURE — 99284 EMERGENCY DEPT VISIT MOD MDM: CPT

## 2024-04-03 PROCEDURE — 70450 CT HEAD/BRAIN W/O DYE: CPT

## 2024-04-03 PROCEDURE — 71045 X-RAY EXAM CHEST 1 VIEW: CPT

## 2024-04-03 PROCEDURE — 73523 X-RAY EXAM HIPS BI 5/> VIEWS: CPT

## 2024-04-03 PROCEDURE — 71045 X-RAY EXAM CHEST 1 VIEW: CPT | Performed by: RADIOLOGY

## 2024-04-03 PROCEDURE — 73523 X-RAY EXAM HIPS BI 5/> VIEWS: CPT | Performed by: RADIOLOGY

## 2024-04-03 NOTE — ED PROVIDER NOTES
Subjective   History of Present Illness  78-year-old female with past medical history of Alzheimer's dementia, hypertension, hyperlipidemia presents to the ER after a fall at the nursing home.  Nursing of staff said it was an unwitnessed fall when the patient was ambulating through the nursing home.  Audible fall noted.  No obvious loss of conscious.  No obvious deformity.  Patient is confused at baseline.  Vital signs stable      Review of Systems   Unable to perform ROS: Dementia       Past Medical History:   Diagnosis Date   • Alzheimer disease    • Alzheimer's disease    • Anxiety    • Anxiety disorder, unspecified    • Cerebrovascular accident (CVA)    • Constipation    • Deafness    • Delirium due to known physiological condition    • Depression    • Difficulty in walking, not elsewhere classified    • Gastro-esophageal reflux disease with esophagitis    • GERD (gastroesophageal reflux disease)    • Hyperlipidemia    • Hyperlipidemia    • Hypertension    • Major depressive disorder, single episode    • Muscle weakness (generalized)    • Other lack of coordination    • Paranoid personality (disorder)    • Shared psychotic disorder    • Unspecified dementia without behavioral disturbance    • Unspecified hearing loss, unspecified ear    • Unspecified lack of coordination    • Unspecified psychosis not due to a substance or known physiological condition        No Known Allergies    No past surgical history on file.    Family History   Family history unknown: Yes       Social History     Socioeconomic History   • Marital status:    Tobacco Use   • Smoking status: Never   • Smokeless tobacco: Never   Vaping Use   • Vaping status: Never Used   Substance and Sexual Activity   • Alcohol use: No   • Drug use: No   • Sexual activity: Not Currently     Partners: Male           Objective   Physical Exam  Constitutional:       General: She is not in acute distress.     Appearance: Normal appearance. She is not  ill-appearing.   HENT:      Head: Normocephalic and atraumatic.      Right Ear: External ear normal.      Left Ear: External ear normal.      Nose: Nose normal.      Mouth/Throat:      Mouth: Mucous membranes are moist.   Eyes:      Extraocular Movements: Extraocular movements intact.      Pupils: Pupils are equal, round, and reactive to light.   Cardiovascular:      Rate and Rhythm: Normal rate and regular rhythm.      Heart sounds: No murmur heard.  Pulmonary:      Effort: Pulmonary effort is normal. No respiratory distress.      Breath sounds: Normal breath sounds. No wheezing.   Chest:      Chest wall: Tenderness present.       Abdominal:      General: Bowel sounds are normal.      Palpations: Abdomen is soft.      Tenderness: There is no abdominal tenderness.   Musculoskeletal:         General: No deformity or signs of injury. Normal range of motion.      Cervical back: Normal range of motion and neck supple.      Comments: Mild tenderness to palpation of the left greater trochanter.   Skin:     General: Skin is warm and dry.      Findings: No erythema.   Neurological:      General: No focal deficit present.      Mental Status: She is alert and oriented to person, place, and time. Mental status is at baseline.      Cranial Nerves: No cranial nerve deficit.   Psychiatric:         Mood and Affect: Mood normal.         Behavior: Behavior normal.         Thought Content: Thought content normal.         Procedures           ED Course  ED Course as of 04/03/24 0640   Wed Apr 03, 2024   0640 Care of this patient was transferred to the next attending physician at shift change.  Complete discussion of presentation, labs, imaging, care, and expected course occurred during transition of providers.  Vitals stable at transfer of care.    Electronically signed by Dung Johnson DO, 04/03/24, 6:40 AM EDT.   [SF]      ED Course User Index  [SF] Dung Johnson DO                                             Medical Decision  Making  Amount and/or Complexity of Data Reviewed  Radiology: ordered.        Final diagnoses:   None       ED Disposition  ED Disposition       None            No follow-up provider specified.       Medication List      No changes were made to your prescriptions during this visit.

## 2024-07-04 ENCOUNTER — LAB REQUISITION (OUTPATIENT)
Dept: LAB | Facility: HOSPITAL | Age: 79
End: 2024-07-04
Payer: MEDICARE

## 2024-07-04 DIAGNOSIS — R53.83 OTHER FATIGUE: ICD-10-CM

## 2024-07-04 DIAGNOSIS — R41.0 DISORIENTATION, UNSPECIFIED: ICD-10-CM

## 2024-07-04 LAB
ANION GAP SERPL CALCULATED.3IONS-SCNC: 8.7 MMOL/L (ref 5–15)
BACTERIA UR QL AUTO: ABNORMAL /HPF
BASOPHILS # BLD AUTO: 0.01 10*3/MM3 (ref 0–0.2)
BASOPHILS NFR BLD AUTO: 0.1 % (ref 0–1.5)
BILIRUB UR QL STRIP: NEGATIVE
BUN SERPL-MCNC: 20 MG/DL (ref 8–23)
BUN/CREAT SERPL: 20.2 (ref 7–25)
CALCIUM SPEC-SCNC: 10.4 MG/DL (ref 8.6–10.5)
CHLORIDE SERPL-SCNC: 106 MMOL/L (ref 98–107)
CLARITY UR: CLEAR
CO2 SERPL-SCNC: 27.3 MMOL/L (ref 22–29)
COLOR UR: YELLOW
CREAT SERPL-MCNC: 0.99 MG/DL (ref 0.57–1)
CRP SERPL-MCNC: <0.3 MG/DL (ref 0–0.5)
DEPRECATED RDW RBC AUTO: 42.5 FL (ref 37–54)
EGFRCR SERPLBLD CKD-EPI 2021: 57.8 ML/MIN/1.73
EOSINOPHIL # BLD AUTO: 1.49 10*3/MM3 (ref 0–0.4)
EOSINOPHIL NFR BLD AUTO: 20.4 % (ref 0.3–6.2)
ERYTHROCYTE [DISTWIDTH] IN BLOOD BY AUTOMATED COUNT: 12.9 % (ref 12.3–15.4)
ERYTHROCYTE [SEDIMENTATION RATE] IN BLOOD: 3 MM/HR (ref 0–30)
GLUCOSE SERPL-MCNC: 98 MG/DL (ref 65–99)
GLUCOSE UR STRIP-MCNC: NEGATIVE MG/DL
HCT VFR BLD AUTO: 39.3 % (ref 34–46.6)
HGB BLD-MCNC: 13.3 G/DL (ref 12–15.9)
HGB UR QL STRIP.AUTO: NEGATIVE
HYALINE CASTS UR QL AUTO: ABNORMAL /LPF
IMM GRANULOCYTES # BLD AUTO: 0.02 10*3/MM3 (ref 0–0.05)
IMM GRANULOCYTES NFR BLD AUTO: 0.3 % (ref 0–0.5)
KETONES UR QL STRIP: NEGATIVE
LEUKOCYTE ESTERASE UR QL STRIP.AUTO: NEGATIVE
LYMPHOCYTES # BLD AUTO: 1.98 10*3/MM3 (ref 0.7–3.1)
LYMPHOCYTES NFR BLD AUTO: 27.1 % (ref 19.6–45.3)
MCH RBC QN AUTO: 30.5 PG (ref 26.6–33)
MCHC RBC AUTO-ENTMCNC: 33.8 G/DL (ref 31.5–35.7)
MCV RBC AUTO: 90.1 FL (ref 79–97)
MONOCYTES # BLD AUTO: 0.54 10*3/MM3 (ref 0.1–0.9)
MONOCYTES NFR BLD AUTO: 7.4 % (ref 5–12)
NEUTROPHILS NFR BLD AUTO: 3.27 10*3/MM3 (ref 1.7–7)
NEUTROPHILS NFR BLD AUTO: 44.7 % (ref 42.7–76)
NITRITE UR QL STRIP: POSITIVE
NRBC BLD AUTO-RTO: 0 /100 WBC (ref 0–0.2)
PH UR STRIP.AUTO: 6.5 [PH] (ref 5–8)
PLATELET # BLD AUTO: 183 10*3/MM3 (ref 140–450)
PMV BLD AUTO: 10.3 FL (ref 6–12)
POTASSIUM SERPL-SCNC: 4.3 MMOL/L (ref 3.5–5.2)
PROT UR QL STRIP: NEGATIVE
RBC # BLD AUTO: 4.36 10*6/MM3 (ref 3.77–5.28)
RBC # UR STRIP: ABNORMAL /HPF
REF LAB TEST METHOD: ABNORMAL
SODIUM SERPL-SCNC: 142 MMOL/L (ref 136–145)
SP GR UR STRIP: 1.01 (ref 1–1.03)
SQUAMOUS #/AREA URNS HPF: ABNORMAL /HPF
UROBILINOGEN UR QL STRIP: ABNORMAL
WBC # UR STRIP: ABNORMAL /HPF
WBC NRBC COR # BLD AUTO: 7.31 10*3/MM3 (ref 3.4–10.8)

## 2024-07-04 PROCEDURE — 87186 SC STD MICRODIL/AGAR DIL: CPT | Performed by: INTERNAL MEDICINE

## 2024-07-04 PROCEDURE — 85025 COMPLETE CBC W/AUTO DIFF WBC: CPT | Performed by: INTERNAL MEDICINE

## 2024-07-04 PROCEDURE — 80048 BASIC METABOLIC PNL TOTAL CA: CPT | Performed by: INTERNAL MEDICINE

## 2024-07-04 PROCEDURE — 86140 C-REACTIVE PROTEIN: CPT | Performed by: INTERNAL MEDICINE

## 2024-07-04 PROCEDURE — 87086 URINE CULTURE/COLONY COUNT: CPT | Performed by: INTERNAL MEDICINE

## 2024-07-04 PROCEDURE — 85652 RBC SED RATE AUTOMATED: CPT | Performed by: INTERNAL MEDICINE

## 2024-07-04 PROCEDURE — 81001 URINALYSIS AUTO W/SCOPE: CPT | Performed by: INTERNAL MEDICINE

## 2024-07-09 LAB — BACTERIA SPEC AEROBE CULT: ABNORMAL

## 2025-02-14 ENCOUNTER — APPOINTMENT (OUTPATIENT)
Dept: CT IMAGING | Facility: HOSPITAL | Age: 80
End: 2025-02-14
Payer: MEDICARE

## 2025-02-14 ENCOUNTER — APPOINTMENT (OUTPATIENT)
Dept: GENERAL RADIOLOGY | Facility: HOSPITAL | Age: 80
End: 2025-02-14
Payer: MEDICARE

## 2025-02-14 ENCOUNTER — HOSPITAL ENCOUNTER (EMERGENCY)
Facility: HOSPITAL | Age: 80
Discharge: HOME | End: 2025-02-14
Attending: STUDENT IN AN ORGANIZED HEALTH CARE EDUCATION/TRAINING PROGRAM
Payer: MEDICARE

## 2025-02-14 VITALS
HEIGHT: 65 IN | RESPIRATION RATE: 18 BRPM | WEIGHT: 174.2 LBS | SYSTOLIC BLOOD PRESSURE: 168 MMHG | BODY MASS INDEX: 29.02 KG/M2 | TEMPERATURE: 98.5 F | HEART RATE: 70 BPM | OXYGEN SATURATION: 95 % | DIASTOLIC BLOOD PRESSURE: 80 MMHG

## 2025-02-14 DIAGNOSIS — S76.011A HIP STRAIN, RIGHT, INITIAL ENCOUNTER: ICD-10-CM

## 2025-02-14 DIAGNOSIS — S09.90XA INJURY OF HEAD, INITIAL ENCOUNTER: Primary | ICD-10-CM

## 2025-02-14 PROCEDURE — 72125 CT NECK SPINE W/O DYE: CPT | Performed by: RADIOLOGY

## 2025-02-14 PROCEDURE — 72125 CT NECK SPINE W/O DYE: CPT

## 2025-02-14 PROCEDURE — 70450 CT HEAD/BRAIN W/O DYE: CPT | Performed by: RADIOLOGY

## 2025-02-14 PROCEDURE — 70450 CT HEAD/BRAIN W/O DYE: CPT

## 2025-02-14 PROCEDURE — 73523 X-RAY EXAM HIPS BI 5/> VIEWS: CPT

## 2025-02-14 PROCEDURE — 99284 EMERGENCY DEPT VISIT MOD MDM: CPT

## 2025-02-14 PROCEDURE — 73523 X-RAY EXAM HIPS BI 5/> VIEWS: CPT | Performed by: RADIOLOGY

## 2025-02-14 NOTE — ED PROVIDER NOTES
Subjective   History of Present Illness  This is an 79 year old female patient who presents to the ER with chief complaint of fall. History obtained from chart, NH staff and EMS staff as patient is non-verbal at baseline. PMH significant for HLD, CAD anticoagulated on plavix, seizure disorder, Alzheimer disease, HTN, CVA. The patient apparently at a fall earlier today at the nursing home and was acting like her hips hurt. No known syncope and LOC.       Review of Systems   Unable to perform ROS: Patient nonverbal       Past Medical History:   Diagnosis Date    Alzheimer disease     Alzheimer's disease     Anxiety     Anxiety disorder, unspecified     Cerebrovascular accident (CVA)     Constipation     Deafness     Delirium due to known physiological condition     Depression     Difficulty in walking, not elsewhere classified     Gastro-esophageal reflux disease with esophagitis     GERD (gastroesophageal reflux disease)     Hyperlipidemia     Hyperlipidemia     Hypertension     Major depressive disorder, single episode     Muscle weakness (generalized)     Other lack of coordination     Paranoid personality (disorder)     Shared psychotic disorder     Unspecified dementia without behavioral disturbance     Unspecified hearing loss, unspecified ear     Unspecified lack of coordination     Unspecified psychosis not due to a substance or known physiological condition        No Known Allergies    No past surgical history on file.    Family History   Family history unknown: Yes       Social History     Socioeconomic History    Marital status:    Tobacco Use    Smoking status: Never    Smokeless tobacco: Never   Vaping Use    Vaping status: Never Used   Substance and Sexual Activity    Alcohol use: No    Drug use: No    Sexual activity: Not Currently     Partners: Male           Objective   Physical Exam  Vitals and nursing note reviewed.   Constitutional:       General: She is not in acute distress.      Appearance: She is well-developed. She is not diaphoretic.   HENT:      Head: Normocephalic.      Right Ear: External ear normal.      Left Ear: External ear normal.      Nose: Nose normal.   Eyes:      Conjunctiva/sclera: Conjunctivae normal.      Pupils: Pupils are equal, round, and reactive to light.   Neck:      Vascular: No JVD.      Trachea: No tracheal deviation.   Cardiovascular:      Rate and Rhythm: Normal rate and regular rhythm.      Heart sounds: Normal heart sounds. No murmur heard.  Pulmonary:      Effort: Pulmonary effort is normal. No respiratory distress.      Breath sounds: Normal breath sounds. No wheezing.   Abdominal:      General: Bowel sounds are normal.      Palpations: Abdomen is soft.      Tenderness: There is no abdominal tenderness.   Musculoskeletal:         General: No deformity. Normal range of motion.      Cervical back: Normal range of motion and neck supple.   Skin:     General: Skin is warm and dry.      Coloration: Skin is not pale.      Findings: No erythema or rash.   Neurological:      Mental Status: She is alert and oriented to person, place, and time.      Cranial Nerves: No cranial nerve deficit.   Psychiatric:         Behavior: Behavior normal.         Thought Content: Thought content normal.         Procedures           ED Course  ED Course as of 02/14/25 1133   Fri Feb 14, 2025   1101 RISHI Lyle, called the NH staff to get a better history of what happened. Apparently, she was trying to squat over the garbage can and have a BM when she lost her balance and fell.  [MM]   1111 CT Head Without Contrast  IMPRESSION:     1. No parenchymal mass, hemorrhage, or midline shift  2. Atrophy and chronic microangiopathic change     This report was finalized on 2/14/2025 11:00 AM by Dr. Manuel Holden MD   [MM]   1112 CT Cervical Spine Without Contrast  IMPRESSION:     1. No acute bony abnormality.     2. Other incidental findings as above     This report was finalized on 2/14/2025  11:01 AM by Dr. Manuel Holden MD   [MM]   1125 XR Hips Bilateral With or Without Pelvis 5 View  IMPRESSION:    No acute findings in the bilateral hips.     This report was finalized on 2/14/2025 11:16 AM by Dr. Manuel Holden MD   [MM]   1130 Patient has a normal work up. Diagnosed with right hip strain, head injury. Will f/u with PCP in 2 days or return to ER if symptoms worsen.  [MM]      ED Course User Index  [MM] Erica Cordero PA                                                       Medical Decision Making    This is an 79 year old female patient who presents to the ER with chief complaint of fall. History obtained from chart, NH staff and EMS staff as patient is non-verbal at baseline. PMH significant for HLD, CAD anticoagulated on plavix, seizure disorder, Alzheimer disease, HTN, CVA. The patient apparently at a fall earlier today at the nursing home and was acting like her hips hurt. No known syncope and LOC.       Problems Addressed:  Hip strain, right, initial encounter: complicated acute illness or injury  Injury of head, initial encounter: complicated acute illness or injury    Amount and/or Complexity of Data Reviewed  Radiology: ordered. Decision-making details documented in ED Course.        Final diagnoses:   Injury of head, initial encounter   Hip strain, right, initial encounter       ED Disposition  ED Disposition       ED Disposition   Discharge    Condition   Stable    Comment   --               Ricky [place]amo [place]  [address] [id number] [phone]    In 2 days           Medication List      No changes were made to your prescriptions during this visit.            Erica Cordero PA  02/14/25 1132

## 2025-02-14 NOTE — DISCHARGE INSTRUCTIONS
Not controlled  Will increase Lexapro to 20mg daily  Please follow up with your PCP in 2 days or return to ER if symptoms worsen.

## 2025-02-14 NOTE — ED NOTES
On phone with one of the social workers at [facility] at this time who states that an aid stated that patient was squatting to use the restroom in a trash can when she fell and reported hip pain. Informed that EMS states that patient is nonverbal. I was informed by staff at [facility] that patient is not nonverbal and that she just speaks very softly.

## 2025-05-20 ENCOUNTER — APPOINTMENT (OUTPATIENT)
Dept: GENERAL RADIOLOGY | Facility: HOSPITAL | Age: 80
End: 2025-05-20
Payer: MEDICARE

## 2025-05-20 ENCOUNTER — HOSPITAL ENCOUNTER (EMERGENCY)
Facility: HOSPITAL | Age: 80
Discharge: NURSING, REHAB OR LONG-TERM CARE | End: 2025-05-20
Attending: STUDENT IN AN ORGANIZED HEALTH CARE EDUCATION/TRAINING PROGRAM | Admitting: STUDENT IN AN ORGANIZED HEALTH CARE EDUCATION/TRAINING PROGRAM
Payer: MEDICARE

## 2025-05-20 ENCOUNTER — APPOINTMENT (OUTPATIENT)
Dept: CT IMAGING | Facility: HOSPITAL | Age: 80
End: 2025-05-20
Payer: MEDICARE

## 2025-05-20 VITALS
DIASTOLIC BLOOD PRESSURE: 78 MMHG | RESPIRATION RATE: 16 BRPM | TEMPERATURE: 98.1 F | BODY MASS INDEX: 29.22 KG/M2 | HEART RATE: 67 BPM | WEIGHT: 175.4 LBS | OXYGEN SATURATION: 97 % | SYSTOLIC BLOOD PRESSURE: 144 MMHG | HEIGHT: 65 IN

## 2025-05-20 DIAGNOSIS — S00.83XA CONTUSION OF FACE, INITIAL ENCOUNTER: ICD-10-CM
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
## 2025-05-20 PROCEDURE — 70486 CT MAXILLOFACIAL W/O DYE: CPT | Performed by: RADIOLOGY

## 2025-05-20 PROCEDURE — 72125 CT NECK SPINE W/O DYE: CPT

## 2025-05-20 PROCEDURE — 73562 X-RAY EXAM OF KNEE 3: CPT | Performed by: RADIOLOGY

## 2025-05-20 PROCEDURE — 73523 X-RAY EXAM HIPS BI 5/> VIEWS: CPT

## 2025-05-20 PROCEDURE — 73562 X-RAY EXAM OF KNEE 3: CPT

## 2025-05-20 PROCEDURE — 73523 X-RAY EXAM HIPS BI 5/> VIEWS: CPT | Performed by: RADIOLOGY

## 2025-05-20 PROCEDURE — 72125 CT NECK SPINE W/O DYE: CPT | Performed by: RADIOLOGY

## 2025-05-20 PROCEDURE — 70450 CT HEAD/BRAIN W/O DYE: CPT

## 2025-05-20 PROCEDURE — 99284 EMERGENCY DEPT VISIT MOD MDM: CPT

## 2025-05-20 PROCEDURE — 70450 CT HEAD/BRAIN W/O DYE: CPT | Performed by: RADIOLOGY

## 2025-05-20 PROCEDURE — 70486 CT MAXILLOFACIAL W/O DYE: CPT

## 2025-05-20 NOTE — ED PROVIDER NOTES
Subjective   History of Present Illness  79-year-old female patient with a past medical history of Alzheimer's disease, history of a stroke, hyperlipidemia, hypertension, paranoid personality disorder, and GERD presents to the emergency room today from nursing home.  Patient was trying to get about the bed although cannot walk by herself and fell.  She fell face first hitting her head.  Denies any loss of consciousness.  Patient is complaining of facial pain, bilateral knee pain and neck pain.    History provided by:  Patient   used: No        Review of Systems   Constitutional: Negative.    HENT: Negative.     Eyes: Negative.    Respiratory: Negative.     Cardiovascular: Negative.    Gastrointestinal: Negative.    Endocrine: Negative.    Genitourinary: Negative.    Musculoskeletal:  Positive for neck pain.   Skin: Negative.    Allergic/Immunologic: Negative.    Neurological: Negative.    Hematological: Negative.    Psychiatric/Behavioral: Negative.     All other systems reviewed and are negative.      Past Medical History:   Diagnosis Date    Alzheimer disease     Alzheimer's disease     Anxiety     Anxiety disorder, unspecified     Cerebrovascular accident (CVA)     Constipation     Deafness     Delirium due to known physiological condition     Depression     Difficulty in walking, not elsewhere classified     Gastro-esophageal reflux disease with esophagitis     GERD (gastroesophageal reflux disease)     Hyperlipidemia     Hyperlipidemia     Hypertension     Major depressive disorder, single episode     Muscle weakness (generalized)     Other lack of coordination     Paranoid personality (disorder)     Shared psychotic disorder     Unspecified dementia without behavioral disturbance     Unspecified hearing loss, unspecified ear     Unspecified lack of coordination     Unspecified psychosis not due to a substance or known physiological condition        No Known Allergies    No past surgical  history on file.    Family History   Family history unknown: Yes       Social History     Socioeconomic History    Marital status:    Tobacco Use    Smoking status: Never    Smokeless tobacco: Never   Vaping Use    Vaping status: Never Used   Substance and Sexual Activity    Alcohol use: No    Drug use: No    Sexual activity: Not Currently     Partners: Male           Objective   Physical Exam  Vitals and nursing note reviewed.   Constitutional:       Appearance: Normal appearance. She is normal weight.   HENT:      Head: Normocephalic and atraumatic.      Right Ear: External ear normal.      Left Ear: External ear normal.      Nose: Nose normal.        Mouth/Throat:      Mouth: Mucous membranes are moist.      Pharynx: Oropharynx is clear.   Eyes:      Extraocular Movements: Extraocular movements intact.      Conjunctiva/sclera: Conjunctivae normal.      Pupils: Pupils are equal, round, and reactive to light.   Cardiovascular:      Rate and Rhythm: Normal rate and regular rhythm.      Pulses: Normal pulses.      Heart sounds: Normal heart sounds.   Pulmonary:      Effort: Pulmonary effort is normal.      Breath sounds: Normal breath sounds.   Abdominal:      General: Abdomen is flat. Bowel sounds are normal.      Palpations: Abdomen is soft.   Musculoskeletal:         General: Normal range of motion.      Cervical back: Normal range of motion and neck supple.   Skin:     General: Skin is warm and dry.      Capillary Refill: Capillary refill takes less than 2 seconds.   Neurological:      General: No focal deficit present.      Mental Status: She is alert and oriented to person, place, and time. Mental status is at baseline.      Comments: Patient is hard of hearing.  She is neurovascularly intact.  She is at her baseline.   Psychiatric:         Mood and Affect: Mood normal.         Behavior: Behavior normal.         Thought Content: Thought content normal.         Judgment: Judgment normal.          Procedures           ED Course  ED Course as of 05/20/25 1411   Tue May 20, 2025   1113 CT Head Without Contrast [ML]   1126 CT Cervical Spine Without Contrast [ML]   1126 CT Facial Bones Without Contrast [ML]   1126 XR Knee 3 View Bilateral [ML]      ED Course User Index  [ML] Mary Ontiveros LIZ Boateng                                           XR Hips Bilateral With or Without Pelvis 5 View  Result Date: 5/20/2025    No acute findings in the bilateral hips.  This report was finalized on 5/20/2025 12:22 PM by Dr. Teo Araujo MD.      CT Cervical Spine Without Contrast  Result Date: 5/20/2025    Degenerative changes cervical spine as described.  This report was finalized on 5/20/2025 11:15 AM by Dr. Teo Araujo MD.      CT Facial Bones Without Contrast  Result Date: 5/20/2025    No acute findings in the face.  This report was finalized on 5/20/2025 11:15 AM by Dr. Teo Araujo MD.      XR Knee 3 View Bilateral  Result Date: 5/20/2025    No acute findings in the bilateral knees.  This report was finalized on 5/20/2025 11:14 AM by Dr. Teo Araujo MD.      CT Head Without Contrast  Result Date: 5/20/2025    Unremarkable exam demonstrating no CT evidence of acute intracranial findings.  This report was finalized on 5/20/2025 11:08 AM by Dr. Teo Araujo MD.                  Medical Decision Making  79-year-old female patient with a past medical history of Alzheimer's disease, history of a stroke, hyperlipidemia, hypertension, paranoid personality disorder, and GERD presents to the emergency room today from nursing home.  Patient was trying to get about the bed although cannot walk by herself and fell.  She fell face first hitting her head.  Denies any loss of consciousness.  Patient is complaining of facial pain, bilateral knee pain and neck pain.  ED stay has been uncomplicated and uneventful.  Imaging is negative for any acute findings.  Patient will follow-up with primary care.  Discussed symptoms and  red flags that would warrant return to the emergency room.  She will be discharged back to the nursing home.    Problems Addressed:  Contusion of face, initial encounter: complicated acute illness or injury  Fall, initial encounter: complicated acute illness or injury    Amount and/or Complexity of Data Reviewed  Radiology: ordered. Decision-making details documented in ED Course.        Final diagnoses:   Fall, initial encounter   Contusion of face, initial encounter       ED Disposition  ED Disposition       ED Disposition   Discharge    Condition   Stable    Comment   --               Ricky Alamo MD  [address] [id number] [phone]    Schedule an appointment as soon as possible for a visit in 1 day           Medication List      No changes were made to your prescriptions during this visit.            Mary Ontiveros PA  05/20/25 8602

## 2025-05-29 ENCOUNTER — APPOINTMENT (OUTPATIENT)
Dept: GENERAL RADIOLOGY | Facility: HOSPITAL | Age: 80
End: 2025-05-29
Payer: MEDICARE

## 2025-05-29 ENCOUNTER — HOSPITAL ENCOUNTER (EMERGENCY)
Facility: HOSPITAL | Age: 80
Discharge: HOME | End: 2025-05-29
Attending: EMERGENCY MEDICINE
Payer: MEDICARE

## 2025-05-29 VITALS
RESPIRATION RATE: 18 BRPM | HEART RATE: 65 BPM | HEIGHT: 68 IN | SYSTOLIC BLOOD PRESSURE: 165 MMHG | OXYGEN SATURATION: 92 % | TEMPERATURE: 97.4 F | WEIGHT: 175 LBS | DIASTOLIC BLOOD PRESSURE: 84 MMHG | BODY MASS INDEX: 26.52 KG/M2

## 2025-05-29 DIAGNOSIS — S92.352A DISPLACED FRACTURE OF FIFTH METATARSAL BONE, LEFT FOOT, INITIAL ENCOUNTER FOR CLOSED FRACTURE: Primary | ICD-10-CM

## 2025-05-29 DIAGNOSIS — A49.9 UTI (URINARY TRACT INFECTION), BACTERIAL: ICD-10-CM

## 2025-05-29 DIAGNOSIS — N39.0 UTI (URINARY TRACT INFECTION), BACTERIAL: ICD-10-CM

## 2025-05-29 LAB
BACTERIA UR QL AUTO: ABNORMAL /HPF
BILIRUB UR QL STRIP: NEGATIVE
CLARITY UR: ABNORMAL
COLOR UR: YELLOW
GLUCOSE UR STRIP-MCNC: NEGATIVE MG/DL
HGB UR QL STRIP.AUTO: NEGATIVE
HYALINE CASTS UR QL AUTO: ABNORMAL /LPF
KETONES UR QL STRIP: NEGATIVE
LEUKOCYTE ESTERASE UR QL STRIP.AUTO: ABNORMAL
NITRITE UR QL STRIP: POSITIVE
PH UR STRIP.AUTO: 6.5 [PH] (ref 5–8)
PROT UR QL STRIP: NEGATIVE
RBC # UR STRIP: ABNORMAL /HPF
REF LAB TEST METHOD: ABNORMAL
SP GR UR STRIP: 1.01 (ref 1–1.03)
SQUAMOUS #/AREA URNS HPF: ABNORMAL /HPF
UROBILINOGEN UR QL STRIP: ABNORMAL
WBC # UR STRIP: ABNORMAL /HPF

## 2025-05-29 PROCEDURE — 73630 X-RAY EXAM OF FOOT: CPT

## 2025-05-29 PROCEDURE — 73590 X-RAY EXAM OF LOWER LEG: CPT | Performed by: RADIOLOGY

## 2025-05-29 PROCEDURE — 87186 SC STD MICRODIL/AGAR DIL: CPT | Performed by: EMERGENCY MEDICINE

## 2025-05-29 PROCEDURE — 73552 X-RAY EXAM OF FEMUR 2/>: CPT

## 2025-05-29 PROCEDURE — 72170 X-RAY EXAM OF PELVIS: CPT

## 2025-05-29 PROCEDURE — 99283 EMERGENCY DEPT VISIT LOW MDM: CPT

## 2025-05-29 PROCEDURE — P9612 CATHETERIZE FOR URINE SPEC: HCPCS

## 2025-05-29 PROCEDURE — 72170 X-RAY EXAM OF PELVIS: CPT | Performed by: RADIOLOGY

## 2025-05-29 PROCEDURE — 73630 X-RAY EXAM OF FOOT: CPT | Performed by: RADIOLOGY

## 2025-05-29 PROCEDURE — 73590 X-RAY EXAM OF LOWER LEG: CPT

## 2025-05-29 PROCEDURE — 81001 URINALYSIS AUTO W/SCOPE: CPT | Performed by: EMERGENCY MEDICINE

## 2025-05-29 PROCEDURE — 73552 X-RAY EXAM OF FEMUR 2/>: CPT | Performed by: RADIOLOGY

## 2025-05-29 PROCEDURE — 87086 URINE CULTURE/COLONY COUNT: CPT | Performed by: EMERGENCY MEDICINE

## 2025-05-29 RX ORDER — NITROFURANTOIN 25; 75 MG/1; MG/1
100 CAPSULE ORAL ONCE
Status: COMPLETED | OUTPATIENT
Start: 2025-05-29 | End: 2025-05-29

## 2025-05-29 RX ORDER — HYDROCODONE BITARTRATE AND ACETAMINOPHEN 5; 325 MG/1; MG/1
1 TABLET ORAL ONCE
Refills: 0 | Status: COMPLETED | OUTPATIENT
Start: 2025-05-29 | End: 2025-05-29

## 2025-05-29 RX ORDER — NITROFURANTOIN 25; 75 MG/1; MG/1
100 CAPSULE ORAL 2 TIMES DAILY
Qty: 13 CAPSULE | Refills: 0 | Status: SHIPPED | OUTPATIENT
Start: 2025-05-29 | End: 2025-06-05

## 2025-05-29 RX ORDER — HYDROCODONE BITARTRATE AND ACETAMINOPHEN 5; 325 MG/1; MG/1
1 TABLET ORAL EVERY 6 HOURS PRN
Qty: 12 TABLET | Refills: 0 | Status: SHIPPED | OUTPATIENT
Start: 2025-05-29

## 2025-05-29 RX ORDER — CLONIDINE HYDROCHLORIDE 0.1 MG/1
0.1 TABLET ORAL ONCE
Status: COMPLETED | OUTPATIENT
Start: 2025-05-29 | End: 2025-05-29

## 2025-05-29 RX ADMIN — CLONIDINE HYDROCHLORIDE 0.1 MG: 0.1 TABLET ORAL at 19:19

## 2025-05-29 RX ADMIN — NITROFURANTOIN (MONOHYDRATE/MACROCRYSTALS) 100 MG: 25; 75 CAPSULE ORAL at 20:30

## 2025-05-29 RX ADMIN — HYDROCODONE BITARTRATE AND ACETAMINOPHEN 1 TABLET: 5; 325 TABLET ORAL at 20:30

## 2025-05-29 NOTE — ED PROVIDER NOTES
Subjective   History of Present Illness  Patient is 79-year-old female sent for evaluation from a local nursing home.  They reported that she fell during a transfer.  They report that she fell on her buttocks and injured her left leg.  Patient makes eye contact with me, looks at me, does not converse, does not follow commands.  She appears very demented.      Review of Systems   Unable to perform ROS: Patient nonverbal       Past Medical History:   Diagnosis Date    Alzheimer disease     Alzheimer's disease     Anxiety     Anxiety disorder, unspecified     Cerebrovascular accident (CVA)     Constipation     Deafness     Delirium due to known physiological condition     Depression     Difficulty in walking, not elsewhere classified     Gastro-esophageal reflux disease with esophagitis     GERD (gastroesophageal reflux disease)     Hyperlipidemia     Hyperlipidemia     Hypertension     Major depressive disorder, single episode     Muscle weakness (generalized)     Other lack of coordination     Paranoid personality (disorder)     Shared psychotic disorder     Unspecified dementia without behavioral disturbance     Unspecified hearing loss, unspecified ear     Unspecified lack of coordination     Unspecified psychosis not due to a substance or known physiological condition        No Known Allergies    No past surgical history on file.    Family History   Family history unknown: Yes       Social History     Socioeconomic History    Marital status:    Tobacco Use    Smoking status: Never    Smokeless tobacco: Never   Vaping Use    Vaping status: Never Used   Substance and Sexual Activity    Alcohol use: No    Drug use: No    Sexual activity: Not Currently     Partners: Male           Objective   Physical Exam  Vitals and nursing note reviewed.   Constitutional:       General: She is not in acute distress.     Appearance: She is well-developed. She is not toxic-appearing or diaphoretic.      Comments: Elderly  female, awake and alert, does not appear to be in acute distress.  She makes eye contact with me, does not communicate, does not follow commands.   HENT:      Head: Normocephalic and atraumatic.   Eyes:      General: No scleral icterus.     Pupils: Pupils are equal, round, and reactive to light.   Neck:      Trachea: No tracheal deviation.   Cardiovascular:      Rate and Rhythm: Normal rate and regular rhythm.   Pulmonary:      Effort: Pulmonary effort is normal. No respiratory distress.      Breath sounds: Normal breath sounds.   Chest:      Chest wall: No tenderness.   Abdominal:      General: Bowel sounds are normal.      Palpations: Abdomen is soft.      Tenderness: There is no abdominal tenderness. There is no guarding or rebound.   Musculoskeletal:         General: No tenderness. Normal range of motion.      Cervical back: Normal range of motion and neck supple. No rigidity or tenderness.      Right lower leg: No edema.      Left lower leg: No edema.      Comments: Tenderness with purple bruising of the distal left mid to lateral forefoot area.  Mild tenderness in the distal tibial area.  There is no other musculoskeletal tenderness.   Skin:     General: Skin is warm and dry.      Capillary Refill: Capillary refill takes less than 2 seconds.      Coloration: Skin is not pale.   Neurological:      Mental Status: She is alert.      Motor: No abnormal muscle tone.      Comments: Grossly nonfocal   Psychiatric:         Mood and Affect: Mood normal.         Behavior: Behavior normal.         Procedures  XR Foot 3+ View Left   Final Result       Acute oblique fracture involving the mid and distal diaphysis of the   left fifth metatarsal bone with associated mild adjacent soft tissue   swelling along the dorsal and lateral aspect of the left foot adjacent   to the fracture.   Mild cortical step-off noted at the fracture site.   Fracture components are in near anatomic alignment.   No acute dislocation.   Mild  osteoarthritis at the left first MTP joint.   Mild osteoarthritis at the left hip joint, left knee, and left   tibiotalar joint   Intact left femur with no acute fracture or dislocation.   Intact left tibia and fibula with no acute fracture or dislocation.   Intact pelvis with no acute fracture or dislocation.       This report was finalized on 5/29/2025 7:31 PM by Leodan Khan MD.          XR Pelvis 1 or 2 View   Final Result       Acute oblique fracture involving the mid and distal diaphysis of the   left fifth metatarsal bone with associated mild adjacent soft tissue   swelling along the dorsal and lateral aspect of the left foot adjacent   to the fracture.   Mild cortical step-off noted at the fracture site.   Fracture components are in near anatomic alignment.   No acute dislocation.   Mild osteoarthritis at the left first MTP joint.   Mild osteoarthritis at the left hip joint, left knee, and left   tibiotalar joint   Intact left femur with no acute fracture or dislocation.   Intact left tibia and fibula with no acute fracture or dislocation.   Intact pelvis with no acute fracture or dislocation.       This report was finalized on 5/29/2025 7:31 PM by Leodan Khan MD.          XR Tibia Fibula 2 View Left   Final Result       Acute oblique fracture involving the mid and distal diaphysis of the   left fifth metatarsal bone with associated mild adjacent soft tissue   swelling along the dorsal and lateral aspect of the left foot adjacent   to the fracture.   Mild cortical step-off noted at the fracture site.   Fracture components are in near anatomic alignment.   No acute dislocation.   Mild osteoarthritis at the left first MTP joint.   Mild osteoarthritis at the left hip joint, left knee, and left   tibiotalar joint   Intact left femur with no acute fracture or dislocation.   Intact left tibia and fibula with no acute fracture or dislocation.   Intact pelvis with no acute fracture or dislocation.       This  report was finalized on 5/29/2025 7:31 PM by Leodan Khan MD.          XR Femur 2 View Left   Final Result       Acute oblique fracture involving the mid and distal diaphysis of the   left fifth metatarsal bone with associated mild adjacent soft tissue   swelling along the dorsal and lateral aspect of the left foot adjacent   to the fracture.   Mild cortical step-off noted at the fracture site.   Fracture components are in near anatomic alignment.   No acute dislocation.   Mild osteoarthritis at the left first MTP joint.   Mild osteoarthritis at the left hip joint, left knee, and left   tibiotalar joint   Intact left femur with no acute fracture or dislocation.   Intact left tibia and fibula with no acute fracture or dislocation.   Intact pelvis with no acute fracture or dislocation.       This report was finalized on 5/29/2025 7:31 PM by Leodan Khan MD.                     ED Course  ED Course as of 05/29/25 2026   Thu May 29, 2025   2019 Department stay has been uncomplicated.  Nursing noted some foul-smelling urine, sent a sample down for urinalysis with results as above.  I am adding a culture.  Will start the patient on Macrobid pending culture results [CM]      ED Course User Index  [CM] Panda Jones MD                                                       Medical Decision Making  Problems Addressed:  Displaced fracture of fifth metatarsal bone, left foot, initial encounter for closed fracture: complicated acute illness or injury  UTI (urinary tract infection), bacterial: complicated acute illness or injury    Amount and/or Complexity of Data Reviewed  Radiology: ordered.    Risk  Prescription drug management.        Final diagnoses:   Displaced fracture of fifth metatarsal bone, left foot, initial encounter for closed fracture   UTI (urinary tract infection), bacterial       ED Disposition  ED Disposition       ED Disposition   Discharge    Condition   Stable    Comment   --               Jasmeet  Ricky BARRAZA MD  [address] [facility] [address]  [facility] [address]  [phone]    Go in 3 days      [facility] EMERGENCY DEPARTMENT  [address] [facility] [address] [phone]  Go to   If symptoms worsen         Medication List        New Prescriptions      HYDROcodone-acetaminophen 5-325 MG per tablet  Commonly known as: NORCO  Take 1 tablet by mouth Every 6 (Six) Hours As Needed (Pain).     nitrofurantoin (macrocrystal-monohydrate) 100 MG capsule  Commonly known as: MACROBID  Take 1 capsule by mouth 2 (Two) Times a Day for 7 days.               Where to Get Your Medications        These medications were sent to [facility] - [place], [place] - [address] [place] [address] [phone] [place] [phone]   [address] [place] [address]      Phone: [phone]   HYDROcodone-acetaminophen 5-325 MG per tablet  nitrofurantoin (macrocrystal-monohydrate) 100 MG capsule       Please note that portions of this note were completed with a voice recognition program.        Panda Jones MD  05/29/25 2027

## 2025-05-30 NOTE — ED NOTES
Care handoff and report given to Monik at [facility] in preparation for patient's discharge back to facility.

## 2025-06-01 LAB — BACTERIA SPEC AEROBE CULT: ABNORMAL

## 2025-06-02 ENCOUNTER — TELEPHONE (OUTPATIENT)
Dept: ORTHOPEDIC SURGERY | Facility: CLINIC | Age: 80
End: 2025-06-02

## 2025-06-02 NOTE — TELEPHONE ENCOUNTER
Caller: CORBY ROBERTS    Relationship to patient: NURSING HOME    Best call back number: [phone]     Chief complaint: Closed fracture of fifth metatarsal bone of left foot, physeal involvement unspecified, initial encounter - PROG NOTE 5/30/25 - IMAGING XRAY 5/29/25 - SX NO KNOWN- NO INJURY     Type of visit: NEW PATIENT     Requested date: 6/6/25     If rescheduling, when is the original appointment: 7/7/25     Additional notes:NURSING HOME CALLING TO SCHEDULE THE PATIENT REQUESTED FIRST AVAILABLE- PATIENT SCHEDULED HOWEVER DUE TO DX SENDING TE TO SEE IF PATIENT CAN BE SEEN SOONER LIKE 6/6/25

## 2025-06-25 DIAGNOSIS — M79.672 FOOT PAIN, LEFT: Primary | ICD-10-CM

## 2025-07-11 ENCOUNTER — APPOINTMENT (OUTPATIENT)
Dept: CT IMAGING | Facility: HOSPITAL | Age: 80
End: 2025-07-11
Payer: MEDICARE

## 2025-07-11 ENCOUNTER — APPOINTMENT (OUTPATIENT)
Dept: CT IMAGING | Facility: HOSPITAL | Age: 80
DRG: 871 | End: 2025-07-11
Payer: MEDICARE

## 2025-07-11 ENCOUNTER — HOSPITAL ENCOUNTER (EMERGENCY)
Facility: HOSPITAL | Age: 80
Discharge: HOME | DRG: 871 | End: 2025-07-11
Attending: EMERGENCY MEDICINE
Payer: MEDICARE

## 2025-07-11 ENCOUNTER — HOSPITAL ENCOUNTER (INPATIENT)
Facility: HOSPITAL | Age: 80
LOS: 2 days | Discharge: NURSING, REHAB OR LONG-TERM CARE | End: 2025-07-15
Attending: EMERGENCY MEDICINE | Admitting: STUDENT IN AN ORGANIZED HEALTH CARE EDUCATION/TRAINING PROGRAM
Payer: MEDICARE

## 2025-07-11 VITALS
TEMPERATURE: 98.8 F | OXYGEN SATURATION: 98 % | HEIGHT: 68 IN | BODY MASS INDEX: 26.53 KG/M2 | DIASTOLIC BLOOD PRESSURE: 76 MMHG | SYSTOLIC BLOOD PRESSURE: 156 MMHG | RESPIRATION RATE: 15 BRPM | HEART RATE: 72 BPM | WEIGHT: 175.04 LBS

## 2025-07-11 DIAGNOSIS — N39.0 BACTERIAL URINARY INFECTION: Primary | ICD-10-CM

## 2025-07-11 DIAGNOSIS — N39.0 ACUTE LOWER UTI: Primary | ICD-10-CM

## 2025-07-11 DIAGNOSIS — A49.9 BACTERIAL URINARY INFECTION: Primary | ICD-10-CM

## 2025-07-11 LAB
ALBUMIN SERPL-MCNC: 3.8 G/DL (ref 3.5–5.2)
ALBUMIN SERPL-MCNC: 4.1 G/DL (ref 3.5–5.2)
ALBUMIN/GLOB SERPL: 1.5 G/DL
ALBUMIN/GLOB SERPL: 1.6 G/DL
ALP SERPL-CCNC: 102 U/L (ref 39–117)
ALP SERPL-CCNC: 96 U/L (ref 39–117)
ALT SERPL W P-5'-P-CCNC: 11 U/L (ref 1–33)
ALT SERPL W P-5'-P-CCNC: 8 U/L (ref 1–33)
AMMONIA BLD-SCNC: 21 UMOL/L (ref 11–51)
AMPHET+METHAMPHET UR QL: NEGATIVE
AMPHETAMINES UR QL: NEGATIVE
ANION GAP SERPL CALCULATED.3IONS-SCNC: 12.9 MMOL/L (ref 5–15)
ANION GAP SERPL CALCULATED.3IONS-SCNC: 13 MMOL/L (ref 5–15)
APTT PPP: 27.9 SECONDS (ref 24.5–35.9)
AST SERPL-CCNC: 17 U/L (ref 1–32)
AST SERPL-CCNC: 24 U/L (ref 1–32)
B PARAPERT DNA SPEC QL NAA+PROBE: NOT DETECTED
B PERT DNA SPEC QL NAA+PROBE: NOT DETECTED
BACTERIA UR QL AUTO: ABNORMAL /HPF
BACTERIA UR QL AUTO: ABNORMAL /HPF
BARBITURATES UR QL SCN: NEGATIVE
BASOPHILS # BLD AUTO: 0 10*3/MM3 (ref 0–0.2)
BASOPHILS # BLD AUTO: 0.01 10*3/MM3 (ref 0–0.2)
BASOPHILS NFR BLD AUTO: 0 % (ref 0–1.5)
BASOPHILS NFR BLD AUTO: 0.1 % (ref 0–1.5)
BENZODIAZ UR QL SCN: NEGATIVE
BILIRUB SERPL-MCNC: 0.5 MG/DL (ref 0–1.2)
BILIRUB SERPL-MCNC: 0.5 MG/DL (ref 0–1.2)
BILIRUB UR QL STRIP: NEGATIVE
BILIRUB UR QL STRIP: NEGATIVE
BUN SERPL-MCNC: 20.4 MG/DL (ref 8–23)
BUN SERPL-MCNC: 22.4 MG/DL (ref 8–23)
BUN/CREAT SERPL: 20.6 (ref 7–25)
BUN/CREAT SERPL: 26 (ref 7–25)
BUPRENORPHINE SERPL-MCNC: NEGATIVE NG/ML
C PNEUM DNA NPH QL NAA+NON-PROBE: NOT DETECTED
CALCIUM SPEC-SCNC: 10.1 MG/DL (ref 8.6–10.5)
CALCIUM SPEC-SCNC: 9.8 MG/DL (ref 8.6–10.5)
CANNABINOIDS SERPL QL: NEGATIVE
CHLORIDE SERPL-SCNC: 101 MMOL/L (ref 98–107)
CHLORIDE SERPL-SCNC: 103 MMOL/L (ref 98–107)
CK SERPL-CCNC: 52 U/L (ref 20–180)
CLARITY UR: ABNORMAL
CLARITY UR: ABNORMAL
CO2 SERPL-SCNC: 22.1 MMOL/L (ref 22–29)
CO2 SERPL-SCNC: 25 MMOL/L (ref 22–29)
COCAINE UR QL: NEGATIVE
COLOR UR: YELLOW
COLOR UR: YELLOW
CREAT SERPL-MCNC: 0.86 MG/DL (ref 0.57–1)
CREAT SERPL-MCNC: 0.99 MG/DL (ref 0.57–1)
CRP SERPL-MCNC: <0.3 MG/DL (ref 0–0.5)
D-LACTATE SERPL-SCNC: 2.6 MMOL/L (ref 0.5–2)
DEPRECATED RDW RBC AUTO: 40 FL (ref 37–54)
DEPRECATED RDW RBC AUTO: 42.1 FL (ref 37–54)
EGFRCR SERPLBLD CKD-EPI 2021: 57.4 ML/MIN/1.73
EGFRCR SERPLBLD CKD-EPI 2021: 68 ML/MIN/1.73
EOSINOPHIL # BLD AUTO: 0.02 10*3/MM3 (ref 0–0.4)
EOSINOPHIL # BLD AUTO: 0.2 10*3/MM3 (ref 0–0.4)
EOSINOPHIL NFR BLD AUTO: 0.2 % (ref 0.3–6.2)
EOSINOPHIL NFR BLD AUTO: 2.1 % (ref 0.3–6.2)
ERYTHROCYTE [DISTWIDTH] IN BLOOD BY AUTOMATED COUNT: 12.2 % (ref 12.3–15.4)
ERYTHROCYTE [DISTWIDTH] IN BLOOD BY AUTOMATED COUNT: 12.4 % (ref 12.3–15.4)
FENTANYL UR-MCNC: NEGATIVE NG/ML
FLUAV RNA RESP QL NAA+PROBE: NOT DETECTED
FLUAV SUBTYP SPEC NAA+PROBE: NOT DETECTED
FLUBV RNA NPH QL NAA+NON-PROBE: NOT DETECTED
FLUBV RNA RESP QL NAA+PROBE: NOT DETECTED
GLOBULIN UR ELPH-MCNC: 2.5 GM/DL
GLOBULIN UR ELPH-MCNC: 2.6 GM/DL
GLUCOSE SERPL-MCNC: 104 MG/DL (ref 65–99)
GLUCOSE SERPL-MCNC: 121 MG/DL (ref 65–99)
GLUCOSE UR STRIP-MCNC: NEGATIVE MG/DL
GLUCOSE UR STRIP-MCNC: NEGATIVE MG/DL
HADV DNA SPEC NAA+PROBE: NOT DETECTED
HCOV 229E RNA SPEC QL NAA+PROBE: NOT DETECTED
HCOV HKU1 RNA SPEC QL NAA+PROBE: NOT DETECTED
HCOV NL63 RNA SPEC QL NAA+PROBE: NOT DETECTED
HCOV OC43 RNA SPEC QL NAA+PROBE: NOT DETECTED
HCT VFR BLD AUTO: 41.3 % (ref 34–46.6)
HCT VFR BLD AUTO: 42 % (ref 34–46.6)
HGB BLD-MCNC: 13.5 G/DL (ref 12–15.9)
HGB BLD-MCNC: 14 G/DL (ref 12–15.9)
HGB UR QL STRIP.AUTO: NEGATIVE
HGB UR QL STRIP.AUTO: NEGATIVE
HMPV RNA NPH QL NAA+NON-PROBE: NOT DETECTED
HPIV1 RNA ISLT QL NAA+PROBE: NOT DETECTED
HPIV2 RNA SPEC QL NAA+PROBE: NOT DETECTED
HPIV3 RNA NPH QL NAA+PROBE: NOT DETECTED
HPIV4 P GENE NPH QL NAA+PROBE: NOT DETECTED
HYALINE CASTS UR QL AUTO: ABNORMAL /LPF
HYALINE CASTS UR QL AUTO: ABNORMAL /LPF
IMM GRANULOCYTES # BLD AUTO: 0.03 10*3/MM3 (ref 0–0.05)
IMM GRANULOCYTES # BLD AUTO: 0.04 10*3/MM3 (ref 0–0.05)
IMM GRANULOCYTES NFR BLD AUTO: 0.3 % (ref 0–0.5)
IMM GRANULOCYTES NFR BLD AUTO: 0.4 % (ref 0–0.5)
INR PPP: 1.04 (ref 0.9–1.1)
KETONES UR QL STRIP: ABNORMAL
KETONES UR QL STRIP: NEGATIVE
LEUKOCYTE ESTERASE UR QL STRIP.AUTO: ABNORMAL
LEUKOCYTE ESTERASE UR QL STRIP.AUTO: ABNORMAL
LYMPHOCYTES # BLD AUTO: 1.51 10*3/MM3 (ref 0.7–3.1)
LYMPHOCYTES # BLD AUTO: 2.28 10*3/MM3 (ref 0.7–3.1)
LYMPHOCYTES NFR BLD AUTO: 16.3 % (ref 19.6–45.3)
LYMPHOCYTES NFR BLD AUTO: 23.8 % (ref 19.6–45.3)
M PNEUMO IGG SER IA-ACNC: NOT DETECTED
MAGNESIUM SERPL-MCNC: 1.9 MG/DL (ref 1.6–2.4)
MCH RBC QN AUTO: 30 PG (ref 26.6–33)
MCH RBC QN AUTO: 30.1 PG (ref 26.6–33)
MCHC RBC AUTO-ENTMCNC: 32.7 G/DL (ref 31.5–35.7)
MCHC RBC AUTO-ENTMCNC: 33.3 G/DL (ref 31.5–35.7)
MCV RBC AUTO: 89.9 FL (ref 79–97)
MCV RBC AUTO: 92 FL (ref 79–97)
METHADONE UR QL SCN: NEGATIVE
MONOCYTES # BLD AUTO: 0.55 10*3/MM3 (ref 0.1–0.9)
MONOCYTES # BLD AUTO: 0.83 10*3/MM3 (ref 0.1–0.9)
MONOCYTES NFR BLD AUTO: 5.9 % (ref 5–12)
MONOCYTES NFR BLD AUTO: 8.6 % (ref 5–12)
NEUTROPHILS NFR BLD AUTO: 6.25 10*3/MM3 (ref 1.7–7)
NEUTROPHILS NFR BLD AUTO: 65.1 % (ref 42.7–76)
NEUTROPHILS NFR BLD AUTO: 7.15 10*3/MM3 (ref 1.7–7)
NEUTROPHILS NFR BLD AUTO: 77.2 % (ref 42.7–76)
NITRITE UR QL STRIP: POSITIVE
NITRITE UR QL STRIP: POSITIVE
NRBC BLD AUTO-RTO: 0 /100 WBC (ref 0–0.2)
NRBC BLD AUTO-RTO: 0 /100 WBC (ref 0–0.2)
NT-PROBNP SERPL-MCNC: 1119 PG/ML (ref 0–1800)
OPIATES UR QL: NEGATIVE
OXYCODONE UR QL SCN: NEGATIVE
PCP UR QL SCN: NEGATIVE
PH UR STRIP.AUTO: 5.5 [PH] (ref 5–8)
PH UR STRIP.AUTO: 6 [PH] (ref 5–8)
PLATELET # BLD AUTO: 198 10*3/MM3 (ref 140–450)
PLATELET # BLD AUTO: 223 10*3/MM3 (ref 140–450)
PMV BLD AUTO: 10.2 FL (ref 6–12)
PMV BLD AUTO: 10.3 FL (ref 6–12)
POTASSIUM SERPL-SCNC: 4.2 MMOL/L (ref 3.5–5.2)
POTASSIUM SERPL-SCNC: 4.5 MMOL/L (ref 3.5–5.2)
PROCALCITONIN SERPL-MCNC: 0.05 NG/ML (ref 0–0.25)
PROT SERPL-MCNC: 6.3 G/DL (ref 6–8.5)
PROT SERPL-MCNC: 6.7 G/DL (ref 6–8.5)
PROT UR QL STRIP: ABNORMAL
PROT UR QL STRIP: ABNORMAL
PROTHROMBIN TIME: 14.3 SECONDS (ref 12.5–15.2)
RBC # BLD AUTO: 4.49 10*6/MM3 (ref 3.77–5.28)
RBC # BLD AUTO: 4.67 10*6/MM3 (ref 3.77–5.28)
RBC # UR STRIP: ABNORMAL /HPF
RBC # UR STRIP: ABNORMAL /HPF
REF LAB TEST METHOD: ABNORMAL
REF LAB TEST METHOD: ABNORMAL
RHINOVIRUS RNA SPEC NAA+PROBE: NOT DETECTED
RSV RNA NPH QL NAA+NON-PROBE: NOT DETECTED
SARS-COV-2 RNA RESP QL NAA+PROBE: NOT DETECTED
SARS-COV-2 RNA RESP QL NAA+PROBE: NOT DETECTED
SODIUM SERPL-SCNC: 138 MMOL/L (ref 136–145)
SODIUM SERPL-SCNC: 139 MMOL/L (ref 136–145)
SP GR UR STRIP: 1.02 (ref 1–1.03)
SP GR UR STRIP: 1.02 (ref 1–1.03)
SQUAMOUS #/AREA URNS HPF: ABNORMAL /HPF
SQUAMOUS #/AREA URNS HPF: ABNORMAL /HPF
TRICYCLICS UR QL SCN: NEGATIVE
TSH SERPL DL<=0.05 MIU/L-ACNC: 0.93 UIU/ML (ref 0.27–4.2)
UROBILINOGEN UR QL STRIP: ABNORMAL
UROBILINOGEN UR QL STRIP: ABNORMAL
VALPROATE SERPL-MCNC: 7 MCG/ML (ref 50–125)
WBC # UR STRIP: ABNORMAL /HPF
WBC # UR STRIP: ABNORMAL /HPF
WBC NRBC COR # BLD AUTO: 9.27 10*3/MM3 (ref 3.4–10.8)
WBC NRBC COR # BLD AUTO: 9.6 10*3/MM3 (ref 3.4–10.8)

## 2025-07-11 PROCEDURE — 70450 CT HEAD/BRAIN W/O DYE: CPT

## 2025-07-11 PROCEDURE — 25010000002 MORPHINE PER 10 MG: Performed by: EMERGENCY MEDICINE

## 2025-07-11 PROCEDURE — 86140 C-REACTIVE PROTEIN: CPT | Performed by: EMERGENCY MEDICINE

## 2025-07-11 PROCEDURE — 96365 THER/PROPH/DIAG IV INF INIT: CPT

## 2025-07-11 PROCEDURE — 80053 COMPREHEN METABOLIC PANEL: CPT | Performed by: EMERGENCY MEDICINE

## 2025-07-11 PROCEDURE — 81001 URINALYSIS AUTO W/SCOPE: CPT | Performed by: EMERGENCY MEDICINE

## 2025-07-11 PROCEDURE — 84145 PROCALCITONIN (PCT): CPT | Performed by: EMERGENCY MEDICINE

## 2025-07-11 PROCEDURE — 25810000003 SODIUM CHLORIDE 0.9 % SOLUTION: Performed by: EMERGENCY MEDICINE

## 2025-07-11 PROCEDURE — 36415 COLL VENOUS BLD VENIPUNCTURE: CPT

## 2025-07-11 PROCEDURE — 85610 PROTHROMBIN TIME: CPT | Performed by: EMERGENCY MEDICINE

## 2025-07-11 PROCEDURE — 25010000002 CEFTRIAXONE PER 250 MG: Performed by: EMERGENCY MEDICINE

## 2025-07-11 PROCEDURE — 85025 COMPLETE CBC W/AUTO DIFF WBC: CPT | Performed by: EMERGENCY MEDICINE

## 2025-07-11 PROCEDURE — 87040 BLOOD CULTURE FOR BACTERIA: CPT | Performed by: EMERGENCY MEDICINE

## 2025-07-11 PROCEDURE — 99284 EMERGENCY DEPT VISIT MOD MDM: CPT

## 2025-07-11 PROCEDURE — 71250 CT THORAX DX C-: CPT

## 2025-07-11 PROCEDURE — 85730 THROMBOPLASTIN TIME PARTIAL: CPT | Performed by: EMERGENCY MEDICINE

## 2025-07-11 PROCEDURE — 82550 ASSAY OF CK (CPK): CPT | Performed by: EMERGENCY MEDICINE

## 2025-07-11 PROCEDURE — 99285 EMERGENCY DEPT VISIT HI MDM: CPT

## 2025-07-11 PROCEDURE — 74176 CT ABD & PELVIS W/O CONTRAST: CPT | Performed by: RADIOLOGY

## 2025-07-11 PROCEDURE — P9612 CATHETERIZE FOR URINE SPEC: HCPCS

## 2025-07-11 PROCEDURE — 71250 CT THORAX DX C-: CPT | Performed by: RADIOLOGY

## 2025-07-11 PROCEDURE — 0202U NFCT DS 22 TRGT SARS-COV-2: CPT | Performed by: EMERGENCY MEDICINE

## 2025-07-11 PROCEDURE — 82140 ASSAY OF AMMONIA: CPT | Performed by: EMERGENCY MEDICINE

## 2025-07-11 PROCEDURE — 70450 CT HEAD/BRAIN W/O DYE: CPT | Performed by: RADIOLOGY

## 2025-07-11 PROCEDURE — 80307 DRUG TEST PRSMV CHEM ANLYZR: CPT | Performed by: EMERGENCY MEDICINE

## 2025-07-11 PROCEDURE — 83605 ASSAY OF LACTIC ACID: CPT | Performed by: EMERGENCY MEDICINE

## 2025-07-11 PROCEDURE — 87636 SARSCOV2 & INF A&B AMP PRB: CPT | Performed by: EMERGENCY MEDICINE

## 2025-07-11 PROCEDURE — 83880 ASSAY OF NATRIURETIC PEPTIDE: CPT | Performed by: EMERGENCY MEDICINE

## 2025-07-11 PROCEDURE — 80164 ASSAY DIPROPYLACETIC ACD TOT: CPT | Performed by: EMERGENCY MEDICINE

## 2025-07-11 PROCEDURE — 74176 CT ABD & PELVIS W/O CONTRAST: CPT

## 2025-07-11 PROCEDURE — 83735 ASSAY OF MAGNESIUM: CPT | Performed by: EMERGENCY MEDICINE

## 2025-07-11 PROCEDURE — 84443 ASSAY THYROID STIM HORMONE: CPT | Performed by: EMERGENCY MEDICINE

## 2025-07-11 RX ORDER — SODIUM CHLORIDE 0.9 % (FLUSH) 0.9 %
10 SYRINGE (ML) INJECTION AS NEEDED
Status: DISCONTINUED | OUTPATIENT
Start: 2025-07-11 | End: 2025-07-11 | Stop reason: HOSPADM

## 2025-07-11 RX ORDER — MORPHINE SULFATE 2 MG/ML
2 INJECTION, SOLUTION INTRAMUSCULAR; INTRAVENOUS ONCE
Status: COMPLETED | OUTPATIENT
Start: 2025-07-11 | End: 2025-07-11

## 2025-07-11 RX ORDER — CEFDINIR 300 MG/1
300 CAPSULE ORAL DAILY
Qty: 8 CAPSULE | Refills: 0 | Status: SHIPPED | OUTPATIENT
Start: 2025-07-11 | End: 2025-07-15 | Stop reason: HOSPADM

## 2025-07-11 RX ADMIN — MORPHINE SULFATE 2 MG: 2 INJECTION, SOLUTION INTRAMUSCULAR; INTRAVENOUS at 22:22

## 2025-07-11 RX ADMIN — SODIUM CHLORIDE 500 ML: 9 INJECTION, SOLUTION INTRAVENOUS at 15:57

## 2025-07-11 RX ADMIN — CEFTRIAXONE SODIUM 1000 MG: 1 INJECTION, POWDER, FOR SOLUTION INTRAMUSCULAR; INTRAVENOUS at 15:55

## 2025-07-11 RX ADMIN — CEFTRIAXONE SODIUM 1000 MG: 1 INJECTION, POWDER, FOR SOLUTION INTRAMUSCULAR; INTRAVENOUS at 21:05

## 2025-07-11 NOTE — ED PROVIDER NOTES
Subjective   History of Present Illness  Presents to ER with altered mental status, low grade fever. Has history dementia and decreased hearing    Altered Mental Status  Presenting symptoms: behavior changes, confusion and memory loss    Severity:  Moderate  Timing:  Constant  Chronicity:  Chronic  Context: dementia, nursing home resident, recent illness and recent infection    Associated symptoms: abdominal pain, fever and weakness        Review of Systems   Constitutional:  Positive for activity change, fatigue and fever.   HENT: Negative.     Eyes: Negative.    Respiratory: Negative.     Cardiovascular: Negative.    Gastrointestinal:  Positive for abdominal pain.   Genitourinary:  Positive for decreased urine volume.   Musculoskeletal: Negative.    Allergic/Immunologic: Negative.    Neurological:  Positive for weakness.   Psychiatric/Behavioral:  Positive for confusion, decreased concentration and memory loss.        Past Medical History:   Diagnosis Date    Alzheimer disease     Alzheimer's disease     Anxiety     Anxiety disorder, unspecified     Cerebrovascular accident (CVA)     Constipation     Deafness     Delirium due to known physiological condition     Depression     Difficulty in walking, not elsewhere classified     Gastro-esophageal reflux disease with esophagitis     GERD (gastroesophageal reflux disease)     Hyperlipidemia     Hyperlipidemia     Hypertension     Major depressive disorder, single episode     Muscle weakness (generalized)     Other lack of coordination     Paranoid personality (disorder)     Shared psychotic disorder     Unspecified dementia without behavioral disturbance     Unspecified hearing loss, unspecified ear     Unspecified lack of coordination     Unspecified psychosis not due to a substance or known physiological condition        No Known Allergies    No past surgical history on file.    Family History   Family history unknown: Yes       Social History     Socioeconomic  History    Marital status:    Tobacco Use    Smoking status: Never    Smokeless tobacco: Never   Vaping Use    Vaping status: Never Used   Substance and Sexual Activity    Alcohol use: No    Drug use: No    Sexual activity: Not Currently     Partners: Male           Objective   Physical Exam  Vitals and nursing note reviewed.   Constitutional:       General: She is not in acute distress.     Appearance: She is not toxic-appearing.   HENT:      Head: Normocephalic.   Eyes:      Pupils: Pupils are equal, round, and reactive to light.   Cardiovascular:      Rate and Rhythm: Normal rate.   Pulmonary:      Effort: Pulmonary effort is normal.   Abdominal:      General: Bowel sounds are normal.      Palpations: Abdomen is soft.   Musculoskeletal:      Cervical back: Normal range of motion.   Skin:     General: Skin is dry.   Neurological:      Mental Status: She is lethargic and confused.   Psychiatric:         Cognition and Memory: Cognition is impaired.         Procedures           ED Course                                                       Medical Decision Making  Problems Addressed:  Acute lower UTI: complicated acute illness or injury    Amount and/or Complexity of Data Reviewed  Labs: ordered.  Radiology: ordered.    Risk  Prescription drug management.        Final diagnoses:   Acute lower UTI       ED Disposition  ED Disposition       ED Disposition   Discharge    Condition   Stable    Comment   --               Ricky Alamo MD  [address]  [place] [address]  [phone]    Schedule an appointment as soon as possible for a visit   If symptoms worsen         Medication List        New Prescriptions      cefdinir 300 MG capsule  Commonly known as: OMNICEF  Take 1 capsule by mouth Daily.               Where to Get Your Medications        These medications were sent to [facility] - [place], [place] - [address] [facility] - [phone] [place] [phone] FX  [address] [place], [place] [address]       Phone: [phone]   cefdinir 300 MG capsule            Kermit Morelos MD  07/11/25 4559     Sotyktu Counseling:  I discussed the most common side effects of Sotyktu including: common cold, sore throat, sinus infections, cold sores, canker sores, folliculitis, and acne.? I also discussed more serious side effects of Sotyktu including but not limited to: serious allergic reactions; increased risk for infections such as TB; cancers such as lymphomas; rhabdomyolysis and elevated CPK; and elevated triglycerides and liver enzymes.?

## 2025-07-12 PROBLEM — N39.0 URINARY TRACT INFECTION: Status: ACTIVE | Noted: 2025-07-12

## 2025-07-12 LAB
D-LACTATE SERPL-SCNC: 0.9 MMOL/L (ref 0.5–2)
D-LACTATE SERPL-SCNC: 2.4 MMOL/L (ref 0.5–2)
D-LACTATE SERPL-SCNC: 2.5 MMOL/L (ref 0.5–2)

## 2025-07-12 PROCEDURE — 36415 COLL VENOUS BLD VENIPUNCTURE: CPT | Performed by: EMERGENCY MEDICINE

## 2025-07-12 PROCEDURE — 92610 EVALUATE SWALLOWING FUNCTION: CPT

## 2025-07-12 PROCEDURE — 25810000003 LACTATED RINGERS PER 1000 ML: Performed by: STUDENT IN AN ORGANIZED HEALTH CARE EDUCATION/TRAINING PROGRAM

## 2025-07-12 PROCEDURE — 83605 ASSAY OF LACTIC ACID: CPT | Performed by: EMERGENCY MEDICINE

## 2025-07-12 PROCEDURE — 25010000002 CEFTRIAXONE PER 250 MG: Performed by: STUDENT IN AN ORGANIZED HEALTH CARE EDUCATION/TRAINING PROGRAM

## 2025-07-12 PROCEDURE — G0378 HOSPITAL OBSERVATION PER HR: HCPCS

## 2025-07-12 PROCEDURE — 25010000002 ENOXAPARIN PER 10 MG: Performed by: STUDENT IN AN ORGANIZED HEALTH CARE EDUCATION/TRAINING PROGRAM

## 2025-07-12 PROCEDURE — 99223 1ST HOSP IP/OBS HIGH 75: CPT | Performed by: STUDENT IN AN ORGANIZED HEALTH CARE EDUCATION/TRAINING PROGRAM

## 2025-07-12 RX ORDER — AMOXICILLIN 250 MG
2 CAPSULE ORAL 2 TIMES DAILY
Status: DISCONTINUED | OUTPATIENT
Start: 2025-07-12 | End: 2025-07-15 | Stop reason: HOSPADM

## 2025-07-12 RX ORDER — LIDOCAINE 4 G/G
1 PATCH TOPICAL EVERY 24 HOURS
COMMUNITY

## 2025-07-12 RX ORDER — SODIUM CHLORIDE, SODIUM LACTATE, POTASSIUM CHLORIDE, CALCIUM CHLORIDE 600; 310; 30; 20 MG/100ML; MG/100ML; MG/100ML; MG/100ML
100 INJECTION, SOLUTION INTRAVENOUS CONTINUOUS
Status: ACTIVE | OUTPATIENT
Start: 2025-07-12 | End: 2025-07-12

## 2025-07-12 RX ORDER — ATORVASTATIN CALCIUM 10 MG/1
10 TABLET, FILM COATED ORAL NIGHTLY
Status: CANCELLED | OUTPATIENT
Start: 2025-07-12

## 2025-07-12 RX ORDER — ASPIRIN 81 MG/1
81 TABLET ORAL DAILY
Status: DISCONTINUED | OUTPATIENT
Start: 2025-07-12 | End: 2025-07-15 | Stop reason: HOSPADM

## 2025-07-12 RX ORDER — LIDOCAINE 4 G/G
1 PATCH TOPICAL EVERY 24 HOURS
Status: DISCONTINUED | OUTPATIENT
Start: 2025-07-12 | End: 2025-07-15 | Stop reason: HOSPADM

## 2025-07-12 RX ORDER — FERROUS SULFATE 325(65) MG
325 TABLET ORAL DAILY
Status: DISCONTINUED | OUTPATIENT
Start: 2025-07-12 | End: 2025-07-15 | Stop reason: HOSPADM

## 2025-07-12 RX ORDER — GABAPENTIN 100 MG/1
100 CAPSULE ORAL NIGHTLY
Status: CANCELLED | OUTPATIENT
Start: 2025-07-12

## 2025-07-12 RX ORDER — ASPIRIN 81 MG/1
81 TABLET, CHEWABLE ORAL DAILY
Status: CANCELLED | OUTPATIENT
Start: 2025-07-12

## 2025-07-12 RX ORDER — OLANZAPINE 5 MG/1
2.5 TABLET, FILM COATED ORAL NIGHTLY
Status: CANCELLED | OUTPATIENT
Start: 2025-07-12

## 2025-07-12 RX ORDER — DIVALPROEX SODIUM 125 MG/1
125 CAPSULE, COATED PELLETS ORAL 2 TIMES DAILY
Status: CANCELLED | OUTPATIENT
Start: 2025-07-12

## 2025-07-12 RX ORDER — SODIUM CHLORIDE 0.9 % (FLUSH) 0.9 %
10 SYRINGE (ML) INJECTION EVERY 12 HOURS SCHEDULED
Status: DISCONTINUED | OUTPATIENT
Start: 2025-07-12 | End: 2025-07-15 | Stop reason: HOSPADM

## 2025-07-12 RX ORDER — CHOLECALCIFEROL (VITAMIN D3) 25 MCG
2000 TABLET ORAL DAILY
Status: CANCELLED | OUTPATIENT
Start: 2025-07-12

## 2025-07-12 RX ORDER — POLYETHYLENE GLYCOL 3350 17 G/17G
17 POWDER, FOR SOLUTION ORAL DAILY
Status: CANCELLED | OUTPATIENT
Start: 2025-07-12

## 2025-07-12 RX ORDER — CHOLECALCIFEROL (VITAMIN D3) 25 MCG
2000 TABLET ORAL DAILY
Status: ON HOLD | COMMUNITY
End: 2025-07-21

## 2025-07-12 RX ORDER — SODIUM CHLORIDE 9 MG/ML
40 INJECTION, SOLUTION INTRAVENOUS AS NEEDED
Status: DISCONTINUED | OUTPATIENT
Start: 2025-07-12 | End: 2025-07-15 | Stop reason: HOSPADM

## 2025-07-12 RX ORDER — CLONIDINE HYDROCHLORIDE 0.1 MG/1
0.1 TABLET ORAL EVERY 8 HOURS PRN
Status: CANCELLED | OUTPATIENT
Start: 2025-07-12

## 2025-07-12 RX ORDER — GABAPENTIN 100 MG/1
100 CAPSULE ORAL NIGHTLY
COMMUNITY

## 2025-07-12 RX ORDER — OLANZAPINE 2.5 MG/1
2.5 TABLET, FILM COATED ORAL NIGHTLY
Status: DISCONTINUED | OUTPATIENT
Start: 2025-07-12 | End: 2025-07-15 | Stop reason: HOSPADM

## 2025-07-12 RX ORDER — MEMANTINE HYDROCHLORIDE 5 MG/1
5 TABLET ORAL 2 TIMES DAILY
Status: DISCONTINUED | OUTPATIENT
Start: 2025-07-12 | End: 2025-07-15 | Stop reason: HOSPADM

## 2025-07-12 RX ORDER — ENOXAPARIN SODIUM 100 MG/ML
40 INJECTION SUBCUTANEOUS DAILY
Status: DISCONTINUED | OUTPATIENT
Start: 2025-07-12 | End: 2025-07-15 | Stop reason: HOSPADM

## 2025-07-12 RX ORDER — ATORVASTATIN CALCIUM 10 MG/1
10 TABLET, FILM COATED ORAL NIGHTLY
Status: DISCONTINUED | OUTPATIENT
Start: 2025-07-13 | End: 2025-07-15 | Stop reason: HOSPADM

## 2025-07-12 RX ORDER — DIVALPROEX SODIUM 125 MG/1
125 CAPSULE, COATED PELLETS ORAL 2 TIMES DAILY
Status: DISCONTINUED | OUTPATIENT
Start: 2025-07-12 | End: 2025-07-15 | Stop reason: HOSPADM

## 2025-07-12 RX ORDER — ACETAMINOPHEN 500 MG
500 TABLET ORAL EVERY 6 HOURS PRN
Status: CANCELLED | OUTPATIENT
Start: 2025-07-12

## 2025-07-12 RX ORDER — CLONIDINE HYDROCHLORIDE 0.1 MG/1
0.1 TABLET ORAL EVERY 8 HOURS PRN
COMMUNITY

## 2025-07-12 RX ORDER — HYDROCODONE BITARTRATE AND ACETAMINOPHEN 5; 325 MG/1; MG/1
1 TABLET ORAL EVERY 6 HOURS PRN
Refills: 0 | Status: CANCELLED | OUTPATIENT
Start: 2025-07-12

## 2025-07-12 RX ORDER — OLANZAPINE 2.5 MG/1
2.5 TABLET, FILM COATED ORAL NIGHTLY
COMMUNITY

## 2025-07-12 RX ORDER — POLYETHYLENE GLYCOL 3350 17 G/17G
17 POWDER, FOR SOLUTION ORAL DAILY
COMMUNITY

## 2025-07-12 RX ORDER — ERGOCALCIFEROL 1.25 MG/1
50000 CAPSULE, LIQUID FILLED ORAL WEEKLY
COMMUNITY

## 2025-07-12 RX ORDER — MEMANTINE HYDROCHLORIDE 5 MG/1
5 TABLET ORAL EVERY 12 HOURS SCHEDULED
Status: CANCELLED | OUTPATIENT
Start: 2025-07-12

## 2025-07-12 RX ORDER — SODIUM CHLORIDE 0.9 % (FLUSH) 0.9 %
10 SYRINGE (ML) INJECTION AS NEEDED
Status: DISCONTINUED | OUTPATIENT
Start: 2025-07-12 | End: 2025-07-15 | Stop reason: HOSPADM

## 2025-07-12 RX ORDER — DOCUSATE SODIUM 100 MG/1
100 CAPSULE, LIQUID FILLED ORAL DAILY
Status: DISCONTINUED | OUTPATIENT
Start: 2025-07-12 | End: 2025-07-15 | Stop reason: HOSPADM

## 2025-07-12 RX ORDER — SODIUM CHLORIDE, SODIUM LACTATE, POTASSIUM CHLORIDE, CALCIUM CHLORIDE 600; 310; 30; 20 MG/100ML; MG/100ML; MG/100ML; MG/100ML
100 INJECTION, SOLUTION INTRAVENOUS CONTINUOUS
Status: ACTIVE | OUTPATIENT
Start: 2025-07-12 | End: 2025-07-13

## 2025-07-12 RX ORDER — ACETAMINOPHEN 500 MG
500 TABLET ORAL EVERY 6 HOURS PRN
Status: DISCONTINUED | OUTPATIENT
Start: 2025-07-12 | End: 2025-07-15 | Stop reason: HOSPADM

## 2025-07-12 RX ORDER — HYDROCODONE BITARTRATE AND ACETAMINOPHEN 5; 325 MG/1; MG/1
1 TABLET ORAL EVERY 6 HOURS PRN
Refills: 0 | Status: DISCONTINUED | OUTPATIENT
Start: 2025-07-12 | End: 2025-07-15 | Stop reason: HOSPADM

## 2025-07-12 RX ORDER — DOCUSATE SODIUM 100 MG/1
100 CAPSULE, LIQUID FILLED ORAL DAILY
COMMUNITY

## 2025-07-12 RX ORDER — DIVALPROEX SODIUM 125 MG/1
125 CAPSULE, COATED PELLETS ORAL 2 TIMES DAILY
COMMUNITY

## 2025-07-12 RX ORDER — GABAPENTIN 100 MG/1
100 CAPSULE ORAL NIGHTLY
Status: DISCONTINUED | OUTPATIENT
Start: 2025-07-12 | End: 2025-07-15 | Stop reason: HOSPADM

## 2025-07-12 RX ORDER — LIDOCAINE 4 G/G
1 PATCH TOPICAL EVERY 24 HOURS
Status: CANCELLED | OUTPATIENT
Start: 2025-07-12

## 2025-07-12 RX ORDER — DOCUSATE SODIUM 100 MG/1
100 CAPSULE, LIQUID FILLED ORAL DAILY
Status: CANCELLED | OUTPATIENT
Start: 2025-07-12

## 2025-07-12 RX ORDER — CHOLECALCIFEROL (VITAMIN D3) 25 MCG
2000 TABLET ORAL DAILY
Status: DISCONTINUED | OUTPATIENT
Start: 2025-07-12 | End: 2025-07-15 | Stop reason: HOSPADM

## 2025-07-12 RX ORDER — POLYETHYLENE GLYCOL 3350 17 G/17G
17 POWDER, FOR SOLUTION ORAL DAILY
Status: DISCONTINUED | OUTPATIENT
Start: 2025-07-12 | End: 2025-07-15 | Stop reason: HOSPADM

## 2025-07-12 RX ORDER — CLONIDINE HYDROCHLORIDE 0.1 MG/1
0.1 TABLET ORAL EVERY 8 HOURS PRN
Status: DISCONTINUED | OUTPATIENT
Start: 2025-07-12 | End: 2025-07-12

## 2025-07-12 RX ORDER — SENNOSIDES 8.6 MG/1
2 TABLET ORAL 2 TIMES DAILY
Status: CANCELLED | OUTPATIENT
Start: 2025-07-12

## 2025-07-12 RX ORDER — FERROUS SULFATE 325(65) MG
325 TABLET ORAL DAILY
Status: CANCELLED | OUTPATIENT
Start: 2025-07-12

## 2025-07-12 RX ADMIN — ENOXAPARIN SODIUM 40 MG: 100 INJECTION SUBCUTANEOUS at 09:07

## 2025-07-12 RX ADMIN — SODIUM CHLORIDE, POTASSIUM CHLORIDE, SODIUM LACTATE AND CALCIUM CHLORIDE 100 ML/HR: 600; 310; 30; 20 INJECTION, SOLUTION INTRAVENOUS at 03:16

## 2025-07-12 RX ADMIN — SODIUM CHLORIDE, POTASSIUM CHLORIDE, SODIUM LACTATE AND CALCIUM CHLORIDE 100 ML/HR: 600; 310; 30; 20 INJECTION, SOLUTION INTRAVENOUS at 17:09

## 2025-07-12 RX ADMIN — Medication 10 ML: at 03:16

## 2025-07-12 RX ADMIN — CEFTRIAXONE SODIUM 1000 MG: 1 INJECTION, POWDER, FOR SOLUTION INTRAMUSCULAR; INTRAVENOUS at 09:08

## 2025-07-12 RX ADMIN — Medication 10 ML: at 09:07

## 2025-07-12 RX ADMIN — LIDOCAINE 1 PATCH: 4 PATCH TOPICAL at 12:56

## 2025-07-12 RX ADMIN — Medication 10 ML: at 20:35

## 2025-07-12 NOTE — H&P
Hospitalist History and Physical    Patient Identification:  Name: Catherine Snyder  Age/Sex: 79 y.o. female  :  1945  MRN: [id number]        Admit Date: 2025   Primary Care Physician: Ricky Alamo MD      Chief Complaint   Patient presents with    Altered Mental Status       History of Present Illness  Patient is a 79 y.o. female with PMHx of advanced Alzheimer's dementia with psychosis, deafness, prior CVA, hypertension, hyperlipidemia, GERD, prior ESBL UTI, who presented to the ED with altered mental status.  Per discussion with daughter, the patient was at the nursing home this afternoon and began to show evidence of of chills, rigors, nausea, and vomiting.  Patient was brought to the ED where she was treated with IV ceftriaxone and returned to the nursing home.  However approximately at 7 PM, the tremors returned and the patient was lethargic, therefore patient returned to the ED.  Of note, the daughter states the patient has a left-sided facial droop for the past 5 to 6 years.  Daughter states she has advanced dementia, and requires maximal assistance for all ADLs.  At baseline she speaks a few words.    Upon arrival, the patient was awake but confused.  She did not follow commands.  Tmax 99.2, heart rate 84, /75.  Labs grossly normal with lactic acid 2.6.  UA significant for nitrites, leukocytes, WBCs, and bacteria.  CT brain, chest, abdomen, pelvis negative for significant acute findings.  Patient received additional dose of ceftriaxone.  Patient admitted for further management.      Past History:  Past Medical History:   Diagnosis Date    Alzheimer disease     Alzheimer's disease     Anxiety     Anxiety disorder, unspecified     Cerebrovascular accident (CVA)     Constipation     Deafness     Delirium due to known physiological condition     Depression     Difficulty in walking, not elsewhere classified     Gastro-esophageal reflux disease with esophagitis     GERD (gastroesophageal  reflux disease)     Hyperlipidemia     Hyperlipidemia     Hypertension     Major depressive disorder, single episode     Muscle weakness (generalized)     Other lack of coordination     Paranoid personality (disorder)     Shared psychotic disorder     Unspecified dementia without behavioral disturbance     Unspecified hearing loss, unspecified ear     Unspecified lack of coordination     Unspecified psychosis not due to a substance or known physiological condition      No past surgical history on file.  Family History   Family history unknown: Yes     Social History     Tobacco Use    Smoking status: Never    Smokeless tobacco: Never   Vaping Use    Vaping status: Never Used   Substance Use Topics    Alcohol use: No    Drug use: No       Allergies: Patient has no known allergies.      ROS: 12 point review of systems negative unless otherwise stated in HPI.    Vital Signs  Temp:  [98.8 °F (37.1 °C)-99.2 °F (37.3 °C)] 99.2 °F (37.3 °C)  Heart Rate:  [67-84] 69  Resp:  [15-20] 20  BP: (142-182)/(67-93) 142/70  Body mass index is 26.62 kg/m².    Physical Exam:    General Appearance:    Alert, confused, intermittently follow commands, nonverbal, in no acute distress     Head:    Normocephalic, without obvious abnormality, atraumatic   Eyes:            Lids normal, conjunctivae and sclerae normal, no icterus, PERRLA   Ears:    Ears appear intact without obvious abnormalities   Throat:   No oral lesions, oral mucosa moist   Neck:   No adenopathy, supple, trachea midline, no thyromegaly         Lungs:     Clear to auscultation,respirations regular, even and unlabored    Heart:    Regular rhythm and normal rate, normal S1 and S2,   no murmur, gallop, rub        Abdomen:     Normal bowel sounds, soft, nontender to palpation, no masses, no organomegaly, non-distended, no guarding, no rebound            Extremities:   Moves all extremities, left hand clenched fist, no edema, no cyanosis, no redness   Pulses:   Pulses palpable  and equal bilaterally   Skin:   No bleeding, bruising or rash       Neurologic:   Cranial nerves 2 - 12 grossly intact, no obvious focal deficits     Results Review:          Results from last 7 days   Lab Units 07/11/25 2003 07/11/25  1452   WBC 10*3/mm3 9.60 9.27   HEMOGLOBIN g/dL 13.5 14.0   HEMATOCRIT % 41.3 42.0   PLATELETS 10*3/mm3 198 223     Results from last 7 days   Lab Units 07/11/25 2003 07/11/25  1452   SODIUM mmol/L 138 139   POTASSIUM mmol/L 4.5 4.2   CHLORIDE mmol/L 103 101   CO2 mmol/L 22.1 25.0   BUN mg/dL 20.4 22.4   CREATININE mg/dL 0.99 0.86   CALCIUM mg/dL 9.8 10.1   GLUCOSE mg/dL 104* 121*     Results from last 7 days   Lab Units 07/11/25 2003 07/11/25  1452   BILIRUBIN mg/dL 0.5 0.5   ALK PHOS U/L 96 102   AST (SGOT) U/L 24 17   ALT (SGPT) U/L 8 11     Results from last 7 days   Lab Units 07/11/25 2003   CRP mg/dL <0.30     Results from last 7 days   Lab Units 07/11/25 2003   MAGNESIUM mg/dL 1.9     Results from last 7 days   Lab Units 07/11/25 2003   CK TOTAL U/L 52     Results from last 7 days   Lab Units 07/11/25 2003   INR  1.04       Imaging:    I have reviewed new imaging and EKG findings.    Imaging Results (Most Recent)       Procedure Component Value Units Date/Time    CT Abdomen Pelvis Without Contrast [[id number]] Collected: 07/11/25 2216     Updated: 07/11/25 2220    Narrative:      EXAMINATION: CT abdomen and pelvis without contrast     CLINICAL HISTORY: Pain     COMPARISON: 7/28/2023     TECHNIQUE: Contiguous 5 mm thick slices were obtained through the  abdomen and pelvis without contrast. Coronal and sagittal reformats were  performed.     FINDINGS:  ABDOMEN:  Mild likely scarring is noted posteriorly within the lower lobes. The  lung bases are otherwise clear. A large hiatal hernia is present. The  gallbladder is mildly distended, nonspecific. The lack of intravenous  contrast material limits evaluation of the solid abdominal viscera.  However, no contour deforming  lesion is appreciated within the  unenhanced liver, spleen, adrenal glands or pancreas. No hydronephrosis  either kidney. No definite ureteral calculus. No adenopathy, free or  loculated collection is appreciated within the upper abdomen.     PELVIS:   Colonic diverticulosis is noted without acute diverticulitis. There is  no bowel obstruction. No free air is identified to suggest perforation.  No significant free or loculated fluid collection is identified.     Evaluation of the bones demonstrates no acute or suspicious osseous  lesions.       Impression:      1. No acute inflammatory process.  2. Diverticulosis without acute diverticulitis.  3. Large hiatal hernia.  4. No bowel obstruction or perforation.     This report was finalized on 7/11/2025 10:18 PM by Regan Osman MD.       CT Chest Without Contrast Diagnostic [[id number]] Collected: 07/11/25 2213     Updated: 07/11/25 2218    Narrative:      EXAMINATION: CT chest without contrast     CLINICAL HISTORY: Altered mental status. Pain.     COMPARISON: None available.     TECHNIQUE: Contiguous 3 mm thick slices were obtained through the chest  without contrast. Coronal and sagittal reformats were performed.     FINDINGS:  Atherosclerotic calcification is noted within the aorta. A moderate to  large hiatal hernia is noted. The central airways are patent. Scattered  likely scarring or atelectasis is noted posteriorly within the lower  lobes. The lungs are otherwise clear. No pneumothorax is appreciated.  There is no pleural or pericardial effusion. No pathologically enlarged  nodes are appreciated. No acute fracture is appreciated. Mild to  moderate multilevel disc related degenerative changes are noted.       Impression:      1. No acute inflammatory process.  2. Mild scattered subsegmental scarring or atelectasis posteriorly  within the lower lobes.     This report was finalized on 7/11/2025 10:16 PM by Regan Osman MD.       CT Head Without Contrast  [[id number]] Collected: 07/11/25 2208     Updated: 07/11/25 2215    Narrative:      EXAMINATION: CT head without contrast     CLINICAL HISTORY: Altered mental status. Pain.     COMPARISON: 7/11/2023     TECHNIQUE: Contiguous 3 mm thick slices were obtained from the skull  base to the vertex without contrast. Coronal and sagittal reformats were  performed.     FINDINGS:  Mild to moderate generalized volume loss is noted with prominence of the  sulci and ventricular system. White matter changes are noted in a  pattern suggesting chronic small vessel ischemic disease. There is no  acute intracranial hemorrhage. No acute territorial infarct. No  extra-axial collection is identified. No midline shift. A lacunar  infarct is again noted adjacent to the right lateral ventricle. No acute  calvarial fracture is appreciated. There is leftward deviation of the  nasal septum. The mastoids are clear.       Impression:      1. No acute intracranial process.  2. If symptoms persist, MRI can be performed for further evaluation.     This report was finalized on 7/11/2025 10:12 PM by Regan Osman MD.                   Assessment & Plan     Acute on chronic metabolic encephalopathy, suspect urinary tract infection  Severe sepsis, likely from above    History of ESBL E. coli UTI  Advanced Alzheimer's dementia, with psychosis  Deafness  Hypertension  Hyperlipidemia  Iron deficiency anemia  Constipation  Vitamin D deficiency  - s/p 2 g ceftriaxone in ED, 1 L NS    Plan:  C/w 1 g ceftriaxone q24h (7/11-); although patient has prior history of ESBL, she is clinically improving on ceftriaxone  - Follow-up blood culture, urine culture   cc/h x 2 L  Restart home Namenda 5 mg twice daily  Restart home Zyprexa 2.5 mg nightly, divalproex 125 mg twice daily  Home senna 2 tab twice daily, milk of magnesia as needed    DVT: Lovenox  GI: NI  Diet: N.p.o. pending dysphagia screen  Dispo: Observation  Code status: DNI/DNR, discussed with  healthcare surrogate granddaughter Gita via telephone    I discussed the patients findings and my recommendations with family          Jessicahong JULIANE Morales DO  07/12/25  01:18 EDT

## 2025-07-12 NOTE — NURSING NOTE
Patient's grandchild Gita said that she wanted to speak with the doctor about patient's diet that we needed to feed her or she would come and feed her. I explained to her that SLP saw patient and recommended her be NPO due to her risk for aspiration. I told her pt was getting IV fluids and can have ice chips. Pt was confused and couldn't participate in bedside swallow with SLP. Dr. Fernandez notified, stated he attempted to call several times but only got voicemail.

## 2025-07-12 NOTE — PLAN OF CARE
Goal Outcome Evaluation:           Progress: no change  Outcome Evaluation: Patient is in bed at this time. VSS on RA. LR infusing. IV abx given. No acute changes or complaints at this time. Pt remains NPO. Will continue POC.

## 2025-07-12 NOTE — PLAN OF CARE
Goal Outcome Evaluation:  Plan of Care Reviewed With: patient        Progress: no change  Outcome Evaluation: Patient from ER this shift. Patient is alert and disoriented x4. VSS on room air.  LR infusing at 100 mL/hr per orders. No acute changes noted at this time. Will continue with plan of care.

## 2025-07-12 NOTE — THERAPY EVALUATION
"Acute Care - Speech Language Pathology   Swallow Initial Evaluation  [facility]  CLINICAL DYSPHAGIA ASSESSMENT     Patient Name: Catherine Snyder  : 1945  MRN: [id number]  Today's Date: 2025             Admit Date: 2025    Visit Dx:   No diagnosis found.  Patient Active Problem List   Diagnosis    HCAP (healthcare-associated pneumonia)    UTI (urinary tract infection)    Altered mental status    Cerebrovascular accident (CVA)    Aggressive behavior    Hyperlipidemia    Hypertension    Dementia with psychosis    Anticoagulated    Urinary tract infection     Past Medical History:   Diagnosis Date    Alzheimer disease     Alzheimer's disease     Anxiety     Anxiety disorder, unspecified     Cerebrovascular accident (CVA)     Constipation     Deafness     Delirium due to known physiological condition     Depression     Difficulty in walking, not elsewhere classified     Gastro-esophageal reflux disease with esophagitis     GERD (gastroesophageal reflux disease)     Hyperlipidemia     Hyperlipidemia     Hypertension     Major depressive disorder, single episode     Muscle weakness (generalized)     Other lack of coordination     Paranoid personality (disorder)     Shared psychotic disorder     Unspecified dementia without behavioral disturbance     Unspecified hearing loss, unspecified ear     Unspecified lack of coordination     Unspecified psychosis not due to a substance or known physiological condition      History reviewed. No pertinent surgical history.    Catherine Snyder was seen at bedside this am on 3N to assess safety/efficacy of swallowing fnx, determine safest/least restrictive diet tolerance.     Per chart review, patient \"with PMHx of advanced Alzheimer's dementia with psychosis, deafness, prior CVA, hypertension, hyperlipidemia, GERD, prior ESBL UTI, who presented to the ED with altered mental status.  Per discussion with daughter, the patient was at the nursing home this afternoon and began to " "show evidence of of chills, rigors, nausea, and vomiting.  Patient was brought to the ED where she was treated with IV ceftriaxone and returned to the nursing home.  However approximately at 7 PM, the tremors returned and the patient was lethargic, therefore patient returned to the ED.  Of note, the daughter states the patient has a left-sided facial droop for the past 5 to 6 years.  Daughter states she has advanced dementia, and requires maximal assistance for all ADLs.  At baseline she speaks a few words.     Upon arrival, the patient was awake but confused.  She did not follow commands.  Tmax 99.2, heart rate 84, /75.  Labs grossly normal with lactic acid 2.6.  UA significant for nitrites, leukocytes, WBCs, and bacteria.  CT brain, chest, abdomen, pelvis negative for significant acute findings.  Patient received additional dose of ceftriaxone.  Patient admitted for further management.\"    She was referred for dysphagia assessment. Per review of EMR, patient is familiar to SLP Department from h/o MBS 9/22/17 during prior [facility] admission w/ recommendation for modified po diet of mechanical soft consistencies, chopped meats, thin liquids. No further SLP encounters noted within this system.     Social History     Socioeconomic History    Marital status:    Tobacco Use    Smoking status: Never    Smokeless tobacco: Never   Vaping Use    Vaping status: Never Used   Substance and Sexual Activity    Alcohol use: No    Drug use: No    Sexual activity: Not Currently     Partners: Male      Imaging:  Narrative & Impression   EXAMINATION: CT chest without contrast     CLINICAL HISTORY: Altered mental status. Pain.     COMPARISON: None available.     TECHNIQUE: Contiguous 3 mm thick slices were obtained through the chest  without contrast. Coronal and sagittal reformats were performed.     FINDINGS:  Atherosclerotic calcification is noted within the aorta. A moderate to  large hiatal hernia is noted. The central " airways are patent. Scattered  likely scarring or atelectasis is noted posteriorly within the lower  lobes. The lungs are otherwise clear. No pneumothorax is appreciated.  There is no pleural or pericardial effusion. No pathologically enlarged  nodes are appreciated. No acute fracture is appreciated. Mild to  moderate multilevel disc related degenerative changes are noted.     IMPRESSION:  1. No acute inflammatory process.  2. Mild scattered subsegmental scarring or atelectasis posteriorly  within the lower lobes.     This report was finalized on 7/11/2025 10:16 PM by Regan Osman MD.     Labs:  Sodium  138  07/11 2003  Potassium  4.5  07/11 2003  Chloride  103  07/11 2003  CO2  22.1  07/11 2003  BUN  20.4  07/11 2003  Creatinine  0.99  07/11 2003  Glucose  104  07/11 2003  Hemoglobin  13.5  07/11 2003  Hematocrit  41.3  07/11 2003  WBC  9.60  07/11 2003  Platelets  198  07/11 2003  PTT  27.9  07/11 2003  Protime  14.3  07/11 2003  INR  1.04  07/11 2003     Diet Orders (active) (From admission, onward)       Start     Ordered    07/12/25 0543  NPO Diet NPO Type: Strict NPO  Diet Effective Now         07/12/25 0542                  She was observed on ra w/o complications.     Patient was positioned upright and centered in bed to accept very limited po presentations of single ice chips and thin water  via spoon. Further presentations and consistencies deferred at this time 2/2 significant ams, dysphagia. Patient was unable to self provide po trials.     Facial/oral structures were symmetrical upon observation. No obvious lingual deviation, unable to elicit protrusion. Oral mucosa were mildly xerostomic, pink, and clean. Secretions were clear, tacky, and orally controlled. OROM/[place] appeared generally weak, she did not attempt to imitate oral postures. Gag was not assessed. Volitional cough could not assess as this was not elicited. Spontaneous cough was weak in intensity, slightly congestive in quality,  non-productive. Voice could not be assessed as she was nonverbal across this assessment. She did not attempt to communicate or interact. She was alert, looking around room, and spontaneously moving, but did not make eye contact or visually track SLP. She did not follow directives. She was significantly confused and non-interactive.     Upon po presentations, limited bolus anticipation w/ weak in incomplete labial seal for bolus clearance via spoon bowl. Bolus formation, manipulation and control were significantly weak and limited. Marked oral holding noted w/ all presentations. A-p transit was significantly delayed, no obvious oral residue appreciated w/ these limited trials. Suspect premature spillage w/ thin water. No overt s/s aspiration before the swallow.      Pharyngeal swallow was delayed w/ generally weak hyolaryngeal elevation per palpation. Overt s/s aspiration evidenced post thin water trials via spoon, elicited cough response. Per significant ams and dysphagia, further presentations deferred at this time.     Impression: Per this assessment, Ms. Snyder presented w/ significant ams to negatively impact safety of po intake. She evidenced w/ overt s/s aspiration w/ thin liquids trials. Recommend continuing NPO except ice chip protocol when a/a to accept for oral hydration/comfort. SLP to f/u for reassessment pending improved status.     SLP Recommendation and Plan  1. NPO except ice chip protocol when a/a to accept for oral hydration/comfort.  2. Medications via non-oral method.    3. Universal aspiration precautions.   4. NORA precautions.  5. Oral care protocol.  SLP to f/u for reassessment pending improved status.     Thank you for allowing me to participate in the care of your patient-  Mechelle Vaca M.A., CCC-SLP      EDUCATION  The patient has been educated in the following areas:   Dysphagia (Swallowing Impairment) Oral Care/Hydration NPO rationale.      Time Calculation:    Time Calculation- SLP        Row Name 07/12/25 1254             Time Calculation- SLP    SLP Received On 07/12/25  -RIK      SLP - Next Appointment 07/14/25  -RIK                User Key  (r) = Recorded By, (t) = Taken By, (c) = Cosigned By      Initials Name Provider Type    Mechelle Loera MA,CCC-SLP Speech and Language Pathologist                  Therapy Charges for Today       Code Description Service Date Service Provider Modifiers Qty    [id number] HC ST EVAL ORAL PHARYNG SWALLOW 3 7/12/2025 Mechelle Vaca MA,CCC-SLP GN 1          Mechelle Vaca MA,ROBINA-SLP  7/12/2025

## 2025-07-12 NOTE — PROGRESS NOTES
"Patient Name: Catherine Snyder  YOB: 1945  MRN: [id number]  Admission date: 7/11/2025  Reason for Encounter: MST 2-3 or Nursing Admission Screen    [facility] Clinical Nutrition Assessment     Subjective    Subjective Information     Pt admitted from SNF.  Pt has dementia with confusion and unable to provide information.       Objective   H&P and Current Problems      H&P  Past Medical History:   Diagnosis Date    Alzheimer disease     Alzheimer's disease     Anxiety     Anxiety disorder, unspecified     Cerebrovascular accident (CVA)     Constipation     Deafness     Delirium due to known physiological condition     Depression     Difficulty in walking, not elsewhere classified     Gastro-esophageal reflux disease with esophagitis     GERD (gastroesophageal reflux disease)     Hyperlipidemia     Hyperlipidemia     Hypertension     Major depressive disorder, single episode     Muscle weakness (generalized)     Other lack of coordination     Paranoid personality (disorder)     Shared psychotic disorder     Unspecified dementia without behavioral disturbance     Unspecified hearing loss, unspecified ear     Unspecified lack of coordination     Unspecified psychosis not due to a substance or known physiological condition       History reviewed. No pertinent surgical history.   Current Problems   Admission Diagnosis:  Urinary tract infection [N39.0]    Problem List:    Urinary tract infection      Other Applicable Nutrition Information:   N/A     Anthropometrics       Height: 172.7 cm (67.99\")  Weight: 79.4 kg (175 lb 0.7 oz) (07/11/25 1944)     BMI (Calculated): 26.6       Trending Weight Changes 07/12/25: No significant changes       Weight History  Wt Readings from Last 20 Encounters:   07/11/25 1944 79.4 kg (175 lb 0.7 oz)   07/11/25 1430 79.4 kg (175 lb 0.7 oz)   05/29/25 1741 79.4 kg (175 lb)   05/20/25 1026 79.6 kg (175 lb 6.4 oz)   02/14/25 1035 79 kg (174 lb 3.2 oz)   04/03/24 0625 86.2 kg (190 " lb 0.6 oz)   11/10/23 0939 86.2 kg (190 lb)   11/06/23 1217 86.2 kg (190 lb)   06/21/23 1009 88.3 kg (194 lb 9.6 oz)   11/10/22 1204 80 kg (176 lb 5.9 oz)   09/02/22 1003 86.2 kg (190 lb 0.6 oz)   02/15/22 1346 86.2 kg (190 lb)   10/14/21 2120 86.2 kg (190 lb)   10/06/21 0829 86.2 kg (190 lb)   07/04/21 1414 88 kg (194 lb)   03/17/21 1029 86.2 kg (190 lb)   09/25/17 0302 77.1 kg (170 lb)   09/24/17 0330 77.3 kg (170 lb 6.4 oz)   09/23/17 0330 76.6 kg (168 lb 12.8 oz)   09/22/17 0323 76.8 kg (169 lb 6.4 oz)   09/21/17 1115 74.8 kg (165 lb)   08/22/17 2300 77.6 kg (171 lb 0.2 oz)   08/22/17 1741 70.8 kg (156 lb)   12/26/16 0355 76.1 kg (167 lb 12.8 oz)   12/25/16 0400 78 kg (172 lb)   12/23/16 1921 74.6 kg (164 lb 8 oz)   12/23/16 0846 74.4 kg (164 lb)            Labs      Comment: Weight stable     Results from last 7 days   Lab Units 07/12/25  0516 07/12/25  0224 07/11/25  2327 07/11/25 2003 07/11/25 2003 07/11/25  1452   SODIUM mmol/L  --   --   --   --  138 139   POTASSIUM mmol/L  --   --   --   --  4.5 4.2   GLUCOSE mg/dL  --   --   --   --  104* 121*   BUN mg/dL  --   --   --   --  20.4 22.4   CREATININE mg/dL  --   --   --   --  0.99 0.86   CALCIUM mg/dL  --   --   --   --  9.8 10.1   MAGNESIUM mg/dL  --   --   --   --  1.9  --    ALBUMIN g/dL  --   --   --   --  3.8 4.1   CRP mg/dL  --   --   --   --  <0.30  --    LACTATE mmol/L 0.9 2.5* 2.4*   < > 2.6*  --    BILIRUBIN mg/dL  --   --   --   --  0.5 0.5   ALK PHOS U/L  --   --   --   --  96 102   AST (SGOT) U/L  --   --   --   --  24 17   ALT (SGPT) U/L  --   --   --   --  8 11   PROBNP pg/mL  --   --   --   --  1,119.0  --     < > = values in this interval not displayed.     Results from last 7 days   Lab Units 07/11/25 2003 07/11/25  1452   PLATELETS 10*3/mm3 198 223   HEMOGLOBIN g/dL 13.5 14.0   HEMATOCRIT % 41.3 42.0     Lab Results   Component Value Date    HGBA1C 6.20 (H) 10/16/2021          Medications       Scheduled Medications aspirin, 81 mg,  Oral, Daily  [START ON 7/13/2025] atorvastatin, 10 mg, Oral, Nightly  cefTRIAXone, 1,000 mg, Intravenous, Q24H  Divalproex Sodium, 125 mg, Oral, BID  enoxaparin sodium, 40 mg, Subcutaneous, Daily  gabapentin, 100 mg, Oral, Nightly  memantine, 5 mg, Oral, BID  OLANZapine, 2.5 mg, Oral, Nightly  sennosides-docusate, 2 tablet, Oral, BID  sodium chloride, 10 mL, Intravenous, Q12H        Infusions lactated ringers, 100 mL/hr, Last Rate: 100 mL/hr (07/12/25 0316)        PRN Medications   cloNIDine    HYDROcodone-acetaminophen    magnesium hydroxide    sodium chloride    sodium chloride     Physical Findings      Chewing/Swallowing  Teeth Status: Mouth/Teeth WDL: .WDL except, teeth Teeth Symptoms: teeth absent  Chewing/Swallowing Issues: No issues identified at this time   Edema                            Bowel Function  Stool Output  Perineal Care: absorbent brief/pad changed, catheter care provided, perineum cleansed, protective cream/ointment applied (07/12/25 0600)  Last Bowel Movement:  (pt confused, LOREN)   I/Os  Intake & Output (last 3 days)         07/09 0701  07/10 0700 07/10 0701 07/11 0700 07/11 0701 07/12 0700 07/12 0701 07/13 0700    P.O.    0    Total Intake(mL/kg)    0 (0)    Urine (mL/kg/hr)   200     Total Output   200     Net   -200 0            Urine Unmeasured Occurrence   1 x            Lines, Drains, Airways, & Wounds       Active LDAs       Name Placement date Placement time Site Days Last dressing change    External Urinary Catheter --  --  --  --                       Nutrition Focused Physical Exam     Trending NFPE 07/12/25: n/a     Malnutrition Severity Assessment              Estimated Needs      Energy Requirements    Weight for Calculation 79.4 kg   Method for Estimation  25-30 kcals/kg   Daily Needs (kcal/day) 0656-9612 shiela   Protein Requirements    Weight for Calculation 79.4 kg    Method for Estimation 1.0-1.2 gm/kg   Daily Needs (g/day) 79-95 g/day   Fluid Requirements     Method for  Estimation 1 mL/kcal    Daily Needs (mL/day) 8013-1696 mL/day or per MD order     Current Nutrition Orders & Evaluation of Intake      Oral Nutrition     Food Allergies  and Intolerances NKFA   Current PO Diet NPO Diet NPO Type: Strict NPO   Oral Nutrition Supplement None     Trending % PO Intake 07/12/25:  no intakes since admission     Enteral Nutrition     Current EN Order Patient isn't on Tube Feeding  Patient doesn't have any tube feeding modular orders     EN Route      EN Tolerance     EN Observation/Intake         Parenteral Nutrition     Current TPN Order    TPN Route    Lipids (mL/%/frequency)     Total # Days on TPN    TPN Observation/Intake       Assessment & Plan   Nutrition Diagnosis and Goals       Nutrition Diagnosis 1 No nutrition diagnosis at this time             Goal(s) Establish PO Intake     Nutrition Intervention and Prescription       Intervention  Continue to monitor for plan of care      Diet Prescription     Supplement Prescription     Education Provided       Enteral Prescription        TPN Prescription      Monitoring/Evaluation       Monitor/Evaluation Per Protocol, Pertinent Labs, and Weight     RD Follow-Up Encounter 3-5 days     Electronically signed by:  Sissy Hernandez RD  RD remote in [place], [place]  07/12/25 08:00 EDT

## 2025-07-12 NOTE — NURSING NOTE
Patient's granddaughter called stating that she was upset pt was NPO. Explained to her that pt can have ice chips and is getting fluids and the risks of aspiration. Explained to her that speech therapist evaluated pt and recommended that she remains NPO at this time. Granddaughter states that she coming to hospital and feeding pt or taking her back to NH. Tried to explain the risks of doing this but granddaughter disagreed. Primary RN and HPCM aware.

## 2025-07-12 NOTE — PROGRESS NOTES
Patient admitted after midnight. I have seen and evaluated patient. She did not follow commands and was similar on exam to previous documented exams. No family bedside. Reviewed previous urine cultures and will continue ceftriaxone as we await speciation and sensitivities to culture obtained this visit.

## 2025-07-13 LAB
ALBUMIN SERPL-MCNC: 3.8 G/DL (ref 3.5–5.2)
ALBUMIN/GLOB SERPL: 1.7 G/DL
ALP SERPL-CCNC: 102 U/L (ref 39–117)
ALT SERPL W P-5'-P-CCNC: 8 U/L (ref 1–33)
ANION GAP SERPL CALCULATED.3IONS-SCNC: 17.3 MMOL/L (ref 5–15)
AST SERPL-CCNC: 21 U/L (ref 1–32)
BASOPHILS # BLD AUTO: 0.01 10*3/MM3 (ref 0–0.2)
BASOPHILS NFR BLD AUTO: 0.1 % (ref 0–1.5)
BILIRUB SERPL-MCNC: 0.5 MG/DL (ref 0–1.2)
BUN SERPL-MCNC: 16 MG/DL (ref 8–23)
BUN/CREAT SERPL: 17.8 (ref 7–25)
CALCIUM SPEC-SCNC: 9.8 MG/DL (ref 8.6–10.5)
CHLORIDE SERPL-SCNC: 103 MMOL/L (ref 98–107)
CO2 SERPL-SCNC: 18.7 MMOL/L (ref 22–29)
CREAT SERPL-MCNC: 0.9 MG/DL (ref 0.57–1)
DEPRECATED RDW RBC AUTO: 41.1 FL (ref 37–54)
EGFRCR SERPLBLD CKD-EPI 2021: 64.4 ML/MIN/1.73
EOSINOPHIL # BLD AUTO: 0.32 10*3/MM3 (ref 0–0.4)
EOSINOPHIL NFR BLD AUTO: 4.4 % (ref 0.3–6.2)
ERYTHROCYTE [DISTWIDTH] IN BLOOD BY AUTOMATED COUNT: 12.2 % (ref 12.3–15.4)
GLOBULIN UR ELPH-MCNC: 2.3 GM/DL
GLUCOSE SERPL-MCNC: 108 MG/DL (ref 65–99)
HCT VFR BLD AUTO: 42 % (ref 34–46.6)
HGB BLD-MCNC: 13.7 G/DL (ref 12–15.9)
IMM GRANULOCYTES # BLD AUTO: 0.03 10*3/MM3 (ref 0–0.05)
IMM GRANULOCYTES NFR BLD AUTO: 0.4 % (ref 0–0.5)
LYMPHOCYTES # BLD AUTO: 1.48 10*3/MM3 (ref 0.7–3.1)
LYMPHOCYTES NFR BLD AUTO: 20.4 % (ref 19.6–45.3)
MCH RBC QN AUTO: 29.8 PG (ref 26.6–33)
MCHC RBC AUTO-ENTMCNC: 32.6 G/DL (ref 31.5–35.7)
MCV RBC AUTO: 91.5 FL (ref 79–97)
MONOCYTES # BLD AUTO: 0.59 10*3/MM3 (ref 0.1–0.9)
MONOCYTES NFR BLD AUTO: 8.1 % (ref 5–12)
NEUTROPHILS NFR BLD AUTO: 4.82 10*3/MM3 (ref 1.7–7)
NEUTROPHILS NFR BLD AUTO: 66.6 % (ref 42.7–76)
NRBC BLD AUTO-RTO: 0 /100 WBC (ref 0–0.2)
PLATELET # BLD AUTO: 178 10*3/MM3 (ref 140–450)
PMV BLD AUTO: 10.1 FL (ref 6–12)
POTASSIUM SERPL-SCNC: 3.5 MMOL/L (ref 3.5–5.2)
PROT SERPL-MCNC: 6.1 G/DL (ref 6–8.5)
RBC # BLD AUTO: 4.59 10*6/MM3 (ref 3.77–5.28)
SODIUM SERPL-SCNC: 139 MMOL/L (ref 136–145)
WBC NRBC COR # BLD AUTO: 7.25 10*3/MM3 (ref 3.4–10.8)

## 2025-07-13 PROCEDURE — 85025 COMPLETE CBC W/AUTO DIFF WBC: CPT | Performed by: INTERNAL MEDICINE

## 2025-07-13 PROCEDURE — 99232 SBSQ HOSP IP/OBS MODERATE 35: CPT | Performed by: INTERNAL MEDICINE

## 2025-07-13 PROCEDURE — 25010000002 HYDRALAZINE PER 20 MG: Performed by: STUDENT IN AN ORGANIZED HEALTH CARE EDUCATION/TRAINING PROGRAM

## 2025-07-13 PROCEDURE — 25010000002 ENOXAPARIN PER 10 MG: Performed by: STUDENT IN AN ORGANIZED HEALTH CARE EDUCATION/TRAINING PROGRAM

## 2025-07-13 PROCEDURE — 80053 COMPREHEN METABOLIC PANEL: CPT | Performed by: INTERNAL MEDICINE

## 2025-07-13 PROCEDURE — 25010000002 CEFTRIAXONE PER 250 MG: Performed by: STUDENT IN AN ORGANIZED HEALTH CARE EDUCATION/TRAINING PROGRAM

## 2025-07-13 RX ORDER — HYDRALAZINE HYDROCHLORIDE 20 MG/ML
10 INJECTION INTRAMUSCULAR; INTRAVENOUS EVERY 6 HOURS PRN
Status: DISCONTINUED | OUTPATIENT
Start: 2025-07-13 | End: 2025-07-15 | Stop reason: HOSPADM

## 2025-07-13 RX ADMIN — SENNOSIDES AND DOCUSATE SODIUM 2 TABLET: 50; 8.6 TABLET ORAL at 20:40

## 2025-07-13 RX ADMIN — MEMANTINE 5 MG: 5 TABLET ORAL at 20:40

## 2025-07-13 RX ADMIN — HYDRALAZINE HYDROCHLORIDE 10 MG: 20 INJECTION INTRAMUSCULAR; INTRAVENOUS at 06:43

## 2025-07-13 RX ADMIN — DIVALPROEX SODIUM 125 MG: 125 CAPSULE, COATED PELLETS ORAL at 20:40

## 2025-07-13 RX ADMIN — HYDRALAZINE HYDROCHLORIDE 10 MG: 20 INJECTION INTRAMUSCULAR; INTRAVENOUS at 01:04

## 2025-07-13 RX ADMIN — ATORVASTATIN CALCIUM 10 MG: 10 TABLET, FILM COATED ORAL at 20:40

## 2025-07-13 RX ADMIN — OLANZAPINE 2.5 MG: 2.5 TABLET, FILM COATED ORAL at 20:40

## 2025-07-13 RX ADMIN — Medication 10 ML: at 20:40

## 2025-07-13 RX ADMIN — LIDOCAINE 1 PATCH: 4 PATCH TOPICAL at 12:52

## 2025-07-13 RX ADMIN — ENOXAPARIN SODIUM 40 MG: 100 INJECTION SUBCUTANEOUS at 08:08

## 2025-07-13 RX ADMIN — CEFTRIAXONE SODIUM 1000 MG: 1 INJECTION, POWDER, FOR SOLUTION INTRAMUSCULAR; INTRAVENOUS at 08:08

## 2025-07-13 RX ADMIN — GABAPENTIN 100 MG: 100 CAPSULE ORAL at 20:40

## 2025-07-13 RX ADMIN — Medication 10 ML: at 08:08

## 2025-07-13 NOTE — PLAN OF CARE
Goal Outcome Evaluation:           Progress: no change  Outcome Evaluation: Pts VSS on RA. Pt disoriented x4. Pt tolerating mechanical ground diet, no s/s of distress. Cont IV abx. Will continue with POC.

## 2025-07-13 NOTE — PLAN OF CARE
Goal Outcome Evaluation:   Pt resting in bed, alert and disoriented x4. VSS on RA. Pt remained NPO this shift. Pt's grand daughter stated she would like IV fluids to be restarted in morning with a new SLP eval. BP has started to elevate and prn hydralazine given. Will continue plan of care.

## 2025-07-13 NOTE — PROGRESS NOTES
[facility] HOSPITALIST PROGRESS NOTE     Patient Identification:  Name:  Catherine Snyder  Age:  79 y.o.  Sex:  female  :  1945  MRN:  [id number]  Visit Number:  [id number]  ROOM: [address] [place] [address]     Primary Care Provider:  Ricky Alamo MD    Length of stay in inpatient status:  0    Subjective     Chief Compliant:    Chief Complaint   Patient presents with    Altered Mental Status       History of Presenting Illness:    Patient more awake and alert today. Still not verbally interactive but was tracking with eyes and interacting more. No family bedside. Attempted to call family multiple times yesterday but no answer.     ROS:  Otherwise 10 point ROS negative other than documented above in HPI.     Objective     Current Hospital Meds:aspirin, 81 mg, Oral, Daily  atorvastatin, 10 mg, Oral, Nightly  cefTRIAXone, 1,000 mg, Intravenous, Q24H  cholecalciferol, 50,000 Units, Oral, Weekly  cholecalciferol, 2,000 Units, Oral, Daily  Divalproex Sodium, 125 mg, Oral, BID  docusate sodium, 100 mg, Oral, Daily  enoxaparin sodium, 40 mg, Subcutaneous, Daily  ferrous sulfate, 325 mg, Oral, Daily  gabapentin, 100 mg, Oral, Nightly  Lidocaine, 1 patch, Transdermal, Q24H  memantine, 5 mg, Oral, BID  OLANZapine, 2.5 mg, Oral, Nightly  polyethylene glycol, 17 g, Oral, Daily  sennosides-docusate, 2 tablet, Oral, BID  sodium chloride, 10 mL, Intravenous, Q12H         Current Antimicrobial Therapy:  Anti-Infectives (From admission, onward)      Ordered     Dose/Rate Route Frequency Start Stop    25 005  cefTRIAXone (ROCEPHIN) 1,000 mg in sodium chloride 0.9 % 100 mL IVPB-VTB        Ordering Provider: Kevin Sheikh DO    1,000 mg  200 mL/hr over 30 Minutes Intravenous Every 24 Hours 25 0800 25 0759    25  cefTRIAXone (ROCEPHIN) 1,000 mg in sodium chloride 0.9 % 100 mL IVPB-VTB        Ordering Provider: Lavon Hogan MD    1,000 mg  200 mL/hr over 30 Minutes Intravenous  Once 07/11/25 2012 07/11/25 5372          Current Diuretic Therapy:  Diuretics (From admission, onward)      None          ----------------------------------------------------------------------------------------------------------------------  Vital Signs:  Temp:  [98.5 °F (36.9 °C)-99.4 °F (37.4 °C)] 99.4 °F (37.4 °C)  Heart Rate:  [] 99  Resp:  [18-22] 18  BP: (127-184)/(60-86) 127/60  SpO2:  [95 %] 95 %  on   ;   Device (Oxygen Therapy): room air  Body mass index is 26.62 kg/m².    Wt Readings from Last 3 Encounters:   07/11/25 79.4 kg (175 lb 0.7 oz)   07/11/25 79.4 kg (175 lb 0.7 oz)   05/29/25 79.4 kg (175 lb)     Intake & Output (last 3 days)         07/10 0701  07/11 0700 07/11 0701 07/12 0700 07/12 0701 07/13 0700 07/13 0701 07/14 0700    P.O.   0 75    I.V. (mL/kg)   1624 (20.5)     Total Intake(mL/kg)   1624 (20.5) 75 (0.9)    Urine (mL/kg/hr)  200 1750 (0.9)     Total Output  200 1750     Net  -200 -126 +75            Urine Unmeasured Occurrence  1 x 5 x 2 x          Diet: Regular/House; Texture: Mechanical Ground (NDD 2); Fluid Consistency: Thin (IDDSI 0)  ----------------------------------------------------------------------------------------------------------------------  Physical exam:  Constitutional:  Elderly appearing female.   HENT:  Head:  Normocephalic and atraumatic.  Mouth:  Moist mucous membranes.    Eyes:  Conjunctivae and EOM are normal. No scleral icterus.    Neck:  Neck supple.  No JVD present.    Cardiovascular:  Normal rate, regular rhythm and normal heart sounds with no murmur.  Pulmonary/Chest:  No respiratory distress, no wheezes, no crackles, with normal breath sounds and good air movement.  Abdominal:  Soft.  Bowel sounds are normal.  No distension and no tenderness.   Musculoskeletal:  No edema, no tenderness, and no deformity.  No red or swollen joints anywhere.    Neurological: More interactive. Still not verbal. Moving all extremities.   Skin:  Skin is warm and dry. No  "rash noted. No pallor.   Peripheral vascular:  Pulses in all 4 extremities with no clubbing, no cyanosis, no edema.  ----------------------------------------------------------------------------------------------------------------------  Tele:    ----------------------------------------------------------------------------------------------------------------------  Results from last 7 days   Lab Units 07/13/25  0827 07/12/25  0516 07/12/25 0224 07/11/25 2327 07/11/25 2003 07/11/25 2003 07/11/25  1452   CRP mg/dL  --   --   --   --   --  <0.30  --    LACTATE mmol/L  --  0.9 2.5* 2.4*   < > 2.6*  --    WBC 10*3/mm3 7.25  --   --   --   --  9.60 9.27   HEMOGLOBIN g/dL 13.7  --   --   --   --  13.5 14.0   HEMATOCRIT % 42.0  --   --   --   --  41.3 42.0   MCV fL 91.5  --   --   --   --  92.0 89.9   MCHC g/dL 32.6  --   --   --   --  32.7 33.3   PLATELETS 10*3/mm3 178  --   --   --   --  198 223   INR   --   --   --   --   --  1.04  --     < > = values in this interval not displayed.         Results from last 7 days   Lab Units 07/13/25  0827 07/11/25 2003 07/11/25  1452   SODIUM mmol/L 139 138 139   POTASSIUM mmol/L 3.5 4.5 4.2   MAGNESIUM mg/dL  --  1.9  --    CHLORIDE mmol/L 103 103 101   CO2 mmol/L 18.7* 22.1 25.0   BUN mg/dL 16.0 20.4 22.4   CREATININE mg/dL 0.90 0.99 0.86   CALCIUM mg/dL 9.8 9.8 10.1   GLUCOSE mg/dL 108* 104* 121*   ALBUMIN g/dL 3.8 3.8 4.1   BILIRUBIN mg/dL 0.5 0.5 0.5   ALK PHOS U/L 102 96 102   AST (SGOT) U/L 21 24 17   ALT (SGPT) U/L 8 8 11   Estimated Creatinine Clearance: 54.3 mL/min (by C-G formula based on SCr of 0.9 mg/dL).  Ammonia   Date Value Ref Range Status   07/11/2025 21 11 - 51 umol/L Final     Results from last 7 days   Lab Units 07/11/25 2003   CK TOTAL U/L 52     Results from last 7 days   Lab Units 07/11/25 2003   PROBNP pg/mL 1,119.0         No results found for: \"HGBA1C\", \"POCGLU\"  Lab Results   Component Value Date    TSH 0.934 07/11/2025    FREET4 1.07 11/10/2022     No " "results found for: \"PREGTESTUR\", \"PREGSERUM\", \"HCG\", \"HCGQUANT\"  Pain Management Panel  More data exists         Latest Ref Rng & Units 7/11/2025 11/10/2022   Pain Management Panel   Amphetamine, Urine Qual Negative Negative  Negative    Barbiturates Screen, Urine Negative Negative  Negative    Benzodiazepine Screen, Urine Negative Negative  Negative    Buprenorphine, Screen, Urine Negative Negative  Negative    Cocaine Screen, Urine Negative Negative  Negative    Fentanyl, Urine Negative Negative  -   Methadone Screen , Urine Negative Negative  Negative    Methamphetamine, Ur Negative Negative  Negative      Brief Urine Lab Results  (Last result in the past 365 days)        Color   Clarity   Blood   Leuk Est   Nitrite   Protein   CREAT   Urine HCG        07/11/25 2003 Yellow   Cloudy   Negative   Small (1+)   Positive   Trace                 Blood Culture   Date Value Ref Range Status   07/11/2025 No growth at 24 hours  Preliminary   07/11/2025 No growth at 24 hours  Preliminary     No results found for: \"URINECX\"  No results found for: \"WOUNDCX\"  No results found for: \"STOOLCX\"  No results found for: \"RESPCX\"  No results found for: \"AFBCX\"  Results from last 7 days   Lab Units 07/12/25  0516 07/12/25  0224 07/11/25  2327 07/11/25 2003   PROCALCITONIN ng/mL  --   --   --  0.05   LACTATE mmol/L 0.9 2.5* 2.4* 2.6*   CRP mg/dL  --   --   --  <0.30       I have personally looked at the labs and they are summarized above.  ----------------------------------------------------------------------------------------------------------------------  Detailed radiology reports for the last 24 hours:    Imaging Results (Last 24 Hours)       ** No results found for the last 24 hours. **          Assessment & Plan      #Acute on chronic metabolic encephalopathy, suspect urinary tract infection  #Severe sepsis, likely from above    #History of ESBL E. coli UTI  #Lactic acidosis   - Reviewed previous urine cultures and will continue " ceftriaxone as we await speciation and sensitivities to culture obtained this visit. Does not look like one was obtained on admission. Will try to add or get new one.   - Lactate has normalized. Mental status improving.   - Restarting diet conservatively now she is more awake and passed nursing bedside exam. SLP consulted.       Chronic medical problems   Advanced Alzheimer's dementia, with psychosis  Deafness  Hypertension  Hyperlipidemia  Iron deficiency anemia  Constipation  Vitamin D deficiency    Code status: DNR/DNI    Dispo: Back to SNF pending continued improvement.         Alexandru Fernandez MD  [facility] Hospitalist  07/13/25  18:23 EDT

## 2025-07-14 LAB
BILIRUB UR QL STRIP: NEGATIVE
CLARITY UR: CLEAR
COLOR UR: ABNORMAL
GLUCOSE UR STRIP-MCNC: NEGATIVE MG/DL
HGB UR QL STRIP.AUTO: NEGATIVE
KETONES UR QL STRIP: ABNORMAL
LEUKOCYTE ESTERASE UR QL STRIP.AUTO: NEGATIVE
NITRITE UR QL STRIP: NEGATIVE
PH UR STRIP.AUTO: <=5 [PH] (ref 5–8)
PROT UR QL STRIP: NEGATIVE
SP GR UR STRIP: 1.02 (ref 1–1.03)
UROBILINOGEN UR QL STRIP: ABNORMAL

## 2025-07-14 PROCEDURE — 92610 EVALUATE SWALLOWING FUNCTION: CPT

## 2025-07-14 PROCEDURE — 25010000002 LABETALOL 5 MG/ML SOLUTION: Performed by: STUDENT IN AN ORGANIZED HEALTH CARE EDUCATION/TRAINING PROGRAM

## 2025-07-14 PROCEDURE — 25010000002 CEFTRIAXONE PER 250 MG: Performed by: STUDENT IN AN ORGANIZED HEALTH CARE EDUCATION/TRAINING PROGRAM

## 2025-07-14 PROCEDURE — 99233 SBSQ HOSP IP/OBS HIGH 50: CPT | Performed by: INTERNAL MEDICINE

## 2025-07-14 PROCEDURE — 25010000002 HYDRALAZINE PER 20 MG: Performed by: STUDENT IN AN ORGANIZED HEALTH CARE EDUCATION/TRAINING PROGRAM

## 2025-07-14 PROCEDURE — 81003 URINALYSIS AUTO W/O SCOPE: CPT | Performed by: INTERNAL MEDICINE

## 2025-07-14 PROCEDURE — 25010000002 ENOXAPARIN PER 10 MG: Performed by: STUDENT IN AN ORGANIZED HEALTH CARE EDUCATION/TRAINING PROGRAM

## 2025-07-14 RX ORDER — LABETALOL HYDROCHLORIDE 5 MG/ML
10 INJECTION, SOLUTION INTRAVENOUS EVERY 4 HOURS PRN
Status: DISCONTINUED | OUTPATIENT
Start: 2025-07-14 | End: 2025-07-15 | Stop reason: HOSPADM

## 2025-07-14 RX ADMIN — Medication 10 ML: at 08:14

## 2025-07-14 RX ADMIN — HYDRALAZINE HYDROCHLORIDE 10 MG: 20 INJECTION INTRAMUSCULAR; INTRAVENOUS at 18:42

## 2025-07-14 RX ADMIN — Medication 10 ML: at 20:12

## 2025-07-14 RX ADMIN — LIDOCAINE 1 PATCH: 4 PATCH TOPICAL at 12:51

## 2025-07-14 RX ADMIN — CEFTRIAXONE SODIUM 1000 MG: 1 INJECTION, POWDER, FOR SOLUTION INTRAMUSCULAR; INTRAVENOUS at 08:13

## 2025-07-14 RX ADMIN — LABETALOL HYDROCHLORIDE 10 MG: 5 INJECTION, SOLUTION INTRAVENOUS at 23:39

## 2025-07-14 RX ADMIN — ENOXAPARIN SODIUM 40 MG: 100 INJECTION SUBCUTANEOUS at 08:13

## 2025-07-14 NOTE — THERAPY RE-EVALUATION
"Acute Care - Speech Language Pathology   Swallow Re-Assessment  [facility]  CLINICAL DYSPHAGIA REASSESSMENT     Patient Name: Catherine Snyder  : 1945  MRN: [id number]  Today's Date: 2025             Admit Date: 2025    Visit Dx:   No diagnosis found.  Patient Active Problem List   Diagnosis    HCAP (healthcare-associated pneumonia)    UTI (urinary tract infection)    Altered mental status    Cerebrovascular accident (CVA)    Aggressive behavior    Hyperlipidemia    Hypertension    Dementia with psychosis    Anticoagulated    Urinary tract infection     Past Medical History:   Diagnosis Date    Alzheimer disease     Alzheimer's disease     Anxiety     Anxiety disorder, unspecified     Cerebrovascular accident (CVA)     Constipation     Deafness     Delirium due to known physiological condition     Depression     Difficulty in walking, not elsewhere classified     Gastro-esophageal reflux disease with esophagitis     GERD (gastroesophageal reflux disease)     Hyperlipidemia     Hyperlipidemia     Hypertension     Major depressive disorder, single episode     Muscle weakness (generalized)     Other lack of coordination     Paranoid personality (disorder)     Shared psychotic disorder     Unspecified dementia without behavioral disturbance     Unspecified hearing loss, unspecified ear     Unspecified lack of coordination     Unspecified psychosis not due to a substance or known physiological condition      History reviewed. No pertinent surgical history.    Catherine Snyder was seen at bedside this am on 3N to reassess safety/efficacy of swallowing fnx, determine safest/least restrictive diet tolerance.     She is s/p initial clinical dysphagia assessment 25 w/ \"significant ams to negatively impact safety of po intake. She evidenced w/ overt s/s aspiration w/ thin liquids trials. Recommend continuing NPO except ice chip protocol when a/a to accept for oral hydration/comfort. SLP to f/u for reassessment " "pending improved status.\"    Per chart review, she was noted w/ improving status, passed RN dysphagia screen 7/13/25 and was initiated on modified po diet of mechanical ground consistencies, thin liquids, which she has tolerated since that time.     Social History     Socioeconomic History    Marital status:    Tobacco Use    Smoking status: Never    Smokeless tobacco: Never   Vaping Use    Vaping status: Never Used   Substance and Sexual Activity    Alcohol use: No    Drug use: No    Sexual activity: Not Currently     Partners: Male      Imaging:  Narrative & Impression   EXAMINATION: CT chest without contrast     CLINICAL HISTORY: Altered mental status. Pain.     COMPARISON: None available.     TECHNIQUE: Contiguous 3 mm thick slices were obtained through the chest  without contrast. Coronal and sagittal reformats were performed.     FINDINGS:  Atherosclerotic calcification is noted within the aorta. A moderate to  large hiatal hernia is noted. The central airways are patent. Scattered  likely scarring or atelectasis is noted posteriorly within the lower  lobes. The lungs are otherwise clear. No pneumothorax is appreciated.  There is no pleural or pericardial effusion. No pathologically enlarged  nodes are appreciated. No acute fracture is appreciated. Mild to  moderate multilevel disc related degenerative changes are noted.     IMPRESSION:  1. No acute inflammatory process.  2. Mild scattered subsegmental scarring or atelectasis posteriorly  within the lower lobes.     This report was finalized on 7/11/2025 10:16 PM by Regan Osman MD.     Labs:  Sodium  139  07/13 0827  Potassium  3.5  07/13 0827  Chloride  103  07/13 0827  CO2  18.7  07/13 0827  BUN  16.0  07/13 0827  Creatinine  0.90  07/13 0827  Glucose  108  07/13 0827  Hemoglobin  13.7  07/13 0827  Hematocrit  42.0  07/13 0827  WBC  7.25  07/13 0827  Platelets  178  07/13 0827  PTT  27.9  07/11 2003  Protime  14.3  07/11 2003  INR  1.04  07/11 " 2003     She was on a mechanical ground diet, thin liquids.     She was observed on ra w/o complications.     Patient was positioned upright and centered in bed to accept multiple po presentations of ice chips, crumbled cracker in puree, puree, and thin liquids via spoon, cup and straw. She did not self provide po trials.     Facial/oral structures were symmetrical upon observation. No obvious lingual deviation, unable to elicit protrusion. Oral mucosa were moist, pink, and clean. Secretions were clear, thin, and controlled. OROM/GERMANIA appeared mildly weak in general, she did not attempt to imitate oral postures. Gag was not assessed. Volitional cough could not assess as this was not elicited. No spontaneous cough elicited today. Voice could not be assessed as she was nonverbal across this reassessment. She was alert this am, able to make eye contact and trach SLP around the room. She was cooperative to accept all po presentations, appeared to be improving in cognitive status in comparison to prior SLP encounter. She continued non-communicative today, per chart review family reported this to be premorbid baseline 2/2 ams and deafness for approximately 10 years.      Upon po presentations, adequate bolus anticipation and acceptance w/ good labial seal for bolus clearance via spoon bowl, cup rim stability and suction via straw.  No anterior loss evidenced. Bolus formation, manipulation and control were mildly-moderately weak in general, w/ lingual pumping noted w/ puree and moderately increased oral prep time w/ solid crumbled in puree, mixed phasic/rotary mastication pattern. No significant anterior holding evidenced. A-p transit was moderately delayed w/ puree, timely w/ thin liquids. No significant oral residue appreciated. No overt s/s aspiration before the swallow.      Pharyngeal swallow was timely w/ adequate hyolaryngeal elevation per palpation. No overt s/s aspiration evidenced across this reassessment. No  obvious silent aspiration suspected.     Impression: Per this reassessment, Ms. Snyder was improved in overall status and participation. She presented w/ a moderate oral, wfl pharyngeal swallow w/o overt s/s aspiration. No obvious silent aspiration suspected.     She is felt to most benefit from continuing modified po diet of mechanical ground consistencies, thin liquids, 1:1 feeding assistance. This is felt to be least restrictive for her per premorbid baseline ams.     SLP Recommendation and Plan  1. Mechanical ground consistencies, thin liquids.   2. Medications crushed in puree.   3. 1:1 feeding assistance.   4. Upright and centered for all po intake.   5. Universal aspiration and NORA precautions.  6. Oral care protocol.  SLP to sign off at this time.     Thank you for allowing me to participate in the care of your patient-  Mechelle Vaca M.A., CCC-SLP      EDUCATION  The patient has been educated in the following areas:   Dysphagia (Swallowing Impairment) Oral Care/Hydration NPO rationale.      Time Calculation:       Therapy Charges for Today       Code Description Service Date Service Provider Modifiers Qty    [id number]  ST EVAL ORAL PHARYNG SWALLOW 4 7/14/2025 Mechelle Vaca MA,CCC-SLP GN 1          Mechelle Vaca MA,CCC-SLP  7/14/2025

## 2025-07-14 NOTE — PROGRESS NOTES
[facility] HOSPITALIST PROGRESS NOTE    Subjective     History:   Catherine Snyder is a 79 y.o. female admitted on 7/11/2025 secondary to Urinary tract infection     Procedures: None    Patient seen and examined with RN. This is 79-year-old female with history significant for advanced Alzheimer's dementia with psychosis, deafness, CVA, hypertension, hyperlipidemia, GERD, history of ESBL UTI who had presented with altered mental status above baseline mental status and advanced dementia.  There was report of chills, rigors, nausea and vomiting and she was brought to the emergency room and found to have possible UTI.  Unfortunately, no urine sample (UA) was sent in the emergency room and no urine culture was obtained.  Patient has since been on ceftriaxone.  When I encountered patient, no family member by the bedside.  She does have a knee length cast from the left foot to just below the left knee.  During my interaction with her, she remained nonverbal.  The nurse reported that she does not usually talk and would usually talk in very soft voicd without making sense.  I have ordered for straight cath for urine so the urine sample can be sent at least and will await for urine culture subsequently from the UA if UA appears infected.  Again, I was unable to do any review of systems with patient and therefore, my history/review of systems was dependent on chart review and from patient's nurse.  At baseline, it was reported that patient usually speaks a few words.  If patient's urinalysis looks infected, will switch ceftriaxone to Invanz.    History taken from: patient, chart, and RN.      Objective     Vital Signs  Temp:  [98.7 °F (37.1 °C)-99.4 °F (37.4 °C)] 98.8 °F (37.1 °C)  Heart Rate:  [70-99] 82  Resp:  [18-19] 18  BP: (117-180)/(60-80) 117/64    Intake/Output Summary (Last 24 hours) at 7/14/2025 1246  Last data filed at 7/14/2025 1202  Gross per 24 hour   Intake 235 ml   Output 700 ml   Net -465 ml          Physical Exam:  Vital signs reviewed  General: Alert, awake, patient was nonverbal.  HEENT: Normocephalic, atraumatic, anicteric, PERRLA, no ear discharge, oral mucosa moist  Neck: Soft and supple with no JVD  Respiratory: Clear to auscultation bilaterally  Cardiovascular: S1, S2, appreciated, no murmur  Abdomen: Nondistended, nontender, bowel sounds appreciated  Genitourinary: No suprapubic tenderness and no CVA tenderness  Extremities: No bipedal edema with distal pulses palpable bilaterally  Musculoskeletal: No joint swelling or joint tenderness noted  Skin: No rashes noted  Neuro: Nonfocal examination  Psych: Mood and affect appropriate    Results Review:    Results from last 7 days   Lab Units 07/13/25 0827 07/11/25 2003 07/11/25  1452   WBC 10*3/mm3 7.25 9.60 9.27   HEMOGLOBIN g/dL 13.7 13.5 14.0   PLATELETS 10*3/mm3 178 198 223     Results from last 7 days   Lab Units 07/13/25 0827 07/11/25 2003 07/11/25  1452   SODIUM mmol/L 139 138 139   POTASSIUM mmol/L 3.5 4.5 4.2   CHLORIDE mmol/L 103 103 101   CO2 mmol/L 18.7* 22.1 25.0   BUN mg/dL 16.0 20.4 22.4   CREATININE mg/dL 0.90 0.99 0.86   CALCIUM mg/dL 9.8 9.8 10.1   GLUCOSE mg/dL 108* 104* 121*     Results from last 7 days   Lab Units 07/13/25 0827 07/11/25 2003 07/11/25  1452   BILIRUBIN mg/dL 0.5 0.5 0.5   ALK PHOS U/L 102 96 102   AST (SGOT) U/L 21 24 17   ALT (SGPT) U/L 8 8 11     Results from last 7 days   Lab Units 07/11/25 2003   MAGNESIUM mg/dL 1.9     Results from last 7 days   Lab Units 07/11/25 2003   INR  1.04     Results from last 7 days   Lab Units 07/11/25 2003   CK TOTAL U/L 52       Imaging Results (Last 24 Hours)       ** No results found for the last 24 hours. **              Medications:  aspirin, 81 mg, Oral, Daily  atorvastatin, 10 mg, Oral, Nightly  cefTRIAXone, 1,000 mg, Intravenous, Q24H  cholecalciferol, 50,000 Units, Oral, Weekly  cholecalciferol, 2,000 Units, Oral, Daily  Divalproex Sodium, 125 mg, Oral,  BID  docusate sodium, 100 mg, Oral, Daily  enoxaparin sodium, 40 mg, Subcutaneous, Daily  ferrous sulfate, 325 mg, Oral, Daily  gabapentin, 100 mg, Oral, Nightly  Lidocaine, 1 patch, Transdermal, Q24H  memantine, 5 mg, Oral, BID  OLANZapine, 2.5 mg, Oral, Nightly  polyethylene glycol, 17 g, Oral, Daily  sennosides-docusate, 2 tablet, Oral, BID  sodium chloride, 10 mL, Intravenous, Q12H           Assessment and plan:   Urinary tract infection:    History of ESBL E coli UTI  - patient presents with chills, rigors, vomiting and nausea which makes it likely that she came in with pyelonephritis.  However, her CT abdomen and pelvis did not show obvious pyelo.  She does have history of ESBL UTI but unfortunately, no urine culture was sent.  I have ordered for straight cath for urine sample for urine culture.  Continue ceftriaxone for now and if repeat UA looks dirty today, will switch to Invanz.     Lactic acidosis:  - Resolved.     Acute on chronic metabolic encephalopathy with the acute component likely from UTI.  -  Unable to assess patient at this time due to nonverbal status and not following commands.  -  Chronic component likely from dementia.      Severe sepsis, likely from 1. Above.  -  Improved.     Deafness     History of dementia with behavioral disturbance:  - continue memantine and olanzapine.     Iron deficiency anemia      Hyperlipidemia on Lipitor.      DVT prophylaxis with Lovenox prophylactic dose.      Code status is DNR/ DNI.      Disposition: TBD    Yasir Marroquin MD  07/14/25  12:46 EDT

## 2025-07-14 NOTE — PLAN OF CARE
Goal Outcome Evaluation:           Progress: no change  Outcome Evaluation: Pts VSS on RA. No acute changes. No s/s of distress. Cont IV abx. Will cont with POC

## 2025-07-14 NOTE — CASE MANAGEMENT/SOCIAL WORK
Discharge Planning Assessment  [facility]     Patient Name: Catherine Snyder  MRN: [id number]  Today's Date: 7/14/2025    Admit Date: 7/11/2025     Discharge Needs Assessment       Row Name 07/14/25 1802       Living Environment    People in Home facility resident    Current Living Arrangements extended care facility       Discharge Needs Assessment    Discharge Facility/Level of Care Needs nursing facility, intermediate                   Discharge Plan       Row Name 07/14/25 1802       Plan    Plan Pt was admitted on 07/11/25 from [facility]. SS spoke with [facility] per Ivan who states Pt has a 30 day bed hold. SS to follow.                  Continued Care and Services - Admitted Since 7/11/2025       Destination       Service Provider Request Status Services Address Phone Fax Patient Preferred    [facility] Pending - No Request Sent -- [address] [place] [id number] [phone] --                    ERIC Moraes

## 2025-07-14 NOTE — PLAN OF CARE
Goal Outcome Evaluation:         Pt unable to answer orientation questions, but seems to be faye alert opening eyes spontaneously and tracking with eyes. limited speech is baseline for pt per family at bedside. Spoke with granddaughter over the phone pt granddaughter stated this pt has been deaf for nearly ten years documented this in pt chart. Pt took all nightly meds crushed in pudding, ate 100% of soft snack without issues. Pt is resting in bed at this time without s/s of discomfort or distress. POC ongoing

## 2025-07-15 VITALS
SYSTOLIC BLOOD PRESSURE: 147 MMHG | DIASTOLIC BLOOD PRESSURE: 75 MMHG | OXYGEN SATURATION: 95 % | TEMPERATURE: 97.5 F | HEART RATE: 79 BPM | WEIGHT: 175.04 LBS | HEIGHT: 68 IN | BODY MASS INDEX: 26.53 KG/M2 | RESPIRATION RATE: 18 BRPM

## 2025-07-15 LAB
ANION GAP SERPL CALCULATED.3IONS-SCNC: 13.8 MMOL/L (ref 5–15)
BUN SERPL-MCNC: 26.1 MG/DL (ref 8–23)
BUN/CREAT SERPL: 26.4 (ref 7–25)
CALCIUM SPEC-SCNC: 9.6 MG/DL (ref 8.6–10.5)
CHLORIDE SERPL-SCNC: 103 MMOL/L (ref 98–107)
CO2 SERPL-SCNC: 23.2 MMOL/L (ref 22–29)
CREAT SERPL-MCNC: 0.99 MG/DL (ref 0.57–1)
DEPRECATED RDW RBC AUTO: 40.9 FL (ref 37–54)
EGFRCR SERPLBLD CKD-EPI 2021: 57.4 ML/MIN/1.73
ERYTHROCYTE [DISTWIDTH] IN BLOOD BY AUTOMATED COUNT: 12.2 % (ref 12.3–15.4)
GLUCOSE SERPL-MCNC: 91 MG/DL (ref 65–99)
HCT VFR BLD AUTO: 42.3 % (ref 34–46.6)
HGB BLD-MCNC: 14.1 G/DL (ref 12–15.9)
MCH RBC QN AUTO: 30.5 PG (ref 26.6–33)
MCHC RBC AUTO-ENTMCNC: 33.3 G/DL (ref 31.5–35.7)
MCV RBC AUTO: 91.4 FL (ref 79–97)
PLATELET # BLD AUTO: 208 10*3/MM3 (ref 140–450)
PMV BLD AUTO: 10.2 FL (ref 6–12)
POTASSIUM SERPL-SCNC: 3.5 MMOL/L (ref 3.5–5.2)
RBC # BLD AUTO: 4.63 10*6/MM3 (ref 3.77–5.28)
SODIUM SERPL-SCNC: 140 MMOL/L (ref 136–145)
WBC NRBC COR # BLD AUTO: 8.18 10*3/MM3 (ref 3.4–10.8)

## 2025-07-15 PROCEDURE — 99239 HOSP IP/OBS DSCHRG MGMT >30: CPT | Performed by: INTERNAL MEDICINE

## 2025-07-15 PROCEDURE — 25010000002 CEFTRIAXONE PER 250 MG: Performed by: STUDENT IN AN ORGANIZED HEALTH CARE EDUCATION/TRAINING PROGRAM

## 2025-07-15 PROCEDURE — 85027 COMPLETE CBC AUTOMATED: CPT | Performed by: INTERNAL MEDICINE

## 2025-07-15 PROCEDURE — 25010000002 ENOXAPARIN PER 10 MG: Performed by: STUDENT IN AN ORGANIZED HEALTH CARE EDUCATION/TRAINING PROGRAM

## 2025-07-15 PROCEDURE — 80048 BASIC METABOLIC PNL TOTAL CA: CPT | Performed by: INTERNAL MEDICINE

## 2025-07-15 RX ORDER — CEPHALEXIN 500 MG/1
500 CAPSULE ORAL 2 TIMES DAILY
Qty: 6 CAPSULE | Refills: 0 | Status: SHIPPED | OUTPATIENT
Start: 2025-07-15 | End: 2025-07-18

## 2025-07-15 RX ADMIN — LIDOCAINE 1 PATCH: 4 PATCH TOPICAL at 13:58

## 2025-07-15 RX ADMIN — FERROUS SULFATE TAB 325 MG (65 MG ELEMENTAL FE) 325 MG: 325 (65 FE) TAB at 08:27

## 2025-07-15 RX ADMIN — SENNOSIDES AND DOCUSATE SODIUM 2 TABLET: 50; 8.6 TABLET ORAL at 08:27

## 2025-07-15 RX ADMIN — POLYETHYLENE GLYCOL (3350) 17 G: 17 POWDER, FOR SOLUTION ORAL at 08:28

## 2025-07-15 RX ADMIN — ASPIRIN 81 MG: 81 TABLET, DELAYED RELEASE ORAL at 08:28

## 2025-07-15 RX ADMIN — CEFTRIAXONE SODIUM 1000 MG: 1 INJECTION, POWDER, FOR SOLUTION INTRAMUSCULAR; INTRAVENOUS at 08:28

## 2025-07-15 RX ADMIN — ENOXAPARIN SODIUM 40 MG: 100 INJECTION SUBCUTANEOUS at 08:28

## 2025-07-15 RX ADMIN — Medication 2000 UNITS: at 08:27

## 2025-07-15 RX ADMIN — DIVALPROEX SODIUM 125 MG: 125 CAPSULE, COATED PELLETS ORAL at 08:27

## 2025-07-15 RX ADMIN — Medication 10 ML: at 08:28

## 2025-07-15 RX ADMIN — MEMANTINE 5 MG: 5 TABLET ORAL at 08:28

## 2025-07-15 RX ADMIN — DOCUSATE SODIUM 100 MG: 100 CAPSULE, LIQUID FILLED ORAL at 08:27

## 2025-07-15 NOTE — PROGRESS NOTES
"Patient Name: Catherine Snyder  YOB: 1945  MRN: [id number]  Admission date: 7/11/2025  Reason for Encounter: Follow-up/Progress Note    [facility] Clinical Nutrition Assessment     Subjective    Subjective Information     Pt and RN in room at time of visit. Pt is deaf and requires feeding assistance. RN was feeding pt lunch and states that pt eats fairly well except for breakfast as it comes too early. RD will add Boost with breafast.     Objective   H&P and Current Problems      H&P  Past Medical History:   Diagnosis Date    Alzheimer disease     Alzheimer's disease     Anxiety     Anxiety disorder, unspecified     Cerebrovascular accident (CVA)     Constipation     Deafness     Delirium due to known physiological condition     Depression     Difficulty in walking, not elsewhere classified     Gastro-esophageal reflux disease with esophagitis     GERD (gastroesophageal reflux disease)     Hyperlipidemia     Hyperlipidemia     Hypertension     Major depressive disorder, single episode     Muscle weakness (generalized)     Other lack of coordination     Paranoid personality (disorder)     Shared psychotic disorder     Unspecified dementia without behavioral disturbance     Unspecified hearing loss, unspecified ear     Unspecified lack of coordination     Unspecified psychosis not due to a substance or known physiological condition       History reviewed. No pertinent surgical history.   Current Problems   Admission Diagnosis:  Urinary tract infection [N39.0]  UTI (urinary tract infection) [N39.0]    Problem List:    Urinary tract infection    UTI (urinary tract infection)      Other Applicable Nutrition Information:   N/A     Anthropometrics       Height: 172.7 cm (67.99\")  Weight: 79.4 kg (175 lb 0.7 oz) (07/11/25 1944)     BMI (Calculated): 26.6       Trending Weight Changes 07/15/25: weight loss of 15 lbs (7.8%) over 1 year(s)    Significant?  No       Weight History  Wt Readings from Last 20 " Encounters:   07/11/25 1944 79.4 kg (175 lb 0.7 oz)   07/11/25 1430 79.4 kg (175 lb 0.7 oz)   05/29/25 1741 79.4 kg (175 lb)   05/20/25 1026 79.6 kg (175 lb 6.4 oz)   02/14/25 1035 79 kg (174 lb 3.2 oz)   04/03/24 0625 86.2 kg (190 lb 0.6 oz)   11/10/23 0939 86.2 kg (190 lb)   11/06/23 1217 86.2 kg (190 lb)   06/21/23 1009 88.3 kg (194 lb 9.6 oz)   11/10/22 1204 80 kg (176 lb 5.9 oz)   09/02/22 1003 86.2 kg (190 lb 0.6 oz)   02/15/22 1346 86.2 kg (190 lb)   10/14/21 2120 86.2 kg (190 lb)   10/06/21 0829 86.2 kg (190 lb)   07/04/21 1414 88 kg (194 lb)   03/17/21 1029 86.2 kg (190 lb)   09/25/17 0302 77.1 kg (170 lb)   09/24/17 0330 77.3 kg (170 lb 6.4 oz)   09/23/17 0330 76.6 kg (168 lb 12.8 oz)   09/22/17 0323 76.8 kg (169 lb 6.4 oz)   09/21/17 1115 74.8 kg (165 lb)   08/22/17 2300 77.6 kg (171 lb 0.2 oz)   08/22/17 1741 70.8 kg (156 lb)   12/26/16 0355 76.1 kg (167 lb 12.8 oz)   12/25/16 0400 78 kg (172 lb)   12/23/16 1921 74.6 kg (164 lb 8 oz)   12/23/16 0846 74.4 kg (164 lb)            Labs      Comment: Reviewed      Results from last 7 days   Lab Units 07/15/25  0158 07/13/25  0827 07/12/25  0516 07/12/25  0224 07/11/25  2327 07/11/25 2003 07/11/25 1452 07/11/25  1452   SODIUM mmol/L 140 139  --   --   --  138  --  139   POTASSIUM mmol/L 3.5 3.5  --   --   --  4.5  --  4.2   GLUCOSE mg/dL 91 108*  --   --   --  104*  --  121*   BUN mg/dL 26.1* 16.0  --   --   --  20.4  --  22.4   CREATININE mg/dL 0.99 0.90  --   --   --  0.99  --  0.86   CALCIUM mg/dL 9.6 9.8  --   --   --  9.8  --  10.1   MAGNESIUM mg/dL  --   --   --   --   --  1.9  --   --    ALBUMIN g/dL  --  3.8  --   --   --  3.8  --  4.1   CRP mg/dL  --   --   --   --   --  <0.30  --   --    LACTATE mmol/L  --   --  0.9 2.5* 2.4* 2.6*   < >  --    BILIRUBIN mg/dL  --  0.5  --   --   --  0.5  --  0.5   ALK PHOS U/L  --  102  --   --   --  96  --  102   AST (SGOT) U/L  --  21  --   --   --  24  --  17   ALT (SGPT) U/L  --  8  --   --   --  8  --  11    PROBNP pg/mL  --   --   --   --   --  1,119.0  --   --     < > = values in this interval not displayed.     Results from last 7 days   Lab Units 07/15/25  0158 07/13/25  0827 07/11/25 2003   PLATELETS 10*3/mm3 208 178 198   HEMOGLOBIN g/dL 14.1 13.7 13.5   HEMATOCRIT % 42.3 42.0 41.3     Lab Results   Component Value Date    HGBA1C 6.20 (H) 10/16/2021          Medications       Scheduled Medications aspirin, 81 mg, Oral, Daily  atorvastatin, 10 mg, Oral, Nightly  cefTRIAXone, 1,000 mg, Intravenous, Q24H  cholecalciferol, 50,000 Units, Oral, Weekly  cholecalciferol, 2,000 Units, Oral, Daily  Divalproex Sodium, 125 mg, Oral, BID  docusate sodium, 100 mg, Oral, Daily  enoxaparin sodium, 40 mg, Subcutaneous, Daily  ferrous sulfate, 325 mg, Oral, Daily  gabapentin, 100 mg, Oral, Nightly  Lidocaine, 1 patch, Transdermal, Q24H  memantine, 5 mg, Oral, BID  OLANZapine, 2.5 mg, Oral, Nightly  polyethylene glycol, 17 g, Oral, Daily  sennosides-docusate, 2 tablet, Oral, BID  sodium chloride, 10 mL, Intravenous, Q12H        Infusions      PRN Medications   acetaminophen    hydrALAZINE    HYDROcodone-acetaminophen    labetalol    magnesium hydroxide    sodium chloride    sodium chloride     Physical Findings      Chewing/Swallowing  Teeth Status: Mouth/Teeth WDL: .WDL except, teeth Teeth Symptoms: teeth absent  Chewing/Swallowing Issues: Dysphagia   Edema                            Bowel Function  Stool Output  Stool Unmeasured Occurrence: 1 (07/15/25 1057)  Bowel Incontinence: Yes (07/15/25 1057)  Stool Amount: Large (07/15/25 1057)  Stool Consistency: creamy (07/15/25 1100)  Perineal Care: absorbent brief/pad changed, perineum cleansed (07/15/25 1057)  Last Bowel Movement: 07/15/25   I/Os  Intake & Output (last 3 days)         07/12 0701 07/13 0700 07/13 0701  07/14 0700 07/14 0701  07/15 0700 07/15 0701 07/16 0700    P.O. 0 135 190 220    I.V. (mL/kg) 1624 (20.5)       Total Intake(mL/kg) 1624 (20.5) 135 (1.7) 190 (2.4)  220 (2.8)    Urine (mL/kg/hr) 1750 (0.9) 700 (0.4) 125 (0.1)     Stool  0 0     Total Output 1750 700 125     Net -126 -565 +65 +220            Urine Unmeasured Occurrence 5 x 2 x 2 x     Stool Unmeasured Occurrence  0 x 0 x 1 x           Lines, Drains, Airways, & Wounds       Active LDAs       Name Placement date Placement time Site Days Last dressing change    Peripheral IV 07/12/25 0905 22 G Left;Posterior Hand 07/12/25  0905  Hand  3     External Urinary Catheter --  --  --  --                       Nutrition Focused Physical Exam     Trending NFPE 07/15/25:N/A     Malnutrition Severity Assessment              Estimated Needs      Energy Requirements    Weight for Calculation 79 kg   Method for Estimation  25-30 kcals/kg   Daily Needs (kcal/day) 5929-4456 kcals/day   Protein Requirements    Weight for Calculation 79 kg   Method for Estimation 1.2 gm/kg   Daily Needs (g/day) 95 g/day   Fluid Requirements     Method for Estimation 1 mL/kcal    Daily Needs (mL/day)      Current Nutrition Orders & Evaluation of Intake      Oral Nutrition     Food Allergies  and Intolerances NKFA   Current PO Diet Diet: Regular/House; Texture: Mechanical Ground (NDD 2); Fluid Consistency: Thin (IDDSI 0)   Oral Nutrition Supplement None     Trending % PO Intake 07/15/25: 35% over 5 meals     Enteral Nutrition     Current EN Order Patient isn't on Tube Feeding  Patient doesn't have any tube feeding modular orders     EN Route      EN Tolerance     EN Observation/Intake         Parenteral Nutrition     Current TPN Order    TPN Route    Lipids (mL/%/frequency)     Total # Days on TPN    TPN Observation/Intake       Assessment & Plan   Nutrition Diagnosis and Goals       Nutrition Diagnosis 1 Inadequate Oral Intake related to inability to consume sufficient energy/nutrients aeb po intake of 35%.      Nutrition Diagnosis 2 Increased Nutrient Needs (pro) related to increased demand for pro aeb UTI        Goal(s) Increase PO Intake , Meets  Estimated Needs , Accepts Oral Nutrition Supplement, and Maintain Weight     Nutrition Intervention and Prescription       Intervention  Start oral nutrition supplement      Diet Prescription     Supplement Prescription     Education Provided       Enteral Prescription        TPN Prescription      Monitoring/Evaluation       Monitor/Evaluation Per Protocol, PO Intake, Oral Nutrition Supplement Intake, Pertinent Labs, Weight, and Skin Status     RD Follow-Up Encounter 3-5 days     Electronically signed by:  Christina Dailey RD  07/15/25 13:45 EDT

## 2025-07-15 NOTE — PLAN OF CARE
Goal Outcome Evaluation:         Pt alert, arouses to gentle touch d/t being deaf. Pt is mostly nonverbal, will speak a few words very softly at times. Pt refused PO meds for elevated BP (188/100) MD aware put in prn IV labetalol, given BP regulated. Spoke with granddaughter given education of pathology of pt infection and recovery process. Family seemed to be receptive of education at this time. Pt has had bath, is resting in bed at this time without s/s of discomfort or distress. POC ongoing

## 2025-07-15 NOTE — CASE MANAGEMENT/SOCIAL WORK
Discharge Planning Assessment  [facility]     Patient Name: Catherine Snyder  MRN: [id number]  Today's Date: 7/15/2025    Admit Date: 7/11/2025            Discharge Plan       Row Name 07/15/25 1558       Plan    Final Discharge Disposition Code 03 - skilled nursing facility (SNF)    Final Note Patient is being discharged back to [facility] on this date. SS attempted to call Pt's Grand-daughter to make her aware of discharge but no answer, nor option of voicemail. SS faxed AVS summary, clinical update to [facility] EPIC basket. SS provided RN report number for [facility] [phone]. SS completed PCS form.    16:11pm: Pt's ALON Gita called back and was notified of discharge. SS scheduled EMS via EMS dispatch tab.                  Continued Care and Services - Admitted Since 7/11/2025       Destination Coordination complete.      Service Provider Request Status Services Address Phone Fax Patient Preferred    [facility]  Selected Nursing Home [address] [place] [id number] [phone] --                  Expected Discharge Date and Time       Expected Discharge Date Expected Discharge Time    Jul 15, 2025             ERIC Moraes

## 2025-07-15 NOTE — DISCHARGE PLACEMENT REQUEST
"Shonda Snyder (79 y.o. Female)       Date of Birth   1945    Social Security Number       Address   [address] [facility] [address]    Home Phone   [phone]    MRN   [id number]       Jew   None    Marital Status                               Admission Date   7/11/2025    Admission Type   Emergency    Admitting Provider   Kevin Sheikh DO    Attending Provider   Yasir Marroquin MD    Department, Room/Bed   [address], 3349/2S       Discharge Date       Discharge Disposition       Discharge Destination                                 Attending Provider: Yasir Marroquin MD    Allergies: No Known Allergies    Isolation: Contact   Infection: ESBL E coli (01/27/22)   Code Status: No CPR    Ht: 172.7 cm (67.99\")   Wt: 79.4 kg (175 lb 0.7 oz)    Admission Cmt: None   Principal Problem: Urinary tract infection [N39.0]                   Active Insurance as of 7/11/2025       Primary Coverage       Payor Plan Insurance Group Employer/Plan Group    MEDICARE MEDICARE B ONLY        Payor Plan Address Payor Plan Phone Number Payor Plan Fax Number Effective Dates    [address] [address] [phone]  7/1/2008 - None Entered    [facility] [id number]         Subscriber Name Subscriber Birth Date Member ID       SHONDA SNYDER 1945 [id number]               Secondary Coverage       Payor Plan Insurance Group Employer/Plan Group    [place]        Payor Plan Address Payor Plan Phone Number Payor Plan Fax Number Effective Dates    [address] [phone]  12/15/2016 - None Entered    [facility] [id number]         Subscriber Name Subscriber Birth Date Member ID       SHONDA SNYDER 1945 [id number]                     Emergency Contacts        (Rel.) Home Phone Work Phone Mobile Phone    LillianGita jain (Grandchild) [phone] -- [phone]                 History & Physical        Kevin Sheikh DO at 07/12/25 0118        "   Hospitalist History and Physical    Patient Identification:  Name: Catherine Snyder  Age/Sex: 79 y.o. female  :  1945  MRN: [id number]        Admit Date: 2025   Primary Care Physician: Ricky Alamo MD      Chief Complaint   Patient presents with    Altered Mental Status       History of Present Illness  Patient is a 79 y.o. female with PMHx of advanced Alzheimer's dementia with psychosis, deafness, prior CVA, hypertension, hyperlipidemia, GERD, prior ESBL UTI, who presented to the ED with altered mental status.  Per discussion with daughter, the patient was at the nursing home this afternoon and began to show evidence of of chills, rigors, nausea, and vomiting.  Patient was brought to the ED where she was treated with IV ceftriaxone and returned to the nursing home.  However approximately at 7 PM, the tremors returned and the patient was lethargic, therefore patient returned to the ED.  Of note, the daughter states the patient has a left-sided facial droop for the past 5 to 6 years.  Daughter states she has advanced dementia, and requires maximal assistance for all ADLs.  At baseline she speaks a few words.    Upon arrival, the patient was awake but confused.  She did not follow commands.  Tmax 99.2, heart rate 84, /75.  Labs grossly normal with lactic acid 2.6.  UA significant for nitrites, leukocytes, WBCs, and bacteria.  CT brain, chest, abdomen, pelvis negative for significant acute findings.  Patient received additional dose of ceftriaxone.  Patient admitted for further management.      Past History:  Past Medical History:   Diagnosis Date    Alzheimer disease     Alzheimer's disease     Anxiety     Anxiety disorder, unspecified     Cerebrovascular accident (CVA)     Constipation     Deafness     Delirium due to known physiological condition     Depression     Difficulty in walking, not elsewhere classified     Gastro-esophageal reflux disease with esophagitis     GERD (gastroesophageal  reflux disease)     Hyperlipidemia     Hyperlipidemia     Hypertension     Major depressive disorder, single episode     Muscle weakness (generalized)     Other lack of coordination     Paranoid personality (disorder)     Shared psychotic disorder     Unspecified dementia without behavioral disturbance     Unspecified hearing loss, unspecified ear     Unspecified lack of coordination     Unspecified psychosis not due to a substance or known physiological condition      No past surgical history on file.  Family History   Family history unknown: Yes     Social History     Tobacco Use    Smoking status: Never    Smokeless tobacco: Never   Vaping Use    Vaping status: Never Used   Substance Use Topics    Alcohol use: No    Drug use: No       Allergies: Patient has no known allergies.      ROS: 12 point review of systems negative unless otherwise stated in HPI.    Vital Signs  Temp:  [98.8 °F (37.1 °C)-99.2 °F (37.3 °C)] 99.2 °F (37.3 °C)  Heart Rate:  [67-84] 69  Resp:  [15-20] 20  BP: (142-182)/(67-93) 142/70  Body mass index is 26.62 kg/m².    Physical Exam:    General Appearance:    Alert, confused, intermittently follow commands, nonverbal, in no acute distress     Head:    Normocephalic, without obvious abnormality, atraumatic   Eyes:            Lids normal, conjunctivae and sclerae normal, no icterus, PERRLA   Ears:    Ears appear intact without obvious abnormalities   Throat:   No oral lesions, oral mucosa moist   Neck:   No adenopathy, supple, trachea midline, no thyromegaly         Lungs:     Clear to auscultation,respirations regular, even and unlabored    Heart:    Regular rhythm and normal rate, normal S1 and S2,   no murmur, gallop, rub        Abdomen:     Normal bowel sounds, soft, nontender to palpation, no masses, no organomegaly, non-distended, no guarding, no rebound            Extremities:   Moves all extremities, left hand clenched fist, no edema, no cyanosis, no redness   Pulses:   Pulses palpable  and equal bilaterally   Skin:   No bleeding, bruising or rash       Neurologic:   Cranial nerves 2 - 12 grossly intact, no obvious focal deficits     Results Review:          Results from last 7 days   Lab Units 07/11/25 2003 07/11/25  1452   WBC 10*3/mm3 9.60 9.27   HEMOGLOBIN g/dL 13.5 14.0   HEMATOCRIT % 41.3 42.0   PLATELETS 10*3/mm3 198 223     Results from last 7 days   Lab Units 07/11/25 2003 07/11/25  1452   SODIUM mmol/L 138 139   POTASSIUM mmol/L 4.5 4.2   CHLORIDE mmol/L 103 101   CO2 mmol/L 22.1 25.0   BUN mg/dL 20.4 22.4   CREATININE mg/dL 0.99 0.86   CALCIUM mg/dL 9.8 10.1   GLUCOSE mg/dL 104* 121*     Results from last 7 days   Lab Units 07/11/25 2003 07/11/25  1452   BILIRUBIN mg/dL 0.5 0.5   ALK PHOS U/L 96 102   AST (SGOT) U/L 24 17   ALT (SGPT) U/L 8 11     Results from last 7 days   Lab Units 07/11/25 2003   CRP mg/dL <0.30     Results from last 7 days   Lab Units 07/11/25 2003   MAGNESIUM mg/dL 1.9     Results from last 7 days   Lab Units 07/11/25 2003   CK TOTAL U/L 52     Results from last 7 days   Lab Units 07/11/25 2003   INR  1.04       Imaging:    I have reviewed new imaging and EKG findings.    Imaging Results (Most Recent)       Procedure Component Value Units Date/Time    CT Abdomen Pelvis Without Contrast [[id number]] Collected: 07/11/25 2216     Updated: 07/11/25 2220    Narrative:      EXAMINATION: CT abdomen and pelvis without contrast     CLINICAL HISTORY: Pain     COMPARISON: 7/28/2023     TECHNIQUE: Contiguous 5 mm thick slices were obtained through the  abdomen and pelvis without contrast. Coronal and sagittal reformats were  performed.     FINDINGS:  ABDOMEN:  Mild likely scarring is noted posteriorly within the lower lobes. The  lung bases are otherwise clear. A large hiatal hernia is present. The  gallbladder is mildly distended, nonspecific. The lack of intravenous  contrast material limits evaluation of the solid abdominal viscera.  However, no contour deforming  lesion is appreciated within the  unenhanced liver, spleen, adrenal glands or pancreas. No hydronephrosis  either kidney. No definite ureteral calculus. No adenopathy, free or  loculated collection is appreciated within the upper abdomen.     PELVIS:   Colonic diverticulosis is noted without acute diverticulitis. There is  no bowel obstruction. No free air is identified to suggest perforation.  No significant free or loculated fluid collection is identified.     Evaluation of the bones demonstrates no acute or suspicious osseous  lesions.       Impression:      1. No acute inflammatory process.  2. Diverticulosis without acute diverticulitis.  3. Large hiatal hernia.  4. No bowel obstruction or perforation.     This report was finalized on 7/11/2025 10:18 PM by Regan Osman MD.       CT Chest Without Contrast Diagnostic [[id number]] Collected: 07/11/25 2213     Updated: 07/11/25 2218    Narrative:      EXAMINATION: CT chest without contrast     CLINICAL HISTORY: Altered mental status. Pain.     COMPARISON: None available.     TECHNIQUE: Contiguous 3 mm thick slices were obtained through the chest  without contrast. Coronal and sagittal reformats were performed.     FINDINGS:  Atherosclerotic calcification is noted within the aorta. A moderate to  large hiatal hernia is noted. The central airways are patent. Scattered  likely scarring or atelectasis is noted posteriorly within the lower  lobes. The lungs are otherwise clear. No pneumothorax is appreciated.  There is no pleural or pericardial effusion. No pathologically enlarged  nodes are appreciated. No acute fracture is appreciated. Mild to  moderate multilevel disc related degenerative changes are noted.       Impression:      1. No acute inflammatory process.  2. Mild scattered subsegmental scarring or atelectasis posteriorly  within the lower lobes.     This report was finalized on 7/11/2025 10:16 PM by Regan Osman MD.       CT Head Without Contrast  [[id number]] Collected: 07/11/25 2208     Updated: 07/11/25 2215    Narrative:      EXAMINATION: CT head without contrast     CLINICAL HISTORY: Altered mental status. Pain.     COMPARISON: 7/11/2023     TECHNIQUE: Contiguous 3 mm thick slices were obtained from the skull  base to the vertex without contrast. Coronal and sagittal reformats were  performed.     FINDINGS:  Mild to moderate generalized volume loss is noted with prominence of the  sulci and ventricular system. White matter changes are noted in a  pattern suggesting chronic small vessel ischemic disease. There is no  acute intracranial hemorrhage. No acute territorial infarct. No  extra-axial collection is identified. No midline shift. A lacunar  infarct is again noted adjacent to the right lateral ventricle. No acute  calvarial fracture is appreciated. There is leftward deviation of the  nasal septum. The mastoids are clear.       Impression:      1. No acute intracranial process.  2. If symptoms persist, MRI can be performed for further evaluation.     This report was finalized on 7/11/2025 10:12 PM by Regan Osman MD.                   Assessment & Plan    Acute on chronic metabolic encephalopathy, suspect urinary tract infection  Severe sepsis, likely from above    History of ESBL E. coli UTI  Advanced Alzheimer's dementia, with psychosis  Deafness  Hypertension  Hyperlipidemia  Iron deficiency anemia  Constipation  Vitamin D deficiency  - s/p 2 g ceftriaxone in ED, 1 L NS    Plan:  C/w 1 g ceftriaxone q24h (7/11-); although patient has prior history of ESBL, she is clinically improving on ceftriaxone  - Follow-up blood culture, urine culture   cc/h x 2 L  Restart home Namenda 5 mg twice daily  Restart home Zyprexa 2.5 mg nightly, divalproex 125 mg twice daily  Home senna 2 tab twice daily, milk of magnesia as needed    DVT: Lovenox  GI: NI  Diet: N.p.o. pending dysphagia screen  Dispo: Observation  Code status: DNI/DNR, discussed with  healthcare surrogate granddaughter Gita via telephone    I discussed the patients findings and my recommendations with family          Kevin Morales DO  07/12/25  01:18 EDT      Electronically signed by Kevin Morales DO at 07/12/25 0600       Vital Signs (last day)       Date/Time Temp Temp src Pulse Resp BP Patient Position SpO2    07/15/25 1030 98.5 (36.9) Axillary 76 18 129/67 Lying --    07/15/25 0655 98.2 (36.8) Axillary 69 16 155/69 Lying --    07/15/25 0308 98.1 (36.7) Axillary 89 16 152/74 Lying --    07/15/25 0028 -- -- -- -- 162/80 Lying --    07/14/25 2251 98.6 (37) Oral 88 16 188/100 Lying 95    07/14/25 1834 98.3 (36.8) Axillary 57 18 182/75 Lying 91    07/14/25 1430 98.8 (37.1) Axillary 80 18 156/83 Lying --    07/14/25 0600 98.8 (37.1) Axillary 82 18 117/64 Lying --    07/14/25 0247 98.8 (37.1) Axillary 70 19 135/64 Lying --          Intake & Output (last day)         07/14 0701  07/15 0700 07/15 0701  07/16 0700    P.O. 190 220    Total Intake(mL/kg) 190 (2.4) 220 (2.8)    Urine (mL/kg/hr) 125 (0.1)     Stool 0     Total Output 125     Net +65 +220          Urine Unmeasured Occurrence 2 x 1 x    Stool Unmeasured Occurrence 0 x 1 x          Current Facility-Administered Medications   Medication Dose Route Frequency Provider Last Rate Last Admin    acetaminophen (TYLENOL) tablet 500 mg  500 mg Oral Q6H PRN Alexandru Fernandez MD        aspirin EC tablet 81 mg  81 mg Oral Daily Kevin Morales DO   81 mg at 07/15/25 0828    atorvastatin (LIPITOR) tablet 10 mg  10 mg Oral Nightly DegregKevin junior DO   10 mg at 07/13/25 2040    cefTRIAXone (ROCEPHIN) 1,000 mg in sodium chloride 0.9 % 100 mL IVPB-VTB  1,000 mg Intravenous Q24H Degregory, Vincent T,  mL/hr at 07/15/25 0828 1,000 mg at 07/15/25 0828    cholecalciferol (VITAMIN D3) capsule 50,000 Units  50,000 Units Oral Weekly Alexandru Fernandez MD        cholecalciferol (VITAMIN D3) tablet 2,000 Units  2,000 Units Oral Daily Jim  Alexandru ROCHA MD   2,000 Units at 07/15/25 0827    Divalproex Sodium (DEPAKOTE SPRINKLE) capsule 125 mg  125 mg Oral BID Kevin Sheikh DO   125 mg at 07/15/25 0827    docusate sodium (COLACE) capsule 100 mg  100 mg Oral Daily Alexandru Fernandez MD   100 mg at 07/15/25 0827    enoxaparin sodium (LOVENOX) syringe 40 mg  40 mg Subcutaneous Daily Kevin Sheikh DO   40 mg at 07/15/25 0828    ferrous sulfate tablet 325 mg  325 mg Oral Daily Alexandru Fernandez MD   325 mg at 07/15/25 0827    gabapentin (NEURONTIN) capsule 100 mg  100 mg Oral Nightly Kevin Sheikh DO   100 mg at 07/13/25 2040    hydrALAZINE (APRESOLINE) injection 10 mg  10 mg Intravenous Q6H PRN Kevin Sheikh DO   10 mg at 07/14/25 1842    HYDROcodone-acetaminophen (NORCO) 5-325 MG per tablet 1 tablet  1 tablet Oral Q6H PRN Kevin Sheikh DO        labetalol (NORMODYNE,TRANDATE) injection 10 mg  10 mg Intravenous Q4H PRN Kevin Sheikh DO   10 mg at 07/14/25 2339    Lidocaine 4 % 1 patch  1 patch Transdermal Q24H Alexandru Fernandez MD   1 patch at 07/15/25 1358    magnesium hydroxide (MILK OF MAGNESIA) suspension 10 mL  10 mL Oral Daily PRN Alexandru Fernandez MD        memantine (NAMENDA) tablet 5 mg  5 mg Oral BID Kevin Sheikh DO   5 mg at 07/15/25 0828    OLANZapine (zyPREXA) tablet 2.5 mg  2.5 mg Oral Nightly Kevin Sheikh DO   2.5 mg at 07/13/25 2040    polyethylene glycol (MIRALAX) packet 17 g  17 g Oral Daily Alexandru Fernandez MD   17 g at 07/15/25 0828    sennosides-docusate (PERICOLACE) 8.6-50 MG per tablet 2 tablet  2 tablet Oral BID Kevin Sheikh DO   2 tablet at 07/15/25 0827    sodium chloride 0.9 % flush 10 mL  10 mL Intravenous Q12H Degregory, Vincent T, DO   10 mL at 07/15/25 0828    sodium chloride 0.9 % flush 10 mL  10 mL Intravenous PRN Degregory, Vincent T, DO        sodium chloride 0.9 % infusion 40 mL  40 mL Intravenous PRN Degregory, Vincent T, DO         Operative/Procedure Notes  (most recent note)    No notes of this type exist for this encounter.          Physician Progress Notes (most recent note)        Yasir Marroquin MD at 07/14/25 1242                [facility]    Subjective     History:   Catherine Snyder is a 79 y.o. female admitted on 7/11/2025 secondary to Urinary tract infection     Procedures: None    Patient seen and examined with RN. This is 79-year-old female with history significant for advanced Alzheimer's dementia with psychosis, deafness, CVA, hypertension, hyperlipidemia, GERD, history of ESBL UTI who had presented with altered mental status above baseline mental status and advanced dementia.  There was report of chills, rigors, nausea and vomiting and she was brought to the emergency room and found to have possible UTI.  Unfortunately, no urine sample (UA) was sent in the emergency room and no urine culture was obtained.  Patient has since been on ceftriaxone.  When I encountered patient, no family member by the bedside.  She does have a knee length cast from the left foot to just below the left knee.  During my interaction with her, she remained nonverbal.  The nurse reported that she does not usually talk and would usually talk in very soft voicd without making sense.  I have ordered for straight cath for urine so the urine sample can be sent at least and will await for urine culture subsequently from the UA if UA appears infected.  Again, I was unable to do any review of systems with patient and therefore, my history/review of systems was dependent on chart review and from patient's nurse.  At baseline, it was reported that patient usually speaks a few words.  If patient's urinalysis looks infected, will switch ceftriaxone to Invanz.    History taken from: patient, chart, and RN.      Objective     Vital Signs  Temp:  [98.7 °F (37.1 °C)-99.4 °F (37.4 °C)] 98.8 °F (37.1 °C)  Heart Rate:  [70-99] 82  Resp:  [18-19] 18  BP:  (117-180)/(60-80) 117/64    Intake/Output Summary (Last 24 hours) at 7/14/2025 1246  Last data filed at 7/14/2025 1202  Gross per 24 hour   Intake 235 ml   Output 700 ml   Net -465 ml         Physical Exam:  Vital signs reviewed  General: Alert, awake, patient was nonverbal.  HEENT: Normocephalic, atraumatic, anicteric, PERRLA, no ear discharge, oral mucosa moist  Neck: Soft and supple with no JVD  Respiratory: Clear to auscultation bilaterally  Cardiovascular: S1, S2, appreciated, no murmur  Abdomen: Nondistended, nontender, bowel sounds appreciated  Genitourinary: No suprapubic tenderness and no CVA tenderness  Extremities: No bipedal edema with distal pulses palpable bilaterally  Musculoskeletal: No joint swelling or joint tenderness noted  Skin: No rashes noted  Neuro: Nonfocal examination  Psych: Mood and affect appropriate    Results Review:    Results from last 7 days   Lab Units 07/13/25 0827 07/11/25 2003 07/11/25  1452   WBC 10*3/mm3 7.25 9.60 9.27   HEMOGLOBIN g/dL 13.7 13.5 14.0   PLATELETS 10*3/mm3 178 198 223     Results from last 7 days   Lab Units 07/13/25 0827 07/11/25 2003 07/11/25  1452   SODIUM mmol/L 139 138 139   POTASSIUM mmol/L 3.5 4.5 4.2   CHLORIDE mmol/L 103 103 101   CO2 mmol/L 18.7* 22.1 25.0   BUN mg/dL 16.0 20.4 22.4   CREATININE mg/dL 0.90 0.99 0.86   CALCIUM mg/dL 9.8 9.8 10.1   GLUCOSE mg/dL 108* 104* 121*     Results from last 7 days   Lab Units 07/13/25 0827 07/11/25 2003 07/11/25  1452   BILIRUBIN mg/dL 0.5 0.5 0.5   ALK PHOS U/L 102 96 102   AST (SGOT) U/L 21 24 17   ALT (SGPT) U/L 8 8 11     Results from last 7 days   Lab Units 07/11/25 2003   MAGNESIUM mg/dL 1.9     Results from last 7 days   Lab Units 07/11/25 2003   INR  1.04     Results from last 7 days   Lab Units 07/11/25 2003   CK TOTAL U/L 52       Imaging Results (Last 24 Hours)       ** No results found for the last 24 hours. **              Medications:  aspirin, 81 mg, Oral, Daily  atorvastatin, 10 mg,  Oral, Nightly  cefTRIAXone, 1,000 mg, Intravenous, Q24H  cholecalciferol, 50,000 Units, Oral, Weekly  cholecalciferol, 2,000 Units, Oral, Daily  Divalproex Sodium, 125 mg, Oral, BID  docusate sodium, 100 mg, Oral, Daily  enoxaparin sodium, 40 mg, Subcutaneous, Daily  ferrous sulfate, 325 mg, Oral, Daily  gabapentin, 100 mg, Oral, Nightly  Lidocaine, 1 patch, Transdermal, Q24H  memantine, 5 mg, Oral, BID  OLANZapine, 2.5 mg, Oral, Nightly  polyethylene glycol, 17 g, Oral, Daily  sennosides-docusate, 2 tablet, Oral, BID  sodium chloride, 10 mL, Intravenous, Q12H           Assessment and plan:   Urinary tract infection:    History of ESBL E coli UTI  - patient presents with chills, rigors, vomiting and nausea which makes it likely that she came in with pyelonephritis.  However, her CT abdomen and pelvis did not show obvious pyelo.  She does have history of ESBL UTI but unfortunately, no urine culture was sent.  I have ordered for straight cath for urine sample for urine culture.  Continue ceftriaxone for now and if repeat UA looks dirty today, will switch to Invanz.     Lactic acidosis:  - Resolved.     Acute on chronic metabolic encephalopathy with the acute component likely from UTI.  -  Unable to assess patient at this time due to nonverbal status and not following commands.  -  Chronic component likely from dementia.      Severe sepsis, likely from 1. Above.  -  Improved.     Deafness     History of dementia with behavioral disturbance:  - continue memantine and olanzapine.     Iron deficiency anemia      Hyperlipidemia on Lipitor.      DVT prophylaxis with Lovenox prophylactic dose.      Code status is DNR/ DNI.      Disposition: TBD    Yasir Marroquin MD  07/14/25  12:46 EDT    Electronically signed by Yasir Marroquin MD at 07/15/25 0003

## 2025-07-15 NOTE — DISCHARGE SUMMARY
[facility] DISCHARGE SUMMARY      Date of Admission: 7/11/2025    Date of Discharge:  07/15/2025    PCP: Ricky Alamo MD    Admission Diagnosis: Urinary tract infection    Discharge Diagnoses:    Acute on chronic encephalopathy, likely infectious encephalopathy versus metabolic encephalopathy.  Patient currently back to baseline.      Probable UTI:  - treated with ceftriaxone although urine culture was not really sent on admission.  Patient's symptoms of chills and rigors resolved.  Patient is being discharged with a few more days of Keflex for completion of antibiotics.      Advanced dementia, stable.      Lactic acidosis, resolved     Mild metabolic acidosis, resolved.     Severe sepsis, likely from UTI, improved.      History of dementia with behavioral disturbance    Procedures Performed:       Consults:   Consults       Date and Time Order Name Status Description    7/15/2025 10:11 AM Inpatient Podiatry Consult      7/11/2025  7:56 PM Hospitalist (on-call MD unless specified)                History of Present Illness:  Catherine Snyder is a 79 y.o. female who presented to [facility] ED with CC of   Chief Complaint   Patient presents with    Altered Mental Status   . Probable UTI       Hospital Course  Catherine Snyder is an 79-year-old female with pretty much advanced Alzheimer's dementia with psychosis, prior history of CVA, hypertension, hyperlipidemia, GERD, prior history of ESBL UTI who had presented with altered mental status on top of her advanced dementia.  Patient's daughter on admission also reported that she was having chills, rigors, nausea and vomiting.  She was initially brought to emergency room and treated with ceftriaxone and sent back to the nursing facility.  However, that same evening, her rigors returned and she was lethargic.  She was therefore brought back to the emergency room.  Patient at baseline requires maximum assistance for all activities of daily living.  She would usually  speak a few words when she wanted to speak.  However, most of the time, she would remain nonverbal.    In the emergency room on presentation, her urinalysis was dirty although unfortunately, no urine culture was sent.  Her CBC revealed no leukocytosis.  Her chemistries revealed elevated BUN/ creatinine ratio on admission likely suggestive of some element of dehydration.  Patient then had a CT head done in the emergency room on admission which revealed no acute intracranial process.  She also had CT abdomen and pelvis done in the emergency room which revealed no acute process in the abdomen and pelvis but diverticulosis was noted and large hiatal hernia.  CT chest done in the emergency room noted no acute inflammatory process but found scattered subsegmental scarring or atelectasis posteriorly within the lower lobes.   On admission under the hospitalist Service, patient was started on ceftriaxone.  Since being admitted here, she has done relatively well.  She has not spiked fevers.  She has not had rigors.  Repeat urinalysis done yesterday actually did not reveal dirty urine again.  At this time, it is believed the patient is at baseline mental status.  The nurse reported that she communicated with family and was told that patient is likely at baseline currently by family.  Therefore, patient has been deemed to have maximized hospital stay and will be discharged back to her facility.      Patient was seen and evaluated by me on the day of discharge today.  No new acute issues reported.  She has not had any fever or chills.  She has been deemed to have maximized hospital stay and will be discharged back to facility.  She will need to follow-up with her PCP in 5-7 days.      Pertinent Test Results:    Condition on Discharge:  Stable    Vital Signs  Vitals:    07/15/25 1420   BP: 147/75   Pulse: 79   Resp: 18   Temp: 97.5 °F (36.4 °C)   SpO2:        Physical Exam:  Vital signs reviewed  General: Alert, awake, Patient  remains nonverbal most of the time.  HEENT: Normocephalic, atraumatic, anicteric, PERRLA, no ear discharge, oral mucosa moist  Neck: Soft and supple with no JVD  Respiratory: Decreased bilateral breath sounds.  Cardiovascular: S1, S2, appreciated, no murmur  Abdomen: Nondistended, nontender, bowel sounds appreciated  Genitourinary: No suprapubic tenderness and no CVA tenderness  Extremities:  Left leg from below the left knee to the left foot covered in cast.  No edema in the right lower extremity.  Musculoskeletal: No joint swelling or joint tenderness noted  Skin: No rashes noted  Neuro:   Unable to fully assess because of patient's deafness, advanced dementia and nonverbal state.  Psych:   Unable to assess.  Discharge Disposition:   SNF      Discharge Medications:     Discharge Medications        New Medications        Instructions Start Date   cephalexin 500 MG capsule  Commonly known as: KEFLEX   500 mg, Oral, 2 Times Daily             Continue These Medications        Instructions Start Date   acetaminophen 500 MG tablet  Commonly known as: TYLENOL   500 mg, Oral, Every 6 Hours PRN      aspirin 81 MG chewable tablet   81 mg, Daily      atorvastatin 10 MG tablet  Commonly known as: LIPITOR   10 mg, Oral, Nightly      cholecalciferol 25 MCG (1000 UT) tablet  Commonly known as: VITAMIN D3   2,000 Units, Oral, Daily      cloNIDine 0.1 MG tablet  Commonly known as: CATAPRES   0.1 mg, Oral, Every 8 Hours PRN      Divalproex Sodium 125 MG capsule  Commonly known as: DEPAKOTE SPRINKLE   125 mg, Oral, 2 Times Daily      docusate sodium 100 MG capsule  Commonly known as: COLACE   100 mg, Oral, Daily      ferrous sulfate 325 (65 FE) MG tablet   325 mg, Daily      gabapentin 100 MG capsule  Commonly known as: NEURONTIN   100 mg, Oral, Nightly      HYDROcodone-acetaminophen 5-325 MG per tablet  Commonly known as: NORCO   1 tablet, Oral, Every 6 Hours PRN      Lidocaine 4 %   1 patch, Transdermal, Every 24 Hours, Remove &  Discard patch within 12 hours or as directed by MD      magnesium hydroxide 400 MG/5ML suspension  Commonly known as: MILK OF MAGNESIA   30 mL, Daily PRN      memantine 5 MG tablet  Commonly known as: NAMENDA   5 mg, Oral, Every 12 Hours Scheduled      MiraLax Mix-In Ararat 17 g packet  Generic drug: polyethylene glycol   17 g, Oral, Daily      OLANZapine 2.5 MG tablet  Commonly known as: zyPREXA   2.5 mg, Oral, Nightly      senna 8.6 MG tablet  Commonly known as: SENOKOT   2 tablets, 2 Times Daily      vitamin D 1.25 MG (91492 UT) capsule capsule  Commonly known as: ERGOCALCIFEROL   50,000 Units, Oral, Weekly             Stop These Medications      cefdinir 300 MG capsule  Commonly known as: OMNICEF                Discharge Diet:   cardiac diet  Dietary Orders (From admission, onward)       Start     Ordered    07/15/25 1354  Dietary Nutrition Supplements Boost Plus (Ensure Enlive, Ensure Plus)  Daily With Breakfast      Question:  Select Supplement  Answer:  Boost Plus (Ensure Enlive, Ensure Plus)    07/15/25 1354    07/13/25 1401  Diet: Regular/House; Texture: Mechanical Ground (NDD 2); Fluid Consistency: Thin (IDDSI 0)  Diet Effective Now        References:    Diet Order Definitions   Question Answer Comment   Diets: Regular/House    Texture: Mechanical Ground (NDD 2)    Fluid Consistency: Thin (IDDSI 0)        07/13/25 1400                    Activity at Discharge:  As tolerated but avoid weight-bearing on the left lower extremity until she sees Orthopedics.    Follow-up Appointments:  Additional Instructions for the Follow-ups that You Need to Schedule       Discharge Follow-up with PCP   As directed       Currently Documented PCP:    Ricky Alamo MD    PCP Phone Number:    [phone]     Follow Up Details: Please, schedule an appointment for patient to follow-up with her PCP in 5-7 days.        Discharge Follow-up with Specialty: Please, scheduled appointment for patient to follow-up with orthopedics in 1  week regarding her left leg cast.; 1 Week   As directed      Specialty: Please, scheduled appointment for patient to follow-up with orthopedics in 1 week regarding her left leg cast.   Follow Up: 1 Week               Follow-up Information       Ricky Alamo MD .    Specialty: Internal Medicine  Why: Please, schedule an appointment for patient to follow-up with her PCP in 5-7 days.  Contact information:  [address] [facility] [place] [address] [phone]                                 Test Results Pending at Discharge:  Pending Labs       Order Current Status    Blood Culture - Blood, Arm, Left Preliminary result    Blood Culture - Blood, Arm, Left Preliminary result             The ASCVD Risk score (Jovany DK, et al., 2019) failed to calculate for the following reasons:    The 2019 ASCVD risk score is only valid for ages 40 to 79    Risk score cannot be calculated because patient has a medical history suggesting prior/existing ASCVD      Yasir Marroquin MD  07/15/25  15:16 EDT      Time: Greater than 30 minutes spent on this discharge.

## 2025-07-15 NOTE — ED PROVIDER NOTES
Subjective   History of Present Illness        History provided by:  Patient   used: No    Weakness - Generalized  Severity:  Moderate  Onset quality:  Gradual  Duration: Several.  Timing:  Constant  Progression:  Worsening  Chronicity:  Chronic  Context: urinary tract infection    Context: not alcohol use, not allergies, not change in medication, not decreased sleep, not dehydration, not drug use, not increased activity, not pinched nerve, not recent infection and not stress    Relieved by:  Nothing  Worsened by:  Nothing  Ineffective treatments:  None tried  Associated symptoms: difficulty walking and lethargy    Associated symptoms: no abdominal pain, no anorexia, no aphasia, no arthralgias, no ataxia, no chest pain, no cough, no diarrhea, no dizziness, no drooling, no dysphagia, no dysuria, no numbness in extremities, no falls, no fever, no foul-smelling urine, no frequency, no headaches, no hematochezia, no loss of consciousness, no melena, no myalgias, no nausea, no near-syncope, no seizures, no sensory-motor deficit, no shortness of breath, no stroke symptoms, no syncope, no urgency, no vision change and no vomiting    Risk factors: neurologic disease    Risk factors: no anemia, no congestive heart failure, no coronary artery disease, no diabetes, no excessive menstruation, no family hx of stroke, no heart disease, no new medications and no recent stressors        Review of Systems   Constitutional:  Positive for activity change, appetite change and fatigue. Negative for chills, diaphoresis and fever.   HENT:  Negative for congestion, drooling, ear pain and sore throat.    Eyes:  Negative for redness.   Respiratory:  Negative for cough, chest tightness, shortness of breath and wheezing.    Cardiovascular:  Negative for chest pain, palpitations, leg swelling, syncope and near-syncope.   Gastrointestinal:  Negative for abdominal pain, anorexia, diarrhea, dysphagia, hematochezia, melena,  nausea and vomiting.   Genitourinary:  Negative for dysuria, frequency and urgency.   Musculoskeletal:  Negative for arthralgias, back pain, falls, myalgias and neck pain.   Skin:  Negative for pallor, rash and wound.   Neurological:  Positive for weakness. Negative for dizziness, seizures, loss of consciousness, speech difficulty and headaches.   Psychiatric/Behavioral:  Negative for agitation, behavioral problems, confusion and decreased concentration.    All other systems reviewed and are negative.      Past Medical History:   Diagnosis Date    Alzheimer disease     Alzheimer's disease     Anxiety     Anxiety disorder, unspecified     Cerebrovascular accident (CVA)     Constipation     Deafness     Delirium due to known physiological condition     Depression     Difficulty in walking, not elsewhere classified     Gastro-esophageal reflux disease with esophagitis     GERD (gastroesophageal reflux disease)     Hyperlipidemia     Hyperlipidemia     Hypertension     Major depressive disorder, single episode     Muscle weakness (generalized)     Other lack of coordination     Paranoid personality (disorder)     Shared psychotic disorder     Unspecified dementia without behavioral disturbance     Unspecified hearing loss, unspecified ear     Unspecified lack of coordination     Unspecified psychosis not due to a substance or known physiological condition        No Known Allergies    History reviewed. No pertinent surgical history.    Family History   Family history unknown: Yes       Social History     Socioeconomic History    Marital status:    Tobacco Use    Smoking status: Never    Smokeless tobacco: Never   Vaping Use    Vaping status: Never Used   Substance and Sexual Activity    Alcohol use: No    Drug use: No    Sexual activity: Not Currently     Partners: Male           Objective   Physical Exam  Vitals and nursing note reviewed.   Constitutional:       General: She is not in acute distress.      Appearance: Normal appearance. She is well-developed. She is ill-appearing. She is not toxic-appearing or diaphoretic.   HENT:      Head: Normocephalic and atraumatic.      Right Ear: External ear normal.      Left Ear: External ear normal.      Nose: Nose normal.      Mouth/Throat:      Pharynx: No oropharyngeal exudate.      Tonsils: No tonsillar exudate.   Eyes:      General: Lids are normal.      Conjunctiva/sclera: Conjunctivae normal.      Pupils: Pupils are equal, round, and reactive to light.   Neck:      Thyroid: No thyromegaly.   Cardiovascular:      Rate and Rhythm: Normal rate and regular rhythm.      Pulses: Normal pulses.      Heart sounds: Normal heart sounds, S1 normal and S2 normal.   Pulmonary:      Effort: Pulmonary effort is normal. No tachypnea or respiratory distress.      Breath sounds: Normal breath sounds. No decreased breath sounds, wheezing or rales.   Chest:      Chest wall: No tenderness.   Abdominal:      General: Bowel sounds are normal. There is no distension.      Palpations: Abdomen is soft.      Tenderness: There is no abdominal tenderness. There is no guarding or rebound.   Musculoskeletal:         General: No tenderness or deformity. Normal range of motion.      Cervical back: Full passive range of motion without pain, normal range of motion and neck supple.   Lymphadenopathy:      Cervical: No cervical adenopathy.   Skin:     General: Skin is warm and dry.      Capillary Refill: Capillary refill takes more than 3 seconds.      Coloration: Skin is not pale.      Findings: No erythema or rash.   Neurological:      Mental Status: She is alert and oriented to person, place, and time.      GCS: GCS eye subscore is 4. GCS verbal subscore is 3. GCS motor subscore is 5.      Cranial Nerves: No cranial nerve deficit.      Sensory: No sensory deficit.   Psychiatric:         Thought Content: Thought content normal.         Cognition and Memory: Memory is impaired.         Judgment:  Judgment normal.         Procedures           ED Course                                                       Medical Decision Making  Amount and/or Complexity of Data Reviewed  Labs: ordered.  Radiology: ordered.    Risk  Prescription drug management.  Decision regarding hospitalization.        Final diagnoses:   Bacterial urinary infection       ED Disposition  ED Disposition       ED Disposition   Decision to Admit    Condition   --    Comment   Level of Care: Med/Surg [1]   Diagnosis: Urinary tract infection [861245]   Admitting Physician: TERESO BARNARD [[id number]]                 Ricky Alamo MD  [address] [facility] [address]  [phone]      Please, schedule an appointment for patient to follow-up with her PCP in 5-7 days.         Medication List        New Prescriptions      cephalexin 500 MG capsule  Commonly known as: KEFLEX  Take 1 capsule by mouth 2 (Two) Times a Day for 3 days.            Stop      cefdinir 300 MG capsule  Commonly known as: OMNICEF               Where to Get Your Medications        These medications were sent to [facility]. - [place], [place] - [address] [facility] - [phone]  - [phone] FX  [address] [place] [id number]      Phone: [phone]   cephalexin 500 MG capsule            Lavon Hogan MD  07/15/25 6692

## 2025-07-15 NOTE — DISCHARGE PLACEMENT REQUEST
"Shonda Snyder (79 y.o. Female)       Date of Birth   1945    Social Security Number       Address   [address] [facility] [address]    Home Phone   [phone]    MRN   [id number]       Hindu   None    Marital Status                               Admission Date   7/11/2025    Admission Type   Emergency    Admitting Provider   Kevin Sheikh DO    Attending Provider   Yasir Marroquin MD    Department, Room/Bed   [address], 3349/2S       Discharge Date       Discharge Disposition   Skilled Nursing Facility (DC - External)    Discharge Destination                                 Attending Provider: Yasir Marroquin MD    Allergies: No Known Allergies    Isolation: Contact   Infection: ESBL E coli (01/27/22)   Code Status: No CPR    Ht: 172.7 cm (67.99\")   Wt: 79.4 kg (175 lb 0.7 oz)    Admission Cmt: None   Principal Problem: Urinary tract infection [N39.0]                   Active Insurance as of 7/11/2025       Primary Coverage       Payor Plan Insurance Group Employer/Plan Group    MEDICARE MEDICARE B ONLY        Payor Plan Address Payor Plan Phone Number Payor Plan Fax Number Effective Dates    [address] [address] [phone]  7/1/2008 - None Entered    [facility] [id number]         Subscriber Name Subscriber Birth Date Member ID       SHONDA SNYDER 1945 [id number]               Secondary Coverage       Payor Plan Insurance Group Employer/Plan Group    [place]        Payor Plan Address Payor Plan Phone Number Payor Plan Fax Number Effective Dates    [address] [phone]  12/15/2016 - None Entered    [facility] [address]         Subscriber Name Subscriber Birth Date Member ID       SHONDA SNYDER 1945 [id number]                     Emergency Contacts        (Rel.) Home Phone Work Phone Mobile Phone    TuolumneGita jain (Grandchild) [phone] -- [phone]                 Discharge Summary        Yasir Marroquin MD " at 07/15/25 1516              [facility] DISCHARGE SUMMARY      Date of Admission: 7/11/2025    Date of Discharge:  07/15/2025    PCP: Ricky Alamo MD    Admission Diagnosis: Urinary tract infection    Discharge Diagnoses:    Acute on chronic encephalopathy, likely infectious encephalopathy versus metabolic encephalopathy.  Patient currently back to baseline.      Probable UTI:  - treated with ceftriaxone although urine culture was not really sent on admission.  Patient's symptoms of chills and rigors resolved.  Patient is being discharged with a few more days of Keflex for completion of antibiotics.      Advanced dementia, stable.      Lactic acidosis, resolved     Mild metabolic acidosis, resolved.     Severe sepsis, likely from UTI, improved.      History of dementia with behavioral disturbance    Procedures Performed:       Consults:   Consults       Date and Time Order Name Status Description    7/15/2025 10:11 AM Inpatient Podiatry Consult      7/11/2025  7:56 PM Hospitalist (on-call MD unless specified)                History of Present Illness:  Catherine Snyder is a 79 y.o. female who presented to [facility] ED with CC of   Chief Complaint   Patient presents with    Altered Mental Status   . Probable UTI       Hospital Course  Catherine Snyder is an 79-year-old female with pretty much advanced Alzheimer's dementia with psychosis, prior history of CVA, hypertension, hyperlipidemia, GERD, prior history of ESBL UTI who had presented with altered mental status on top of her advanced dementia.  Patient's daughter on admission also reported that she was having chills, rigors, nausea and vomiting.  She was initially brought to emergency room and treated with ceftriaxone and sent back to the nursing facility.  However, that same evening, her rigors returned and she was lethargic.  She was therefore brought back to the emergency room.  Patient at baseline requires maximum assistance for all activities of daily  living.  She would usually speak a few words when she wanted to speak.  However, most of the time, she would remain nonverbal.    In the emergency room on presentation, her urinalysis was dirty although unfortunately, no urine culture was sent.  Her CBC revealed no leukocytosis.  Her chemistries revealed elevated BUN/ creatinine ratio on admission likely suggestive of some element of dehydration.  Patient then had a CT head done in the emergency room on admission which revealed no acute intracranial process.  She also had CT abdomen and pelvis done in the emergency room which revealed no acute process in the abdomen and pelvis but diverticulosis was noted and large hiatal hernia.  CT chest done in the emergency room noted no acute inflammatory process but found scattered subsegmental scarring or atelectasis posteriorly within the lower lobes.   On admission under the hospitalist Service, patient was started on ceftriaxone.  Since being admitted here, she has done relatively well.  She has not spiked fevers.  She has not had rigors.  Repeat urinalysis done yesterday actually did not reveal dirty urine again.  At this time, it is believed the patient is at baseline mental status.  The nurse reported that she communicated with family and was told that patient is likely at baseline currently by family.  Therefore, patient has been deemed to have maximized hospital stay and will be discharged back to her facility.      Patient was seen and evaluated by me on the day of discharge today.  No new acute issues reported.  She has not had any fever or chills.  She has been deemed to have maximized hospital stay and will be discharged back to facility.  She will need to follow-up with her PCP in 5-7 days.      Pertinent Test Results:    Condition on Discharge:  Stable    Vital Signs  Vitals:    07/15/25 1420   BP: 147/75   Pulse: 79   Resp: 18   Temp: 97.5 °F (36.4 °C)   SpO2:        Physical Exam:  Vital signs  reviewed  General: Alert, awake, Patient remains nonverbal most of the time.  HEENT: Normocephalic, atraumatic, anicteric, PERRLA, no ear discharge, oral mucosa moist  Neck: Soft and supple with no JVD  Respiratory: Decreased bilateral breath sounds.  Cardiovascular: S1, S2, appreciated, no murmur  Abdomen: Nondistended, nontender, bowel sounds appreciated  Genitourinary: No suprapubic tenderness and no CVA tenderness  Extremities:  Left leg from below the left knee to the left foot covered in cast.  No edema in the right lower extremity.  Musculoskeletal: No joint swelling or joint tenderness noted  Skin: No rashes noted  Neuro:   Unable to fully assess because of patient's deafness, advanced dementia and nonverbal state.  Psych:   Unable to assess.  Discharge Disposition:   SNF      Discharge Medications:     Discharge Medications        New Medications        Instructions Start Date   cephalexin 500 MG capsule  Commonly known as: KEFLEX   500 mg, Oral, 2 Times Daily             Continue These Medications        Instructions Start Date   acetaminophen 500 MG tablet  Commonly known as: TYLENOL   500 mg, Oral, Every 6 Hours PRN      aspirin 81 MG chewable tablet   81 mg, Daily      atorvastatin 10 MG tablet  Commonly known as: LIPITOR   10 mg, Oral, Nightly      cholecalciferol 25 MCG (1000 UT) tablet  Commonly known as: VITAMIN D3   2,000 Units, Oral, Daily      cloNIDine 0.1 MG tablet  Commonly known as: CATAPRES   0.1 mg, Oral, Every 8 Hours PRN      Divalproex Sodium 125 MG capsule  Commonly known as: DEPAKOTE SPRINKLE   125 mg, Oral, 2 Times Daily      docusate sodium 100 MG capsule  Commonly known as: COLACE   100 mg, Oral, Daily      ferrous sulfate 325 (65 FE) MG tablet   325 mg, Daily      gabapentin 100 MG capsule  Commonly known as: NEURONTIN   100 mg, Oral, Nightly      HYDROcodone-acetaminophen 5-325 MG per tablet  Commonly known as: NORCO   1 tablet, Oral, Every 6 Hours PRN      Lidocaine 4 %   1  patch, Transdermal, Every 24 Hours, Remove & Discard patch within 12 hours or as directed by MD      magnesium hydroxide 400 MG/5ML suspension  Commonly known as: MILK OF MAGNESIA   30 mL, Daily PRN      memantine 5 MG tablet  Commonly known as: NAMENDA   5 mg, Oral, Every 12 Hours Scheduled      MiraLax Mix-In [place] 17 g packet  Generic drug: polyethylene glycol   17 g, Oral, Daily      OLANZapine 2.5 MG tablet  Commonly known as: zyPREXA   2.5 mg, Oral, Nightly      senna 8.6 MG tablet  Commonly known as: SENOKOT   2 tablets, 2 Times Daily      vitamin D 1.25 MG (12286 UT) capsule capsule  Commonly known as: ERGOCALCIFEROL   50,000 Units, Oral, Weekly             Stop These Medications      cefdinir 300 MG capsule  Commonly known as: OMNICEF                Discharge Diet:   cardiac diet  Dietary Orders (From admission, onward)       Start     Ordered    07/15/25 1354  Dietary Nutrition Supplements Boost Plus (Ensure Enlive, Ensure Plus)  Daily With Breakfast      Question:  Select Supplement  Answer:  Boost Plus (Ensure Enlive, Ensure Plus)    07/15/25 1354    07/13/25 1401  Diet: Regular/House; Texture: Mechanical Ground (NDD 2); Fluid Consistency: Thin (IDDSI 0)  Diet Effective Now        References:    Diet Order Definitions   Question Answer Comment   Diets: Regular/House    Texture: Mechanical Ground (NDD 2)    Fluid Consistency: Thin (IDDSI 0)        07/13/25 1400                    Activity at Discharge:  As tolerated but avoid weight-bearing on the left lower extremity until she sees Orthopedics.    Follow-up Appointments:  Additional Instructions for the Follow-ups that You Need to Schedule       Discharge Follow-up with PCP   As directed       Currently Documented PCP:    Ricky Alamo MD    PCP Phone Number:    [phone]     Follow Up Details: Please, schedule an appointment for patient to follow-up with her PCP in 5-7 days.        Discharge Follow-up with Specialty: Please, scheduled appointment  for patient to follow-up with orthopedics in 1 week regarding her left leg cast.; 1 Week   As directed      Specialty: Please, scheduled appointment for patient to follow-up with orthopedics in 1 week regarding her left leg cast.   Follow Up: 1 Week               Follow-up Information       Ricky Alamo MD .    Specialty: Internal Medicine  Why: Please, schedule an appointment for patient to follow-up with her PCP in 5-7 days.  Contact information:  [address] [facility] [place] [address] [phone]                                 Test Results Pending at Discharge:  Pending Labs       Order Current Status    Blood Culture - Blood, Arm, Left Preliminary result    Blood Culture - Blood, Arm, Left Preliminary result             The ASCVD Risk score (Jovany DK, et al., 2019) failed to calculate for the following reasons:    The 2019 ASCVD risk score is only valid for ages 40 to 79    Risk score cannot be calculated because patient has a medical history suggesting prior/existing ASCVD      Yasir Lawton MD  07/15/25  15:16 EDT      Time: Greater than 30 minutes spent on this discharge.    Electronically signed by Yasir Lawton MD at 07/15/25 1533       Discharge Order (From admission, onward)       Start     Ordered    07/15/25 1445  Discharge patient  Once        Expected Discharge Date: 07/15/25   Expected Discharge Time: Evening   Discharge Disposition: Skilled Nursing Facility (DC - External)   Physician of Record for Attribution - Please select from Treatment Team: YASIR LAWTON [[id number]]   Review needed by CMO to determine Physician of Record: No      Question Answer Comment   Physician of Record for Attribution - Please select from Treatment Team YASIR LAWTON    Review needed by CMO to determine Physician of Record No        07/15/25 1446

## 2025-07-16 LAB
BACTERIA SPEC AEROBE CULT: NORMAL
BACTERIA SPEC AEROBE CULT: NORMAL

## 2025-07-20 ENCOUNTER — APPOINTMENT (OUTPATIENT)
Dept: GENERAL RADIOLOGY | Facility: HOSPITAL | Age: 80
DRG: 065 | End: 2025-07-20
Payer: MEDICARE

## 2025-07-20 ENCOUNTER — APPOINTMENT (OUTPATIENT)
Dept: CT IMAGING | Facility: HOSPITAL | Age: 80
DRG: 065 | End: 2025-07-20
Payer: MEDICARE

## 2025-07-20 ENCOUNTER — APPOINTMENT (OUTPATIENT)
Dept: MRI IMAGING | Facility: HOSPITAL | Age: 80
DRG: 065 | End: 2025-07-20
Payer: MEDICARE

## 2025-07-20 ENCOUNTER — HOSPITAL ENCOUNTER (INPATIENT)
Facility: HOSPITAL | Age: 80
LOS: 4 days | Discharge: NURSING, REHAB OR LONG-TERM CARE | DRG: 065 | End: 2025-07-24
Admitting: HOSPITALIST
Payer: MEDICARE

## 2025-07-20 DIAGNOSIS — I48.0 PAROXYSMAL ATRIAL FIBRILLATION: ICD-10-CM

## 2025-07-20 DIAGNOSIS — I63.9 CEREBROVASCULAR ACCIDENT (CVA), UNSPECIFIED MECHANISM: Primary | ICD-10-CM

## 2025-07-20 LAB
ALBUMIN SERPL-MCNC: 3.9 G/DL (ref 3.5–5.2)
ALBUMIN/GLOB SERPL: 1.8 G/DL
ALP SERPL-CCNC: 102 U/L (ref 39–117)
ALT SERPL W P-5'-P-CCNC: 11 U/L (ref 1–33)
ANION GAP SERPL CALCULATED.3IONS-SCNC: 10 MMOL/L (ref 5–15)
AST SERPL-CCNC: 20 U/L (ref 1–32)
BASOPHILS # BLD AUTO: 0.01 10*3/MM3 (ref 0–0.2)
BASOPHILS NFR BLD AUTO: 0.1 % (ref 0–1.5)
BILIRUB SERPL-MCNC: 0.5 MG/DL (ref 0–1.2)
BUN SERPL-MCNC: 21.4 MG/DL (ref 8–23)
BUN/CREAT SERPL: 16.9 (ref 7–25)
CALCIUM SPEC-SCNC: 10.5 MG/DL (ref 8.6–10.5)
CHLORIDE SERPL-SCNC: 105 MMOL/L (ref 98–107)
CO2 SERPL-SCNC: 27 MMOL/L (ref 22–29)
CREAT SERPL-MCNC: 1.27 MG/DL (ref 0.57–1)
DEPRECATED RDW RBC AUTO: 42.2 FL (ref 37–54)
EGFRCR SERPLBLD CKD-EPI 2021: 42.6 ML/MIN/1.73
EOSINOPHIL # BLD AUTO: 0.68 10*3/MM3 (ref 0–0.4)
EOSINOPHIL NFR BLD AUTO: 7.2 % (ref 0.3–6.2)
ERYTHROCYTE [DISTWIDTH] IN BLOOD BY AUTOMATED COUNT: 12.5 % (ref 12.3–15.4)
GLOBULIN UR ELPH-MCNC: 2.2 GM/DL
GLUCOSE SERPL-MCNC: 102 MG/DL (ref 65–99)
HCT VFR BLD AUTO: 43 % (ref 34–46.6)
HGB BLD-MCNC: 14.1 G/DL (ref 12–15.9)
HOLD SPECIMEN: NORMAL
HOLD SPECIMEN: NORMAL
IMM GRANULOCYTES # BLD AUTO: 0.05 10*3/MM3 (ref 0–0.05)
IMM GRANULOCYTES NFR BLD AUTO: 0.5 % (ref 0–0.5)
LYMPHOCYTES # BLD AUTO: 1.92 10*3/MM3 (ref 0.7–3.1)
LYMPHOCYTES NFR BLD AUTO: 20.2 % (ref 19.6–45.3)
MCH RBC QN AUTO: 30.3 PG (ref 26.6–33)
MCHC RBC AUTO-ENTMCNC: 32.8 G/DL (ref 31.5–35.7)
MCV RBC AUTO: 92.3 FL (ref 79–97)
MONOCYTES # BLD AUTO: 0.56 10*3/MM3 (ref 0.1–0.9)
MONOCYTES NFR BLD AUTO: 5.9 % (ref 5–12)
NEUTROPHILS NFR BLD AUTO: 6.29 10*3/MM3 (ref 1.7–7)
NEUTROPHILS NFR BLD AUTO: 66.1 % (ref 42.7–76)
NRBC BLD AUTO-RTO: 0 /100 WBC (ref 0–0.2)
PLATELET # BLD AUTO: 187 10*3/MM3 (ref 140–450)
PMV BLD AUTO: 10.3 FL (ref 6–12)
POTASSIUM SERPL-SCNC: 4.7 MMOL/L (ref 3.5–5.2)
PROT SERPL-MCNC: 6.1 G/DL (ref 6–8.5)
RBC # BLD AUTO: 4.66 10*6/MM3 (ref 3.77–5.28)
SODIUM SERPL-SCNC: 142 MMOL/L (ref 136–145)
VALPROATE SERPL-MCNC: 21.3 MCG/ML (ref 50–125)
WBC NRBC COR # BLD AUTO: 9.51 10*3/MM3 (ref 3.4–10.8)
WHOLE BLOOD HOLD COAG: NORMAL
WHOLE BLOOD HOLD SPECIMEN: NORMAL

## 2025-07-20 PROCEDURE — 72192 CT PELVIS W/O DYE: CPT

## 2025-07-20 PROCEDURE — 73502 X-RAY EXAM HIP UNI 2-3 VIEWS: CPT

## 2025-07-20 PROCEDURE — 73590 X-RAY EXAM OF LOWER LEG: CPT

## 2025-07-20 PROCEDURE — 70450 CT HEAD/BRAIN W/O DYE: CPT | Performed by: RADIOLOGY

## 2025-07-20 PROCEDURE — 85025 COMPLETE CBC W/AUTO DIFF WBC: CPT | Performed by: PHYSICIAN ASSISTANT

## 2025-07-20 PROCEDURE — 99285 EMERGENCY DEPT VISIT HI MDM: CPT

## 2025-07-20 PROCEDURE — 80164 ASSAY DIPROPYLACETIC ACD TOT: CPT | Performed by: PHYSICIAN ASSISTANT

## 2025-07-20 PROCEDURE — 73552 X-RAY EXAM OF FEMUR 2/>: CPT

## 2025-07-20 PROCEDURE — 70551 MRI BRAIN STEM W/O DYE: CPT

## 2025-07-20 PROCEDURE — 72125 CT NECK SPINE W/O DYE: CPT | Performed by: RADIOLOGY

## 2025-07-20 PROCEDURE — 70450 CT HEAD/BRAIN W/O DYE: CPT

## 2025-07-20 PROCEDURE — 73590 X-RAY EXAM OF LOWER LEG: CPT | Performed by: RADIOLOGY

## 2025-07-20 PROCEDURE — 73552 X-RAY EXAM OF FEMUR 2/>: CPT | Performed by: RADIOLOGY

## 2025-07-20 PROCEDURE — 72131 CT LUMBAR SPINE W/O DYE: CPT | Performed by: RADIOLOGY

## 2025-07-20 PROCEDURE — 70551 MRI BRAIN STEM W/O DYE: CPT | Performed by: RADIOLOGY

## 2025-07-20 PROCEDURE — 25010000002 LORAZEPAM PER 2 MG

## 2025-07-20 PROCEDURE — 99291 CRITICAL CARE FIRST HOUR: CPT | Performed by: PSYCHIATRY & NEUROLOGY

## 2025-07-20 PROCEDURE — 80053 COMPREHEN METABOLIC PANEL: CPT | Performed by: PHYSICIAN ASSISTANT

## 2025-07-20 PROCEDURE — 72131 CT LUMBAR SPINE W/O DYE: CPT

## 2025-07-20 PROCEDURE — 73502 X-RAY EXAM HIP UNI 2-3 VIEWS: CPT | Performed by: RADIOLOGY

## 2025-07-20 PROCEDURE — 73090 X-RAY EXAM OF FOREARM: CPT

## 2025-07-20 PROCEDURE — 73090 X-RAY EXAM OF FOREARM: CPT | Performed by: RADIOLOGY

## 2025-07-20 PROCEDURE — 72125 CT NECK SPINE W/O DYE: CPT

## 2025-07-20 PROCEDURE — 25810000003 SODIUM CHLORIDE 0.9 % SOLUTION: Performed by: NURSE PRACTITIONER

## 2025-07-20 PROCEDURE — 72192 CT PELVIS W/O DYE: CPT | Performed by: RADIOLOGY

## 2025-07-20 RX ORDER — ASPIRIN 300 MG/1
300 SUPPOSITORY RECTAL DAILY
Status: DISCONTINUED | OUTPATIENT
Start: 2025-07-21 | End: 2025-07-21

## 2025-07-20 RX ORDER — SODIUM CHLORIDE 0.9 % (FLUSH) 0.9 %
10 SYRINGE (ML) INJECTION EVERY 12 HOURS SCHEDULED
Status: DISCONTINUED | OUTPATIENT
Start: 2025-07-20 | End: 2025-07-24 | Stop reason: HOSPADM

## 2025-07-20 RX ORDER — ONDANSETRON 2 MG/ML
4 INJECTION INTRAMUSCULAR; INTRAVENOUS EVERY 6 HOURS PRN
Status: DISCONTINUED | OUTPATIENT
Start: 2025-07-20 | End: 2025-07-24 | Stop reason: HOSPADM

## 2025-07-20 RX ORDER — SODIUM CHLORIDE 0.9 % (FLUSH) 0.9 %
10 SYRINGE (ML) INJECTION AS NEEDED
Status: DISCONTINUED | OUTPATIENT
Start: 2025-07-20 | End: 2025-07-24 | Stop reason: HOSPADM

## 2025-07-20 RX ORDER — ASPIRIN 81 MG/1
81 TABLET, CHEWABLE ORAL DAILY
Status: DISCONTINUED | OUTPATIENT
Start: 2025-07-21 | End: 2025-07-22

## 2025-07-20 RX ORDER — LORAZEPAM 2 MG/ML
0.5 INJECTION INTRAMUSCULAR ONCE
Status: COMPLETED | OUTPATIENT
Start: 2025-07-20 | End: 2025-07-20

## 2025-07-20 RX ORDER — SODIUM CHLORIDE 9 MG/ML
40 INJECTION, SOLUTION INTRAVENOUS AS NEEDED
Status: DISCONTINUED | OUTPATIENT
Start: 2025-07-20 | End: 2025-07-24 | Stop reason: HOSPADM

## 2025-07-20 RX ORDER — ALUMINA, MAGNESIA, AND SIMETHICONE 2400; 2400; 240 MG/30ML; MG/30ML; MG/30ML
7.5 SUSPENSION ORAL EVERY 4 HOURS PRN
Status: DISCONTINUED | OUTPATIENT
Start: 2025-07-20 | End: 2025-07-24 | Stop reason: HOSPADM

## 2025-07-20 RX ORDER — ATORVASTATIN CALCIUM 40 MG/1
80 TABLET, FILM COATED ORAL NIGHTLY
Status: DISCONTINUED | OUTPATIENT
Start: 2025-07-20 | End: 2025-07-21

## 2025-07-20 RX ORDER — BISACODYL 10 MG
10 SUPPOSITORY, RECTAL RECTAL DAILY PRN
Status: DISCONTINUED | OUTPATIENT
Start: 2025-07-20 | End: 2025-07-21 | Stop reason: SDUPTHER

## 2025-07-20 RX ORDER — ACETAMINOPHEN 325 MG/1
650 TABLET ORAL EVERY 4 HOURS PRN
Status: DISCONTINUED | OUTPATIENT
Start: 2025-07-20 | End: 2025-07-24 | Stop reason: HOSPADM

## 2025-07-20 RX ORDER — CLONIDINE HYDROCHLORIDE 0.1 MG/1
0.1 TABLET ORAL ONCE
Status: COMPLETED | OUTPATIENT
Start: 2025-07-20 | End: 2025-07-20

## 2025-07-20 RX ORDER — ACETAMINOPHEN 650 MG/1
650 SUPPOSITORY RECTAL EVERY 4 HOURS PRN
Status: DISCONTINUED | OUTPATIENT
Start: 2025-07-20 | End: 2025-07-24 | Stop reason: HOSPADM

## 2025-07-20 RX ADMIN — LORAZEPAM 0.5 MG: 2 INJECTION INTRAMUSCULAR; INTRAVENOUS at 17:17

## 2025-07-20 RX ADMIN — LORAZEPAM 0.5 MG: 2 INJECTION INTRAMUSCULAR; INTRAVENOUS at 14:34

## 2025-07-20 RX ADMIN — CLONIDINE HYDROCHLORIDE 0.1 MG: 0.1 TABLET ORAL at 23:52

## 2025-07-20 RX ADMIN — Medication 10 ML: at 22:44

## 2025-07-20 RX ADMIN — SODIUM CHLORIDE 500 ML: 9 INJECTION, SOLUTION INTRAVENOUS at 20:29

## 2025-07-20 RX ADMIN — ATORVASTATIN CALCIUM 80 MG: 40 TABLET, FILM COATED ORAL at 22:44

## 2025-07-20 NOTE — ED PROVIDER NOTES
Subjective   History of Present Illness  79-year-old female presents from the nursing home secondary to fall.  Patient had reported forehead apparent right sided hip/leg pain.  Patient is currently in a left lower extremity cast due to recent fracture.  Patient is unable to provide me any history due to Alzheimer's.  She is resting comfortably in no distress or discomfort.  She has a past medical history according the chart of Alzheimer's dementia, stroke, deafness, hypertension and hyperlipidemia.  She presents by ambulance.        Review of Systems   Unable to perform ROS: Dementia       Past Medical History:   Diagnosis Date    Alzheimer disease     Alzheimer's disease     Anxiety     Anxiety disorder, unspecified     Cerebrovascular accident (CVA)     Constipation     Deafness     Delirium due to known physiological condition     Depression     Difficulty in walking, not elsewhere classified     Gastro-esophageal reflux disease with esophagitis     GERD (gastroesophageal reflux disease)     Hyperlipidemia     Hyperlipidemia     Hypertension     Major depressive disorder, single episode     Muscle weakness (generalized)     Other lack of coordination     Paranoid personality (disorder)     Shared psychotic disorder     Unspecified dementia without behavioral disturbance     Unspecified hearing loss, unspecified ear     Unspecified lack of coordination     Unspecified psychosis not due to a substance or known physiological condition        No Known Allergies    History reviewed. No pertinent surgical history.    Family History   Family history unknown: Yes       Social History     Socioeconomic History    Marital status:    Tobacco Use    Smoking status: Never    Smokeless tobacco: Never   Vaping Use    Vaping status: Never Used   Substance and Sexual Activity    Alcohol use: No    Drug use: No    Sexual activity: Not Currently     Partners: Male           Objective   Physical Exam  Vitals and nursing note  reviewed.   Constitutional:       General: She is not in acute distress.     Appearance: She is well-developed. She is not diaphoretic.   HENT:      Head: Normocephalic and atraumatic.      Right Ear: External ear normal.      Left Ear: External ear normal.      Nose: Nose normal.   Eyes:      Conjunctiva/sclera: Conjunctivae normal.      Pupils: Pupils are equal, round, and reactive to light.   Neck:      Vascular: No JVD.      Trachea: No tracheal deviation.   Cardiovascular:      Rate and Rhythm: Normal rate and regular rhythm.      Heart sounds: Normal heart sounds. No murmur heard.  Pulmonary:      Effort: Pulmonary effort is normal. No respiratory distress.      Breath sounds: Normal breath sounds. No wheezing.   Abdominal:      General: Bowel sounds are normal.      Palpations: Abdomen is soft.      Tenderness: There is no abdominal tenderness.   Musculoskeletal:         General: No deformity. Normal range of motion.      Cervical back: Normal range of motion and neck supple.      Comments: No overt sign of trauma.  Wearing a cast on her left lower extremity.   Skin:     General: Skin is warm and dry.      Coloration: Skin is not pale.      Findings: No erythema or rash.   Neurological:      Mental Status: She is alert.      Cranial Nerves: No cranial nerve deficit.      Comments: Alert, nonverbal, confused   Psychiatric:         Behavior: Behavior normal.         Thought Content: Thought content normal.         Procedures       Results for orders placed or performed during the hospital encounter of 07/20/25   Comprehensive Metabolic Panel    Collection Time: 07/20/25 10:35 AM    Specimen: Blood   Result Value Ref Range    Glucose 102 (H) 65 - 99 mg/dL    BUN 21.4 8.0 - 23.0 mg/dL    Creatinine 1.27 (H) 0.57 - 1.00 mg/dL    Sodium 142 136 - 145 mmol/L    Potassium 4.7 3.5 - 5.2 mmol/L    Chloride 105 98 - 107 mmol/L    CO2 27.0 22.0 - 29.0 mmol/L    Calcium 10.5 8.6 - 10.5 mg/dL    Total Protein 6.1 6.0 - 8.5  g/dL    Albumin 3.9 3.5 - 5.2 g/dL    ALT (SGPT) 11 1 - 33 U/L    AST (SGOT) 20 1 - 32 U/L    Alkaline Phosphatase 102 39 - 117 U/L    Total Bilirubin 0.5 0.0 - 1.2 mg/dL    Globulin 2.2 gm/dL    A/G Ratio 1.8 g/dL    BUN/Creatinine Ratio 16.9 7.0 - 25.0    Anion Gap 10.0 5.0 - 15.0 mmol/L    eGFR 42.6 (L) >60.0 mL/min/1.73   Valproic Acid Level, Total    Collection Time: 07/20/25 10:35 AM    Specimen: Blood   Result Value Ref Range    Valproic Acid 21.3 (L) 50.0 - 125.0 mcg/mL   CBC Auto Differential    Collection Time: 07/20/25 10:35 AM    Specimen: Blood   Result Value Ref Range    WBC 9.51 3.40 - 10.80 10*3/mm3    RBC 4.66 3.77 - 5.28 10*6/mm3    Hemoglobin 14.1 12.0 - 15.9 g/dL    Hematocrit 43.0 34.0 - 46.6 %    MCV 92.3 79.0 - 97.0 fL    MCH 30.3 26.6 - 33.0 pg    MCHC 32.8 31.5 - 35.7 g/dL    RDW 12.5 12.3 - 15.4 %    RDW-SD 42.2 37.0 - 54.0 fl    MPV 10.3 6.0 - 12.0 fL    Platelets 187 140 - 450 10*3/mm3    Neutrophil % 66.1 42.7 - 76.0 %    Lymphocyte % 20.2 19.6 - 45.3 %    Monocyte % 5.9 5.0 - 12.0 %    Eosinophil % 7.2 (H) 0.3 - 6.2 %    Basophil % 0.1 0.0 - 1.5 %    Immature Grans % 0.5 0.0 - 0.5 %    Neutrophils, Absolute 6.29 1.70 - 7.00 10*3/mm3    Lymphocytes, Absolute 1.92 0.70 - 3.10 10*3/mm3    Monocytes, Absolute 0.56 0.10 - 0.90 10*3/mm3    Eosinophils, Absolute 0.68 (H) 0.00 - 0.40 10*3/mm3    Basophils, Absolute 0.01 0.00 - 0.20 10*3/mm3    Immature Grans, Absolute 0.05 0.00 - 0.05 10*3/mm3    nRBC 0.0 0.0 - 0.2 /100 WBC   Green Top (Gel)    Collection Time: 07/20/25 10:35 AM   Result Value Ref Range    Extra Tube Hold for add-ons.    Lavender Top    Collection Time: 07/20/25 10:35 AM   Result Value Ref Range    Extra Tube hold for add-on    Gold Top - SST    Collection Time: 07/20/25 10:35 AM   Result Value Ref Range    Extra Tube Hold for add-ons.    Light Blue Top    Collection Time: 07/20/25 10:35 AM   Result Value Ref Range    Extra Tube Hold for add-ons.    POC Glucose Q6H     Collection Time: 07/21/25 12:57 AM    Specimen: Blood   Result Value Ref Range    Glucose 88 70 - 130 mg/dL    [facility]ova Comment 1 Notified Patients RN         MRI Brain Without Contrast   Final Result       Multiple focal areas of restricted diffusion identified in the right   parietal lobe and right frontal lobe most consistent with subacute   infarcts possibly due to shower emboli.   Mild to moderate generalized brain volume loss.   Moderate chronic small vessel ischemic type changes in the white matter.   No acute intracranial hemorrhage, mass, or hydrocephalus.   No shift in midline structures.       This report was finalized on 7/20/2025 9:14 PM by Leodan Khan MD.          CT Lumbar Spine Without Contrast   ED Interpretation   Interpretation per radiologist      Final Result   1.  No acute fracture or traumatic malalignment identified.   2.  Degenerative changes lumbar spine as described.       This report was finalized on 7/20/2025 10:52 AM by Dr. Madhav Lang MD.          CT Pelvis Without Contrast   ED Interpretation   Interpretation per radiologist      Final Result     No acute fracture or dislocation identified.       This report was finalized on 7/20/2025 10:51 AM by Dr. Madhav Lang MD.          CT Cervical Spine Without Contrast   ED Interpretation   Interpretation per radiologist      Final Result   1.  No acute fracture or traumatic malalignment identified.   2.  Degenerative changes cervical spine as described.       This report was finalized on 7/20/2025 10:50 AM by Dr. Madhav Lang MD.          CT Head Without Contrast   ED Interpretation   Interpretation per radiologist      Final Result   1.  Focal low-density in the right parietal lobe, image #30, axial   series, with loss of gray-white matter differentiation concerning for   age-indeterminate infarct, but possibly acute/subacute. If indicated,   further evaluation with MRI is recommended.   2.  No midline shift or mass effect.  No  hydrocephalus.   3.  No evidence of intracranial hemorrhage.   4.  Moderate chronic small vessel ischemic disease.       This report was finalized on 7/20/2025 10:55 AM by Dr. Madhav Lang MD.          XR Tibia Fibula 2 View Right   ED Interpretation   Interpretation per radiologist      Final Result     No acute fracture or dislocation.       This report was finalized on 7/20/2025 10:34 AM by Dr. Madhav Lang MD.          XR Forearm 2 View Left   ED Interpretation   Interpretation per radiologist      Final Result     No acute findings in the left forearm.       This report was finalized on 7/20/2025 10:35 AM by Dr. Madhav Lang MD.          XR Tibia Fibula 2 View Left   ED Interpretation   Interpretation per radiologist      Final Result   1.  No displaced fracture or dislocation of the tibia or fibula   identified.   2.  Fracture of the fifth metatarsal, incompletely imaged.   3.  Cast device obscures fine bone detail.       This report was finalized on 7/20/2025 10:25 AM by Dr. Madhav Lang MD.          XR Femur 2 View Right   ED Interpretation   Interpretation per radiologist      Final Result   Addendum (preliminary) 1 of 1   ADDENDUM:       Please note corrections to the original report reflected in the addended   report below:       EXAM:     XR LEFT femur, 2 Views       EXAM DATE:     7/20/2025 9:46 AM       CLINICAL HISTORY:     Fall with injury       TECHNIQUE:     Frontal and lateral views of the LEFT femur.       COMPARISON:     No relevant prior studies available.       FINDINGS:     Bones/joints:  Unremarkable.  No acute fracture.  No dislocation.     Soft tissues:  Unremarkable.       IMPRESSION:          No acute fracture or dislocation.       This report was finalized on 7/20/2025 4:38 PM by Dr. Madhav Lang MD.          Final     No acute fracture or dislocation.       This report was finalized on 7/20/2025 10:24 AM by Dr. Madhav Lang MD.          XR Hip With or Without  Pelvis 2 - 3 View Left   Final Result     No acute osseous or articular abnormality in the LEFT hip.               This report was finalized on 7/20/2025 9:18 PM by Dr. Madhav Lang MD.          US Carotid Bilateral    (Results Pending)        ED Course  ED Course as of 07/21/25 0426   Sun Jul 20, 2025   0947 Previously reported as right leg injury.  Nursing home note states that patient was complaining of left wrist and low back pain/buttock pain [JI]   1952 Lengthy wait due to need for sedation and subsequent critical care patients.  Awaiting MRI results. [JI]   2210 Discussed with Neuro, advised to admit.  [MB]   2225 Paged out for admission [MB]   Mon Jul 21, 2025   0000 Case d/w Dr. Harper, will admit to hospitalist service. [MB]      ED Course User Index  [JI] Noel Carter PA  [MB] Robyn Martinez APRN                      [place] Coma Scale Score: 12           Total (NIH Stroke Scale): 11          Electronically signed by LIZ Higuera, 07/20/25, 7:52 PM EDT.              Medical Decision Making  Problems Addressed:  Cerebrovascular accident (CVA), unspecified mechanism: complicated acute illness or injury    Amount and/or Complexity of Data Reviewed  Labs: ordered.  Radiology: ordered.    Risk  Prescription drug management.  Decision regarding hospitalization.        Final diagnoses:   Cerebrovascular accident (CVA), unspecified mechanism       ED Disposition  ED Disposition       ED Disposition   Decision to Admit    Condition   --    Comment   Level of Care: Telemetry [5]   Diagnosis: CVA (cerebral vascular accident) [843631]   Admitting Physician: MYLENE HARPER [[id number]]   Attending Physician: MYLENE HARPER [[id number]]   Certification: I Certify That Inpatient Hospital Services Are Medically Necessary For Greater Than 2 Midnights                 No follow-up provider specified.       Medication List      No changes were made to your prescriptions during this visit.            Juan  Robyn, APRN  07/21/25 0426

## 2025-07-21 ENCOUNTER — APPOINTMENT (OUTPATIENT)
Dept: ULTRASOUND IMAGING | Facility: HOSPITAL | Age: 80
DRG: 065 | End: 2025-07-21
Payer: MEDICARE

## 2025-07-21 ENCOUNTER — APPOINTMENT (OUTPATIENT)
Dept: CARDIOLOGY | Facility: HOSPITAL | Age: 80
DRG: 065 | End: 2025-07-21
Payer: MEDICARE

## 2025-07-21 ENCOUNTER — APPOINTMENT (OUTPATIENT)
Dept: RESPIRATORY THERAPY | Facility: HOSPITAL | Age: 80
DRG: 065 | End: 2025-07-21
Payer: MEDICARE

## 2025-07-21 ENCOUNTER — APPOINTMENT (OUTPATIENT)
Dept: CT IMAGING | Facility: HOSPITAL | Age: 80
DRG: 065 | End: 2025-07-21
Payer: MEDICARE

## 2025-07-21 LAB
ALBUMIN SERPL-MCNC: 3.5 G/DL (ref 3.5–5.2)
ALBUMIN/GLOB SERPL: 1.8 G/DL
ALP SERPL-CCNC: 94 U/L (ref 39–117)
ALT SERPL W P-5'-P-CCNC: 10 U/L (ref 1–33)
AMMONIA BLD-SCNC: 14 UMOL/L (ref 11–51)
ANION GAP SERPL CALCULATED.3IONS-SCNC: 8 MMOL/L (ref 5–15)
APTT PPP: 27.7 SECONDS (ref 24.5–35.9)
AST SERPL-CCNC: 18 U/L (ref 1–32)
BACTERIA UR QL AUTO: ABNORMAL /HPF
BILIRUB SERPL-MCNC: 0.6 MG/DL (ref 0–1.2)
BILIRUB UR QL STRIP: NEGATIVE
BUN SERPL-MCNC: 18.3 MG/DL (ref 8–23)
BUN/CREAT SERPL: 20.6 (ref 7–25)
CALCIUM SPEC-SCNC: 9.6 MG/DL (ref 8.6–10.5)
CHLORIDE SERPL-SCNC: 107 MMOL/L (ref 98–107)
CHOLEST SERPL-MCNC: 131 MG/DL (ref 0–200)
CLARITY UR: CLEAR
CO2 SERPL-SCNC: 27 MMOL/L (ref 22–29)
COLOR UR: YELLOW
CREAT SERPL-MCNC: 0.89 MG/DL (ref 0.57–1)
DEPRECATED RDW RBC AUTO: 41.2 FL (ref 37–54)
EGFRCR SERPLBLD CKD-EPI 2021: 65.2 ML/MIN/1.73
ERYTHROCYTE [DISTWIDTH] IN BLOOD BY AUTOMATED COUNT: 12.4 % (ref 12.3–15.4)
ERYTHROCYTE [SEDIMENTATION RATE] IN BLOOD: 3 MM/HR (ref 0–30)
FOLATE SERPL-MCNC: 7.85 NG/ML (ref 4.78–24.2)
GLOBULIN UR ELPH-MCNC: 2 GM/DL
GLUCOSE BLDC GLUCOMTR-MCNC: 75 MG/DL (ref 70–130)
GLUCOSE BLDC GLUCOMTR-MCNC: 88 MG/DL (ref 70–130)
GLUCOSE SERPL-MCNC: 88 MG/DL (ref 65–99)
GLUCOSE UR STRIP-MCNC: NEGATIVE MG/DL
HBA1C MFR BLD: 5.43 % (ref 4.8–5.6)
HCT VFR BLD AUTO: 37.6 % (ref 34–46.6)
HDLC SERPL-MCNC: 32 MG/DL (ref 40–60)
HGB BLD-MCNC: 12.7 G/DL (ref 12–15.9)
HGB UR QL STRIP.AUTO: NEGATIVE
HYALINE CASTS UR QL AUTO: ABNORMAL /LPF
INR PPP: 1.03 (ref 0.9–1.1)
KETONES UR QL STRIP: ABNORMAL
LDLC SERPL CALC-MCNC: 78 MG/DL (ref 0–100)
LDLC/HDLC SERPL: 2.38 {RATIO}
LEUKOCYTE ESTERASE UR QL STRIP.AUTO: ABNORMAL
Lab: NORMAL
Lab: NORMAL
MCH RBC QN AUTO: 30.9 PG (ref 26.6–33)
MCHC RBC AUTO-ENTMCNC: 33.8 G/DL (ref 31.5–35.7)
MCV RBC AUTO: 91.5 FL (ref 79–97)
NITRITE UR QL STRIP: NEGATIVE
PH UR STRIP.AUTO: 6 [PH] (ref 5–8)
PLATELET # BLD AUTO: 163 10*3/MM3 (ref 140–450)
PMV BLD AUTO: 10.4 FL (ref 6–12)
POTASSIUM SERPL-SCNC: 3.6 MMOL/L (ref 3.5–5.2)
PROT SERPL-MCNC: 5.5 G/DL (ref 6–8.5)
PROT UR QL STRIP: NEGATIVE
PROTHROMBIN TIME: 14.1 SECONDS (ref 12.5–15.2)
QT INTERVAL: 424 MS
QTC INTERVAL: 450 MS
RBC # BLD AUTO: 4.11 10*6/MM3 (ref 3.77–5.28)
RBC # UR STRIP: ABNORMAL /HPF
REF LAB TEST METHOD: ABNORMAL
SODIUM SERPL-SCNC: 142 MMOL/L (ref 136–145)
SP GR UR STRIP: 1.02 (ref 1–1.03)
SQUAMOUS #/AREA URNS HPF: ABNORMAL /HPF
TRIGL SERPL-MCNC: 115 MG/DL (ref 0–150)
TSH SERPL DL<=0.05 MIU/L-ACNC: 2.3 UIU/ML (ref 0.27–4.2)
UROBILINOGEN UR QL STRIP: ABNORMAL
VIT B12 BLD-MCNC: 826 PG/ML (ref 211–946)
VLDLC SERPL-MCNC: 21 MG/DL (ref 5–40)
WBC # UR STRIP: ABNORMAL /HPF
WBC NRBC COR # BLD AUTO: 6.17 10*3/MM3 (ref 3.4–10.8)

## 2025-07-21 PROCEDURE — 85610 PROTHROMBIN TIME: CPT | Performed by: HOSPITALIST

## 2025-07-21 PROCEDURE — 25010000002 HYDRALAZINE PER 20 MG

## 2025-07-21 PROCEDURE — 99232 SBSQ HOSP IP/OBS MODERATE 35: CPT | Performed by: NURSE PRACTITIONER

## 2025-07-21 PROCEDURE — 25010000002 LORAZEPAM PER 2 MG: Performed by: HOSPITALIST

## 2025-07-21 PROCEDURE — 93880 EXTRACRANIAL BILAT STUDY: CPT | Performed by: RADIOLOGY

## 2025-07-21 PROCEDURE — 80061 LIPID PANEL: CPT | Performed by: HOSPITALIST

## 2025-07-21 PROCEDURE — 97162 PT EVAL MOD COMPLEX 30 MIN: CPT

## 2025-07-21 PROCEDURE — 97166 OT EVAL MOD COMPLEX 45 MIN: CPT

## 2025-07-21 PROCEDURE — 99223 1ST HOSP IP/OBS HIGH 75: CPT

## 2025-07-21 PROCEDURE — 82607 VITAMIN B-12: CPT | Performed by: HOSPITALIST

## 2025-07-21 PROCEDURE — 82948 REAGENT STRIP/BLOOD GLUCOSE: CPT | Performed by: HOSPITALIST

## 2025-07-21 PROCEDURE — 93010 ELECTROCARDIOGRAM REPORT: CPT | Performed by: SPECIALIST

## 2025-07-21 PROCEDURE — 36415 COLL VENOUS BLD VENIPUNCTURE: CPT | Performed by: HOSPITALIST

## 2025-07-21 PROCEDURE — 80053 COMPREHEN METABOLIC PANEL: CPT | Performed by: HOSPITALIST

## 2025-07-21 PROCEDURE — 85730 THROMBOPLASTIN TIME PARTIAL: CPT | Performed by: HOSPITALIST

## 2025-07-21 PROCEDURE — 82746 ASSAY OF FOLIC ACID SERUM: CPT | Performed by: HOSPITALIST

## 2025-07-21 PROCEDURE — 25810000003 SODIUM CHLORIDE 0.9 % SOLUTION: Performed by: STUDENT IN AN ORGANIZED HEALTH CARE EDUCATION/TRAINING PROGRAM

## 2025-07-21 PROCEDURE — 85652 RBC SED RATE AUTOMATED: CPT | Performed by: HOSPITALIST

## 2025-07-21 PROCEDURE — 84443 ASSAY THYROID STIM HORMONE: CPT | Performed by: HOSPITALIST

## 2025-07-21 PROCEDURE — 25810000003 SODIUM CHLORIDE 0.9 % SOLUTION: Performed by: HOSPITALIST

## 2025-07-21 PROCEDURE — 92610 EVALUATE SWALLOWING FUNCTION: CPT

## 2025-07-21 PROCEDURE — 85027 COMPLETE CBC AUTOMATED: CPT | Performed by: HOSPITALIST

## 2025-07-21 PROCEDURE — 95819 EEG AWAKE AND ASLEEP: CPT

## 2025-07-21 PROCEDURE — 93005 ELECTROCARDIOGRAM TRACING: CPT | Performed by: STUDENT IN AN ORGANIZED HEALTH CARE EDUCATION/TRAINING PROGRAM

## 2025-07-21 PROCEDURE — 25010000002 ZIPRASIDONE MESYLATE PER 10 MG: Performed by: STUDENT IN AN ORGANIZED HEALTH CARE EDUCATION/TRAINING PROGRAM

## 2025-07-21 PROCEDURE — 25010000002 CEFTRIAXONE PER 250 MG: Performed by: STUDENT IN AN ORGANIZED HEALTH CARE EDUCATION/TRAINING PROGRAM

## 2025-07-21 PROCEDURE — 95819 EEG AWAKE AND ASLEEP: CPT | Performed by: PSYCHIATRY & NEUROLOGY

## 2025-07-21 PROCEDURE — 82140 ASSAY OF AMMONIA: CPT | Performed by: STUDENT IN AN ORGANIZED HEALTH CARE EDUCATION/TRAINING PROGRAM

## 2025-07-21 PROCEDURE — 83036 HEMOGLOBIN GLYCOSYLATED A1C: CPT | Performed by: HOSPITALIST

## 2025-07-21 PROCEDURE — 93880 EXTRACRANIAL BILAT STUDY: CPT

## 2025-07-21 PROCEDURE — 81001 URINALYSIS AUTO W/SCOPE: CPT | Performed by: STUDENT IN AN ORGANIZED HEALTH CARE EDUCATION/TRAINING PROGRAM

## 2025-07-21 RX ORDER — DIVALPROEX SODIUM 125 MG/1
125 CAPSULE, COATED PELLETS ORAL 2 TIMES DAILY
Status: DISCONTINUED | OUTPATIENT
Start: 2025-07-21 | End: 2025-07-24 | Stop reason: HOSPADM

## 2025-07-21 RX ORDER — ACETAMINOPHEN 500 MG
500 TABLET ORAL EVERY 6 HOURS PRN
Status: CANCELLED | OUTPATIENT
Start: 2025-07-21

## 2025-07-21 RX ORDER — ASPIRIN 81 MG/1
81 TABLET, CHEWABLE ORAL DAILY
Status: CANCELLED | OUTPATIENT
Start: 2025-07-21

## 2025-07-21 RX ORDER — AMOXICILLIN 250 MG
2 CAPSULE ORAL 2 TIMES DAILY PRN
Status: DISCONTINUED | OUTPATIENT
Start: 2025-07-21 | End: 2025-07-24 | Stop reason: HOSPADM

## 2025-07-21 RX ORDER — BISACODYL 10 MG
10 SUPPOSITORY, RECTAL RECTAL DAILY PRN
Status: DISCONTINUED | OUTPATIENT
Start: 2025-07-21 | End: 2025-07-24 | Stop reason: HOSPADM

## 2025-07-21 RX ORDER — SODIUM CHLORIDE 9 MG/ML
75 INJECTION, SOLUTION INTRAVENOUS CONTINUOUS
Status: ACTIVE | OUTPATIENT
Start: 2025-07-21 | End: 2025-07-21

## 2025-07-21 RX ORDER — POLYETHYLENE GLYCOL 3350 17 G/17G
17 POWDER, FOR SOLUTION ORAL DAILY PRN
Status: DISCONTINUED | OUTPATIENT
Start: 2025-07-21 | End: 2025-07-24 | Stop reason: HOSPADM

## 2025-07-21 RX ORDER — SODIUM CHLORIDE 0.9 % (FLUSH) 0.9 %
10 SYRINGE (ML) INJECTION EVERY 12 HOURS SCHEDULED
Status: DISCONTINUED | OUTPATIENT
Start: 2025-07-21 | End: 2025-07-24 | Stop reason: HOSPADM

## 2025-07-21 RX ORDER — SENNOSIDES 8.6 MG/1
2 TABLET ORAL 2 TIMES DAILY
Status: DISCONTINUED | OUTPATIENT
Start: 2025-07-21 | End: 2025-07-24 | Stop reason: HOSPADM

## 2025-07-21 RX ORDER — DOCUSATE SODIUM 100 MG/1
100 CAPSULE, LIQUID FILLED ORAL DAILY
Status: DISCONTINUED | OUTPATIENT
Start: 2025-07-21 | End: 2025-07-24 | Stop reason: HOSPADM

## 2025-07-21 RX ORDER — ASPIRIN 325 MG
325 TABLET ORAL ONCE
Status: DISCONTINUED | OUTPATIENT
Start: 2025-07-21 | End: 2025-07-21

## 2025-07-21 RX ORDER — SODIUM CHLORIDE 9 MG/ML
75 INJECTION, SOLUTION INTRAVENOUS CONTINUOUS
Status: ACTIVE | OUTPATIENT
Start: 2025-07-21 | End: 2025-07-22

## 2025-07-21 RX ORDER — NITROGLYCERIN 0.4 MG/1
0.4 TABLET SUBLINGUAL
Status: DISCONTINUED | OUTPATIENT
Start: 2025-07-21 | End: 2025-07-24 | Stop reason: HOSPADM

## 2025-07-21 RX ORDER — BISACODYL 5 MG/1
5 TABLET, DELAYED RELEASE ORAL DAILY PRN
Status: DISCONTINUED | OUTPATIENT
Start: 2025-07-21 | End: 2025-07-24 | Stop reason: HOSPADM

## 2025-07-21 RX ORDER — MEMANTINE HYDROCHLORIDE 5 MG/1
5 TABLET ORAL EVERY 12 HOURS SCHEDULED
Status: DISCONTINUED | OUTPATIENT
Start: 2025-07-21 | End: 2025-07-24 | Stop reason: HOSPADM

## 2025-07-21 RX ORDER — HYDRALAZINE HYDROCHLORIDE 10 MG/1
10 TABLET, FILM COATED ORAL EVERY 8 HOURS PRN
Status: DISCONTINUED | OUTPATIENT
Start: 2025-07-21 | End: 2025-07-24 | Stop reason: HOSPADM

## 2025-07-21 RX ORDER — OLANZAPINE 5 MG/1
2.5 TABLET, FILM COATED ORAL NIGHTLY
Status: DISCONTINUED | OUTPATIENT
Start: 2025-07-21 | End: 2025-07-24 | Stop reason: HOSPADM

## 2025-07-21 RX ORDER — SODIUM CHLORIDE 9 MG/ML
40 INJECTION, SOLUTION INTRAVENOUS AS NEEDED
Status: DISCONTINUED | OUTPATIENT
Start: 2025-07-21 | End: 2025-07-24 | Stop reason: HOSPADM

## 2025-07-21 RX ORDER — ATORVASTATIN CALCIUM 40 MG/1
40 TABLET, FILM COATED ORAL NIGHTLY
Status: DISCONTINUED | OUTPATIENT
Start: 2025-07-21 | End: 2025-07-24 | Stop reason: HOSPADM

## 2025-07-21 RX ORDER — LIDOCAINE 4 G/G
1 PATCH TOPICAL EVERY 24 HOURS
Status: DISCONTINUED | OUTPATIENT
Start: 2025-07-21 | End: 2025-07-24 | Stop reason: HOSPADM

## 2025-07-21 RX ORDER — SODIUM CHLORIDE 0.9 % (FLUSH) 0.9 %
10 SYRINGE (ML) INJECTION AS NEEDED
Status: DISCONTINUED | OUTPATIENT
Start: 2025-07-21 | End: 2025-07-24 | Stop reason: HOSPADM

## 2025-07-21 RX ORDER — LORAZEPAM 2 MG/ML
0.5 INJECTION INTRAMUSCULAR ONCE
Status: COMPLETED | OUTPATIENT
Start: 2025-07-21 | End: 2025-07-21

## 2025-07-21 RX ORDER — POLYETHYLENE GLYCOL 3350 17 G/17G
17 POWDER, FOR SOLUTION ORAL DAILY
Status: DISCONTINUED | OUTPATIENT
Start: 2025-07-21 | End: 2025-07-24 | Stop reason: HOSPADM

## 2025-07-21 RX ORDER — CLONIDINE HYDROCHLORIDE 0.1 MG/1
0.1 TABLET ORAL EVERY 8 HOURS PRN
Status: DISCONTINUED | OUTPATIENT
Start: 2025-07-21 | End: 2025-07-24

## 2025-07-21 RX ORDER — HYDROCODONE BITARTRATE AND ACETAMINOPHEN 5; 325 MG/1; MG/1
1 TABLET ORAL EVERY 6 HOURS PRN
Status: DISCONTINUED | OUTPATIENT
Start: 2025-07-21 | End: 2025-07-24 | Stop reason: HOSPADM

## 2025-07-21 RX ORDER — GABAPENTIN 100 MG/1
100 CAPSULE ORAL NIGHTLY
Status: DISCONTINUED | OUTPATIENT
Start: 2025-07-21 | End: 2025-07-24 | Stop reason: HOSPADM

## 2025-07-21 RX ORDER — POLYETHYLENE GLYCOL 3350 17 G/17G
17 POWDER, FOR SOLUTION ORAL DAILY
Status: CANCELLED | OUTPATIENT
Start: 2025-07-21

## 2025-07-21 RX ORDER — HYDRALAZINE HYDROCHLORIDE 20 MG/ML
10 INJECTION INTRAMUSCULAR; INTRAVENOUS EVERY 6 HOURS PRN
Status: DISCONTINUED | OUTPATIENT
Start: 2025-07-21 | End: 2025-07-24

## 2025-07-21 RX ORDER — FERROUS SULFATE 325(65) MG
325 TABLET ORAL DAILY
Status: DISCONTINUED | OUTPATIENT
Start: 2025-07-21 | End: 2025-07-24 | Stop reason: HOSPADM

## 2025-07-21 RX ADMIN — Medication 10 ML: at 20:43

## 2025-07-21 RX ADMIN — Medication 10 ML: at 10:08

## 2025-07-21 RX ADMIN — LORAZEPAM 0.5 MG: 2 INJECTION INTRAMUSCULAR; INTRAVENOUS at 01:36

## 2025-07-21 RX ADMIN — LIDOCAINE 1 PATCH: 4 PATCH TOPICAL at 12:05

## 2025-07-21 RX ADMIN — SODIUM CHLORIDE 75 ML/HR: 9 INJECTION, SOLUTION INTRAVENOUS at 20:43

## 2025-07-21 RX ADMIN — Medication 10 ML: at 00:51

## 2025-07-21 RX ADMIN — ZIPRASIDONE MESYLATE 20 MG: 20 INJECTION, POWDER, LYOPHILIZED, FOR SOLUTION INTRAMUSCULAR at 17:00

## 2025-07-21 RX ADMIN — HYDRALAZINE HYDROCHLORIDE 10 MG: 20 INJECTION INTRAMUSCULAR; INTRAVENOUS at 20:42

## 2025-07-21 RX ADMIN — CEFTRIAXONE SODIUM 1000 MG: 1 INJECTION, POWDER, FOR SOLUTION INTRAMUSCULAR; INTRAVENOUS at 23:13

## 2025-07-21 RX ADMIN — SODIUM CHLORIDE 75 ML/HR: 9 INJECTION, SOLUTION INTRAVENOUS at 00:26

## 2025-07-21 NOTE — THERAPY EVALUATION
Acute Care - Physical Therapy Initial Evaluation   [facility]     Patient Name: Catherine Snyder  : 1945  MRN: [id number]  Today's Date: 2025   Onset of Illness/Injury or Date of Surgery: 25  Visit Dx:     ICD-10-CM ICD-9-CM   1. Cerebrovascular accident (CVA), unspecified mechanism  I63.9 434.91     Patient Active Problem List   Diagnosis    HCAP (healthcare-associated pneumonia)    UTI (urinary tract infection)    Altered mental status    Cerebrovascular accident (CVA)    Aggressive behavior    Hyperlipidemia    Hypertension    Dementia with psychosis    Anticoagulated    Urinary tract infection    CVA (cerebral vascular accident)     Past Medical History:   Diagnosis Date    Alzheimer disease     Alzheimer's disease     Anxiety     Anxiety disorder, unspecified     Cerebrovascular accident (CVA)     Constipation     Deafness     Delirium due to known physiological condition     Depression     Difficulty in walking, not elsewhere classified     Gastro-esophageal reflux disease with esophagitis     GERD (gastroesophageal reflux disease)     Hyperlipidemia     Hyperlipidemia     Hypertension     Major depressive disorder, single episode     Muscle weakness (generalized)     Other lack of coordination     Paranoid personality (disorder)     Shared psychotic disorder     Unspecified dementia without behavioral disturbance     Unspecified hearing loss, unspecified ear     Unspecified lack of coordination     Unspecified psychosis not due to a substance or known physiological condition      History reviewed. No pertinent surgical history.  PT Assessment (Last 12 Hours)       PT Evaluation and Treatment       Row Name 25 1424          Physical Therapy Time and Intention    Subjective Information --  pt does not voice complaints  -CT     Document Type evaluation  -CT     Mode of Treatment individual therapy;physical therapy  -CT     Comment Pt is from a local SNF. Pt with multiple reported falls. Pt  does not answer questions or follow commands during eval. Pt is also reported to bed deaf.  -CT       Row Name 07/21/25 1424          General Information    Patient Profile Reviewed yes  -CT     Onset of Illness/Injury or Date of Surgery 07/20/25  -CT     Referring Physician Leroy  -CT     Patient Observations lethargic  -CT     Prior Level of Function --  Pt was ambulatory at SNF but has cast on LLE  -CT     Existing Precautions/Restrictions fall  -CT     Limitations/Impairments safety/cognitive  -CT     Barriers to Rehab cognitive status  -CT       Row Name 07/21/25 1424          Living Environment    Current Living Arrangements extended care facility  -CT     People in Home facility resident  -CT       Row Name 07/21/25 1424          Home Use of Assistive/Adaptive Equipment    Equipment Currently Used at Home hospital bed  -CT       Row Name 07/21/25 1424          Pain    Additional Documentation Pain Scale: FACES Pre/Post-Treatment (Group)  -CT       Row Name 07/21/25 1424          Pain Scale: FACES Pre/Post-Treatment    Pain: FACES Scale, Pretreatment 0-->no hurt  -CT     Posttreatment Pain Rating 0-->no hurt  -CT       Row Name 07/21/25 1424          Cognition    Affect/Mental Status (Cognition) unable/difficult to assess  -CT     Behavioral Issues (Cognition) unable/difficult to assess  -CT     Orientation Status (Cognition) unable/difficult to assess  -CT       Row Name 07/21/25 1424          Range of Motion Comprehensive    Comment, General Range of Motion BLE AAROM and observed WFL except LLE in hard cast  -CT       Row Name 07/21/25 1424          Strength Comprehensive (MMT)    Comment, General Manual Muscle Testing (MMT) Assessment unable to formally assess  -CT       Row Name 07/21/25 1424          Mobility    Extremity Weight-bearing Status left lower extremity  -CT     Left Lower Extremity (Weight-bearing Status) non weight-bearing (NWB)  no weight bearing orders but pt in hard cast  -CT       Row  Name 07/21/25 1424          Bed Mobility    Bed Mobility rolling left;rolling right  -CT     Rolling Left [place] (Bed Mobility) maximum assist (25% patient effort)  -CT     Rolling Right [place] (Bed Mobility) maximum assist (25% patient effort)  -CT     Bed Mobility, Safety Issues cognitive deficits limit understanding  -CT       Row Name 07/21/25 1424          Transfers    Comment, (Transfers) unable to assess  -CT       Row Name 07/21/25 1424          Gait/Stairs (Locomotion)    Comment, (Gait/Stairs) unable to assess  -CT       Row Name 07/21/25 1424          Plan of Care Review    Plan of Care Reviewed With patient  -CT       Row Name 07/21/25 1424          Positioning and Restraints    Pre-Treatment Position in bed  -CT     Post Treatment Position bed  -CT     In Bed supine;call light within reach;encouraged to call for assist;exit alarm on;side rails up x3  -CT       Row Name 07/21/25 1424          Therapy Assessment/Plan (PT)    Patient/Family Therapy Goals Statement (PT) Goals for pt are to increase overall mobility  -CT     Functional Level at Time of Evaluation (PT) Max A bed mobility  -CT     PT Diagnosis (PT) impaired functional mobility  -CT     Rehab Potential (PT) good  -CT     Criteria for Skilled Interventions Met (PT) yes  -CT     Therapy Frequency (PT) other (see comments)  per pt tolerance  -CT     Predicted Duration of Therapy Intervention (PT) length of stay  -CT       Row Name 07/21/25 1424          Physical Therapy Goals    Bed Mobility Goal Selection (PT) bed mobility, PT goal 1  -CT       Row Name 07/21/25 1424          Bed Mobility Goal 1 (PT)    Activity/Assistive Device (Bed Mobility Goal 1, PT) bed mobility activities, all  -CT     [place] Level/Cues Needed (Bed Mobility Goal 1, PT) moderate assist (50-74% patient effort)  -CT     Time Frame (Bed Mobility Goal 1, PT) by discharge  -CT               User Key  (r) = Recorded By, (t) = Taken By, (c) = Cosigned By       Initials Name Provider Type    CT Ana Gonzalez PT Physical Therapist                      PT Recommendation and Plan  Anticipated Discharge Disposition (PT): skilled nursing facility  Planned Therapy Interventions (PT): balance training, bed mobility training, gait training, manual therapy techniques, motor coordination training, neuromuscular re-education, patient/family education, postural re-education, strengthening, transfer training  Therapy Frequency (PT): other (see comments) (per pt tolerance)  Plan of Care Reviewed With: patient       Time Calculation:    PT Charges       Row Name 07/21/25 1442             Time Calculation    PT Received On 07/21/25  -CT      PT Goal Re-Cert Due Date 08/04/25  -CT                User Key  (r) = Recorded By, (t) = Taken By, (c) = Cosigned By      Initials Name Provider Type    CT Ana Gonzalez PT Physical Therapist                  Therapy Charges for Today       Code Description Service Date Service Provider Modifiers Qty    [id number] HC PT EVAL MOD COMPLEXITY 4 7/21/2025 Ana Gonzalez, PT GP 1            PT G-Codes  AM-PAC 6 Clicks Score (PT): 6    Ana Gonzalez PT  7/21/2025

## 2025-07-21 NOTE — SIGNIFICANT NOTE
07/21/25 1015   OTHER   Discipline speech language pathologist   Rehab Time/Intention   Session Not Performed other (see comments)  (Consult received. Chart reviewed. Pt seen at bedside this am on 3S for attempted assesment. Pt lethargic, unable to awaken to fnx participate at this time. D/w RN pt status w/ report pt received ativan, likely contributing to current status. SLP to f/u.)   Recommendations   SLP - Next Appointment 07/22/25

## 2025-07-21 NOTE — PLAN OF CARE
Goal Outcome Evaluation:  Plan of Care Reviewed With: patient        Progress: no change  Outcome Evaluation: Pt resting in bed at this time. Pt has not been up or ambulated due to inability to follow commands and confusion. No acute changes or concerns noted at this time. Plan of care ongoing.

## 2025-07-21 NOTE — CONSULTS
Palliative Care Initial Consult     Attending Physician: Rasheed Rivera*  Referring Provider: Dr. Rasheed Rivera     assistance with advance directives and assistance with clarification of goals of care  Code Status: DNR/DNI   Code Status and Medical Interventions: No CPR (Do Not Attempt to Resuscitate); Limited Support; No intubation (DNI), No dialysis, No cardioversion; spoke to granddaughter OTP   Ordered at: 07/21/25 1206     Code Status (Patient has no pulse and is not breathing):    No CPR (Do Not Attempt to Resuscitate)     Medical Interventions (Patient has pulse or is breathing):    Limited Support     Medical Intervention Limits:    No intubation (DNI)       No dialysis       No cardioversion     Comments:    spoke to granddaughter OTP     Level Of Support Discussed With:    Next of Kin (If No Surrogate)       Health Care Surrogate      Advanced Directives: Advance Directive Status: Patient has advance directive, copy in chart   Healthcare surrogate: Gita Snyder - grandchild   Goals of Care: DNR/DNI , goals of care are ongoing. I spoke with Dr. Rivera today, he plans to speak with family about findings, prognosis and aggressive care vs comfort today and asks for us to reach out tomorrow.     HPI:  Catherine Snyder is a 79 y.o. female admitted on 7/20/2025 for CVA. Pt had a fall at SNF and complained of right sided pain. She has a significant for advanced Alzheimer's dementia with psychosis, prior history of CVA, HTN, HLD, GERD, recent admission for UTI and Hx of ESBL UTI, recent frequent falls resulting in left lower extremity fracture-currently casted. Today, patient is alert but very confused. She was getting an EKG at the time of exam and respiratory staff reports that she was trying to climb out of the bed. She is confused, disoriented and unable to follow commands. She does have some sounds of attempting to speak but she is also deaf.  ER workup revealed a head CT suggesting a recent stroke.   Neurology was consulted while the patient was in the ER and it was determined that the patient should be admitted for further workup.In the ED vital signs were /79   Pulse 68   Temp 98.3 °F (36.8 °C) (Oral)   Resp 17. Her CT head showed  Focal low-density in the right parietal lobe, with loss of gray-white matter differentiation concerning for age-indeterminate infarct, but possible acute/subacute.  If indicated further evaluation with MRI is recommended.  No midline shift or mass effect.  No hydrocephalus.  No evidence of intracranial hemorrhage.  Moderate chronic small vessel ischemic disease. MRI brain: Brain: Multiple focal areas of restricted diffusion identified in the right parietal lobe and right frontal lobe most consistent with subacute infarct possibly due to shower emboli.  Mild to moderate degenerative brain volume loss.  Moderate chronic small vessel ischemic type changes in the white matter.  No acute intracranial hemorrhage, mass, hydrocephalus.  No shift in the midline structure. Her Pan scan of the spine revealed no acute fractures or dislocation; X-rays of left and right tib-fib, right femur, left forearm, bilateral hips and pelvis revealed no new acute fractures; Glucose 102, creatinine 1.27, BUN 21.4, BUN/creatinine ratio 16.9, WBC 9.51, valproic acid 21.3. Pt was seen by Dr. Hernandez via tele-neurology who recommends that patient will be admitted to the hospitalist service on cardiac telemetry monitoring; as she failed the bedside swallow she will be getting aspirin rectal suppository, Keep systolic blood pressure between 140-160 and diastolic between 80-90, Check labs for tomorrow includes lipid profile TSH, sedimentation rate, hemoglobin A1c, vitamin B12 folic acid levels.  Transthoracic echocardiogram with bubble study to look for PFO, Carotid ultrasound to look for any carotid blockage, An EEG has been requested to make sure there is no underlying seizure disorder, Physical  therapy/speech pathology/Occupational Therapy needs to evaluate the patient to see if she would be safe to go back to the nursing home from where she came. She will be followed up tomorrow by MARYBETH Dsouza with the inpatient telemedicine service. His final Impression and Plan: MRI showing acute ischemia in the watershed distribution between the right MCA territory and the right PCA territory.        ROS: unable to assess due to AMS, advanced dementia     Past Medical History:   Diagnosis Date    Alzheimer disease     Alzheimer's disease     Anxiety     Anxiety disorder, unspecified     Cerebrovascular accident (CVA)     Constipation     Deafness     Delirium due to known physiological condition     Depression     Difficulty in walking, not elsewhere classified     Gastro-esophageal reflux disease with esophagitis     GERD (gastroesophageal reflux disease)     Hyperlipidemia     Hyperlipidemia     Hypertension     Major depressive disorder, single episode     Muscle weakness (generalized)     Other lack of coordination     Paranoid personality (disorder)     Shared psychotic disorder     Unspecified dementia without behavioral disturbance     Unspecified hearing loss, unspecified ear     Unspecified lack of coordination     Unspecified psychosis not due to a substance or known physiological condition      History reviewed. No pertinent surgical history.  Social History     Socioeconomic History    Marital status:    Tobacco Use    Smoking status: Never    Smokeless tobacco: Never   Vaping Use    Vaping status: Never Used   Substance and Sexual Activity    Alcohol use: No    Drug use: No    Sexual activity: Not Currently     Partners: Male     Family History   Family history unknown: Yes       No Known Allergies    Current Facility-Administered Medications   Medication Dose Route Frequency Provider Last Rate Last Admin    acetaminophen (TYLENOL) tablet 650 mg  650 mg Oral Q4H PRN Christofer Harper  MD Jose Miguel        Or    acetaminophen (TYLENOL) suppository 650 mg  650 mg Rectal Q4H PRN Christofer Harper MD        aluminum-magnesium hydroxide-simethicone (MAALOX MAX) 400-400-40 MG/5ML suspension 7.5 mL  7.5 mL Oral Q4H PRN Christofer Harper MD        aspirin chewable tablet 81 mg  81 mg Oral Daily Christofer Harper MD        atorvastatin (LIPITOR) tablet 40 mg  40 mg Oral Nightly Moon Lucas MD        sennosides-docusate (PERICOLACE) 8.6-50 MG per tablet 2 tablet  2 tablet Oral BID PRN Christofer Harper MD        And    polyethylene glycol (MIRALAX) packet 17 g  17 g Oral Daily PRN Christofer Harper MD        And    bisacodyl (DULCOLAX) EC tablet 5 mg  5 mg Oral Daily PRN Christofer Harper MD        And    bisacodyl (DULCOLAX) suppository 10 mg  10 mg Rectal Daily PRN Christofer Harper MD        [START ON 7/27/2025] cholecalciferol (VITAMIN D3) capsule 50,000 Units  50,000 Units Oral Weekly Rasheed Rivera DO        cloNIDine (CATAPRES) tablet 0.1 mg  0.1 mg Oral Q8H PRN Rasheed Rivera DO        Divalproex Sodium (DEPAKOTE SPRINKLE) capsule 125 mg  125 mg Oral BID Rasheed Rivera DO        docusate sodium (COLACE) capsule 100 mg  100 mg Oral Daily Rasheed Rivera DO        ferrous sulfate tablet 325 mg  325 mg Oral Daily Rasheed Rivera DO        gabapentin (NEURONTIN) capsule 100 mg  100 mg Oral Nightly Rasheed Rivera DO        hydrALAZINE (APRESOLINE) tablet 10 mg  10 mg Oral Q8H PRN Christofer Harper MD        HYDROcodone-acetaminophen (NORCO) 5-325 MG per tablet 1 tablet  1 tablet Oral Q6H PRN Rasheed Rivera DO        Lidocaine 4 % 1 patch  1 patch Transdermal Q24H Rasheed Rivera DO   1 patch at 07/21/25 1205    magnesium hydroxide (MILK OF MAGNESIA) suspension 10 mL  10 mL Oral Daily PRN Rasheed Rivera DO        memantine (NAMENDA) tablet 5 mg  5 mg Oral Q12H  "Rasheed Rivera DO        nitroglycerin (NITROSTAT) SL tablet 0.4 mg  0.4 mg Sublingual Q5 Min PRN Christofer Harper MD        OLANZapine (zyPREXA) tablet 2.5 mg  2.5 mg Oral Nightly Rasheed Rivera DO        ondansetron (ZOFRAN) injection 4 mg  4 mg Intravenous Q6H PRN Christofer Harper MD        polyethylene glycol (MIRALAX) packet 17 g  17 g Oral Daily Rasheed Rivera DO        senna (SENOKOT) tablet 2 tablet  2 tablet Oral BID Rasheed Rivera DO        sodium chloride 0.9 % flush 10 mL  10 mL Intravenous PRN Christofer Harper MD        sodium chloride 0.9 % flush 10 mL  10 mL Intravenous Q12H Christofer Harper MD   10 mL at 07/21/25 1008    sodium chloride 0.9 % flush 10 mL  10 mL Intravenous PRN Christofer Harper MD        sodium chloride 0.9 % flush 10 mL  10 mL Intravenous Q12H Christofer Harper MD   10 mL at 07/21/25 1008    sodium chloride 0.9 % flush 10 mL  10 mL Intravenous PRN Christofer Harper MD        sodium chloride 0.9 % infusion 40 mL  40 mL Intravenous PRN Christofer Harepr MD        sodium chloride 0.9 % infusion 40 mL  40 mL Intravenous PRN Christofer Harper MD              acetaminophen **OR** acetaminophen    aluminum-magnesium hydroxide-simethicone    senna-docusate sodium **AND** polyethylene glycol **AND** bisacodyl **AND** bisacodyl    cloNIDine    hydrALAZINE    HYDROcodone-acetaminophen    magnesium hydroxide    nitroglycerin    ondansetron    [COMPLETED] Insert Peripheral IV **AND** sodium chloride    sodium chloride    sodium chloride    sodium chloride    sodium chloride    Current medication reviewed for route, type, dose and frequency and are current per MAR.    Palliative Performance Scale Score:     /42 (BP Location: Right arm, Patient Position: Lying)   Pulse 64   Temp 97.3 °F (36.3 °C) (Axillary)   Resp 18   Ht 170.2 cm (67\")   Wt 75.7 kg (166 lb 14.4 oz) Comment: New admit less than " 24 hours.  SpO2 92%   BMI 26.14 kg/m²     Intake/Output Summary (Last 24 hours) at 7/21/2025 1510  Last data filed at 7/20/2025 2059  Gross per 24 hour   Intake 500 ml   Output --   Net 500 ml       PE:  General Appearance:    Chronically ill appearing, alert but confused, uncooperative, NAD   HEENT:    NC/AT, deaf bilaterally    Neck:   supple, trachea midline, no JVD   Lungs:     CTAB without w/r/r    Heart:    RRR, normal S1 and S2, no M/R/G   Abdomen:     Soft, NT, ND, NABS    Extremities:   Moves all extremities, no edema   Pulses:   Pulses palpable and equal bilaterally   Skin:   Warm, dry   Neurologic:   Confused , disoriented , unable to reorient , climbing out of bed    Psych:   Agitated , uncooperative      Labs:   Results from last 7 days   Lab Units 07/21/25  0543   WBC 10*3/mm3 6.17   HEMOGLOBIN g/dL 12.7   HEMATOCRIT % 37.6   PLATELETS 10*3/mm3 163     Results from last 7 days   Lab Units 07/21/25  0543   SODIUM mmol/L 142   POTASSIUM mmol/L 3.6   CHLORIDE mmol/L 107   CO2 mmol/L 27.0   BUN mg/dL 18.3   CREATININE mg/dL 0.89   GLUCOSE mg/dL 88   CALCIUM mg/dL 9.6     Results from last 7 days   Lab Units 07/21/25  0543   SODIUM mmol/L 142   POTASSIUM mmol/L 3.6   CHLORIDE mmol/L 107   CO2 mmol/L 27.0   BUN mg/dL 18.3   CREATININE mg/dL 0.89   CALCIUM mg/dL 9.6   BILIRUBIN mg/dL 0.6   ALK PHOS U/L 94   ALT (SGPT) U/L 10   AST (SGOT) U/L 18   GLUCOSE mg/dL 88     Imaging Results (Last 72 Hours)       Procedure Component Value Units Date/Time    US Carotid Bilateral [[id number]] Collected: 07/21/25 0819     Updated: 07/21/25 0822    Narrative:      EXAM:    US Duplex Bilateral Extracranial Arteries     EXAM DATE:    7/21/2025 8:19 AM     CLINICAL HISTORY:    stroke; I63.9-Cerebral infarction, unspecified     TECHNIQUE:    Real-time duplex ultrasound scan of the extracranial arteries  integrating B-mode two-dimensional vascular structure, Doppler spectral  analysis and color flow Doppler imaging.      COMPARISON:    No relevant prior studies available.     FINDINGS:    Right common carotid artery:  Unremarkable as visualized.  No  occlusion or significant stenosis on color flow and spectral Doppler  imaging.    Right internal carotid artery:  Mild multifocal bilateral ICA plaque.   Peak systolic velocity in the right internal carotid artery (ICA) is 68  cm/s.    Right external carotid artery:  Unremarkable as visualized.  No  occlusion or significant stenosis on color flow and spectral Doppler  imaging.    Right vertebral artery:  Unremarkable as visualized.  Antegrade flow.    Right ICA/CCA ratio:  Unremarkable as visualized.  Within normal  limits.       Left common carotid artery:  Unremarkable as visualized.  No occlusion  or significant stenosis on color flow and spectral Doppler imaging.    Left internal carotid artery:  Peak systolic velocity in the left  internal carotid artery (ICA) is 63 cm/s.    Left external carotid artery:  Unremarkable as visualized.  No  occlusion or significant stenosis on color flow and spectral Doppler  imaging.    Left vertebral artery:  Unremarkable as visualized.  Antegrade flow.    Left ICA/CCA ratio:  Unremarkable as visualized.  Within normal  limits.       Lymph nodes:  Unremarkable as visualized.  No lymphadenopathy.      CAROTID STENOSIS REFERENCE USING IAC CRITERIA:    Mild - <50% stenosis. ICA PSV is less than 180 cm/s and plaque or  intimal thickening is visible.    Moderate - 50-69% stenosis. ICA PSV is 180 to 230 cm/s and plaque is  visible.    Severe - 70-94% stenosis. ICA PSV is more than 230 cm/s and visible  plaque with lumen narrowing is seen.    Near occlusion - 95-99% stenosis. ICA PSV is variable and significant  plaque with luminal narrowing is seen.    Occluded - 100% stenosis. No flow identified.       Impression:        Mild multifocal bilateral ICA plaque.     This report was finalized on 7/21/2025 8:20 AM by Dr. Teo Araujo MD.       XR Femur 2  View Left [[id number]] Collected: 07/20/25 1024     Updated: 07/21/25 0734    Addenda:        ADDENDUM:     Please note corrections to the original report reflected in the addended  report below:     EXAM:    XR LEFT femur, 2 Views     EXAM DATE:    7/20/2025 9:46 AM     CLINICAL HISTORY:    Fall with injury     TECHNIQUE:    Frontal and lateral views of the LEFT femur.     COMPARISON:    No relevant prior studies available.     FINDINGS:    Bones/joints:  Unremarkable.  No acute fracture.  No dislocation.    Soft tissues:  Unremarkable.     IMPRESSION:         No acute fracture or dislocation.     This report was finalized on 7/20/2025 4:38 PM by Dr. Madhav Lang MD.     Signed: 07/20/25 1638 by Madhav Lang MD    Narrative:      EXAM:    XR Right Femur, 2 Views     EXAM DATE:    7/20/2025 9:46 AM     CLINICAL HISTORY:    Fall with injury     TECHNIQUE:    Frontal and lateral views of the right femur.     COMPARISON:    No relevant prior studies available.     FINDINGS:    Bones/joints:  Unremarkable.  No acute fracture.  No dislocation.    Soft tissues:  Unremarkable.       Impression:        No acute fracture or dislocation.     This report was finalized on 7/20/2025 10:24 AM by Dr. Madhav Lang MD.       XR Hip With or Without Pelvis 2 - 3 View Left [[id number]] Collected: 07/20/25 1027     Updated: 07/20/25 2120    Narrative:      EXAM:    XR LEFT hip With Pelvis When Performed, 2 or 3 Views     EXAM DATE:    7/20/2025 9:46 AM     CLINICAL HISTORY:    Fall with injury     TECHNIQUE:    Two or three views of the LEFT hip with pelvis when performed.     COMPARISON:    No relevant prior studies available.     FINDINGS:    Bones/joints:  Unremarkable.  No acute fracture.  No dislocation.    Soft tissues:  Unremarkable.       Impression:        No acute osseous or articular abnormality in the LEFT hip.           This report was finalized on 7/20/2025 9:18 PM by Dr. Madhav Lang MD.       MRI Brain Without  Contrast [[id number]] Collected: 07/20/25 2111     Updated: 07/20/25 2116    Narrative:         Procedure: MRI examination of the brain performed without IV contrast on  July 20, 2025.  Examination performed utilizing T1, T2, FLAIR, and  diffusion sequences with ADC mapping with imaging performed in the  axial, sagittal, and coronal planes.     HISTORY: Evaluate for stroke.     COMPARISON: None.     FINDINGS:     Mild to moderate generalized brain volume loss.  Moderate chronic small vessel ischemic type changes in the white matter.  No acute hemorrhage.  Normal-sized the sella.  No tonsillar ectopia.  No hydrocephalus.  Mild mucosal thickening in the paranasal sinuses.  Clear mastoid air cells.  Multiple focal areas of restricted diffusion identified in the right  parietal lobe including the cortex and subcortical white matter and deep  white matter.  Additional focus of restricted diffusion identified at the right frontal  lobe seen best on image 12, series 6.  These areas show corresponding low signal on the ADC map consistent with  subacute infarcts.  Infarcts could be of embolic source.  Additional  cortical focus of restricted diffusion noted in the lateral aspect of  the upper right frontal lobe as seen on image 17, series 6       Impression:         Multiple focal areas of restricted diffusion identified in the right  parietal lobe and right frontal lobe most consistent with subacute  infarcts possibly due to shower emboli.  Mild to moderate generalized brain volume loss.  Moderate chronic small vessel ischemic type changes in the white matter.  No acute intracranial hemorrhage, mass, or hydrocephalus.  No shift in midline structures.     This report was finalized on 7/20/2025 9:14 PM by Leodan Khan MD.       XR Tibia Fibula 2 View Left [[id number]] Collected: 07/20/25 1025     Updated: 07/20/25 1316    Narrative:      EXAM:    XR Left Tibia and Fibula, 2 Views     EXAM DATE:    7/20/2025 9:46 AM      CLINICAL HISTORY:    Recent fracture with fall     TECHNIQUE:    Frontal and lateral views of the left tibia and fibula.     COMPARISON:    No relevant prior studies available.     FINDINGS:    Bones/joints:  Fracture of the fifth metatarsal, incompletely imaged.   No displaced fracture or dislocation of the tibia or fibula identified.    Soft tissues:  Unremarkable.  No radiopaque foreign body.    Tubes, lines and devices:  Cast device obscures fine bone detail.       Impression:      1.  No displaced fracture or dislocation of the tibia or fibula  identified.  2.  Fracture of the fifth metatarsal, incompletely imaged.  3.  Cast device obscures fine bone detail.     This report was finalized on 7/20/2025 10:25 AM by Dr. Madhav Lang MD.       CT Head Without Contrast [[id number]] Collected: 07/20/25 1052     Updated: 07/20/25 1106    Narrative:      EXAM:    CT Head Without Intravenous Contrast     EXAM DATE:    7/20/2025 9:53 AM     CLINICAL HISTORY:    trauma     TECHNIQUE:    Axial computed tomography images of the head/brain without intravenous  contrast.  Sagittal and coronal reformatted images were created and  reviewed.  This CT exam was performed using one or more of the following  dose reduction techniques:  automated exposure control, adjustment of  the mA and/or kV according to patient size, and/or use of iterative  reconstruction technique.     COMPARISON:    7/11/2025     FINDINGS:    Brain and extra-axial spaces:  Focal low-density in the right parietal  lobe, image #30, axial series, with loss of gray-white matter  differentiation may represent age-indeterminate infarct. If indicated,  further evaluation with MRI is recommended.  Moderate chronic small  vessel ischemic disease.  No midline shift or mass effect.  No evidence  of intracranial hemorrhage.    Bones/joints:  Unremarkable.  No acute fracture.    Soft tissues:  Unremarkable.    Sinuses:  Unremarkable as visualized.  No acute  sinusitis.    Mastoid air cells:  Unremarkable as visualized.  No mastoid effusion.       Impression:      1.  Focal low-density in the right parietal lobe, image #30, axial  series, with loss of gray-white matter differentiation concerning for  age-indeterminate infarct, but possibly acute/subacute. If indicated,  further evaluation with MRI is recommended.  2.  No midline shift or mass effect.  No hydrocephalus.  3.  No evidence of intracranial hemorrhage.  4.  Moderate chronic small vessel ischemic disease.     This report was finalized on 7/20/2025 10:55 AM by Dr. Madhav Lang MD.       XR Tibia Fibula 2 View Right [[id number]] Collected: 07/20/25 1034     Updated: 07/20/25 1106    Narrative:      EXAM:    XR Right Tibia and Fibula, 2 Views     EXAM DATE:    7/20/2025 9:46 AM     CLINICAL HISTORY:    Fall with injury     TECHNIQUE:    Frontal and lateral views of the right tibia and fibula.     COMPARISON:    No relevant prior studies available.     FINDINGS:    Bones/joints:  Unremarkable.  No acute fracture or dislocation.    Soft tissues:  Unremarkable.  No radiopaque foreign body.       Impression:        No acute fracture or dislocation.     This report was finalized on 7/20/2025 10:34 AM by Dr. Madhav Lang MD.       XR Forearm 2 View Left [[id number]] Collected: 07/20/25 1035     Updated: 07/20/25 1106    Narrative:      EXAM:    XR Left Forearm, 2 Views     EXAM DATE:    7/20/2025 10:23 AM     CLINICAL HISTORY:    injury with pain     TECHNIQUE:    Frontal and lateral views of the left forearm.     COMPARISON:    No relevant prior studies available.     FINDINGS:    Bones/joints:  Unremarkable.  No acute fracture.  No dislocation.    Soft tissues:  Unremarkable.       Impression:        No acute findings in the left forearm.     This report was finalized on 7/20/2025 10:35 AM by Dr. Madhav Lang MD.       CT Cervical Spine Without Contrast [[id number]] Collected: 07/20/25 1047     Updated: 07/20/25  1106    Narrative:      EXAM:    CT Cervical Spine Without Intravenous Contrast     EXAM DATE:    7/20/2025 9:53 AM     CLINICAL HISTORY:    fall at NH     TECHNIQUE:    Axial computed tomography images of the cervical spine without  intravenous contrast.  Sagittal and coronal reformatted images were  created and reviewed.  This CT exam was performed using one or more of  the following dose reduction techniques:  automated exposure control,  adjustment of the mA and/or kV according to patient size, and/or use of  iterative reconstruction technique.     COMPARISON:    No relevant prior studies available.     FINDINGS:    Vertebrae:  Degenerative facet arthropathy throughout the cervical  spine.  No acute fracture or traumatic malalignment identified.    Discs/spinal canal/neural foramina:  Degenerative disc disease  throughout the cervical spine.  Multilevel neuroforaminal stenosis and  mild central canal stenosis most pronounced C3/4 through C6/7.   Congenital fusion of C2-C3.    Soft tissues:  Unremarkable.    Vasculature:  Carotid artery calcifications.       Impression:      1.  No acute fracture or traumatic malalignment identified.  2.  Degenerative changes cervical spine as described.     This report was finalized on 7/20/2025 10:50 AM by Dr. Madhav Lang MD.       CT Pelvis Without Contrast [[id number]] Collected: 07/20/25 1050     Updated: 07/20/25 1105    Narrative:      EXAM:    CT Pelvis Without Intravenous Contrast     EXAM DATE:    7/20/2025 9:54 AM     CLINICAL HISTORY:    fall with injury     TECHNIQUE:    Axial computed tomography images of the pelvis without intravenous  contrast.  Sagittal and coronal reformatted images were created and  reviewed.  This CT exam was performed using one or more of the following  dose reduction techniques:  automated exposure control, adjustment of  the mA and/or kV according to patient size, and/or use of iterative  reconstruction technique.     COMPARISON:    No  relevant prior studies available.     FINDINGS:    Bowel:  Mild diverticulosis.  No obstruction.  No mucosal thickening.    Appendix:  No findings to suggest acute appendicitis.    Intraperitoneal space:  Unremarkable.  No free air.  No pelvic free  fluid.    Bladder:  Layering stones or calcification in the dependent portion of  urinary bladder.    Reproductive:  Unremarkable as visualized.    Bones/joints:  No acute fracture.  No dislocation.    Soft tissues:  Unremarkable.  No pelvic soft tissue hematomas are  identified.    Vasculature:  Unremarkable.  No lower abdominal aortic aneurysm.    Lymph nodes:  Unremarkable.  No enlarged lymph nodes.       Impression:        No acute fracture or dislocation identified.     This report was finalized on 7/20/2025 10:51 AM by Dr. Madhav Lang MD.       CT Lumbar Spine Without Contrast [[id number]] Collected: 07/20/25 1051     Updated: 07/20/25 1105    Narrative:      EXAM:    CT Lumbar Spine Without Intravenous Contrast     EXAM DATE:    7/20/2025 9:54 AM     CLINICAL HISTORY:    fall with injury     TECHNIQUE:    Axial computed tomography images of the lumbar spine without  intravenous contrast.  Sagittal and coronal reformatted images were  created and reviewed.  This CT exam was performed using one or more of  the following dose reduction techniques:  automated exposure control,  adjustment of the mA and/or kV according to patient size, and/or use of  iterative reconstruction technique.     COMPARISON:    7/11/2025     FINDINGS:    Vertebrae:  Stable Schmorl's node inferior endplate of L2 vertebral  body.  Degenerative facet arthropathy throughout the lumbar spine, most  prominent in the lower lumbar spine.  Multilevel facet arthropathy.  No  acute fracture or traumatic malalignment identified.    Discs/spinal canal/neural foramina:  Central canal and neuroforaminal  stenosis L2/3 through L5/S1.  Degenerative disc disease throughout the  lumbar spine.    Soft  tissues:  Unremarkable.       Impression:      1.  No acute fracture or traumatic malalignment identified.  2.  Degenerative changes lumbar spine as described.     This report was finalized on 7/20/2025 10:52 AM by Dr. Madhav Lang MD.               Diagnostics: Reviewed    A: Catherine Snyder is a 79 y.o. female admitted on 7/20/2025 for CVA. Pt had a fall at SNF and complained of right sided pain. She has a significant for advanced Alzheimer's dementia with psychosis, prior history of CVA, HTN, HLD, GERD, recent admission for UTI and Hx of ESBL UTI, recent frequent falls resulting in left lower extremity fracture-currently casted. Today, patient is alert but very confused. She was getting an EKG at the time of exam and respiratory staff reports that she was trying to climb out of the bed. She is confused, disoriented and unable to follow commands. She does have some sounds of attempting to speak but she is also deaf.  ER workup revealed a head CT suggesting a recent stroke.  Neurology was consulted while the patient was in the ER and it was determined that the patient should be admitted for further workup.In the ED vital signs were /79   Pulse 68   Temp 98.3 °F (36.8 °C) (Oral)   Resp 17. Her CT head showed  Focal low-density in the right parietal lobe, with loss of gray-white matter differentiation concerning for age-indeterminate infarct, but possible acute/subacute.  If indicated further evaluation with MRI is recommended.  No midline shift or mass effect.  No hydrocephalus.  No evidence of intracranial hemorrhage.  Moderate chronic small vessel ischemic disease. MRI brain: Brain: Multiple focal areas of restricted diffusion identified in the right parietal lobe and right frontal lobe most consistent with subacute infarct possibly due to shower emboli.  Mild to moderate degenerative brain volume loss.  Moderate chronic small vessel ischemic type changes in the white matter.  No acute intracranial  hemorrhage, mass, hydrocephalus.  No shift in the midline structure. Her Pan scan of the spine revealed no acute fractures or dislocation; X-rays of left and right tib-fib, right femur, left forearm, bilateral hips and pelvis revealed no new acute fractures; Glucose 102, creatinine 1.27, BUN 21.4, BUN/creatinine ratio 16.9, WBC 9.51, valproic acid 21.3. Pt was seen by Dr. Hernandez via tele-neurology who recommends that patient will be admitted to the hospitalist service on cardiac telemetry monitoring; as she failed the bedside swallow she will be getting aspirin rectal suppository, Keep systolic blood pressure between 140-160 and diastolic between 80-90, Check labs for tomorrow includes lipid profile TSH, sedimentation rate, hemoglobin A1c, vitamin B12 folic acid levels.  Transthoracic echocardiogram with bubble study to look for PFO, Carotid ultrasound to look for any carotid blockage, An EEG has been requested to make sure there is no underlying seizure disorder, Physical therapy/speech pathology/Occupational Therapy needs to evaluate the patient to see if she would be safe to go back to the nursing home from where she came. She will be followed up tomorrow by MARYBETH Dsouza with the inpatient telemedicine service. His final Impression and Plan: MRI showing acute ischemia in the watershed distribution between the right MCA territory and the right PCA territory.         P: Discussed patient with Dr. Rivera today, reports that he plans to speak with family first and then tomorrow for us to discuss GOC/discharge plans for in depth. Pt is very confused, disoriented and high falls risk. Will continue to provide symptomatic and supportive palliative care. Pt would benefit from hospice care services at the nursing home due to advanced dementia.     We appreciate the consult and the opportunity to participate in Catherine Snyder's care. We will continue to follow along. Please do not hesitate to contact us  regarding further symptom management or goals of care needs, including after hours or on weekends via our on call provider at [phone].     Time: 65 minutes spent reviewing medical and medication records, assessing and examining patient, discussing with family, answering questions, providing some guidance about a plan and documentation of care, and coordinating care with other healthcare members, with > 50% time spent face to face.     Aruna Jacinto, APRN    7/21/2025

## 2025-07-21 NOTE — CONSULTS
[facility]   Teleneurology Note    Patient Name: Catherine Snyder  : 1945  MRN: [id number]  Primary Care Physician: Ricky Alamo MD  Referring Site: [facility]   Teleneurology Initial Data           Neurologist Evaluation Date: 25 Neurologist Evaluation Time:    Date Last Known Well: 25 Time Last Known Well: 1200     History     Chief Complaint: 79-year-old right-handed white female with a history of progressive dementia and numerous falls in the nursing home was last seen normal around noon.  However later on she took a fall at an unknown time and hit her head and was brought to the emergency room.  HPI: I was called around 9:50 PM after the neuroimaging studies were showing acute stroke in the watershed distribution of the right MCA and right PCA territory.  I was able to see the patient through the telemedicine device at 10 PM and noted that she is awake but she does not verbally communicate.  She is moving all her extremities and at times reaching out with the left hand into thin air trying to grab something.  She is not following any commands.  Her eyes are open and she is establishing good eye contact with a registered nurse.  She is verbally mute and sometimes says yes and no.  Hard to do any kind of neurological evaluation because of this problem.  When we tried to offer her water with straw she did not know what to do with it and just held onto the cup.  No seizure activity has been noted so far.  She seems to be impulsive at times trying to get out of the bed.  She also has a fracture of the left foot and bruises throughout her entire body from numerous falls.  NIH stroke scale was attempted but very difficult to perform anything.  Patient not a candidate for any acute thrombolytic therapy as the last known time was more than 4.5 hours before last known normal and also she had traumatic head injury.    Stroke Risk Factors/ Pertinent Data     Stroke risk factors:  dementia, dyslipidemia, head trauma  Anticoagulants prior to arrival: none  Antiplatelets prior to arrival: aspirin  Statins prior to arrival: atorvastatin (Lipitor)     Scoring Scales     Modified Shamir Scale  Pre-Stroke Modified Doddridge Scale: 4 - Moderately severe disability.  Unable to walk without assistance, and unable to attend to own bodily needs without assistance.  Intracerebral Hemmorhage (ICH) Score  [place] Coma Score: 5-12  Age>=80: yes  Suki Coma Scale  Best Eye Response: Spontaneous  Best Verbal Response: Confused  Best Motor Response: Withdraws to pain  Suki Coma Scale Score: 12    NIH Stroke Scale     NIHSS Performed Date: 07/20/25 NIHSS Performed Time: 2200   Interval: baseline  1a. Level of Consciousness: 3-->Responds only with reflex motor or autonomic effects or totally unresponsive, flaccid, and areflexic  1b. LOC Questions: 2-->Answers neither question correctly  1c. LOC Commands: 2-->Performs neither task correctly  2. Best Gaze: 0-->Normal  3. Visual: 0-->No visual loss  4. Facial Palsy: 0-->Normal symmetrical movements  5a. Motor Arm, Left: 0-->No drift, limb holds 90 (or 45) degrees for full 10 secs  5b. Motor Arm, Right: 0-->No drift, limb holds 90 (or 45) degrees for full 10 secs  6a. Motor Leg, Left: 0-->No drift, leg holds 30 degree position for full 5 secs  6b. Motor Leg, Right: 0-->No drift, leg holds 30 degree position for full 5 secs  7. Limb Ataxia: 0-->Absent  8. Sensory: 0-->Normal, no sensory loss  9. Best Language: 2-->Severe aphasia, all communication is through fragmentary expression, great need for inference, questioning, and guessing by the listener. Range of information that can be exchanged is limited, listener carries burden of. . . (see row details)  10. Dysarthria: 2-->Severe dysarthria, patients speech is so slurred as to be unintelligible in the absence of or out of proportion to any dysphasia, or is mute/anarthric  11. Extinction and Inattention (formerly  Neglect): 0-->No abnormality  Total (NIH Stroke Scale): 11     Review of Systems     Review of Systems   Constitutional: Negative.    HENT:  Positive for hearing loss.    Eyes: Negative.    Respiratory: Negative.     Cardiovascular: Negative.    Gastrointestinal: Negative.    Endocrine: Negative.    Genitourinary: Negative.    Musculoskeletal:  Positive for arthralgias, back pain and myalgias.   Skin:  Positive for color change and wound.   Neurological:  Positive for syncope and speech difficulty.   Hematological: Negative.    Psychiatric/Behavioral:  Positive for behavioral problems and confusion. The patient is hyperactive.      History of metabolic encephalopathy as she was seen by me on November 6, 2023 with the help of MARYBETH Dsouza at which point it was found that she had triphasic wave pattern on the EEG  Objective   Exam     Exam performed with the help of support staff from the referring site  Neurological Exam  Mental Status  Awake and alert. Oriented only to person. Patient is nonverbal. Expressive aphasia present.  Progressive dementia just answers yes or no to questions.  Does not follow any commands.  Constantly looking around and trying to grab things from the air.    Cranial Nerves  CN I: Sense of smell is normal.  CN II: Visual acuity is normal. Visual fields full to confrontation.  CN III, IV, VI: Extraocular movements intact bilaterally. Normal lids and orbits bilaterally. Pupils equal round and reactive to light bilaterally.  CN V: Facial sensation is normal.  CN VII: Full and symmetric facial movement.  CN IX, X: Palate elevates symmetrically. Normal gag reflex.  CN XI: Shoulder shrug strength is normal.  CN XII: Tongue midline without atrophy or fasciculations.    Motor  Normal muscle bulk throughout. Normal muscle tone. No abnormal involuntary movements.  Has a cast in the left lower extremity secondary to recent fracture after a fall.    Sensory  Sensation is intact to light touch,  pinprick, vibration and proprioception in all four extremities.    Coordination    Patient does not comprehend and so unable to test finger-to-nose or heel-to-shin testing..    Gait    Not weightbearing as she had taken a fall earlier and has a cast in the left lower extremity..    Bedside swallow was attempted with a straw but the patient held onto the cup with water and did not know what to do with it  Result Review    Results   CT Head Without Intravenous Contrast     EXAM DATE:    7/20/2025 9:53 AM     CLINICAL HISTORY:    trauma     TECHNIQUE:    Axial computed tomography images of the head/brain without intravenous  contrast.  Sagittal and coronal reformatted images were created and  reviewed.  This CT exam was performed using one or more of the following  dose reduction techniques:  automated exposure control, adjustment of  the mA and/or kV according to patient size, and/or use of iterative  reconstruction technique.     COMPARISON:    7/11/2025     FINDINGS:    Brain and extra-axial spaces:  Focal low-density in the right parietal  lobe, image #30, axial series, with loss of gray-white matter  differentiation may represent age-indeterminate infarct. If indicated,  further evaluation with MRI is recommended.  Moderate chronic small  vessel ischemic disease.  No midline shift or mass effect.  No evidence  of intracranial hemorrhage.    Bones/joints:  Unremarkable.  No acute fracture.    Soft tissues:  Unremarkable.    Sinuses:  Unremarkable as visualized.  No acute sinusitis.    Mastoid air cells:  Unremarkable as visualized.  No mastoid effusion.     IMPRESSION:  1.  Focal low-density in the right parietal lobe, image #30, axial  series, with loss of gray-white matter differentiation concerning for  age-indeterminate infarct, but possibly acute/subacute. If indicated,  further evaluation with MRI is recommended.  2.  No midline shift or mass effect.  No hydrocephalus.  3.  No evidence of intracranial  hemorrhage.  4.  Moderate chronic small vessel ischemic disease.   CT Lumbar Spine Without Intravenous Contrast     EXAM DATE:    7/20/2025 9:54 AM     CLINICAL HISTORY:    fall with injury     TECHNIQUE:    Axial computed tomography images of the lumbar spine without  intravenous contrast.      Sagittal and coronal reformatted images were  created and reviewed.  This CT exam was performed using one or more of  the following dose reduction techniques:  automated exposure control,  adjustment of the mA and/or kV according to patient size, and/or use of  iterative reconstruction technique.     COMPARISON:    7/11/2025     FINDINGS:    Vertebrae:  Stable Schmorl's node inferior endplate of L2 vertebral  body.  Degenerative facet arthropathy throughout the lumbar spine, most  prominent in the lower lumbar spine.  Multilevel facet arthropathy.  No  acute fracture or traumatic malalignment identified.    Discs/spinal canal/neural foramina:  Central canal and neuroforaminal  stenosis L2/3 through L5/S1.  Degenerative disc disease throughout the  lumbar spine.    Soft tissues:  Unremarkable.     IMPRESSION:  1.  No acute fracture or traumatic malalignment identified.  2.  Degenerative changes lumbar spine as described.      CT Pelvis Without Intravenous Contrast     EXAM DATE:    7/20/2025 9:54 AM     CLINICAL HISTORY:    fall with injury     TECHNIQUE:    Axial computed tomography images of the pelvis without intravenous  contrast.  Sagittal and coronal reformatted images were created and  reviewed.  This CT exam was performed using one or more of the following  dose reduction techniques:  automated exposure control, adjustment of  the mA and/or kV according to patient size, and/or use of iterative  reconstruction technique.     COMPARISON:    No relevant prior studies available.     FINDINGS:    Bowel:  Mild diverticulosis.  No obstruction.  No mucosal thickening.    Appendix:  No findings to suggest acute appendicitis.     Intraperitoneal space:  Unremarkable.  No free air.  No pelvic free  fluid.    Bladder:  Layering stones or calcification in the dependent portion of  urinary bladder.    Reproductive:  Unremarkable as visualized.    Bones/joints:  No acute fracture.  No dislocation.    Soft tissues:  Unremarkable.  No pelvic soft tissue hematomas are  identified.    Vasculature:  Unremarkable.  No lower abdominal aortic aneurysm.    Lymph nodes:  Unremarkable.  No enlarged lymph nodes.     IMPRESSION:    No acute fracture or dislocation identified.     CT Cervical Spine Without Intravenous Contrast     EXAM DATE:    7/20/2025 9:53 AM     CLINICAL HISTORY:    fall at NH     TECHNIQUE:    Axial computed tomography images of the cervical spine without  intravenous contrast.  Sagittal and coronal reformatted images were  created and reviewed.  This CT exam was performed using one or more of  the following dose reduction techniques:  automated exposure control,  adjustment of the mA and/or kV according to patient size, and/or use of  iterative reconstruction technique.     COMPARISON:    No relevant prior studies available.     FINDINGS:    Vertebrae:  Degenerative facet arthropathy throughout the cervical  spine.  No acute fracture or traumatic malalignment identified.    Discs/spinal canal/neural foramina:  Degenerative disc disease  throughout the cervical spine.  Multilevel neuroforaminal stenosis and  mild central canal stenosis most pronounced C3/4 through C6/7.   Congenital fusion of C2-C3.    Soft tissues:  Unremarkable.    Vasculature:  Carotid artery calcifications.     IMPRESSION:  1.  No acute fracture or traumatic malalignment identified.  2.  Degenerative changes cervical spine as described.       MRI examination of the brain performed without IV contrast on  July 20, 2025.        Examination performed utilizing T1, T2, FLAIR, and  diffusion sequences with ADC mapping with imaging performed in the  axial, sagittal, and  coronal planes.     HISTORY: Evaluate for stroke.     COMPARISON: None.     FINDINGS:     Mild to moderate generalized brain volume loss.  Moderate chronic small vessel ischemic type changes in the white matter.  No acute hemorrhage.  Normal-sized the sella.  No tonsillar ectopia.  No hydrocephalus.  Mild mucosal thickening in the paranasal sinuses.  Clear mastoid air cells.  Multiple focal areas of restricted diffusion identified in the right  parietal lobe including the cortex and subcortical white matter and deep  white matter.  Additional focus of restricted diffusion identified at the right frontal  lobe seen best on image 12, series 6.  These areas show corresponding low signal on the ADC map consistent with  subacute infarcts.  Infarcts could be of embolic source.  Additional  cortical focus of restricted diffusion noted in the lateral aspect of  the upper right frontal lobe as seen on image 17, series 6     IMPRESSION:     Multiple focal areas of restricted diffusion identified in the right  parietal lobe and right frontal lobe most consistent with subacute  infarcts possibly due to shower emboli.  Mild to moderate generalized brain volume loss.  Moderate chronic small vessel ischemic type changes in the white matter.  No acute intracranial hemorrhage, mass, or hydrocephalus.  No shift in midline structures.    FL VIDEO SWALLOW-this was performed on September 22, 2017:     CLINICAL INDICATION:     silent aspiration; I63.9-Cerebral infarction,  unspecified     TECHNIQUE: Modified barium swallow was performed utilizing video  fluoroscopy in conjunction with the Speech Pathology team. The patient  ingested multiple barium media including thin barium, nectar, and honey  liquid as well as barium pudding and a barium pudding coated cracker.   FLUOROSCOPY TIME: 119 s     COMPARISON: NONE      FINDINGS: Fleeting laryngeal penetration of thin liquids via cup. Oral  holding w/ all consistencies. No other laryngeal  penetration or  aspiration evidenced in this study.         IMPRESSION:  Fleeting laryngeal penetration of thin liquids via cup. Oral  holding w/ all consistencies. No other laryngeal penetration or  aspiration evidenced in this study.     Personal review of CNS imaging:(Official report by radiologist pending)  Imaging  CT Imaging Review: CT Imaging reviewed, NO acute infarct/ hemorrhage seen    Thrombolytic   Thrombolytics: thrombolytic not given  Thrombolytic Exclusion Criteria: Onset unknown or GREATER than 4.5 hours, Significant head trauma or prior stroke in the previous 3 months     Assessment & Plan   Assessment/ Plan     Assessment:  Acute Stroke Evaluation: Acute ishemic stroke       Plan:  Patient will be admitted to the hospitalist service on cardiac telemetry monitoring.  As she failed the bedside swallow she will be getting aspirin rectal suppository.  She needs to be hydrated.  Keep systolic blood pressure between 140-160 and diastolic between 80-90.  Check labs for tomorrow includes lipid profile TSH, sedimentation rate, hemoglobin A1c, vitamin B12 folic acid levels.  Transthoracic echocardiogram with bubble study to look for PFO.  Carotid ultrasound to look for any carotid blockage.  An EEG has been requested to make sure there is no underlying seizure disorder  Physical therapy/speech pathology/Occupational Therapy needs to evaluate the patient to see if she would be safe to go back to the nursing home from where she came.  She will be followed up tomorrow by MARYBETH Dsouza with the inpatient telemedicine service.    Final Impression and Plan: MRI showing acute ischemia in the watershed distribution between the right MCA territory and the right PCA territory.    Disposition     Disposition: The patient will remain at the referring institution for further evaluation and management    Medical Decision Making  Medical Data Reviewed: Data reviewed including: clinical labs, radiology and/or  medical tests, Obtaining/ reviewing old medical records, Obtaining case history from another source, Independent review of CNS images  Length of visit: 30 minutes in this critical care evaluation and management of acute strokelike symptoms in the emergency room setting.    I, Diogo Hernandez MD, saw the patient on 07/20/25 at 2200 for an initial in-patient or emergency room telememedicine face to face consult using interactive technology for 30 minutes. The location of the patient was [place]. I was located at [facility] .    I have proceeded with this evaluation at the request of the referring practitioner as it is felt to be an emergency setting and no appropriate specialist is available to perform this evaluation. The originating hospital has reported that this is the correct patient and has obtained consent from the patient/surrogate to perform this telemedicine evaluation(including obtaining history, performing examination and reviewing data provided by the patient an/or originating site of care provider)    I have introduced myself to the patient, provided my credentials, disclosed my location, and determined that, based on review of the patient's chart and discussion with the patient's primary team, telemedicine via a HIPAA compliant, real-time, face-to-face two-way, interactive audio and video platform is an appropriate and effective means of providing the service.    The patient/surrogate has a right to refuse this evaluation as they have been explained risks including potential loss of confidentiality, benefits, alternatives, and the potential need for subsequent face-to-face care. In this evaluation, we will be providing recommendations only.  The ultimate decision to follow or not to follow these recommendations will be left to the bedside treating/requesting practitioner.    The patient/surrogate has been notified that other healthcare professionals including technical person may be  involved in this A/V evaluation.  All laws concerning confidentiality and patient access to medical records and copies of medical records apply to telemedicine.  The patient/surrogate has received the originating site's Health Notice of Privacy Practices.    Diogo Hernandez MD

## 2025-07-21 NOTE — PROGRESS NOTES
Stroke Progress Note       Chief Complaint: Encephalopathy    Subjective    Subjective     Subjective:  No acute events reported overnight.  Nursing staff reports patient has been very lethargic today, not communicating much.  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  On exam patient would briefly open her eyes but not attempt to converse, was not following commands but spontaneously moving all over extremities purposefully.    Review of Systems   Unable to perform ROS: Dementia            Objective    Objective      Temp:  [97.2 °F (36.2 °C)-98 °F (36.7 °C)] 97.3 °F (36.3 °C)  Heart Rate:  [62-81] 64  Resp:  [18] 18  BP: (132-184)/(42-89) 135/42    Neurological Exam  Mental Status  Arousable to verbal stimuli. Patient is nonverbal. Language: Not following any commands.    Cranial Nerves  CN III, IV, VI: Pupils equal round and reactive to light bilaterally.  CN VII: Full and symmetric facial movement.    Motor  Decreased muscle bulk throughout. Normal muscle tone. No abnormal involuntary movements.  Not following commands, difficult to measure strength but she was moving all extremities purposefully.    Coordination    No obvious dysmetria.    Gait    Not assessed.      Physical Exam  Vitals and nursing note reviewed.   Constitutional:       General: She is not in acute distress.  HENT:      Head: Normocephalic.      Mouth/Throat:      Pharynx: Oropharynx is clear.   Eyes:      Pupils: Pupils are equal, round, and reactive to light.   Cardiovascular:      Rate and Rhythm: Normal rate.   Pulmonary:      Effort: Pulmonary effort is normal. No respiratory distress.   Skin:     General: Skin is warm and dry.   Psychiatric:         Mood and Affect: Mood normal.         Behavior: Behavior normal.         Hospital Meds:  Scheduled- aspirin, 81 mg, Oral, Daily  atorvastatin, 40 mg, Oral, Nightly  [START ON 7/27/2025] cholecalciferol, 50,000 Units, Oral, Weekly  Divalproex Sodium, 125 mg, Oral, BID  docusate  sodium, 100 mg, Oral, Daily  ferrous sulfate, 325 mg, Oral, Daily  gabapentin, 100 mg, Oral, Nightly  Lidocaine, 1 patch, Transdermal, Q24H  memantine, 5 mg, Oral, Q12H  OLANZapine, 2.5 mg, Oral, Nightly  polyethylene glycol, 17 g, Oral, Daily  senna, 2 tablet, Oral, BID  sodium chloride, 10 mL, Intravenous, Q12H  sodium chloride, 10 mL, Intravenous, Q12H      Infusions-     PRNs-   acetaminophen **OR** acetaminophen    aluminum-magnesium hydroxide-simethicone    senna-docusate sodium **AND** polyethylene glycol **AND** bisacodyl **AND** bisacodyl    cloNIDine    hydrALAZINE    HYDROcodone-acetaminophen    magnesium hydroxide    nitroglycerin    ondansetron    [COMPLETED] Insert Peripheral IV **AND** sodium chloride    sodium chloride    sodium chloride    sodium chloride    sodium chloride    Results Review:    I reviewed the patient's new clinical results.    Imaging Results (Last 24 Hours)       Procedure Component Value Units Date/Time    US Carotid Bilateral [[id number]] Collected: 07/21/25 0819     Updated: 07/21/25 0822    Narrative:      EXAM:    US Duplex Bilateral Extracranial Arteries     EXAM DATE:    7/21/2025 8:19 AM     CLINICAL HISTORY:    stroke; I63.9-Cerebral infarction, unspecified     TECHNIQUE:    Real-time duplex ultrasound scan of the extracranial arteries  integrating B-mode two-dimensional vascular structure, Doppler spectral  analysis and color flow Doppler imaging.     COMPARISON:    No relevant prior studies available.     FINDINGS:    Right common carotid artery:  Unremarkable as visualized.  No  occlusion or significant stenosis on color flow and spectral Doppler  imaging.    Right internal carotid artery:  Mild multifocal bilateral ICA plaque.   Peak systolic velocity in the right internal carotid artery (ICA) is 68  cm/s.    Right external carotid artery:  Unremarkable as visualized.  No  occlusion or significant stenosis on color flow and spectral Doppler  imaging.    Right vertebral  artery:  Unremarkable as visualized.  Antegrade flow.    Right ICA/CCA ratio:  Unremarkable as visualized.  Within normal  limits.       Left common carotid artery:  Unremarkable as visualized.  No occlusion  or significant stenosis on color flow and spectral Doppler imaging.    Left internal carotid artery:  Peak systolic velocity in the left  internal carotid artery (ICA) is 63 cm/s.    Left external carotid artery:  Unremarkable as visualized.  No  occlusion or significant stenosis on color flow and spectral Doppler  imaging.    Left vertebral artery:  Unremarkable as visualized.  Antegrade flow.    Left ICA/CCA ratio:  Unremarkable as visualized.  Within normal  limits.       Lymph nodes:  Unremarkable as visualized.  No lymphadenopathy.      CAROTID STENOSIS REFERENCE USING IAC CRITERIA:    Mild - <50% stenosis. ICA PSV is less than 180 cm/s and plaque or  intimal thickening is visible.    Moderate - 50-69% stenosis. ICA PSV is 180 to 230 cm/s and plaque is  visible.    Severe - 70-94% stenosis. ICA PSV is more than 230 cm/s and visible  plaque with lumen narrowing is seen.    Near occlusion - 95-99% stenosis. ICA PSV is variable and significant  plaque with luminal narrowing is seen.    Occluded - 100% stenosis. No flow identified.       Impression:        Mild multifocal bilateral ICA plaque.     This report was finalized on 7/21/2025 8:20 AM by Dr. Teo Araujo MD.       XR Femur 2 View Left [[id number]] Collected: 07/20/25 1024     Updated: 07/21/25 0734    Addenda:        ADDENDUM:     Please note corrections to the original report reflected in the addended  report below:     EXAM:    XR LEFT femur, 2 Views     EXAM DATE:    7/20/2025 9:46 AM     CLINICAL HISTORY:    Fall with injury     TECHNIQUE:    Frontal and lateral views of the LEFT femur.     COMPARISON:    No relevant prior studies available.     FINDINGS:    Bones/joints:  Unremarkable.  No acute fracture.  No dislocation.    Soft tissues:   Unremarkable.     IMPRESSION:         No acute fracture or dislocation.     This report was finalized on 7/20/2025 4:38 PM by Dr. Madhav Lang MD.     Signed: 07/20/25 1638 by Madhav Lang MD    Narrative:      EXAM:    XR Right Femur, 2 Views     EXAM DATE:    7/20/2025 9:46 AM     CLINICAL HISTORY:    Fall with injury     TECHNIQUE:    Frontal and lateral views of the right femur.     COMPARISON:    No relevant prior studies available.     FINDINGS:    Bones/joints:  Unremarkable.  No acute fracture.  No dislocation.    Soft tissues:  Unremarkable.       Impression:        No acute fracture or dislocation.     This report was finalized on 7/20/2025 10:24 AM by Dr. Madhav Lang MD.       XR Hip With or Without Pelvis 2 - 3 View Left [[id number]] Collected: 07/20/25 1027     Updated: 07/20/25 2120    Narrative:      EXAM:    XR LEFT hip With Pelvis When Performed, 2 or 3 Views     EXAM DATE:    7/20/2025 9:46 AM     CLINICAL HISTORY:    Fall with injury     TECHNIQUE:    Two or three views of the LEFT hip with pelvis when performed.     COMPARISON:    No relevant prior studies available.     FINDINGS:    Bones/joints:  Unremarkable.  No acute fracture.  No dislocation.    Soft tissues:  Unremarkable.       Impression:        No acute osseous or articular abnormality in the LEFT hip.           This report was finalized on 7/20/2025 9:18 PM by Dr. Madhav Lang MD.       MRI Brain Without Contrast [[id number]] Collected: 07/20/25 2111     Updated: 07/20/25 2116    Narrative:         Procedure: MRI examination of the brain performed without IV contrast on  July 20, 2025.  Examination performed utilizing T1, T2, FLAIR, and  diffusion sequences with ADC mapping with imaging performed in the  axial, sagittal, and coronal planes.     HISTORY: Evaluate for stroke.     COMPARISON: None.     FINDINGS:     Mild to moderate generalized brain volume loss.  Moderate chronic small vessel ischemic type changes in the white  matter.  No acute hemorrhage.  Normal-sized the sella.  No tonsillar ectopia.  No hydrocephalus.  Mild mucosal thickening in the paranasal sinuses.  Clear mastoid air cells.  Multiple focal areas of restricted diffusion identified in the right  parietal lobe including the cortex and subcortical white matter and deep  white matter.  Additional focus of restricted diffusion identified at the right frontal  lobe seen best on image 12, series 6.  These areas show corresponding low signal on the ADC map consistent with  subacute infarcts.  Infarcts could be of embolic source.  Additional  cortical focus of restricted diffusion noted in the lateral aspect of  the upper right frontal lobe as seen on image 17, series 6       Impression:         Multiple focal areas of restricted diffusion identified in the right  parietal lobe and right frontal lobe most consistent with subacute  infarcts possibly due to shower emboli.  Mild to moderate generalized brain volume loss.  Moderate chronic small vessel ischemic type changes in the white matter.  No acute intracranial hemorrhage, mass, or hydrocephalus.  No shift in midline structures.     This report was finalized on 7/20/2025 9:14 PM by Leodan Khan MD.             Results for orders placed during the hospital encounter of 09/21/17    Adult Transthoracic Echo Complete W/ Cont if Necessary Per Protocol 09/23/2017  2:37 PM    Interpretation Summary  · The study is technically adequate for diagnosis.  · Left ventricular systolic function is normal. Estimated EF = 60%.  · There are no significant valvular abnormalities noted.  · There is no evidence of pericardial effusion.  · Mild pulmonary hypertension is present.    Compared to the study of 3/1/2014, there are no significant changes.    EEG  Result Date: 7/21/2025  Diffuse cerebral dysfunction of mild degree but nonspecific.  This is most commonly seen due to toxic/metabolic or hypoxemic cause No evidence for epilepsy is  present This report is transcribed using the Dragon dictation system.      US Carotid Bilateral  Result Date: 7/21/2025    Mild multifocal bilateral ICA plaque.  This report was finalized on 7/21/2025 8:20 AM by Dr. Teo Araujo MD.      XR Hip With or Without Pelvis 2 - 3 View Left  Result Date: 7/20/2025    No acute osseous or articular abnormality in the LEFT hip.    This report was finalized on 7/20/2025 9:18 PM by Dr. Madhav Lang MD.      MRI Brain Without Contrast  Result Date: 7/20/2025   Multiple focal areas of restricted diffusion identified in the right parietal lobe and right frontal lobe most consistent with subacute infarcts possibly due to shower emboli. Mild to moderate generalized brain volume loss. Moderate chronic small vessel ischemic type changes in the white matter. No acute intracranial hemorrhage, mass, or hydrocephalus. No shift in midline structures.  This report was finalized on 7/20/2025 9:14 PM by Leodan Khan MD.      XR Femur 2 View Left  Addendum Date: 7/20/2025  ADDENDUM:  Please note corrections to the original report reflected in the addended report below:  EXAM:   XR LEFT femur, 2 Views  EXAM DATE:   7/20/2025 9:46 AM  CLINICAL HISTORY:   Fall with injury  TECHNIQUE:   Frontal and lateral views of the LEFT femur.  COMPARISON:   No relevant prior studies available.  FINDINGS:   Bones/joints:  Unremarkable.  No acute fracture.  No dislocation.   Soft tissues:  Unremarkable.  IMPRESSION:       No acute fracture or dislocation.  This report was finalized on 7/20/2025 4:38 PM by Dr. Madhav Lang MD.      Result Date: 7/20/2025    No acute fracture or dislocation.  This report was finalized on 7/20/2025 10:24 AM by Dr. Madhav Lang MD.      XR Tibia Fibula 2 View Left  Result Date: 7/20/2025  1.  No displaced fracture or dislocation of the tibia or fibula identified. 2.  Fracture of the fifth metatarsal, incompletely imaged. 3.  Cast device obscures fine bone detail.  This  report was finalized on 7/20/2025 10:25 AM by Dr. Madhav Lang MD.      CT Head Without Contrast  Result Date: 7/20/2025  1.  Focal low-density in the right parietal lobe, image #30, axial series, with loss of gray-white matter differentiation concerning for age-indeterminate infarct, but possibly acute/subacute. If indicated, further evaluation with MRI is recommended. 2.  No midline shift or mass effect.  No hydrocephalus. 3.  No evidence of intracranial hemorrhage. 4.  Moderate chronic small vessel ischemic disease.  This report was finalized on 7/20/2025 10:55 AM by Dr. Madhav Lang MD.      XR Tibia Fibula 2 View Right  Result Date: 7/20/2025    No acute fracture or dislocation.  This report was finalized on 7/20/2025 10:34 AM by Dr. Madhav Lang MD.      XR Forearm 2 View Left  Result Date: 7/20/2025    No acute findings in the left forearm.  This report was finalized on 7/20/2025 10:35 AM by Dr. Madhav Lang MD.      CT Cervical Spine Without Contrast  Result Date: 7/20/2025  1.  No acute fracture or traumatic malalignment identified. 2.  Degenerative changes cervical spine as described.  This report was finalized on 7/20/2025 10:50 AM by Dr. Madhav Lang MD.      CT Pelvis Without Contrast  Result Date: 7/20/2025    No acute fracture or dislocation identified.  This report was finalized on 7/20/2025 10:51 AM by Dr. Madhav Lang MD.      CT Lumbar Spine Without Contrast  Result Date: 7/20/2025  1.  No acute fracture or traumatic malalignment identified. 2.  Degenerative changes lumbar spine as described.  This report was finalized on 7/20/2025 10:52 AM by Dr. Madhav Lang MD.         Lab Results   Component Value Date    HGBA1C 5.43 07/21/2025      Lab Results   Component Value Date    CHOL 131 07/21/2025    CHLPL 191 03/02/2014    TRIG 115 07/21/2025    HDL 32 (L) 07/21/2025    LDL 78 07/21/2025      Lab Results   Component Value Date    WBC 6.17 07/21/2025    HGB 12.7 07/21/2025    HCT 37.6  07/21/2025    MCV 91.5 07/21/2025     07/21/2025      Lab Results   Component Value Date    GLUCOSE 88 07/21/2025    BUN 18.3 07/21/2025    CREATININE 0.89 07/21/2025    EGFRIFNONA 36 (L) 10/14/2021    BCR 20.6 07/21/2025    K 3.6 07/21/2025    CO2 27.0 07/21/2025    CALCIUM 9.6 07/21/2025    ALBUMIN 3.5 07/21/2025    AST 18 07/21/2025    ALT 10 07/21/2025      Lab Results   Component Value Date    TSH 2.300 07/21/2025     Lab Results   Component Value Date    NLXFEZND31 826 07/21/2025     Lab Results   Component Value Date    FOLATE 7.85 07/21/2025             Assessment/Plan     Assessment/Plan:  79-year-old female with PMHx significant for advanced Alzheimer's with psychosis, prior CVA, hypertension, hyperlipidemia, recent admission for UTI, and frequent falls most recently a fall resulting in left leg fracture presented to the ED after a fall and complaints of right sided pain.  ED workup included a CT head which showed a area concerning for recent ischemia in right parietal lobe.  MRI in the ED showed multiple areas of restricted diffusion in right parietal and right frontal lobe consistent with subacute infarcts.  Carotid ultrasound with mild plaque in the ICAs bilaterally.    #Acute ischemic stroke right parietal lobe, embolic stroke with undetermined source, workup ongoing  #Advanced Alzheimer's  #Significant hearing loss      - CTA head/neck  - UA  - Continue aspirin 81 mg daily  - Atorvastatin 40 mg nightly  - Palliative care consulted    The case was discussed with primary team.    MARYBETH Thomas  07/21/25  14:08 EDT    This was an audio and video enabled telemedicine encounter.  Dr. Lucas present for encounter via telemedicine.  Verbal consent taken.

## 2025-07-21 NOTE — PLAN OF CARE
Goal Outcome Evaluation:      Patient restless and combative on arrival to , PA made aware, see MAR for orders. Pt currently resting in bed voicing no complaints or concerns, bed alarm on with call light in reach. IV access maintained, NS infusing per MAR, no s/s of acute distress noted at this time, plan of care ongoing.

## 2025-07-21 NOTE — THERAPY EVALUATION
Acute Care - Speech Language Pathology   Swallow Initial Evaluation  [facility]  CLINICAL DYSPHAGIA ASSESSMENT     Patient Name: Catherine Snyder  : 1945  MRN: [id number]  Today's Date: 2025  Onset of Illness/Injury or Date of Surgery: 25     Referring Physician: Leroy      Admit Date: 2025    Visit Dx:     ICD-10-CM ICD-9-CM   1. Cerebrovascular accident (CVA), unspecified mechanism  I63.9 434.91     Patient Active Problem List   Diagnosis    HCAP (healthcare-associated pneumonia)    UTI (urinary tract infection)    Altered mental status    Cerebrovascular accident (CVA)    Aggressive behavior    Hyperlipidemia    Hypertension    Dementia with psychosis    Anticoagulated    Urinary tract infection    CVA (cerebral vascular accident)     Past Medical History:   Diagnosis Date    Alzheimer disease     Alzheimer's disease     Anxiety     Anxiety disorder, unspecified     Cerebrovascular accident (CVA)     Constipation     Deafness     Delirium due to known physiological condition     Depression     Difficulty in walking, not elsewhere classified     Gastro-esophageal reflux disease with esophagitis     GERD (gastroesophageal reflux disease)     Hyperlipidemia     Hyperlipidemia     Hypertension     Major depressive disorder, single episode     Muscle weakness (generalized)     Other lack of coordination     Paranoid personality (disorder)     Shared psychotic disorder     Unspecified dementia without behavioral disturbance     Unspecified hearing loss, unspecified ear     Unspecified lack of coordination     Unspecified psychosis not due to a substance or known physiological condition      History reviewed. No pertinent surgical history.    RN contacted SLP this pm w/ report patient somewhat improved in a/a status this pm. Per this status, Catherine Snyder was seen at bedside this pm on 3S to assess safety/efficacy of swallowing fnx, determine safest/least restrictive diet tolerance.     Per chart  "review, \"past medical history is significant for advanced Alzheimer's dementia with psychosis, prior history of CVA, HTN, HLD, GERD, recent admission for UTI and Hx of ESBL UTI, recent frequent falls resulting in left lower extremity fracture-currently casted.     Patient was seen at bedside with nurse Meggan Denise RN present.  Patient was awake, alert, but not oriented.  Patient is deaf and does not communicate or follow instructions.  Nursing staff is attempting to place a second IV while the patient was being combative and spitting at them.  Patient was sent to our hospital ER secondary to a fall at the nursing home where she has been a full-time resident for a significant period of time.  They had concern for the patient's showing signs of right hip/leg pain.  Patient does not have any family members or healthcare surrogates present at her hospitalization today and is unable to provide her own history due to Alzheimer's dementia.  ER workup revealed a head CT suggesting a recent stroke.  Neurology was consulted while the patient was in the ER and it was determined that the patient should be admitted for further workup.     ED, vital signs were /79   Pulse 68   Temp 98.3 °F (36.8 °C) (Oral)   Resp 17   Ht 172.7 cm (68\")   Wt 79.4 kg (175 lb 0.7 oz)   SpO2 93%   BMI 26.62 kg/m²   ED workup significant for:   CT head: Focal low-density in the right parietal lobe, with loss of gray-white matter differentiation concerning for age-indeterminate infarct, but possible acute/subacute.  If indicated further evaluation with MRI is recommended.  No midline shift or mass effect.  No hydrocephalus.  No evidence of intracranial hemorrhage.  Moderate chronic small vessel ischemic disease.  MRI brain: Brain: Multiple focal areas of restricted diffusion identified in the right parietal lobe and right frontal lobe most consistent with subacute infarct possibly due to shower emboli.  Mild to moderate degenerative " "brain volume loss.  Moderate chronic small vessel ischemic type changes in the white matter.  No acute intracranial hemorrhage, mass, hydrocephalus.  No shift in the midline structure.  Pan scan of the spine revealed no acute fractures or dislocations  X-rays of left and right tib-fib, right femur, left forearm, bilateral hips and pelvis revealed no new acute fractures.  Glucose 102, creatinine 1.27, BUN 21.4, BUN/creatinine ratio 16.9, WBC 9.51, valproic acid 21.3.\"    She was referred per stroke protocol. Per review of EMR, patient is familiar to SLP Department from recent [facility] admission w/ UTI, recommended least restrictive modified po diet of mechanical ground consistencies, thin liquids.     Social History     Socioeconomic History    Marital status:    Tobacco Use    Smoking status: Never    Smokeless tobacco: Never   Vaping Use    Vaping status: Never Used   Substance and Sexual Activity    Alcohol use: No    Drug use: No    Sexual activity: Not Currently     Partners: Male      Imaging:  No recent chest xray available for review.     Labs:  Sodium  142  07/21 0543  Potassium  3.6  07/21 0543  Chloride  107  07/21 0543  CO2  27.0  07/21 0543  BUN  18.3  07/21 0543  Creatinine  0.89  07/21 0543  Glucose  75  07/21 0611  Hemoglobin  12.7  07/21 0543  Hematocrit  37.6  07/21 0543  WBC  6.17  07/21 0543  Platelets  163  07/21 0543  PTT  27.7  07/21 0543  Protime  14.1  07/21 0543  INR  1.03  07/21 0543    Diet Orders (active) (From admission, onward)       Start     Ordered    07/21/25 0051  NPO Diet NPO Type: Strict NPO  Diet Effective Now         07/21/25 0050                  She was observed on ra w/o complications.     Patient was positioned upright and centered in bed for attempted po presentations of single ice chips via spoon. Further presentations and consistencies deferred 2/2 significant ams, limited participation. Patient was unable to self provide po trials.     Facial/oral structures were " "symmetrical upon observation. No obvious lingual deviation, unable to elicit protrusion. Oral mucosa were moderately xerostomic, pink, and clean. Secretions were clear, tacky, and orally controlled. OROM/GER[place] appeared generally weak, she did not attempt to imitate oral postures. Gag was not assessed. Volitional cough could not assess as this was not elicited. No spontaneous cough observed. Voice could not be assessed as she was nonverbal across this assessment. She did not attempt to communicate or interact. She was resting upon SLP entry, limitedly awakened to tactile stimulation. She was noted w/ non-purposeful movements of bilateral upper and lower extremities.     Upon po presentations, patient did not anticipate or accept ice chip trials. In response to labial stimulation via spoon bowl, she was noted to immediately tighten labial seal and shake head \"no.\" Unable to elicit acceptance despite max cues. She fatigued quickly and was unable to maintain a/a status. Per this status, further attempts deferred. Pharyngeal swallow was not elicited.     Impression:  Per this assessment, Ms. Snyder presented w/ decreased a/a status and significant ams, felt to be unsafe for po intake at this time.     Recommend continuing NPO, meds via non-oral method, universal aspiration precautions, oral care protocol. SLP to f/u for reassessment pending improved status to functionally participate.     SLP Recommendation and Plan  1. NPO.  2. Medications via non-oral method.    3. Universal aspiration precautions.   4. ONRA precautions.  5. Oral care protocol.  SLP to f/u for reassessment pending improved status.      Thank you for allowing me to participate in the care of your patient-  Mechelle Vaca M.A., CCC-SLP      EDUCATION  The patient has been educated in the following areas:   Dysphagia (Swallowing Impairment) Oral Care/Hydration NPO rationale.      Time Calculation:    Time Calculation- SLP       Row Name 07/21/25 1038 " 07/21/25 1015          Time Calculation- SLP    SLP Received On 07/21/25  -RIK --     SLP - Next Appointment -- 07/22/25  -RIK               User Key  (r) = Recorded By, (t) = Taken By, (c) = Cosigned By      Initials Name Provider Type    Mechelle Loera MA,CCC-SLP Speech and Language Pathologist                    Therapy Charges for Today       Code Description Service Date Service Provider Modifiers Qty    [id number]  ST EVAL ORAL PHARYNG SWALLOW 1 7/21/2025 Mechelle Vaca MA,CCC-SLP GN 1                 Mechelle Vaca MA,CCC-SLP  7/21/2025

## 2025-07-21 NOTE — PROGRESS NOTES
"Patient Name: Catherine Snyder  YOB: 1945  MRN: [id number]  Admission date: 7/20/2025  Reason for Encounter: MD Consult  and MST 2-3 or Nursing Admission Screen    [facility] Clinical Nutrition Assessment     Subjective    Subjective Information     Pt sleeping in bed. RN present in room with RD and reports pt is deaf which is why RD could not arouse pt. RN states pt has been sleeping most of the shift and unable to participate in nutrition review d/t deafness and dementia. Pt is NPO at this time per SLP as eval unable to be completed d/t lack of pt ability to participate in po trials.      Objective   H&P and Current Problems      H&P  Past Medical History:   Diagnosis Date    Alzheimer disease     Alzheimer's disease     Anxiety     Anxiety disorder, unspecified     Cerebrovascular accident (CVA)     Constipation     Deafness     Delirium due to known physiological condition     Depression     Difficulty in walking, not elsewhere classified     Gastro-esophageal reflux disease with esophagitis     GERD (gastroesophageal reflux disease)     Hyperlipidemia     Hyperlipidemia     Hypertension     Major depressive disorder, single episode     Muscle weakness (generalized)     Other lack of coordination     Paranoid personality (disorder)     Shared psychotic disorder     Unspecified dementia without behavioral disturbance     Unspecified hearing loss, unspecified ear     Unspecified lack of coordination     Unspecified psychosis not due to a substance or known physiological condition       History reviewed. No pertinent surgical history.   Current Problems   Admission Diagnosis:  CVA (cerebral vascular accident) [I63.9]    Problem List:    CVA (cerebral vascular accident)      Other Applicable Nutrition Information:   N/A     Anthropometrics       Height: 170.2 cm (67\")  Weight: 75.7 kg (166 lb 14.4 oz) (New admit less than 24 hours.) (07/21/25 0500)  Weight Method: Bed scale  BMI (Calculated): 26.1     "   Trending Weight Changes 07/21/25: No significant changes       Weight History  Wt Readings from Last 20 Encounters:   07/21/25 0500 75.7 kg (166 lb 14.4 oz)   07/21/25 0051 75.7 kg (166 lb 14.4 oz)   07/20/25 0923 79.4 kg (175 lb 0.7 oz)   07/11/25 1944 79.4 kg (175 lb 0.7 oz)   07/11/25 1430 79.4 kg (175 lb 0.7 oz)   05/29/25 1741 79.4 kg (175 lb)   05/20/25 1026 79.6 kg (175 lb 6.4 oz)   02/14/25 1035 79 kg (174 lb 3.2 oz)   04/03/24 0625 86.2 kg (190 lb 0.6 oz)   11/10/23 0939 86.2 kg (190 lb)   11/06/23 1217 86.2 kg (190 lb)   06/21/23 1009 88.3 kg (194 lb 9.6 oz)   11/10/22 1204 80 kg (176 lb 5.9 oz)   09/02/22 1003 86.2 kg (190 lb 0.6 oz)   02/15/22 1346 86.2 kg (190 lb)   10/14/21 2120 86.2 kg (190 lb)   10/06/21 0829 86.2 kg (190 lb)   07/04/21 1414 88 kg (194 lb)   03/17/21 1029 86.2 kg (190 lb)   09/25/17 0302 77.1 kg (170 lb)   09/24/17 0330 77.3 kg (170 lb 6.4 oz)   09/23/17 0330 76.6 kg (168 lb 12.8 oz)   09/22/17 0323 76.8 kg (169 lb 6.4 oz)   09/21/17 1115 74.8 kg (165 lb)   08/22/17 2300 77.6 kg (171 lb 0.2 oz)   08/22/17 1741 70.8 kg (156 lb)   12/26/16 0355 76.1 kg (167 lb 12.8 oz)   12/25/16 0400 78 kg (172 lb)   12/23/16 1921 74.6 kg (164 lb 8 oz)   12/23/16 0846 74.4 kg (164 lb)            Labs      Comment: Reviewed     Results from last 7 days   Lab Units 07/21/25  0543 07/20/25  1035 07/15/25  0158   SODIUM mmol/L 142 142 140   POTASSIUM mmol/L 3.6 4.7 3.5   GLUCOSE mg/dL 88 102* 91   BUN mg/dL 18.3 21.4 26.1*   CREATININE mg/dL 0.89 1.27* 0.99   CALCIUM mg/dL 9.6 10.5 9.6   ALBUMIN g/dL 3.5 3.9  --    BILIRUBIN mg/dL 0.6 0.5  --    ALK PHOS U/L 94 102  --    AST (SGOT) U/L 18 20  --    ALT (SGPT) U/L 10 11  --    TRIGLYCERIDES mg/dL 115  --   --      Results from last 7 days   Lab Units 07/21/25  0543 07/20/25  1035 07/15/25  0158   PLATELETS 10*3/mm3 163 187 208   HEMOGLOBIN g/dL 12.7 14.1 14.1   HEMATOCRIT % 37.6 43.0 42.3     Lab Results   Component Value Date    HGBA1C 5.43 07/21/2025           Medications       Scheduled Medications aspirin, 81 mg, Oral, Daily  atorvastatin, 40 mg, Oral, Nightly  [START ON 7/27/2025] cholecalciferol, 50,000 Units, Oral, Weekly  Divalproex Sodium, 125 mg, Oral, BID  docusate sodium, 100 mg, Oral, Daily  ferrous sulfate, 325 mg, Oral, Daily  gabapentin, 100 mg, Oral, Nightly  Lidocaine, 1 patch, Transdermal, Q24H  memantine, 5 mg, Oral, Q12H  OLANZapine, 2.5 mg, Oral, Nightly  polyethylene glycol, 17 g, Oral, Daily  senna, 2 tablet, Oral, BID  sodium chloride, 10 mL, Intravenous, Q12H  sodium chloride, 10 mL, Intravenous, Q12H  ziprasidone (GEODON) 20 mg in sterile water (preservative free) 1 mL injection, 20 mg, Intramuscular, Once        Infusions      PRN Medications   acetaminophen **OR** acetaminophen    aluminum-magnesium hydroxide-simethicone    senna-docusate sodium **AND** polyethylene glycol **AND** bisacodyl **AND** bisacodyl    cloNIDine    hydrALAZINE    HYDROcodone-acetaminophen    magnesium hydroxide    nitroglycerin    ondansetron    [COMPLETED] Insert Peripheral IV **AND** sodium chloride    sodium chloride    sodium chloride    sodium chloride    sodium chloride     Physical Findings      Chewing/Swallowing  Teeth Status: Mouth/Teeth WDL: WDL, .WDL except, teeth Teeth Symptoms: chewing difficulty  Chewing/Swallowing Issues: SLP eval pending and SLP following   Edema       Arm, Left Edema: 1+ (Trace) Arm, Right Edema: 1+ (Trace)  Leg, Left Edema: 1+ (Trace) Leg, Right Edema: 1+ (Trace)  Ankle, Left Edema: 1+ (Trace) Ankle, Right Edema: 1+ (Trace)  Foot, Left Edema: 0 (None Present) Foot, Right Edema: 0 (None Present)   Bowel Function         I/Os  Intake & Output (last 3 days)         07/18 0701 07/19 0700 07/19 0701 07/20 0700 07/20 0701 07/21 0700 07/21 0701 07/22 0700    IV Piggyback   500     Total Intake(mL/kg)   500 (6.6)     Urine (mL/kg/hr)    250 (0.3)    Total Output    250    Net   +500 -250            Urine Unmeasured Occurrence   1  x 2 x           Lines, Drains, Airways, & Wounds       Active LDAs       Name Placement date Placement time Site Days Last dressing change    Peripheral IV 07/20/25 1738 20 G Left Antecubital 07/20/25  1738  Antecubital  less than 1     Peripheral IV 07/21/25 0215 22 G Anterior;Left Wrist 07/21/25  0215  Wrist  less than 1     External Urinary Catheter --  --  --  --                       Nutrition Focused Physical Exam     Trending NFPE 07/21/25: N/A     Malnutrition Severity Assessment              Estimated Needs      Energy Requirements    Weight for Calculation 76 kg   Method for Estimation  20-25 kcals/kg   Daily Needs (kcal/day) 1560-6573 kcals/day   Protein Requirements    Weight for Calculation 76 kg   Method for Estimation 1.0-1.2 gm/kg   Daily Needs (g/day) 76-91 g/day   Fluid Requirements     Method for Estimation 1 mL/kcal    Daily Needs (mL/day)      Current Nutrition Orders & Evaluation of Intake      Oral Nutrition     Food Allergies  and Intolerances NKFA   Current PO Diet NPO Diet NPO Type: Strict NPO   Oral Nutrition Supplement None     Trending % PO Intake 07/21/25: PT NPO     Enteral Nutrition     Current EN Order Patient isn't on Tube Feeding  Patient doesn't have any tube feeding modular orders     EN Route      EN Tolerance     EN Observation/Intake         Parenteral Nutrition     Current TPN Order    TPN Route    Lipids (mL/%/frequency)     Total # Days on TPN    TPN Observation/Intake       Assessment & Plan   Nutrition Diagnosis and Goals       Nutrition Diagnosis 1 Inadequate Oral Intake related to inability to consume sufficient nutrients aeb NPO with recent stroke.      Nutrition Diagnosis 2          Goal(s) PO Diet Advances When Medically Appropriate  and Maintain Weight     Nutrition Intervention and Prescription       Intervention  Monitor for diet advancement      Diet Prescription     Supplement Prescription     Education Provided       Enteral Prescription        TPN Prescription       Monitoring/Evaluation       Monitor/Evaluation Per Protocol, PO Intake, Pertinent Labs, Weight, and Skin Status     RD Follow-Up Encounter 1-2 days     Electronically signed by:  Christina Dailey RD  07/21/25 16:48 EDT

## 2025-07-21 NOTE — CONSULTS
Diabetes Education    Patient Name:  Catherine Snyder  YOB: 1945  MRN: [id number]  Admit Date:  7/20/2025        Patient is from a SNF and Per stroke protocol, no history of diabetes, A1C 4.7, BG WNL. If there are any questions or concerns, please re-consult or call. Thank you      Electronically signed by:  Fatuma Welsh RN  07/21/25 08:29 EDT

## 2025-07-21 NOTE — THERAPY EVALUATION
Patient Name: Catherine Snyder  : 1945    MRN: [id number]                              Today's Date: 2025       Admit Date: 2025    Visit Dx:     ICD-10-CM ICD-9-CM   1. Cerebrovascular accident (CVA), unspecified mechanism  I63.9 434.91     Patient Active Problem List   Diagnosis    HCAP (healthcare-associated pneumonia)    UTI (urinary tract infection)    Altered mental status    Cerebrovascular accident (CVA)    Aggressive behavior    Hyperlipidemia    Hypertension    Dementia with psychosis    Anticoagulated    Urinary tract infection    CVA (cerebral vascular accident)     Past Medical History:   Diagnosis Date    Alzheimer disease     Alzheimer's disease     Anxiety     Anxiety disorder, unspecified     Cerebrovascular accident (CVA)     Constipation     Deafness     Delirium due to known physiological condition     Depression     Difficulty in walking, not elsewhere classified     Gastro-esophageal reflux disease with esophagitis     GERD (gastroesophageal reflux disease)     Hyperlipidemia     Hyperlipidemia     Hypertension     Major depressive disorder, single episode     Muscle weakness (generalized)     Other lack of coordination     Paranoid personality (disorder)     Shared psychotic disorder     Unspecified dementia without behavioral disturbance     Unspecified hearing loss, unspecified ear     Unspecified lack of coordination     Unspecified psychosis not due to a substance or known physiological condition      History reviewed. No pertinent surgical history.   General Information       Row Name 25 1500          OT Time and Intention    Subjective Information no complaints  -     Document Type evaluation  -KP     Mode of Treatment individual therapy;occupational therapy  -KP     Patient Effort fair  -KP     Symptoms Noted During/After Treatment other (see comments)  fatigued/lethargic  -     Comment Patient seen for OT evaluation. Patient opened eyes briefly during  evaluation but lethargic.  -       Row Name 07/21/25 1500          General Information    Patient Profile Reviewed yes  -     Prior Level of Function --  facility resident; multiple recent falls with injury to left lower extremity with cast in place  -     Existing Precautions/Restrictions fall  -     Barriers to Rehab previous functional deficit;cognitive status  -       Row Name 07/21/25 1500          Occupational Profile    Reason for Services/Referral (Occupational Profile) Patient admitted to [facility] on 7/20/2025. She was referred for OT evaluation due to change in functional performance with ADLs, functional mobility, and/or transfers. Stroke Protocol initiated.  -       Row Name 07/21/25 1500          Living Environment    Current Living Arrangements extended care facility  -     People in Home facility resident  -       Row Name 07/21/25 1500          Cognition    Orientation Status (Cognition) unable/difficult to assess  -       Row Name 07/21/25 1500          Safety Issues/Impairments Affecting Functional Mobility    Safety Issues Affecting Function (Mobility) ability to follow commands;awareness of need for assistance  -     Impairments Affecting Function (Mobility) strength;balance;cognition;endurance/activity tolerance;range of motion (ROM)  -     Cognitive Impairments, Mobility Safety/Performance awareness, need for assistance  -               User Key  (r) = Recorded By, (t) = Taken By, (c) = Cosigned By      Initials Name Provider Type     Rosemary Sequeira, OT Occupational Therapist                     Mobility/ADL's       Row Name 07/21/25 1504          Bed Mobility    Bed Mobility rolling left;rolling right  -     Rolling Left Lane (Bed Mobility) maximum assist (25% patient effort)  -     Rolling Right Lane (Bed Mobility) maximum assist (25% patient effort)  -     Bed Mobility, Safety Issues cognitive deficits limit understanding  -        Row Name 07/21/25 1504          Transfers    Comment, (Transfers) unable to assess  -KP       Row Name 07/21/25 1504          Activities of Daily Living    BADL Assessment/Intervention bathing;upper body dressing;lower body dressing;grooming;toileting  -KP       Row Name 07/21/25 1504          Mobility    Extremity Weight-bearing Status left lower extremity  -     Left Lower Extremity (Weight-bearing Status) non weight-bearing (NWB)  -KP       Row Name 07/21/25 1504          Bathing Assessment/Intervention    [place] Level (Bathing) bathing skills;dependent (less than 25% patient effort)  -KP       Row Name 07/21/25 1504          Upper Body Dressing Assessment/Training    [place] Level (Upper Body Dressing) upper body dressing skills;dependent (less than 25% patient effort)  -KP       Row Name 07/21/25 1504          Lower Body Dressing Assessment/Training    [place] Level (Lower Body Dressing) lower body dressing skills;dependent (less than 25% patient effort)  -KP       Row Name 07/21/25 1504          Grooming Assessment/Training    [place] Level (Grooming) grooming skills;dependent (less than 25% patient effort)  -KP       Row Name 07/21/25 1504          Toileting Assessment/Training    [place] Level (Toileting) toileting skills;dependent (less than 25% patient effort)  -               User Key  (r) = Recorded By, (t) = Taken By, (c) = Cosigned By      Initials Name Provider Type     Rosemary Sequeira OT Occupational Therapist                   Obj/Interventions       Row Name 07/21/25 1506          Sensory Assessment (Somatosensory)    Sensory Assessment (Somatosensory) unable/difficult to assess  -     Sensory Assessment responded to light touch through resistance  -KP       Row Name 07/21/25 1506          Vision Assessment/Intervention    Visual Impairment/Limitations unable/difficult to assess  -KP       Row Name 07/21/25 1506          Range of Motion Comprehensive     Comment, General Range of Motion resistive to movement in BUEs but able to complete functional reach  -       Row Name 07/21/25 1506          Strength Comprehensive (MMT)    Comment, General Manual Muscle Testing (MMT) Assessment unable to formally assess  -[facility] Name 07/21/25 1506          Motor Skills    Motor Skills coordination;functional endurance  -     Coordination other (see comments)  functional grasp in BUEs  -KP     Functional Endurance poor  -KP       Row Name 07/21/25 1506          Balance    Comment, Balance unable to assess  -               User Key  (r) = Recorded By, (t) = Taken By, (c) = Cosigned By      Initials Name Provider Type    Rosemary Hou OT Occupational Therapist                   Goals/Plan       Row Name 07/21/25 1513          Bathing Goal 1 (OT)    Activity/Device (Bathing Goal 1, OT) bathing skills, all  -     Person Level/Cues Needed (Bathing Goal 1, OT) maximum assist (25-49% patient effort)  -       Row Name 07/21/25 1513          Problem Specific Goal 1 (OT)    Problem Specific Goal 1 (OT) Patient will tolerate ROM with BUEs to prevent contracture formation and promote movement needed to participate in daily occupations.  -     Time Frame (Problem Specific Goal 1, OT) by discharge  -       Row Name 07/21/25 1513          Therapy Assessment/Plan (OT)    Planned Therapy Interventions (OT) activity tolerance training;BADL retraining;functional balance retraining;patient/caregiver education/training;passive ROM/stretching;occupation/activity based interventions;ROM/therapeutic exercise;neuromuscular control/coordination retraining;strengthening exercise;transfer/mobility retraining  -               User Key  (r) = Recorded By, (t) = Taken By, (c) = Cosigned By      Initials Name Provider Type    Rosemary Hou OT Occupational Therapist                   Clinical Impression       Row Name 07/21/25 1509          Pain Scale: FACES Pre/Post-Treatment     Pain: FACES Scale, Pretreatment 0-->no hurt  -     Posttreatment Pain Rating 0-->no hurt  -       Row Name 07/21/25 1509          Plan of Care Review    Plan of Care Reviewed With patient  -     Progress no change  -     Outcome Evaluation Patient seen for OT evaluation. Poor ability to participate, but evaluation completed as able with patient currently dependent. Functional limitations related to cognition, impaired activity tolerance, and generalized weakness. Patient would benefit from ongoing OT services to promote highest level of independence and safety.  -       Row Name 07/21/25 1509          Therapy Assessment/Plan (OT)    Patient/Family Therapy Goal Statement (OT) unable to formulate  -     Rehab Potential (OT) fair  -     Criteria for Skilled Therapeutic Interventions Met (OT) yes;meets criteria;skilled treatment is necessary  -     Therapy Frequency (OT) other (see comments)  PRN  -     Predicted Duration of Therapy Intervention (OT) discharge  -       Row Name 07/21/25 1509          Therapy Plan Review/Discharge Plan (OT)    Anticipated Discharge Disposition (OT) skilled nursing facility  -       Row Name 07/21/25 1509          Positioning and Restraints    Pre-Treatment Position in bed  -     Post Treatment Position bed  -     In Bed side lying left;call light within reach;encouraged to call for assist;exit alarm on  -               User Key  (r) = Recorded By, (t) = Taken By, (c) = Cosigned By      Initials Name Provider Type    Rosemary Hou, OT Occupational Therapist                   Outcome Measures       Row Name 07/21/25 1038          How much help from another person do you currently need...    Turning from your back to your side while in flat bed without using bedrails? 1  -RG     Moving from lying on back to sitting on the side of a flat bed without bedrails? 1  -RG     Moving to and from a bed to a chair (including a wheelchair)? 1  -RG     Standing up  from a chair using your arms (e.g., wheelchair, bedside chair)? 1  -RG     Climbing 3-5 steps with a railing? 1  -RG     To walk in hospital room? 1  -RG     AM-PAC 6 Clicks Score (PT) 6  -       Row Name 07/21/25 1514          Modified Shamir Scale    Pre-Stroke Modified Shamir Scale 4 - Moderately severe disability.  Unable to walk without assistance, and unable to attend to own bodily needs without assistance.  -     Modified Shamir Scale 5 - Severe disability.  Bedridden, incontinent, and requiring constant nursing care and attention.  -       Row Name 07/21/25 1514          Functional Assessment    Outcome Measure Options Modified Shamir  -               User Key  (r) = Recorded By, (t) = Taken By, (c) = Cosigned By      Initials Name Provider Type    Rosemary Hou OT Occupational Therapist    Isael Serrano, RN Registered Nurse                      OT Recommendation and Plan  Planned Therapy Interventions (OT): activity tolerance training, BADL retraining, functional balance retraining, patient/caregiver education/training, passive ROM/stretching, occupation/activity based interventions, ROM/therapeutic exercise, neuromuscular control/coordination retraining, strengthening exercise, transfer/mobility retraining  Therapy Frequency (OT): other (see comments) (PRN)  Plan of Care Review  Plan of Care Reviewed With: patient  Progress: no change  Outcome Evaluation: Patient seen for OT evaluation. Poor ability to participate, but evaluation completed as able with patient currently dependent. Functional limitations related to cognition, impaired activity tolerance, and generalized weakness. Patient would benefit from ongoing OT services to promote highest level of independence and safety.     Time Calculation:         Time Calculation- OT       Row Name 07/21/25 1514             Time Calculation- OT    OT Received On 07/21/25  -                User Key  (r) = Recorded By, (t) = Taken By, (c) =  Cosigned By      Initials Name Provider Type     Rosemary Sequeira OT Occupational Therapist                  Therapy Charges for Today       Code Description Service Date Service Provider Modifiers Qty    [id number] HC OT EVAL MOD COMPLEXITY 4 7/21/2025 Rosemary Sequeira OT GO 1                 Rosemary Sequeira OT  7/21/2025

## 2025-07-21 NOTE — CASE MANAGEMENT/SOCIAL WORK
Discharge Planning Assessment  [facility]     Patient Name: Catherine Snyder  MRN: [id number]  Today's Date: 7/21/2025    Admit Date: 7/20/2025    Plan: SS received consult per Physician due to CVA  Pt admitted from [facility].  Per Rubia at [facility] pt has a 26 day bed hold upon admit to [facility].  SS will follow.   Discharge Needs Assessment       Row Name 07/21/25 1408       Living Environment    People in Home facility resident    Current Living Arrangements extended care facility    Potentially Unsafe Housing Conditions none       Transition Planning    Patient/Family Anticipates Transition to long-term care facility    Patient/Family Anticipated Services at Transition                    Discharge Plan       Row Name 07/21/25 1410       Plan    Plan SS received consult per Physician due to CVA  Pt admitted from [facility].  Per Rubia at [facility] pt has a 26 day bed hold upon admit to [facility].  SS will follow.                  Continued Care and Services - Admitted Since 7/20/2025    No active coordination exists.       Selected Continued Care - Prior Encounters Includes continued care and service providers with selected services from prior encounters from 4/21/2025 to 7/21/2025      Discharged on 7/15/2025 Admission date: 7/11/2025 - Discharge disposition: Skilled Nursing Facility (DC - External)      Destination       Service Provider Services Address Phone Fax Patient Preferred    [facility] [address] [address] [phone] --                          Expected Discharge Date and Time       Expected Discharge Date Expected Discharge Time    Jul 24, 2025            Demographic Summary       Row Name 07/21/25 1407       General Information    Admission Type inpatient    Arrived From long-term care    Referral Source nursing    Reason for Consult discharge planning    Preferred Language English                          Marianne Ford, BSW

## 2025-07-21 NOTE — ED NOTES
Pt had complete bed change at this time by RN and ED tech. Pt clean and dry. Pt made comfortable in bed.

## 2025-07-21 NOTE — H&P
"    [facility]  History & Physical    Patient Identification:  Name:  Catherine Snyder  Age:  79 y.o.  Sex:  female  :  1945  MRN:  [id number]   Visit Number:  [id number]  Admit Date: 2025   Primary Care Physician:  Ricky Alamo MD    Subjective       Chief Complaint   Patient presents with    Fall       History of presenting illness:    Catherine Snyder is a 79 y.o. female who presented for further evaluation of fall at SNF, right sided pain    Past medical history is significant for advanced Alzheimer's dementia with psychosis, prior history of CVA, HTN, HLD, GERD, recent admission for UTI and Hx of ESBL UTI, recent frequent falls resulting in left lower extremity fracture-currently casted.    Patient was seen at bedside with nurse Meggan Denise RN present.  Patient was awake, alert, but not oriented.  Patient is deaf and does not communicate or follow instructions.  Nursing staff is attempting to place a second IV while the patient was being combative and spitting at them.  Patient was sent to our hospital ER secondary to a fall at the nursing home where she has been a full-time resident for a significant period of time.  They had concern for the patient's showing signs of right hip/leg pain.  Patient does not have any family members or healthcare surrogates present at her hospitalization today and is unable to provide her own history due to Alzheimer's dementia.  ER workup revealed a head CT suggesting a recent stroke.  Neurology was consulted while the patient was in the ER and it was determined that the patient should be admitted for further workup.    ED, vital signs were /79   Pulse 68   Temp 98.3 °F (36.8 °C) (Oral)   Resp 17   Ht 172.7 cm (68\")   Wt 79.4 kg (175 lb 0.7 oz)   SpO2 93%   BMI 26.62 kg/m²   ED workup significant for:   CT head: Focal low-density in the right parietal lobe, with loss of gray-white matter differentiation " concerning for age-indeterminate infarct, but possible acute/subacute.  If indicated further evaluation with MRI is recommended.  No midline shift or mass effect.  No hydrocephalus.  No evidence of intracranial hemorrhage.  Moderate chronic small vessel ischemic disease.  MRI brain: Brain: Multiple focal areas of restricted diffusion identified in the right parietal lobe and right frontal lobe most consistent with subacute infarct possibly due to shower emboli.  Mild to moderate degenerative brain volume loss.  Moderate chronic small vessel ischemic type changes in the white matter.  No acute intracranial hemorrhage, mass, hydrocephalus.  No shift in the midline structure.  Pan scan of the spine revealed no acute fractures or dislocations  X-rays of left and right tib-fib, right femur, left forearm, bilateral hips and pelvis revealed no new acute fractures.  Glucose 102, creatinine 1.27, BUN 21.4, BUN/creatinine ratio 16.9, WBC 9.51, valproic acid 21.3,    Known Emergency Department medications received prior to my evaluation included:  Medications   atorvastatin (LIPITOR) tablet 80 mg (80 mg Oral Given 7/20/25 2244)   sodium chloride 0.9 % infusion (75 mL/hr Intravenous New Bag 7/21/25 0026)   hydrALAZINE (APRESOLINE) tablet 10 mg (has no administration in time range)   LORazepam (ATIVAN) injection 0.5 mg (0.5 mg Intravenous Given 7/20/25 1434)   LORazepam (ATIVAN) injection 0.5 mg (0.5 mg Intravenous Given 7/20/25 1717)   sodium chloride 0.9 % bolus 500 mL (0 mL Intravenous Stopped 7/20/25 2059)   cloNIDine (CATAPRES) tablet 0.1 mg (0.1 mg Oral Given 7/20/25 1182)       Room location at the time of my evaluation was 311-2.     ---------------------------------------------------------------------------------------------------------------------   Review of Systems   Unable to perform ROS: Dementia       ---------------------------------------------------------------------------------------------------------------------   Past Medical History:   Diagnosis Date    Alzheimer disease     Alzheimer's disease     Anxiety     Anxiety disorder, unspecified     Cerebrovascular accident (CVA)     Constipation     Deafness     Delirium due to known physiological condition     Depression     Difficulty in walking, not elsewhere classified     Gastro-esophageal reflux disease with esophagitis     GERD (gastroesophageal reflux disease)     Hyperlipidemia     Hyperlipidemia     Hypertension     Major depressive disorder, single episode     Muscle weakness (generalized)     Other lack of coordination     Paranoid personality (disorder)     Shared psychotic disorder     Unspecified dementia without behavioral disturbance     Unspecified hearing loss, unspecified ear     Unspecified lack of coordination     Unspecified psychosis not due to a substance or known physiological condition      No past surgical history on file.  Family History   Family history unknown: Yes     Social History     Socioeconomic History    Marital status:    Tobacco Use    Smoking status: Never    Smokeless tobacco: Never   Vaping Use    Vaping status: Never Used   Substance and Sexual Activity    Alcohol use: No    Drug use: No    Sexual activity: Not Currently     Partners: Male     ---------------------------------------------------------------------------------------------------------------------   Allergies:  Patient has no known allergies.  ---------------------------------------------------------------------------------------------------------------------   Home medications:  Medications below are reported home medications pulling from within the system; at this time, these medications have not been reconciled unless otherwise specified and are in the verification process for further verifcation as current home medications.  (Not in a  hospital admission)    Hospital Scheduled Meds:  aspirin, 81 mg, Oral, Daily   Or  aspirin, 300 mg, Rectal, Daily  atorvastatin, 80 mg, Oral, Nightly  sodium chloride, 10 mL, Intravenous, Q12H    sodium chloride, 75 mL/hr, Last Rate: 75 mL/hr (07/21/25 0026)    Current listed hospital scheduled medications may not yet reflect those currently placed in orders that are signed and held awaiting patient's arrival to floor.   ---------------------------------------------------------------------------------------------------------------------     Objective     Vital Signs:  Temp:  [98.3 °F (36.8 °C)] 98.3 °F (36.8 °C)  Heart Rate:  [] 68  Resp:  [17] 17  BP: (132-184)/(65-98) 161/79      07/20/25  0923   Weight: 79.4 kg (175 lb 0.7 oz)     Body mass index is 26.62 kg/m².  ---------------------------------------------------------------------------------------------------------------------     Physical Exam  Vitals reviewed.   Constitutional:       General: She is not in acute distress.     Appearance: Normal appearance.   HENT:      Head: Normocephalic and atraumatic.      Right Ear: Decreased hearing noted.      Left Ear: Decreased hearing noted.      Nose: Nose normal.      Mouth/Throat:      Mouth: Mucous membranes are moist.   Eyes:      Conjunctiva/sclera: Conjunctivae normal.      Pupils: Pupils are equal, round, and reactive to light.   Cardiovascular:      Rate and Rhythm: Normal rate and regular rhythm.      Pulses: Normal pulses.      Heart sounds: Normal heart sounds. No murmur heard.  Pulmonary:      Effort: Pulmonary effort is normal. No respiratory distress.      Breath sounds: Normal breath sounds. No wheezing or rales.   Abdominal:      General: There is no distension.      Palpations: Abdomen is soft.      Tenderness: There is no abdominal tenderness.   Musculoskeletal:         General: No deformity.      Right lower leg: No edema.      Left lower leg: No edema.      Comments: Left lower extremity in  "a cast with good capillary refill.   Skin:     General: Skin is warm and dry.      Findings: No erythema, lesion or rash.   Neurological:      Mental Status: She is alert. Mental status is at baseline. She is confused.   Psychiatric:         Mood and Affect: Affect is labile.         Speech: She is noncommunicative.         Behavior: Behavior is uncooperative, agitated and combative.         Cognition and Memory: Cognition is impaired.         ---------------------------------------------------------------------------------------------------------------------   Results from last 7 days   Lab Units 07/20/25  1035 07/15/25  0158   WBC 10*3/mm3 9.51 8.18   HEMOGLOBIN g/dL 14.1 14.1   HEMATOCRIT % 43.0 42.3   MCV fL 92.3 91.4   MCHC g/dL 32.8 33.3   PLATELETS 10*3/mm3 187 208         Results from last 7 days   Lab Units 07/20/25  1035 07/15/25  0158   SODIUM mmol/L 142 140   POTASSIUM mmol/L 4.7 3.5   CHLORIDE mmol/L 105 103   CO2 mmol/L 27.0 23.2   BUN mg/dL 21.4 26.1*   CREATININE mg/dL 1.27* 0.99   CALCIUM mg/dL 10.5 9.6   GLUCOSE mg/dL 102* 91   ALBUMIN g/dL 3.9  --    BILIRUBIN mg/dL 0.5  --    ALK PHOS U/L 102  --    AST (SGOT) U/L 20  --    ALT (SGPT) U/L 11  --    Estimated Creatinine Clearance: 38.4 mL/min (A) (by C-G formula based on SCr of 1.27 mg/dL (H)).  No results found for: \"AMMONIA\"          Lab Results   Component Value Date    HGBA1C 6.20 (H) 10/16/2021     Lab Results   Component Value Date    TSH 0.934 07/11/2025    FREET4 1.07 11/10/2022     No results found for: \"PREGTESTUR\", \"PREGSERUM\", \"HCG\", \"HCGQUANT\"  Pain Management Panel  More data exists         Latest Ref Rng & Units 7/11/2025 11/10/2022   Pain Management Panel   Amphetamine, Urine Qual Negative Negative  Negative    Barbiturates Screen, Urine Negative Negative  Negative    Benzodiazepine Screen, Urine Negative Negative  Negative    Buprenorphine, Screen, Urine Negative Negative  Negative    Cocaine Screen, Urine Negative Negative  " "Negative    Fentanyl, Urine Negative Negative  -   Methadone Screen , Urine Negative Negative  Negative    Methamphetamine, Ur Negative Negative  Negative      No results found for: \"BLOODCX\"  No results found for: \"URINECX\"  No results found for: \"WOUNDCX\"  No results found for: \"STOOLCX\"      ---------------------------------------------------------------------------------------------------------------------  Imaging Results (Last 7 Days)       Procedure Component Value Units Date/Time    XR Hip With or Without Pelvis 2 - 3 View Left [[id number]] Collected: 07/20/25 1027     Updated: 07/20/25 2120    Narrative:      EXAM:    XR LEFT hip With Pelvis When Performed, 2 or 3 Views     EXAM DATE:    7/20/2025 9:46 AM     CLINICAL HISTORY:    Fall with injury     TECHNIQUE:    Two or three views of the LEFT hip with pelvis when performed.     COMPARISON:    No relevant prior studies available.     FINDINGS:    Bones/joints:  Unremarkable.  No acute fracture.  No dislocation.    Soft tissues:  Unremarkable.       Impression:        No acute osseous or articular abnormality in the LEFT hip.           This report was finalized on 7/20/2025 9:18 PM by Dr. Madhav Lang MD.       MRI Brain Without Contrast [[id number]] Collected: 07/20/25 2111     Updated: 07/20/25 2116    Narrative:         Procedure: MRI examination of the brain performed without IV contrast on  July 20, 2025.  Examination performed utilizing T1, T2, FLAIR, and  diffusion sequences with ADC mapping with imaging performed in the  axial, sagittal, and coronal planes.     HISTORY: Evaluate for stroke.     COMPARISON: None.     FINDINGS:     Mild to moderate generalized brain volume loss.  Moderate chronic small vessel ischemic type changes in the white matter.  No acute hemorrhage.  Normal-sized the sella.  No tonsillar ectopia.  No hydrocephalus.  Mild mucosal thickening in the paranasal sinuses.  Clear mastoid air cells.  Multiple focal areas of restricted " diffusion identified in the right  parietal lobe including the cortex and subcortical white matter and deep  white matter.  Additional focus of restricted diffusion identified at the right frontal  lobe seen best on image 12, series 6.  These areas show corresponding low signal on the ADC map consistent with  subacute infarcts.  Infarcts could be of embolic source.  Additional  cortical focus of restricted diffusion noted in the lateral aspect of  the upper right frontal lobe as seen on image 17, series 6       Impression:         Multiple focal areas of restricted diffusion identified in the right  parietal lobe and right frontal lobe most consistent with subacute  infarcts possibly due to shower emboli.  Mild to moderate generalized brain volume loss.  Moderate chronic small vessel ischemic type changes in the white matter.  No acute intracranial hemorrhage, mass, or hydrocephalus.  No shift in midline structures.     This report was finalized on 7/20/2025 9:14 PM by Leodan Khan MD.       XR Femur 2 View Right [[id number]] Collected: 07/20/25 1024     Updated: 07/20/25 1640    Addenda:        ADDENDUM:     Please note corrections to the original report reflected in the addended  report below:     EXAM:    XR LEFT femur, 2 Views     EXAM DATE:    7/20/2025 9:46 AM     CLINICAL HISTORY:    Fall with injury     TECHNIQUE:    Frontal and lateral views of the LEFT femur.     COMPARISON:    No relevant prior studies available.     FINDINGS:    Bones/joints:  Unremarkable.  No acute fracture.  No dislocation.    Soft tissues:  Unremarkable.     IMPRESSION:         No acute fracture or dislocation.     This report was finalized on 7/20/2025 4:38 PM by Dr. Madhav Lang MD.     Signed: 07/20/25 1638 by Madhav Lang MD    Narrative:      EXAM:    XR Right Femur, 2 Views     EXAM DATE:    7/20/2025 9:46 AM     CLINICAL HISTORY:    Fall with injury     TECHNIQUE:    Frontal and lateral views of the right femur.      COMPARISON:    No relevant prior studies available.     FINDINGS:    Bones/joints:  Unremarkable.  No acute fracture.  No dislocation.    Soft tissues:  Unremarkable.       Impression:        No acute fracture or dislocation.     This report was finalized on 7/20/2025 10:24 AM by Dr. Madhav Lang MD.       XR Tibia Fibula 2 View Left [[id number]] Collected: 07/20/25 1025     Updated: 07/20/25 1316    Narrative:      EXAM:    XR Left Tibia and Fibula, 2 Views     EXAM DATE:    7/20/2025 9:46 AM     CLINICAL HISTORY:    Recent fracture with fall     TECHNIQUE:    Frontal and lateral views of the left tibia and fibula.     COMPARISON:    No relevant prior studies available.     FINDINGS:    Bones/joints:  Fracture of the fifth metatarsal, incompletely imaged.   No displaced fracture or dislocation of the tibia or fibula identified.    Soft tissues:  Unremarkable.  No radiopaque foreign body.    Tubes, lines and devices:  Cast device obscures fine bone detail.       Impression:      1.  No displaced fracture or dislocation of the tibia or fibula  identified.  2.  Fracture of the fifth metatarsal, incompletely imaged.  3.  Cast device obscures fine bone detail.     This report was finalized on 7/20/2025 10:25 AM by Dr. Madhav Lang MD.       CT Head Without Contrast [[id number]] Collected: 07/20/25 1052     Updated: 07/20/25 1106    Narrative:      EXAM:    CT Head Without Intravenous Contrast     EXAM DATE:    7/20/2025 9:53 AM     CLINICAL HISTORY:    trauma     TECHNIQUE:    Axial computed tomography images of the head/brain without intravenous  contrast.  Sagittal and coronal reformatted images were created and  reviewed.  This CT exam was performed using one or more of the following  dose reduction techniques:  automated exposure control, adjustment of  the mA and/or kV according to patient size, and/or use of iterative  reconstruction technique.     COMPARISON:    7/11/2025     FINDINGS:    Brain and  extra-axial spaces:  Focal low-density in the right parietal  lobe, image #30, axial series, with loss of gray-white matter  differentiation may represent age-indeterminate infarct. If indicated,  further evaluation with MRI is recommended.  Moderate chronic small  vessel ischemic disease.  No midline shift or mass effect.  No evidence  of intracranial hemorrhage.    Bones/joints:  Unremarkable.  No acute fracture.    Soft tissues:  Unremarkable.    Sinuses:  Unremarkable as visualized.  No acute sinusitis.    Mastoid air cells:  Unremarkable as visualized.  No mastoid effusion.       Impression:      1.  Focal low-density in the right parietal lobe, image #30, axial  series, with loss of gray-white matter differentiation concerning for  age-indeterminate infarct, but possibly acute/subacute. If indicated,  further evaluation with MRI is recommended.  2.  No midline shift or mass effect.  No hydrocephalus.  3.  No evidence of intracranial hemorrhage.  4.  Moderate chronic small vessel ischemic disease.     This report was finalized on 7/20/2025 10:55 AM by Dr. Madhav Lang MD.       XR Tibia Fibula 2 View Right [[id number]] Collected: 07/20/25 1034     Updated: 07/20/25 1106    Narrative:      EXAM:    XR Right Tibia and Fibula, 2 Views     EXAM DATE:    7/20/2025 9:46 AM     CLINICAL HISTORY:    Fall with injury     TECHNIQUE:    Frontal and lateral views of the right tibia and fibula.     COMPARISON:    No relevant prior studies available.     FINDINGS:    Bones/joints:  Unremarkable.  No acute fracture or dislocation.    Soft tissues:  Unremarkable.  No radiopaque foreign body.       Impression:        No acute fracture or dislocation.     This report was finalized on 7/20/2025 10:34 AM by Dr. Madhav Lang MD.       XR Forearm 2 View Left [[id number]] Collected: 07/20/25 1035     Updated: 07/20/25 1106    Narrative:      EXAM:    XR Left Forearm, 2 Views     EXAM DATE:    7/20/2025 10:23 AM     CLINICAL  HISTORY:    injury with pain     TECHNIQUE:    Frontal and lateral views of the left forearm.     COMPARISON:    No relevant prior studies available.     FINDINGS:    Bones/joints:  Unremarkable.  No acute fracture.  No dislocation.    Soft tissues:  Unremarkable.       Impression:        No acute findings in the left forearm.     This report was finalized on 7/20/2025 10:35 AM by Dr. Madhav Lang MD.       CT Cervical Spine Without Contrast [[id number]] Collected: 07/20/25 1047     Updated: 07/20/25 1106    Narrative:      EXAM:    CT Cervical Spine Without Intravenous Contrast     EXAM DATE:    7/20/2025 9:53 AM     CLINICAL HISTORY:    fall at NH     TECHNIQUE:    Axial computed tomography images of the cervical spine without  intravenous contrast.  Sagittal and coronal reformatted images were  created and reviewed.  This CT exam was performed using one or more of  the following dose reduction techniques:  automated exposure control,  adjustment of the mA and/or kV according to patient size, and/or use of  iterative reconstruction technique.     COMPARISON:    No relevant prior studies available.     FINDINGS:    Vertebrae:  Degenerative facet arthropathy throughout the cervical  spine.  No acute fracture or traumatic malalignment identified.    Discs/spinal canal/neural foramina:  Degenerative disc disease  throughout the cervical spine.  Multilevel neuroforaminal stenosis and  mild central canal stenosis most pronounced C3/4 through C6/7.   Congenital fusion of C2-C3.    Soft tissues:  Unremarkable.    Vasculature:  Carotid artery calcifications.       Impression:      1.  No acute fracture or traumatic malalignment identified.  2.  Degenerative changes cervical spine as described.     This report was finalized on 7/20/2025 10:50 AM by Dr. Madhav Lang MD.       CT Pelvis Without Contrast [[id number]] Collected: 07/20/25 1050     Updated: 07/20/25 1105    Narrative:      EXAM:    CT Pelvis Without  Intravenous Contrast     EXAM DATE:    7/20/2025 9:54 AM     CLINICAL HISTORY:    fall with injury     TECHNIQUE:    Axial computed tomography images of the pelvis without intravenous  contrast.  Sagittal and coronal reformatted images were created and  reviewed.  This CT exam was performed using one or more of the following  dose reduction techniques:  automated exposure control, adjustment of  the mA and/or kV according to patient size, and/or use of iterative  reconstruction technique.     COMPARISON:    No relevant prior studies available.     FINDINGS:    Bowel:  Mild diverticulosis.  No obstruction.  No mucosal thickening.    Appendix:  No findings to suggest acute appendicitis.    Intraperitoneal space:  Unremarkable.  No free air.  No pelvic free  fluid.    Bladder:  Layering stones or calcification in the dependent portion of  urinary bladder.    Reproductive:  Unremarkable as visualized.    Bones/joints:  No acute fracture.  No dislocation.    Soft tissues:  Unremarkable.  No pelvic soft tissue hematomas are  identified.    Vasculature:  Unremarkable.  No lower abdominal aortic aneurysm.    Lymph nodes:  Unremarkable.  No enlarged lymph nodes.       Impression:        No acute fracture or dislocation identified.     This report was finalized on 7/20/2025 10:51 AM by Dr. Madhav Lang MD.       CT Lumbar Spine Without Contrast [[id number]] Collected: 07/20/25 1051     Updated: 07/20/25 1105    Narrative:      EXAM:    CT Lumbar Spine Without Intravenous Contrast     EXAM DATE:    7/20/2025 9:54 AM     CLINICAL HISTORY:    fall with injury     TECHNIQUE:    Axial computed tomography images of the lumbar spine without  intravenous contrast.  Sagittal and coronal reformatted images were  created and reviewed.  This CT exam was performed using one or more of  the following dose reduction techniques:  automated exposure control,  adjustment of the mA and/or kV according to patient size, and/or use of  iterative  "reconstruction technique.     COMPARISON:    7/11/2025     FINDINGS:    Vertebrae:  Stable Schmorl's node inferior endplate of L2 vertebral  body.  Degenerative facet arthropathy throughout the lumbar spine, most  prominent in the lower lumbar spine.  Multilevel facet arthropathy.  No  acute fracture or traumatic malalignment identified.    Discs/spinal canal/neural foramina:  Central canal and neuroforaminal  stenosis L2/3 through L5/S1.  Degenerative disc disease throughout the  lumbar spine.    Soft tissues:  Unremarkable.       Impression:      1.  No acute fracture or traumatic malalignment identified.  2.  Degenerative changes lumbar spine as described.     This report was finalized on 7/20/2025 10:52 AM by Dr. Madhav Lang MD.               Cultures:  No results found for: \"BLOODCX\", \"URINECX\", \"WOUNDCX\", \"MRSACX\", \"RESPCX\", \"STOOLCX\"    Last echocardiogram:  Results for orders placed during the hospital encounter of 09/21/17    Adult Transthoracic Echo Complete W/ Cont if Necessary Per Protocol 09/23/2017  2:37 PM    Interpretation Summary  · The study is technically adequate for diagnosis.  · Left ventricular systolic function is normal. Estimated EF = 60%.  · There are no significant valvular abnormalities noted.  · There is no evidence of pericardial effusion.  · Mild pulmonary hypertension is present.    Compared to the study of 3/1/2014, there are no significant changes.      I have personally reviewed the above radiology images and read the final radiology report on 07/21/25  ---------------------------------------------------------------------------------------------------------------------  Assessment / Plan     Active Hospital Problems    Diagnosis  POA    **CVA (cerebral vascular accident) [I63.9]  Yes       ASSESSMENT/PLAN:  Acute ischemic stroke  Advanced Alzheimer's dementia with psychosis  TeleNeurology consulted; recommendations as below  Patient will be admitted to the hospitalist service " on cardiac telemetry monitoring.  Due to patient failing the bedside swallow the initial plan was to give aspirin as a rectal suppository.  However after further discussion with the patient's family by phone, revealed that she can take pills crushed in applesauce.  Patient fought the ER nursing staff and did not allow for rectal aspirin to be given, therefore we will order orally crushed in applesauce.  Gentle maintenance IV fluids.  Blood pressure goal is to keep SBP between 140-160 and DBP between 80-90.  Morning labs to includes lipid profile TSH, sedimentation rate, hemoglobin A1c, vitamin B12 folic acid levels.  Transthoracic echocardiogram with bubble study to look for PFO.  And carotid ultrasound to look for any carotid blockage.   An EEG has been requested to make sure there is no underlying seizure disorder.  PT/OT/SLP to evaluate the patient to see if she would be safe to go back to the SNF at discharge.  She will be followed up tomorrow by MARYBETH Dsouza with the inpatient telemedicine service       Chronic Conditions  Deafness  Hyperlipidemia  Iron deficiency anemia  Constipation  Essential hypertension  Plan to resume home antihypertensive regimen once med rec is completed by pharmacy.  Holding parameters to prevent hypotension and bradycardia.      ----------  -DVT prophylaxis: SCUDs  -Activity: Up with assistance  -Expected length of stay: INPATIENT status due to the need for care which can only be reasonably provided in an hospital setting such as aggressive/expedited ancillary services and/or consultation services, the necessity for IV medications, close physician monitoring and/or the possible need for procedures.  In such, I feel patient’s risk for adverse outcomes and need for care warrant INPATIENT evaluation and predict the patient’s care encounter to likely last beyond 2 midnights.    -Disposition: Pending clinical course     High risk secondary to acute ischemic stroke complicated by  advanced Alzheimer's dementia    Code Status and Medical Interventions: CPR (Attempt to Resuscitate); Full Support   Ordered at: 07/20/25 2349     Code Status (Patient has no pulse and is not breathing):    CPR (Attempt to Resuscitate)     Medical Interventions (Patient has pulse or is breathing):    Full Support       Ad Hernandez PA-C   07/21/25  00:40 EDT

## 2025-07-21 NOTE — PROGRESS NOTES
Patient admitted earlier this morning with bilateral strokes.  Advanced dementia from local nursing facility.  Will follow-up neurology recommendations.  Will discuss CODE STATUS and GOC with NOK.    Electronically signed by Rasheed Rivera DO, 07/21/25, 11:40 AM EDT.

## 2025-07-22 ENCOUNTER — APPOINTMENT (OUTPATIENT)
Dept: CT IMAGING | Facility: HOSPITAL | Age: 80
DRG: 065 | End: 2025-07-22
Payer: MEDICARE

## 2025-07-22 ENCOUNTER — APPOINTMENT (OUTPATIENT)
Dept: CARDIOLOGY | Facility: HOSPITAL | Age: 80
DRG: 065 | End: 2025-07-22
Payer: MEDICARE

## 2025-07-22 LAB
ANION GAP SERPL CALCULATED.3IONS-SCNC: 14.9 MMOL/L (ref 5–15)
AORTIC DIMENSIONLESS INDEX: 0.92 (DI)
AV MEAN PRESS GRAD SYS DOP V1V2: 7 MMHG
AV VMAX SYS DOP: 168 CM/SEC
BASOPHILS # BLD AUTO: 0.01 10*3/MM3 (ref 0–0.2)
BASOPHILS NFR BLD AUTO: 0.1 % (ref 0–1.5)
BH CV ECHO MEAS - ACS: 1.4 CM
BH CV ECHO MEAS - AO MAX PG: 11.3 MMHG
BH CV ECHO MEAS - AO ROOT DIAM: 3.4 CM
BH CV ECHO MEAS - AO V2 VTI: 30.6 CM
BH CV ECHO MEAS - AVA(I,D): 3.2 CM2
BH CV ECHO MEAS - EDV(CUBED): 54.9 ML
BH CV ECHO MEAS - EDV(MOD-SP2): 24.4 ML
BH CV ECHO MEAS - EDV(MOD-SP4): 40.3 ML
BH CV ECHO MEAS - EF(MOD-SP2): 71.8 %
BH CV ECHO MEAS - EF(MOD-SP4): 73.2 %
BH CV ECHO MEAS - ESV(CUBED): 8 ML
BH CV ECHO MEAS - ESV(MOD-SP2): 6.9 ML
BH CV ECHO MEAS - ESV(MOD-SP4): 10.8 ML
BH CV ECHO MEAS - FS: 47.4 %
BH CV ECHO MEAS - IVS/LVPW: 1 CM
BH CV ECHO MEAS - IVSD: 0.9 CM
BH CV ECHO MEAS - LA DIMENSION: 3.6 CM
BH CV ECHO MEAS - LAT PEAK E' VEL: 5.8 CM/SEC
BH CV ECHO MEAS - LV DIASTOLIC VOL/BSA (35-75): 21.8 CM2
BH CV ECHO MEAS - LV MASS(C)D: 101.1 GRAMS
BH CV ECHO MEAS - LV MAX PG: 10.9 MMHG
BH CV ECHO MEAS - LV MEAN PG: 6 MMHG
BH CV ECHO MEAS - LV SYSTOLIC VOL/BSA (12-30): 5.8 CM2
BH CV ECHO MEAS - LV V1 MAX: 165 CM/SEC
BH CV ECHO MEAS - LV V1 VTI: 28.1 CM
BH CV ECHO MEAS - LVIDD: 3.8 CM
BH CV ECHO MEAS - LVIDS: 2 CM
BH CV ECHO MEAS - LVOT AREA: 3.5 CM2
BH CV ECHO MEAS - LVOT DIAM: 2.1 CM
BH CV ECHO MEAS - LVPWD: 0.9 CM
BH CV ECHO MEAS - MED PEAK E' VEL: 5.6 CM/SEC
BH CV ECHO MEAS - MV A MAX VEL: 114 CM/SEC
BH CV ECHO MEAS - MV E MAX VEL: 65.8 CM/SEC
BH CV ECHO MEAS - MV E/A: 0.58
BH CV ECHO MEAS - PA ACC TIME: 0.04 SEC
BH CV ECHO MEAS - PA V2 MAX: 83.9 CM/SEC
BH CV ECHO MEAS - RAP SYSTOLE: 10 MMHG
BH CV ECHO MEAS - RVDD: 2.9 CM
BH CV ECHO MEAS - RVSP: 57.1 MMHG
BH CV ECHO MEAS - SV(LVOT): 97.3 ML
BH CV ECHO MEAS - SV(MOD-SP2): 17.5 ML
BH CV ECHO MEAS - SV(MOD-SP4): 29.5 ML
BH CV ECHO MEAS - SVI(LVOT): 52.6 ML/M2
BH CV ECHO MEAS - SVI(MOD-SP2): 9.5 ML/M2
BH CV ECHO MEAS - SVI(MOD-SP4): 16 ML/M2
BH CV ECHO MEAS - TAPSE (>1.6): 2.5 CM
BH CV ECHO MEAS - TR MAX PG: 47.1 MMHG
BH CV ECHO MEAS - TR MAX VEL: 343 CM/SEC
BH CV ECHO MEASUREMENTS AVERAGE E/E' RATIO: 11.54
BH CV XLRA - RV BASE: 3.1 CM
BH CV XLRA - RV LENGTH: 5.5 CM
BH CV XLRA - RV MID: 2.4 CM
BUN SERPL-MCNC: 13.5 MG/DL (ref 8–23)
BUN/CREAT SERPL: 18.2 (ref 7–25)
CALCIUM SPEC-SCNC: 9.6 MG/DL (ref 8.6–10.5)
CHLORIDE SERPL-SCNC: 102 MMOL/L (ref 98–107)
CO2 SERPL-SCNC: 22.1 MMOL/L (ref 22–29)
CREAT SERPL-MCNC: 0.74 MG/DL (ref 0.57–1)
DEPRECATED RDW RBC AUTO: 40.6 FL (ref 37–54)
EGFRCR SERPLBLD CKD-EPI 2021: 81.4 ML/MIN/1.73
EOSINOPHIL # BLD AUTO: 0.04 10*3/MM3 (ref 0–0.4)
EOSINOPHIL NFR BLD AUTO: 0.5 % (ref 0.3–6.2)
ERYTHROCYTE [DISTWIDTH] IN BLOOD BY AUTOMATED COUNT: 12.2 % (ref 12.3–15.4)
GLUCOSE BLDC GLUCOMTR-MCNC: 92 MG/DL (ref 70–130)
GLUCOSE SERPL-MCNC: 117 MG/DL (ref 65–99)
HCT VFR BLD AUTO: 41.5 % (ref 34–46.6)
HGB BLD-MCNC: 13.9 G/DL (ref 12–15.9)
IMM GRANULOCYTES # BLD AUTO: 0.05 10*3/MM3 (ref 0–0.05)
IMM GRANULOCYTES NFR BLD AUTO: 0.6 % (ref 0–0.5)
LEFT ATRIUM VOLUME INDEX: 19.1 ML/M2
LV EF BIPLANE MOD: 70.7 %
LYMPHOCYTES # BLD AUTO: 0.8 10*3/MM3 (ref 0.7–3.1)
LYMPHOCYTES NFR BLD AUTO: 9.4 % (ref 19.6–45.3)
MCH RBC QN AUTO: 30.6 PG (ref 26.6–33)
MCHC RBC AUTO-ENTMCNC: 33.5 G/DL (ref 31.5–35.7)
MCV RBC AUTO: 91.4 FL (ref 79–97)
MONOCYTES # BLD AUTO: 0.22 10*3/MM3 (ref 0.1–0.9)
MONOCYTES NFR BLD AUTO: 2.6 % (ref 5–12)
NEUTROPHILS NFR BLD AUTO: 7.36 10*3/MM3 (ref 1.7–7)
NEUTROPHILS NFR BLD AUTO: 86.8 % (ref 42.7–76)
NRBC BLD AUTO-RTO: 0 /100 WBC (ref 0–0.2)
PLATELET # BLD AUTO: 169 10*3/MM3 (ref 140–450)
PMV BLD AUTO: 10.3 FL (ref 6–12)
POTASSIUM SERPL-SCNC: 3.9 MMOL/L (ref 3.5–5.2)
RBC # BLD AUTO: 4.54 10*6/MM3 (ref 3.77–5.28)
SODIUM SERPL-SCNC: 139 MMOL/L (ref 136–145)
WBC NRBC COR # BLD AUTO: 8.48 10*3/MM3 (ref 3.4–10.8)

## 2025-07-22 PROCEDURE — 93306 TTE W/DOPPLER COMPLETE: CPT

## 2025-07-22 PROCEDURE — 93005 ELECTROCARDIOGRAM TRACING: CPT | Performed by: STUDENT IN AN ORGANIZED HEALTH CARE EDUCATION/TRAINING PROGRAM

## 2025-07-22 PROCEDURE — 82948 REAGENT STRIP/BLOOD GLUCOSE: CPT

## 2025-07-22 PROCEDURE — 92610 EVALUATE SWALLOWING FUNCTION: CPT

## 2025-07-22 PROCEDURE — 25510000001 IOPAMIDOL PER 1 ML: Performed by: STUDENT IN AN ORGANIZED HEALTH CARE EDUCATION/TRAINING PROGRAM

## 2025-07-22 PROCEDURE — 93010 ELECTROCARDIOGRAM REPORT: CPT | Performed by: SPECIALIST

## 2025-07-22 PROCEDURE — 85025 COMPLETE CBC W/AUTO DIFF WBC: CPT | Performed by: STUDENT IN AN ORGANIZED HEALTH CARE EDUCATION/TRAINING PROGRAM

## 2025-07-22 PROCEDURE — 97530 THERAPEUTIC ACTIVITIES: CPT

## 2025-07-22 PROCEDURE — 80048 BASIC METABOLIC PNL TOTAL CA: CPT | Performed by: STUDENT IN AN ORGANIZED HEALTH CARE EDUCATION/TRAINING PROGRAM

## 2025-07-22 PROCEDURE — 70496 CT ANGIOGRAPHY HEAD: CPT

## 2025-07-22 PROCEDURE — 99232 SBSQ HOSP IP/OBS MODERATE 35: CPT | Performed by: STUDENT IN AN ORGANIZED HEALTH CARE EDUCATION/TRAINING PROGRAM

## 2025-07-22 PROCEDURE — 93306 TTE W/DOPPLER COMPLETE: CPT | Performed by: SPECIALIST

## 2025-07-22 PROCEDURE — 99232 SBSQ HOSP IP/OBS MODERATE 35: CPT | Performed by: NURSE PRACTITIONER

## 2025-07-22 PROCEDURE — 70498 CT ANGIOGRAPHY NECK: CPT

## 2025-07-22 PROCEDURE — 70498 CT ANGIOGRAPHY NECK: CPT | Performed by: RADIOLOGY

## 2025-07-22 PROCEDURE — 25010000002 HYDRALAZINE PER 20 MG

## 2025-07-22 PROCEDURE — 70496 CT ANGIOGRAPHY HEAD: CPT | Performed by: RADIOLOGY

## 2025-07-22 RX ORDER — ASPIRIN 300 MG/1
300 SUPPOSITORY RECTAL DAILY
Status: DISCONTINUED | OUTPATIENT
Start: 2025-07-22 | End: 2025-07-24 | Stop reason: HOSPADM

## 2025-07-22 RX ORDER — ASPIRIN 81 MG/1
81 TABLET, CHEWABLE ORAL DAILY
Status: DISCONTINUED | OUTPATIENT
Start: 2025-07-22 | End: 2025-07-24 | Stop reason: HOSPADM

## 2025-07-22 RX ORDER — IOPAMIDOL 755 MG/ML
100 INJECTION, SOLUTION INTRAVASCULAR
Status: COMPLETED | OUTPATIENT
Start: 2025-07-22 | End: 2025-07-22

## 2025-07-22 RX ADMIN — Medication 10 ML: at 09:10

## 2025-07-22 RX ADMIN — HYDRALAZINE HYDROCHLORIDE 10 MG: 20 INJECTION INTRAMUSCULAR; INTRAVENOUS at 15:10

## 2025-07-22 RX ADMIN — HYDRALAZINE HYDROCHLORIDE 10 MG: 20 INJECTION INTRAMUSCULAR; INTRAVENOUS at 23:47

## 2025-07-22 RX ADMIN — HYDRALAZINE HYDROCHLORIDE 10 MG: 20 INJECTION INTRAMUSCULAR; INTRAVENOUS at 09:04

## 2025-07-22 RX ADMIN — LIDOCAINE 1 PATCH: 4 PATCH TOPICAL at 11:52

## 2025-07-22 RX ADMIN — Medication 10 ML: at 21:38

## 2025-07-22 RX ADMIN — IOPAMIDOL 70 ML: 755 INJECTION, SOLUTION INTRAVENOUS at 14:28

## 2025-07-22 RX ADMIN — CLONIDINE HYDROCHLORIDE 0.1 MG: 0.1 TABLET ORAL at 00:30

## 2025-07-22 NOTE — PLAN OF CARE
Goal Outcome Evaluation:     Problem: Adult Inpatient Plan of Care  Goal: Absence of Hospital-Acquired Illness or Injury  Intervention: Prevent Skin Injury  Recent Flowsheet Documentation  Taken 7/21/2025 1901 by Cb Santo, RN  Body Position: sitting up in bed     Problem: Skin Injury Risk Increased  Goal: Skin Health and Integrity  Outcome: Progressing     Problem: Fall Injury Risk  Goal: Absence of Fall and Fall-Related Injury  Outcome: Progressing

## 2025-07-22 NOTE — THERAPY RE-EVALUATION
Acute Care - Speech Language Pathology   Swallow Re-Assessment  [place]     Patient Name: Catherine Snyder  : 1945  MRN: [id number]  Today's Date: 2025  Onset of Illness/Injury or Date of Surgery: 25     Referring Physician: Leroy      Admit Date: 2025    Visit Dx:     ICD-10-CM ICD-9-CM   1. Cerebrovascular accident (CVA), unspecified mechanism  I63.9 434.91     Patient Active Problem List   Diagnosis    HCAP (healthcare-associated pneumonia)    UTI (urinary tract infection)    Altered mental status    Cerebrovascular accident (CVA)    Aggressive behavior    Hyperlipidemia    Hypertension    Dementia with psychosis    Anticoagulated    Urinary tract infection    CVA (cerebral vascular accident)     Past Medical History:   Diagnosis Date    Alzheimer disease     Alzheimer's disease     Anxiety     Anxiety disorder, unspecified     Cerebrovascular accident (CVA)     Constipation     Deafness     Delirium due to known physiological condition     Depression     Difficulty in walking, not elsewhere classified     Gastro-esophageal reflux disease with esophagitis     GERD (gastroesophageal reflux disease)     Hyperlipidemia     Hyperlipidemia     Hypertension     Major depressive disorder, single episode     Muscle weakness (generalized)     Other lack of coordination     Paranoid personality (disorder)     Shared psychotic disorder     Unspecified dementia without behavioral disturbance     Unspecified hearing loss, unspecified ear     Unspecified lack of coordination     Unspecified psychosis not due to a substance or known physiological condition      History reviewed. No pertinent surgical history.    Ms. Snyder was seen at bedside this am on 3S for dysphagia reassessment. D/w RN patient status prior to reassessment this am.     She was noted w/ improved a/a status today, however, she continued w/ ams and unable to follow directives. She did not attempt to interact or communicate despite max  cues. RN reported this to be consistent w/ patient status across this am. She did evidence w/ occasional vocalizations, no intelligible verbalizations during this encounter.     She was observed on ra w/o complications.      Patient was positioned upright and centered in bed for attempted po presentations of single ice chips via spoon and thin water via spoon and straw. Further presentations and consistencies deferred 2/2 significant ams, limited participation/cooperation. Patient did not attempt to self provide po trials.       Upon po presentations, patient evidenced w/ limited bolus anticipation and was not cooperative to accept any po trial. In response to labial stimulation via spoon bowl and ice chip, she was noted to immediately tighten labial seal and repeatedly/adamantly shake head and turn away from/avoid spoon bowl. Consistent response was also elicited w/ attempted thin water trials via straw. SLP provided max tactile cues and encouragement in attempt to elicit acceptance/participation, including placing all attempted presentation styles clearing within visual field to familiarize patient, as well as attempting to assist patient w/ self presentation. She intermittently removed spoon from SLP's hand across this reassessment to avoid attempted presentations, and eventually began to swat at all further attempts w/ her hands and became increasingly agitated to yell out. SLP was unable to elicit acceptance of any po trial, despite max cues. Per this status, further attempts deferred.      Impression:  Per this reassessment, Ms. Snyder continued w/ significant ams, uncooperative to accept any po trials during this reassessment. She is felt to be at high risk for intermittent aspiration of any po intake at this time.      Recommend continuing NPO, meds via non-oral method, universal aspiration precautions, oral care protocol. SLP to f/u for reassessment pending improved status to functionally participate. In  consideration of acute strokes superimposed on premorbid baseline advanced dementia, advanced age, and multiple medical comorbidities, she is felt to most benefit from instrumental MBS to objectively evaluate swallowing fnx, determine safest/least restrictive po diet pending improvement in participation/cooperation to functionally participate.     SLP Recommendation and Plan  1. NPO.  2. Medications via non-oral method.    3. Universal aspiration precautions.   4. NORA precautions.  5. Oral care protocol.  SLP to f/u for reassessment pending improved status.      Thank you for allowing me to participate in the care of your patient-  Mechelle Vaca M.A., CCC-SLP      EDUCATION  The patient has been educated in the following areas:   Dysphagia (Swallowing Impairment) Oral Care/Hydration NPO rationale.      Time Calculation:    Time Calculation- SLP       Row Name 07/22/25 1302             Time Calculation- SLP    SLP - Next Appointment 07/23/25  -RIK                User Key  (r) = Recorded By, (t) = Taken By, (c) = Cosigned By      Initials Name Provider Type     Mechelle Vaca MA,CCC-SLP Speech and Language Pathologist                  Therapy Charges for Today       Code Description Service Date Service Provider Modifiers Qty    [id number] HC ST EVAL ORAL PHARYNG SWALLOW 1 7/21/2025 Mechelle Vaca MA,CCC-SLP GN 1    [id number] HC ST EVAL ORAL PHARYNG SWALLOW 4 7/22/2025 Mechelle Vaca MA,CCC-SLP GN 1            Mechelle Vaca MA,CCC-SLP  7/22/2025

## 2025-07-22 NOTE — PROGRESS NOTES
[facility] HOSPITALIST PROGRESS NOTE     Patient Identification:  Name:  Catherine Snyder  Age:  79 y.o.  Sex:  female  :  1945  MRN:  [id number]  Visit Number:  [id number]  ROOM: [address] [place] [address]     Primary Care Provider:  Ricky Alamo MD    Length of stay in inpatient status:  2    Subjective     Chief Compliant:    Chief Complaint   Patient presents with    Fall     Pt fell at nursing home ems states pt has right sided pain pt unable to communicate areas of pain to me pt has broke left leg from previous fall. Pt palpated without any signs of pain.        History of Presenting Illness: Patient seen and evaluated in follow-up for acute ischemic stroke in the setting of known Alzheimer's dementia with psychosis.  Patient at time of evaluation somnolent and lethargic.  Attempted to be seen by SLP this morning but unable to precipitate enough for clearance for safe oral intake.  Will avoid any and all sedating medications at this time.    Objective     Current Hospital Meds:  aspirin, 81 mg, Oral, Daily  atorvastatin, 40 mg, Oral, Nightly  [START ON 2025] cholecalciferol, 50,000 Units, Oral, Weekly  Divalproex Sodium, 125 mg, Oral, BID  docusate sodium, 100 mg, Oral, Daily  ferrous sulfate, 325 mg, Oral, Daily  gabapentin, 100 mg, Oral, Nightly  Lidocaine, 1 patch, Transdermal, Q24H  memantine, 5 mg, Oral, Q12H  OLANZapine, 2.5 mg, Oral, Nightly  polyethylene glycol, 17 g, Oral, Daily  senna, 2 tablet, Oral, BID  sodium chloride, 10 mL, Intravenous, Q12H  sodium chloride, 10 mL, Intravenous, Q12H         ----------------------------------------------------------------------------------------------------------------------  Vital Signs:  Temp:  [96.9 °F (36.1 °C)-98.5 °F (36.9 °C)] 98 °F (36.7 °C)  Heart Rate:  [71-91] 74  Resp:  [16-20] 16  BP: (162-200)/(79-99) 189/79  SpO2:  [96 %-98 %] 96 %  on   ;   Device (Oxygen Therapy): room air  Body mass index is 25.48 kg/m².      Intake/Output Summary  "(Last 24 hours) at 7/22/2025 1923  Last data filed at 7/22/2025 1700  Gross per 24 hour   Intake 0 ml   Output 1750 ml   Net -1750 ml      ----------------------------------------------------------------------------------------------------------------------  Physical exam:  Constitutional: Chronically ill-appearing elderly adult female resting in bed in no distress.      HENT:  Head:  Normocephalic and atraumatic.  Mouth:  Moist mucous membranes.    Eyes:  Conjunctivae and EOM are normal. No scleral icterus.    Cardiovascular:  Normal rate, regular rhythm and normal heart sounds with no murmur.  Pulmonary/Chest:  No respiratory distress, no wheezes, no crackles, with normal breath sounds and good air movement.  Abdominal:  Soft.  Bowel sounds are normal.  No distension and no tenderness.   Musculoskeletal:  No tenderness, and no deformity.  No red or swollen joints anywhere.  Moves all extremities spontaneously.  Neurological: Somnolent and lethargic and minimally arousable, nonverbal unable to assess orientation.  Does not follow commands.    Skin:  Skin is warm and dry. No rash or lesion noted. No pallor.   Peripheral vascular:  Pulses in all 4 extremities with no clubbing, no cyanosis, no edema.  Psychiatric: Appropriate mood and affect, pleasant.   ----------------------------------------------------------------------------------------------------------------------  WBC/HGB/HCT/PLT   8.48/13.9/41.5/169 (07/22 0346)  BUN/CREAT/GLUC/ALT/AST/JEFFY/LIP    13.5/0.74/117/--/--/--/-- (07/22 0346)  LYTES - Na/K/Cl/CO2: 139/3.9/102/22.1 (07/22 0346)        No results found for: \"URINECX\"  No results found for: \"BLOODCX\"    I have personally looked at the labs and they are summarized above.  ----------------------------------------------------------------------------------------------------------------------  Detailed radiology reports for the last 24 hours:  CT Angiogram Head  Result Date: 7/22/2025    No acute findings in " the arteries of the head/brain.  This report was finalized on 7/22/2025 2:34 PM by Dr. Teo Araujo MD.      CT Angiogram Carotids  Result Date: 7/22/2025    Mild bilateral ICA calcific stenosis.  This report was finalized on 7/22/2025 2:33 PM by Dr. Teo Araujo MD.      EEG  Result Date: 7/21/2025  Diffuse cerebral dysfunction of mild degree but nonspecific.  This is most commonly seen due to toxic/metabolic or hypoxemic cause No evidence for epilepsy is present This report is transcribed using the Dragon dictation system.      US Carotid Bilateral  Result Date: 7/21/2025    Mild multifocal bilateral ICA plaque.  This report was finalized on 7/21/2025 8:20 AM by Dr. Teo Araujo MD.      XR Femur 2 View Left  Addendum Date: 7/20/2025  ADDENDUM:  Please note corrections to the original report reflected in the addended report below:  EXAM:   XR LEFT femur, 2 Views  EXAM DATE:   7/20/2025 9:46 AM  CLINICAL HISTORY:   Fall with injury  TECHNIQUE:   Frontal and lateral views of the LEFT femur.  COMPARISON:   No relevant prior studies available.  FINDINGS:   Bones/joints:  Unremarkable.  No acute fracture.  No dislocation.   Soft tissues:  Unremarkable.  IMPRESSION:       No acute fracture or dislocation.  This report was finalized on 7/20/2025 4:38 PM by Dr. Madhav Lang MD.      Result Date: 7/20/2025    No acute fracture or dislocation.  This report was finalized on 7/20/2025 10:24 AM by Dr. Madhav Lang MD.      Assessment & Plan      Acute ischemic stroke  Advanced Alzheimer's dementia with psychosis    - Patient presenting after fall at outside skilled nursing facility due to concerns for right hip/leg pain in the setting of patient with recent fall resulting in left lower extremity fracture currently in cast.    -CT of the head performed in ER given recent fall incidentally finding evidence of acute stroke and teleneurology services consulted and recommending inpatient admission.    - Patient with rectal  aspirin ordered, currently unable to have clearance by speech therapy due to altered level of consciousness for safe oral intake despite being administered medications crushed in applesauce at SNF.  Continue with rectal aspirin as needed until cleared for oral intake.    - Goals of care discussion had with family by previous hospitalist and patient DNR/DNI.  Given advanced age, Alzheimer's, palliative care was consulted and discussed with family however family not interested in any palliative or hospice care at this time.    - Expected return back to skilled nursing facility pending improvement in mental status to have oral intake versus other ongoing goals of care conversations with POA.    - Concern for possible embolic stroke, continuous cardiac monitoring.  Echocardiogram ordered and notable for hyperdynamic EF with grade 1 impaired laxation but negative saline bubble test with elevated RVSP greater than 55 mmHg consistent with pulmonary hypertension.    - CT angiogram of head and neck without evidence of any acute findings in the head and brain with only mild bilateral ICA stenosis.    ИВАН, resolved    - Likely secondary to dementia and poor oral intake, baseline creatinine around 0.9 initially presenting with 1.27 resolved with volume repletion with IV fluids and improved to 0.74    - Avoid nephrotoxic medications and monitor intake, currently unable to have oral intake due to altered mental status.    Chronic:  Hearing loss  Hyperlipidemia  Hypertension  Iron deficiency anemia  Constipation    Copied text in portions of the note has been reviewed and is accurate as of .07/22/25    VTE Prophylaxis:     Active VTE Prophylaxis  Mechanical:        Start        07/20/25 6200  Maintain Sequential Compression Device  Continuous                          Select Pharmacologic VTE Prophylaxis if Desired & Appropriate       Disposition return to SNF once medically improved and stable.    Master Riddle DO  [facility]  [facility]  07/22/25  19:23 EDT

## 2025-07-22 NOTE — THERAPY TREATMENT NOTE
Patient Name: Catherine Snyder  : 1945    MRN: [id number]                              Today's Date: 2025       Admit Date: 2025    Visit Dx:     ICD-10-CM ICD-9-CM   1. Cerebrovascular accident (CVA), unspecified mechanism  I63.9 434.91     Patient Active Problem List   Diagnosis    HCAP (healthcare-associated pneumonia)    UTI (urinary tract infection)    Altered mental status    Cerebrovascular accident (CVA)    Aggressive behavior    Hyperlipidemia    Hypertension    Dementia with psychosis    Anticoagulated    Urinary tract infection    CVA (cerebral vascular accident)     Past Medical History:   Diagnosis Date    Alzheimer disease     Alzheimer's disease     Anxiety     Anxiety disorder, unspecified     Cerebrovascular accident (CVA)     Constipation     Deafness     Delirium due to known physiological condition     Depression     Difficulty in walking, not elsewhere classified     Gastro-esophageal reflux disease with esophagitis     GERD (gastroesophageal reflux disease)     Hyperlipidemia     Hyperlipidemia     Hypertension     Major depressive disorder, single episode     Muscle weakness (generalized)     Other lack of coordination     Paranoid personality (disorder)     Shared psychotic disorder     Unspecified dementia without behavioral disturbance     Unspecified hearing loss, unspecified ear     Unspecified lack of coordination     Unspecified psychosis not due to a substance or known physiological condition      History reviewed. No pertinent surgical history.   General Information       Row Name 25 1335          OT Time and Intention    Subjective Information no complaints  -     Document Type therapy note (daily note)  -     Mode of Treatment individual therapy;occupational therapy  -     Patient Effort fair  -     Comment Patient seen for OT treatment. Patient alert on this date and able to sit at edge of bed.  -       Row Name 25 1330          General  Information    Patient Profile Reviewed yes  -     Existing Precautions/Restrictions fall  -KP       Row Name 07/22/25 1335          Safety Issues/Impairments Affecting Functional Mobility    Safety Issues Affecting Function (Mobility) ability to follow commands;awareness of need for assistance  -     Impairments Affecting Function (Mobility) strength;balance;cognition;endurance/activity tolerance;range of motion (ROM)  -     Cognitive Impairments, Mobility Safety/Performance awareness, need for assistance  -               User Key  (r) = Recorded By, (t) = Taken By, (c) = Cosigned By      Initials Name Provider Type    Rosemary Hou OT Occupational Therapist                     Mobility/ADL's       Row Name 07/22/25 1339          Bed Mobility    Bed Mobility supine-sit;sit-supine  -KP     Supine-Sit [place] (Bed Mobility) moderate assist (50% patient effort);2 person assist  -     Sit-Supine [place] (Bed Mobility) maximum assist (25% patient effort);2 person assist  -     Bed Mobility, Safety Issues cognitive deficits limit understanding  -       Row Name 07/22/25 1339          Mobility    Extremity Weight-bearing Status left lower extremity  -     Left Lower Extremity (Weight-bearing Status) non weight-bearing (NWB)  -       Row Name 07/22/25 1339          Toileting Assessment/Training    [place] Level (Toileting) toileting skills;dependent (less than 25% patient effort)  -               User Key  (r) = Recorded By, (t) = Taken By, (c) = Cosigned By      Initials Name Provider Type    Rosemary Hou OT Occupational Therapist                   Obj/Interventions       Row Name 07/22/25 1340          Motor Skills    Motor Skills functional endurance  -     Functional Endurance fair  -       Row Name 07/22/25 1340          Balance    Balance Interventions sitting;static;minimal challenge;occupation based/functional task  -     Comment, Balance sat at edge of bed with  contact guard/standby assist  -               User Key  (r) = Recorded By, (t) = Taken By, (c) = Cosigned By      Initials Name Provider Type    Rosemary Hou OT Occupational Therapist                   Goals/Plan    No documentation.                  Clinical Impression       Row Name 07/22/25 1341          Pain Scale: FACES Pre/Post-Treatment    Pain: FACES Scale, Pretreatment 0-->no hurt  -KP     Posttreatment Pain Rating 0-->no hurt  -KP       Row Name 07/22/25 1341          Plan of Care Review    Plan of Care Reviewed With patient  -     Progress improving  -     Outcome Evaluation Patient seen for OT treatment. She performed static sitting at edge of bed on this date. Patient alert and tolerated well. Continue plan of care.  -       Row Name 07/22/25 1341          Therapy Plan Review/Discharge Plan (OT)    Anticipated Discharge Disposition (OT) skilled nursing facility  -       Row Name 07/22/25 1341          Positioning and Restraints    Pre-Treatment Position in bed  -     Post Treatment Position bed  -     In Bed fowlers;call light within reach;encouraged to call for assist;exit alarm on;side rails up x3  -               User Key  (r) = Recorded By, (t) = Taken By, (c) = Cosigned By      Initials Name Provider Type    Rosemary Hou OT Occupational Therapist                   Outcome Measures       Row Name 07/22/25 1000          How much help from another person do you currently need...    Turning from your back to your side while in flat bed without using bedrails? 1  -AV     Moving from lying on back to sitting on the side of a flat bed without bedrails? 1  -AV     Moving to and from a bed to a chair (including a wheelchair)? 1  -AV     Standing up from a chair using your arms (e.g., wheelchair, bedside chair)? 1  -AV     Climbing 3-5 steps with a railing? 1  -AV     To walk in hospital room? 1  -AV     AM-PAC 6 Clicks Score (PT) 6  -AV               User Key  (r) = Recorded By,  (t) = Taken By, (c) = Cosigned By      Initials Name Provider Type    Yasir Berg, RN Registered Nurse                      OT Recommendation and Plan  Planned Therapy Interventions (OT): activity tolerance training, BADL retraining, functional balance retraining, patient/caregiver education/training, passive ROM/stretching, occupation/activity based interventions, ROM/therapeutic exercise, neuromuscular control/coordination retraining, strengthening exercise, transfer/mobility retraining  Therapy Frequency (OT): other (see comments) (PRN)  Plan of Care Review  Plan of Care Reviewed With: patient  Progress: improving  Outcome Evaluation: Patient seen for OT treatment. She performed static sitting at edge of bed on this date. Patient alert and tolerated well. Continue plan of care.     Time Calculation:         Time Calculation- OT       Row Name 07/22/25 1342             Time Calculation- OT    OT Received On 07/22/25  -                User Key  (r) = Recorded By, (t) = Taken By, (c) = Cosigned By      Initials Name Provider Type     Rosemary Sequeira OT Occupational Therapist                  Therapy Charges for Today       Code Description Service Date Service Provider Modifiers Qty    [id number]  OT EVAL MOD COMPLEXITY 4 7/21/2025 Rosemary Sequeira OT GO 1    [id number]  OT THERAPEUTIC ACT EA 15 MIN 7/22/2025 Rosemary Sequeria OT GO 1                 Rosemary Sequeira OT  7/22/2025

## 2025-07-22 NOTE — PROGRESS NOTES
Stroke Progress Note       Chief Complaint: Encephalopathy    Subjective    Subjective     Subjective:  No acute events reported overnight.  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  On exam patient was awake, but nonverbal.  She did track me with her eyes across the room.  Intermittently following simple commands.    Review of Systems   Unable to perform ROS: Dementia          Objective    Objective      Temp:  [96.9 °F (36.1 °C)-98.5 °F (36.9 °C)] 98.5 °F (36.9 °C)  Heart Rate:  [56-91] 71  Resp:  [16-20] 18  BP: (135-200)/() 163/88    Neurological Exam  Mental Status  Awake. Patient is nonverbal. Language: Intermittently follows simple commands, squeeze my fingers.    Cranial Nerves  CN III, IV, VI: Pupils equal round and reactive to light bilaterally.  CN VII: Full and symmetric facial movement.    Motor  Decreased muscle bulk throughout. Normal muscle tone. No abnormal involuntary movements.  Not following commands, difficult to measure strength but she was moving all extremities purposefully.    Coordination    No obvious dysmetria.    Gait    Not assessed.      Physical Exam  Vitals and nursing note reviewed.   Constitutional:       General: She is awake. She is not in acute distress.     Appearance: She is ill-appearing.   HENT:      Head: Normocephalic.      Mouth/Throat:      Pharynx: Oropharynx is clear.   Eyes:      Pupils: Pupils are equal, round, and reactive to light.   Cardiovascular:      Rate and Rhythm: Normal rate.   Pulmonary:      Effort: Pulmonary effort is normal. No respiratory distress.   Skin:     General: Skin is warm and dry.   Psychiatric:         Mood and Affect: Mood normal.         Behavior: Behavior normal.         Hospital Meds:  Scheduled- aspirin, 81 mg, Oral, Daily  atorvastatin, 40 mg, Oral, Nightly  [START ON 7/27/2025] cholecalciferol, 50,000 Units, Oral, Weekly  Divalproex Sodium, 125 mg, Oral, BID  docusate sodium, 100 mg, Oral, Daily  ferrous  sulfate, 325 mg, Oral, Daily  gabapentin, 100 mg, Oral, Nightly  Lidocaine, 1 patch, Transdermal, Q24H  memantine, 5 mg, Oral, Q12H  OLANZapine, 2.5 mg, Oral, Nightly  polyethylene glycol, 17 g, Oral, Daily  senna, 2 tablet, Oral, BID  sodium chloride, 10 mL, Intravenous, Q12H  sodium chloride, 10 mL, Intravenous, Q12H      Infusions-     PRNs-   acetaminophen **OR** acetaminophen    aluminum-magnesium hydroxide-simethicone    senna-docusate sodium **AND** polyethylene glycol **AND** bisacodyl **AND** bisacodyl    cloNIDine    hydrALAZINE    [Held by provider] hydrALAZINE    HYDROcodone-acetaminophen    magnesium hydroxide    nitroglycerin    ondansetron    [COMPLETED] Insert Peripheral IV **AND** sodium chloride    sodium chloride    sodium chloride    sodium chloride    sodium chloride    ziprasidone (GEODON) 10 mg in sterile water (preservative free) 0.5 mL injection    Results Review:    I reviewed the patient's new clinical results.    Imaging Results (Last 24 Hours)       ** No results found for the last 24 hours. **          Results for orders placed during the hospital encounter of 07/20/25    Adult Transthoracic Echo Complete W/ Cont if Necessary Per Protocol (With Agitated Saline) 07/22/2025 10:42 AM    Interpretation Summary    Left ventricular systolic function is hyperdynamic (EF > 70%). Left ventricular ejection fraction appears to be greater than 70%.    Left ventricular diastolic function is consistent with (grade I) impaired relaxation.    Saline test results are negative.    Estimated right ventricular systolic pressure from tricuspid regurgitation is markedly elevated (>55 mmHg).    Aortic root calcification.    EEG  Result Date: 7/21/2025  Diffuse cerebral dysfunction of mild degree but nonspecific.  This is most commonly seen due to toxic/metabolic or hypoxemic cause No evidence for epilepsy is present This report is transcribed using the Dragon dictation system.      US Carotid  Bilateral  Result Date: 7/21/2025    Mild multifocal bilateral ICA plaque.  This report was finalized on 7/21/2025 8:20 AM by Dr. Teo Araujo MD.      XR Hip With or Without Pelvis 2 - 3 View Left  Result Date: 7/20/2025    No acute osseous or articular abnormality in the LEFT hip.    This report was finalized on 7/20/2025 9:18 PM by Dr. Madhav Lang MD.      MRI Brain Without Contrast  Result Date: 7/20/2025   Multiple focal areas of restricted diffusion identified in the right parietal lobe and right frontal lobe most consistent with subacute infarcts possibly due to shower emboli. Mild to moderate generalized brain volume loss. Moderate chronic small vessel ischemic type changes in the white matter. No acute intracranial hemorrhage, mass, or hydrocephalus. No shift in midline structures.  This report was finalized on 7/20/2025 9:14 PM by Leodan Khan MD.      XR Femur 2 View Left  Addendum Date: 7/20/2025  ADDENDUM:  Please note corrections to the original report reflected in the addended report below:  EXAM:   XR LEFT femur, 2 Views  EXAM DATE:   7/20/2025 9:46 AM  CLINICAL HISTORY:   Fall with injury  TECHNIQUE:   Frontal and lateral views of the LEFT femur.  COMPARISON:   No relevant prior studies available.  FINDINGS:   Bones/joints:  Unremarkable.  No acute fracture.  No dislocation.   Soft tissues:  Unremarkable.  IMPRESSION:       No acute fracture or dislocation.  This report was finalized on 7/20/2025 4:38 PM by Dr. Madhav Lang MD.      Result Date: 7/20/2025    No acute fracture or dislocation.  This report was finalized on 7/20/2025 10:24 AM by Dr. Madhav Lang MD.      XR Tibia Fibula 2 View Left  Result Date: 7/20/2025  1.  No displaced fracture or dislocation of the tibia or fibula identified. 2.  Fracture of the fifth metatarsal, incompletely imaged. 3.  Cast device obscures fine bone detail.  This report was finalized on 7/20/2025 10:25 AM by Dr. Madhav Lang MD.      CT Head Without  Contrast  Result Date: 7/20/2025  1.  Focal low-density in the right parietal lobe, image #30, axial series, with loss of gray-white matter differentiation concerning for age-indeterminate infarct, but possibly acute/subacute. If indicated, further evaluation with MRI is recommended. 2.  No midline shift or mass effect.  No hydrocephalus. 3.  No evidence of intracranial hemorrhage. 4.  Moderate chronic small vessel ischemic disease.  This report was finalized on 7/20/2025 10:55 AM by Dr. Madhav Lang MD.      XR Tibia Fibula 2 View Right  Result Date: 7/20/2025    No acute fracture or dislocation.  This report was finalized on 7/20/2025 10:34 AM by Dr. Madhav Lang MD.      XR Forearm 2 View Left  Result Date: 7/20/2025    No acute findings in the left forearm.  This report was finalized on 7/20/2025 10:35 AM by Dr. Madhav Lang MD.      CT Cervical Spine Without Contrast  Result Date: 7/20/2025  1.  No acute fracture or traumatic malalignment identified. 2.  Degenerative changes cervical spine as described.  This report was finalized on 7/20/2025 10:50 AM by Dr. Madhav Lang MD.      CT Pelvis Without Contrast  Result Date: 7/20/2025    No acute fracture or dislocation identified.  This report was finalized on 7/20/2025 10:51 AM by Dr. Madhav Lang MD.      CT Lumbar Spine Without Contrast  Result Date: 7/20/2025  1.  No acute fracture or traumatic malalignment identified. 2.  Degenerative changes lumbar spine as described.  This report was finalized on 7/20/2025 10:52 AM by Dr. Madhav Lang MD.         Lab Results   Component Value Date    HGBA1C 5.43 07/21/2025      Lab Results   Component Value Date    CHOL 131 07/21/2025    CHLPL 191 03/02/2014    TRIG 115 07/21/2025    HDL 32 (L) 07/21/2025    LDL 78 07/21/2025      Lab Results   Component Value Date    WBC 8.48 07/22/2025    HGB 13.9 07/22/2025    HCT 41.5 07/22/2025    MCV 91.4 07/22/2025     07/22/2025      Lab Results   Component Value  Date    GLUCOSE 117 (H) 07/22/2025    BUN 13.5 07/22/2025    CREATININE 0.74 07/22/2025    EGFRIFNONA 36 (L) 10/14/2021    BCR 18.2 07/22/2025    K 3.9 07/22/2025    CO2 22.1 07/22/2025    CALCIUM 9.6 07/22/2025    ALBUMIN 3.5 07/21/2025    AST 18 07/21/2025    ALT 10 07/21/2025      Lab Results   Component Value Date    TSH 2.300 07/21/2025     Lab Results   Component Value Date    [id number] 826 07/21/2025     Lab Results   Component Value Date    FOLATE 7.85 07/21/2025             Assessment/Plan     Assessment/Plan:  79-year-old female with PMHx significant for advanced Alzheimer's with psychosis, prior CVA, hypertension, hyperlipidemia, recent admission for UTI, and frequent falls most recently a fall resulting in left leg fracture presented to the ED after a fall and complaints of right sided pain.  ED workup included a CT head which showed a area concerning for recent ischemia in right parietal lobe.  MRI in the ED showed multiple areas of restricted diffusion in right parietal and right frontal lobe consistent with subacute infarcts.  Carotid ultrasound with mild plaque in the ICAs bilaterally.    #Acute ischemic stroke right parietal lobe, embolic stroke with undetermined source, workup ongoing  #Advanced Alzheimer's  #Significant hearing loss      - CTA head/neck pending  - UA  - Continue aspirin 81 mg daily  - Atorvastatin 40 mg nightly  - Palliative care consulted and following  - TTE with EF greater than 70%, negative bubble study    The case was discussed with Dr. Tong, and Dr. Riddle.    MARYBETH Thomas  07/22/25  12:01 EDT

## 2025-07-22 NOTE — PROGRESS NOTES
"Palliative Care Daily Progress Note     S: Medical record reviewed, followed up with Dr. Riddle and Gita granddaughter regarding patient's condition. Pt is much calmer today, she was lying in bed. She was whispering a few words \"Mommy\". She did have a furrowed brow when we moved her leg for repositioning. She does appears much calmer.       O:   Palliative Performance Scale Score:     /88 (BP Location: Right leg, Patient Position: Lying)   Pulse 71   Temp 98.5 °F (36.9 °C) (Oral)   Resp 18   Ht 170.2 cm (67\")   Wt 73.8 kg (162 lb 11.2 oz)   SpO2 98%   BMI 25.48 kg/m²     Intake/Output Summary (Last 24 hours) at 7/22/2025 1247  Last data filed at 7/22/2025 1000  Gross per 24 hour   Intake 0 ml   Output 1300 ml   Net -1300 ml       PE:    General Appearance:    Chronically ill appearing, alert, cooperative, NAD   HEENT:    NC/AT, without obvious abnormality, EOMI, anicteric    Neck:   supple, trachea midline, no JVD   Lungs:     CTAB without w/r/r, diminished     Heart:    RRR, normal S1 and S2, no M/R/G   Abdomen:     Soft, NT, ND, NABS    Extremities:   Moves all extremities, (RLE casted)   Pulses:   Pulses palpable and equal bilaterally   Skin:   Warm, dry   Neurologic:   Confused, disoriented, unable to follow commands    Psych:   Calm         Meds: Reviewed and changes not noted    Labs:   Results from last 7 days   Lab Units 07/22/25  0346   WBC 10*3/mm3 8.48   HEMOGLOBIN g/dL 13.9   HEMATOCRIT % 41.5   PLATELETS 10*3/mm3 169     Results from last 7 days   Lab Units 07/22/25  0346   SODIUM mmol/L 139   POTASSIUM mmol/L 3.9   CHLORIDE mmol/L 102   CO2 mmol/L 22.1   BUN mg/dL 13.5   CREATININE mg/dL 0.74   GLUCOSE mg/dL 117*   CALCIUM mg/dL 9.6     Results from last 7 days   Lab Units 07/22/25  0346 07/21/25  0543   SODIUM mmol/L 139 142   POTASSIUM mmol/L 3.9 3.6   CHLORIDE mmol/L 102 107   CO2 mmol/L 22.1 27.0   BUN mg/dL 13.5 18.3   CREATININE mg/dL 0.74 0.89   CALCIUM mg/dL 9.6 9.6   BILIRUBIN mg/dL "  --  0.6   ALK PHOS U/L  --  94   ALT (SGPT) U/L  --  10   AST (SGOT) U/L  --  18   GLUCOSE mg/dL 117* 88     Imaging Results (Last 72 Hours)       Procedure Component Value Units Date/Time    US Carotid Bilateral [[id number]] Collected: 07/21/25 0819     Updated: 07/21/25 0822    Narrative:      EXAM:    US Duplex Bilateral Extracranial Arteries     EXAM DATE:    7/21/2025 8:19 AM     CLINICAL HISTORY:    stroke; I63.9-Cerebral infarction, unspecified     TECHNIQUE:    Real-time duplex ultrasound scan of the extracranial arteries  integrating B-mode two-dimensional vascular structure, Doppler spectral  analysis and color flow Doppler imaging.     COMPARISON:    No relevant prior studies available.     FINDINGS:    Right common carotid artery:  Unremarkable as visualized.  No  occlusion or significant stenosis on color flow and spectral Doppler  imaging.    Right internal carotid artery:  Mild multifocal bilateral ICA plaque.   Peak systolic velocity in the right internal carotid artery (ICA) is 68  cm/s.    Right external carotid artery:  Unremarkable as visualized.  No  occlusion or significant stenosis on color flow and spectral Doppler  imaging.    Right vertebral artery:  Unremarkable as visualized.  Antegrade flow.    Right ICA/CCA ratio:  Unremarkable as visualized.  Within normal  limits.       Left common carotid artery:  Unremarkable as visualized.  No occlusion  or significant stenosis on color flow and spectral Doppler imaging.    Left internal carotid artery:  Peak systolic velocity in the left  internal carotid artery (ICA) is 63 cm/s.    Left external carotid artery:  Unremarkable as visualized.  No  occlusion or significant stenosis on color flow and spectral Doppler  imaging.    Left vertebral artery:  Unremarkable as visualized.  Antegrade flow.    Left ICA/CCA ratio:  Unremarkable as visualized.  Within normal  limits.       Lymph nodes:  Unremarkable as visualized.  No lymphadenopathy.       CAROTID STENOSIS REFERENCE USING IAC CRITERIA:    Mild - <50% stenosis. ICA PSV is less than 180 cm/s and plaque or  intimal thickening is visible.    Moderate - 50-69% stenosis. ICA PSV is 180 to 230 cm/s and plaque is  visible.    Severe - 70-94% stenosis. ICA PSV is more than 230 cm/s and visible  plaque with lumen narrowing is seen.    Near occlusion - 95-99% stenosis. ICA PSV is variable and significant  plaque with luminal narrowing is seen.    Occluded - 100% stenosis. No flow identified.       Impression:        Mild multifocal bilateral ICA plaque.     This report was finalized on 7/21/2025 8:20 AM by Dr. Teo rAaujo MD.       XR Femur 2 View Left [[id number]] Collected: 07/20/25 1024     Updated: 07/21/25 0734    Addenda:        ADDENDUM:     Please note corrections to the original report reflected in the addended  report below:     EXAM:    XR LEFT femur, 2 Views     EXAM DATE:    7/20/2025 9:46 AM     CLINICAL HISTORY:    Fall with injury     TECHNIQUE:    Frontal and lateral views of the LEFT femur.     COMPARISON:    No relevant prior studies available.     FINDINGS:    Bones/joints:  Unremarkable.  No acute fracture.  No dislocation.    Soft tissues:  Unremarkable.     IMPRESSION:         No acute fracture or dislocation.     This report was finalized on 7/20/2025 4:38 PM by Dr. Madhav Lang MD.     Signed: 07/20/25 1638 by Madhav Lang MD    Narrative:      EXAM:    XR Right Femur, 2 Views     EXAM DATE:    7/20/2025 9:46 AM     CLINICAL HISTORY:    Fall with injury     TECHNIQUE:    Frontal and lateral views of the right femur.     COMPARISON:    No relevant prior studies available.     FINDINGS:    Bones/joints:  Unremarkable.  No acute fracture.  No dislocation.    Soft tissues:  Unremarkable.       Impression:        No acute fracture or dislocation.     This report was finalized on 7/20/2025 10:24 AM by Dr. Madhav Lang MD.       XR Hip With or Without Pelvis 2 - 3 View Left [[id number]]  Collected: 07/20/25 1027     Updated: 07/20/25 2120    Narrative:      EXAM:    XR LEFT hip With Pelvis When Performed, 2 or 3 Views     EXAM DATE:    7/20/2025 9:46 AM     CLINICAL HISTORY:    Fall with injury     TECHNIQUE:    Two or three views of the LEFT hip with pelvis when performed.     COMPARISON:    No relevant prior studies available.     FINDINGS:    Bones/joints:  Unremarkable.  No acute fracture.  No dislocation.    Soft tissues:  Unremarkable.       Impression:        No acute osseous or articular abnormality in the LEFT hip.           This report was finalized on 7/20/2025 9:18 PM by Dr. Madhav Lang MD.       MRI Brain Without Contrast [[id number]] Collected: 07/20/25 2111     Updated: 07/20/25 2116    Narrative:         Procedure: MRI examination of the brain performed without IV contrast on  July 20, 2025.  Examination performed utilizing T1, T2, FLAIR, and  diffusion sequences with ADC mapping with imaging performed in the  axial, sagittal, and coronal planes.     HISTORY: Evaluate for stroke.     COMPARISON: None.     FINDINGS:     Mild to moderate generalized brain volume loss.  Moderate chronic small vessel ischemic type changes in the white matter.  No acute hemorrhage.  Normal-sized the sella.  No tonsillar ectopia.  No hydrocephalus.  Mild mucosal thickening in the paranasal sinuses.  Clear mastoid air cells.  Multiple focal areas of restricted diffusion identified in the right  parietal lobe including the cortex and subcortical white matter and deep  white matter.  Additional focus of restricted diffusion identified at the right frontal  lobe seen best on image 12, series 6.  These areas show corresponding low signal on the ADC map consistent with  subacute infarcts.  Infarcts could be of embolic source.  Additional  cortical focus of restricted diffusion noted in the lateral aspect of  the upper right frontal lobe as seen on image 17, series 6       Impression:         Multiple focal  areas of restricted diffusion identified in the right  parietal lobe and right frontal lobe most consistent with subacute  infarcts possibly due to shower emboli.  Mild to moderate generalized brain volume loss.  Moderate chronic small vessel ischemic type changes in the white matter.  No acute intracranial hemorrhage, mass, or hydrocephalus.  No shift in midline structures.     This report was finalized on 7/20/2025 9:14 PM by Leodan Khan MD.       XR Tibia Fibula 2 View Left [[id number]] Collected: 07/20/25 1025     Updated: 07/20/25 1316    Narrative:      EXAM:    XR Left Tibia and Fibula, 2 Views     EXAM DATE:    7/20/2025 9:46 AM     CLINICAL HISTORY:    Recent fracture with fall     TECHNIQUE:    Frontal and lateral views of the left tibia and fibula.     COMPARISON:    No relevant prior studies available.     FINDINGS:    Bones/joints:  Fracture of the fifth metatarsal, incompletely imaged.   No displaced fracture or dislocation of the tibia or fibula identified.    Soft tissues:  Unremarkable.  No radiopaque foreign body.    Tubes, lines and devices:  Cast device obscures fine bone detail.       Impression:      1.  No displaced fracture or dislocation of the tibia or fibula  identified.  2.  Fracture of the fifth metatarsal, incompletely imaged.  3.  Cast device obscures fine bone detail.     This report was finalized on 7/20/2025 10:25 AM by Dr. Madhav Lang MD.       CT Head Without Contrast [[id number]] Collected: 07/20/25 1052     Updated: 07/20/25 1106    Narrative:      EXAM:    CT Head Without Intravenous Contrast     EXAM DATE:    7/20/2025 9:53 AM     CLINICAL HISTORY:    trauma     TECHNIQUE:    Axial computed tomography images of the head/brain without intravenous  contrast.  Sagittal and coronal reformatted images were created and  reviewed.  This CT exam was performed using one or more of the following  dose reduction techniques:  automated exposure control, adjustment of  the mA and/or  kV according to patient size, and/or use of iterative  reconstruction technique.     COMPARISON:    7/11/2025     FINDINGS:    Brain and extra-axial spaces:  Focal low-density in the right parietal  lobe, image #30, axial series, with loss of gray-white matter  differentiation may represent age-indeterminate infarct. If indicated,  further evaluation with MRI is recommended.  Moderate chronic small  vessel ischemic disease.  No midline shift or mass effect.  No evidence  of intracranial hemorrhage.    Bones/joints:  Unremarkable.  No acute fracture.    Soft tissues:  Unremarkable.    Sinuses:  Unremarkable as visualized.  No acute sinusitis.    Mastoid air cells:  Unremarkable as visualized.  No mastoid effusion.       Impression:      1.  Focal low-density in the right parietal lobe, image #30, axial  series, with loss of gray-white matter differentiation concerning for  age-indeterminate infarct, but possibly acute/subacute. If indicated,  further evaluation with MRI is recommended.  2.  No midline shift or mass effect.  No hydrocephalus.  3.  No evidence of intracranial hemorrhage.  4.  Moderate chronic small vessel ischemic disease.     This report was finalized on 7/20/2025 10:55 AM by Dr. Madhav Lang MD.       XR Tibia Fibula 2 View Right [[id number]] Collected: 07/20/25 1034     Updated: 07/20/25 1106    Narrative:      EXAM:    XR Right Tibia and Fibula, 2 Views     EXAM DATE:    7/20/2025 9:46 AM     CLINICAL HISTORY:    Fall with injury     TECHNIQUE:    Frontal and lateral views of the right tibia and fibula.     COMPARISON:    No relevant prior studies available.     FINDINGS:    Bones/joints:  Unremarkable.  No acute fracture or dislocation.    Soft tissues:  Unremarkable.  No radiopaque foreign body.       Impression:        No acute fracture or dislocation.     This report was finalized on 7/20/2025 10:34 AM by Dr. Madhav Lang MD.       XR Forearm 2 View Left [[id number]] Collected: 07/20/25  1035     Updated: 07/20/25 1106    Narrative:      EXAM:    XR Left Forearm, 2 Views     EXAM DATE:    7/20/2025 10:23 AM     CLINICAL HISTORY:    injury with pain     TECHNIQUE:    Frontal and lateral views of the left forearm.     COMPARISON:    No relevant prior studies available.     FINDINGS:    Bones/joints:  Unremarkable.  No acute fracture.  No dislocation.    Soft tissues:  Unremarkable.       Impression:        No acute findings in the left forearm.     This report was finalized on 7/20/2025 10:35 AM by Dr. Madhav Lang MD.       CT Cervical Spine Without Contrast [[id number]] Collected: 07/20/25 1047     Updated: 07/20/25 1106    Narrative:      EXAM:    CT Cervical Spine Without Intravenous Contrast     EXAM DATE:    7/20/2025 9:53 AM     CLINICAL HISTORY:    fall at NH     TECHNIQUE:    Axial computed tomography images of the cervical spine without  intravenous contrast.  Sagittal and coronal reformatted images were  created and reviewed.  This CT exam was performed using one or more of  the following dose reduction techniques:  automated exposure control,  adjustment of the mA and/or kV according to patient size, and/or use of  iterative reconstruction technique.     COMPARISON:    No relevant prior studies available.     FINDINGS:    Vertebrae:  Degenerative facet arthropathy throughout the cervical  spine.  No acute fracture or traumatic malalignment identified.    Discs/spinal canal/neural foramina:  Degenerative disc disease  throughout the cervical spine.  Multilevel neuroforaminal stenosis and  mild central canal stenosis most pronounced C3/4 through C6/7.   Congenital fusion of C2-C3.    Soft tissues:  Unremarkable.    Vasculature:  Carotid artery calcifications.       Impression:      1.  No acute fracture or traumatic malalignment identified.  2.  Degenerative changes cervical spine as described.     This report was finalized on 7/20/2025 10:50 AM by Dr. Madhav Lang MD.       CT Pelvis  Without Contrast [[id number]] Collected: 07/20/25 1050     Updated: 07/20/25 1105    Narrative:      EXAM:    CT Pelvis Without Intravenous Contrast     EXAM DATE:    7/20/2025 9:54 AM     CLINICAL HISTORY:    fall with injury     TECHNIQUE:    Axial computed tomography images of the pelvis without intravenous  contrast.  Sagittal and coronal reformatted images were created and  reviewed.  This CT exam was performed using one or more of the following  dose reduction techniques:  automated exposure control, adjustment of  the mA and/or kV according to patient size, and/or use of iterative  reconstruction technique.     COMPARISON:    No relevant prior studies available.     FINDINGS:    Bowel:  Mild diverticulosis.  No obstruction.  No mucosal thickening.    Appendix:  No findings to suggest acute appendicitis.    Intraperitoneal space:  Unremarkable.  No free air.  No pelvic free  fluid.    Bladder:  Layering stones or calcification in the dependent portion of  urinary bladder.    Reproductive:  Unremarkable as visualized.    Bones/joints:  No acute fracture.  No dislocation.    Soft tissues:  Unremarkable.  No pelvic soft tissue hematomas are  identified.    Vasculature:  Unremarkable.  No lower abdominal aortic aneurysm.    Lymph nodes:  Unremarkable.  No enlarged lymph nodes.       Impression:        No acute fracture or dislocation identified.     This report was finalized on 7/20/2025 10:51 AM by Dr. Madhav Lang MD.       CT Lumbar Spine Without Contrast [[id number]] Collected: 07/20/25 1051     Updated: 07/20/25 1105    Narrative:      EXAM:    CT Lumbar Spine Without Intravenous Contrast     EXAM DATE:    7/20/2025 9:54 AM     CLINICAL HISTORY:    fall with injury     TECHNIQUE:    Axial computed tomography images of the lumbar spine without  intravenous contrast.  Sagittal and coronal reformatted images were  created and reviewed.  This CT exam was performed using one or more of  the following dose  "reduction techniques:  automated exposure control,  adjustment of the mA and/or kV according to patient size, and/or use of  iterative reconstruction technique.     COMPARISON:    7/11/2025     FINDINGS:    Vertebrae:  Stable Schmorl's node inferior endplate of L2 vertebral  body.  Degenerative facet arthropathy throughout the lumbar spine, most  prominent in the lower lumbar spine.  Multilevel facet arthropathy.  No  acute fracture or traumatic malalignment identified.    Discs/spinal canal/neural foramina:  Central canal and neuroforaminal  stenosis L2/3 through L5/S1.  Degenerative disc disease throughout the  lumbar spine.    Soft tissues:  Unremarkable.       Impression:      1.  No acute fracture or traumatic malalignment identified.  2.  Degenerative changes lumbar spine as described.     This report was finalized on 7/20/2025 10:52 AM by Dr. Madhav Lang MD.                 Diagnostics: Reviewed    A: Catherine Snyder is much calmer than she was yesterday however she also had geodon yesterday. I am unsure patients baseline. Bp 163/88 hr71 sat 98%. Pt's EF 70% and left ventricular diastolic function is consistent with grade I. Pt remains frail, NPO since she has failed swallow study.       P: After speak with Dr. Riddle who recommends comfort measures discussion, I was able to have a very lengthy conversation with granddaughter Gita. She is very upset today. She has complained that she feel that the patient has been \"doped up\" and she is also very upset that the patient is still NPO. She reports that she was able to give her water through a straw and she does fine. She also reports that she is upset about the cotton lemon swabs and mouth sponges because the patient does not like these. She tells me also that tomorrow she plans to come to the hospital and demand a transfer to another facility if she is still NPO. She reports that the last two admissions she has been made NPO and when she went back to the NH they " "were able to give her warm chocolate chip cookies, ham salad sandwiches and cranberry juice without difficulty. She reports that she wants to be here when the speech therapy comes for evaluation because she is going to show them now to get her to eat/drink and not aspirate. She demands that she not be NPO but wants all other aggressive measures. She also tells me that if we do not remove the NPO and refuse the transfer that she will leave AMA with and transport her to another facility herself. She also does not want patient to have any sedating medications (only small doses if she were to ABSOLUTELY need them). She also reports that she is only 35-45 minutes away and she is happy to come assist if patient \"gets wild again\". Dr. Riddle made aware of requests of aggressive measures, NPO release and request of no sedating medications.       We will continue to follow along. Please do not hesitate to contact us regarding further sx mgmt or GOC needs, including after hours or on weekends via our on call provider at [phone].     Aurna Jacinto, APRN    7/22/2025  "

## 2025-07-22 NOTE — PLAN OF CARE
Goal Outcome Evaluation:  Plan of Care Reviewed With: patient        Progress: no change  Outcome Evaluation: Pt alert and confused. Pt received CTA of head and carotids today. Results noted mild bilateral ICA calcific stenosis, however no abnormal findings in the CTA of head. Pt evaluated by speech and failed, strict NPO still in place. PT has had hypertension today, corrected via IV hydralazine q6 hours PRN. Pt has needed two doses today. Pt resting nicely at this time. Pt still in contact precautions r/t E.Coli in urine. Plan of care ongoing.

## 2025-07-22 NOTE — THERAPY TREATMENT NOTE
Acute Care - Physical Therapy Treatment Note   [facility]     Patient Name: Catherine Snyder  : 1945  MRN: [id number]  Today's Date: 2025   Onset of Illness/Injury or Date of Surgery: 25  Visit Dx:     ICD-10-CM ICD-9-CM   1. Cerebrovascular accident (CVA), unspecified mechanism  I63.9 434.91     Patient Active Problem List   Diagnosis    HCAP (healthcare-associated pneumonia)    UTI (urinary tract infection)    Altered mental status    Cerebrovascular accident (CVA)    Aggressive behavior    Hyperlipidemia    Hypertension    Dementia with psychosis    Anticoagulated    Urinary tract infection    CVA (cerebral vascular accident)     Past Medical History:   Diagnosis Date    Alzheimer disease     Alzheimer's disease     Anxiety     Anxiety disorder, unspecified     Cerebrovascular accident (CVA)     Constipation     Deafness     Delirium due to known physiological condition     Depression     Difficulty in walking, not elsewhere classified     Gastro-esophageal reflux disease with esophagitis     GERD (gastroesophageal reflux disease)     Hyperlipidemia     Hyperlipidemia     Hypertension     Major depressive disorder, single episode     Muscle weakness (generalized)     Other lack of coordination     Paranoid personality (disorder)     Shared psychotic disorder     Unspecified dementia without behavioral disturbance     Unspecified hearing loss, unspecified ear     Unspecified lack of coordination     Unspecified psychosis not due to a substance or known physiological condition      History reviewed. No pertinent surgical history.  PT Assessment (Last 12 Hours)       PT Evaluation and Treatment       Row Name 25 1543          Physical Therapy Time and Intention    Subjective Information no complaints  -HC     Document Type therapy note (daily note)  -HC     Mode of Treatment individual therapy;physical therapy  -HC     Comment Pt seen for sitting balance EOB. Pt does well once up and is able to  balance CGA.  -       Row Name 07/22/25 1543          General Information    Patient Profile Reviewed yes  -     Existing Precautions/Restrictions fall  -HC     Limitations/Impairments safety/cognitive  -       Row Name 07/22/25 1543          Home Use of Assistive/Adaptive Equipment    Equipment Currently Used at Home hospital bed  -       Row Name 07/22/25 1543          Pain Scale: FACES Pre/Post-Treatment    Pain: FACES Scale, Pretreatment 0-->no hurt  -HC     Posttreatment Pain Rating 0-->no hurt  -       Row Name 07/22/25 1543          Cognition    Affect/Mental Status (Cognition) unable/difficult to assess  -     Behavioral Issues (Cognition) unable/difficult to assess  -HC     Orientation Status (Cognition) unable/difficult to assess  -     Personal Safety Interventions fall prevention program maintained;nonskid shoes/slippers when out of bed;supervised activity  -       Row Name 07/22/25 1543          Mobility    Extremity Weight-bearing Status left lower extremity  -HC     Left Lower Extremity (Weight-bearing Status) non weight-bearing (NWB)  no weight bearing orders but pt in hard cast  -       Row Name 07/22/25 1543          Bed Mobility    Bed Mobility rolling left;rolling right  -HC     Rolling Left [place] (Bed Mobility) maximum assist (25% patient effort)  -HC     Rolling Right [place] (Bed Mobility) maximum assist (25% patient effort)  -HC     Supine-Sit [place] (Bed Mobility) moderate assist (50% patient effort)  -HC     Sit-Supine [place] (Bed Mobility) maximum assist (25% patient effort)  -HC     Bed Mobility, Safety Issues cognitive deficits limit understanding  -       Row Name 07/22/25 1543          Positioning and Restraints    Pre-Treatment Position in bed  -HC     Post Treatment Position bed  -HC     In Bed fowlers;encouraged to call for assist;call light within reach;exit alarm on;side rails up x3  -       Row Name 07/22/25 1546          Therapy  Assessment/Plan (PT)    Rehab Potential (PT) good  -HC     Criteria for Skilled Interventions Met (PT) yes  -HC     Therapy Frequency (PT) other (see comments)  per pt tolerance  -       Row Name 07/22/25 1543          Physical Therapy Goals    Bed Mobility Goal Selection (PT) bed mobility, PT goal 1  -       Row Name 07/22/25 1543          Bed Mobility Goal 1 (PT)    Activity/Assistive Device (Bed Mobility Goal 1, PT) bed mobility activities, all  -HC     [place] Level/Cues Needed (Bed Mobility Goal 1, PT) moderate assist (50-74% patient effort)  -HC     Time Frame (Bed Mobility Goal 1, PT) by discharge  -               User Key  (r) = Recorded By, (t) = Taken By, (c) = Cosigned By      Initials Name Provider Type     Kaylee Chauhan PTA Physical Therapist Assistant                      PT Recommendation and Plan  Anticipated Discharge Disposition (PT): skilled nursing facility  Therapy Frequency (PT): other (see comments) (per pt tolerance)          Time Calculation:    PT Charges       Row Name 07/22/25 1544             Time Calculation    PT Received On 07/22/25  -HC         Time Calculation- PT    Total Timed Code Minutes- PT 23 minute(s)  -HC                User Key  (r) = Recorded By, (t) = Taken By, (c) = Cosigned By      Initials Name Provider Type    Kaylee Vincent PTA Physical Therapist Assistant                  Therapy Charges for Today       Code Description Service Date Service Provider Modifiers Qty    77407843272  PT THERAPEUTIC ACT EA 15 MIN 7/22/2025 Kaylee Chauhan PTA GP, CQ 2            PT G-Codes  Outcome Measure Options: Modified [place]  AM-PAC 6 Clicks Score (PT): 6  Modified [place] Scale: 5 - Severe disability.  Bedridden, incontinent, and requiring constant nursing care and attention.    Kaylee Chauhan PTA  7/22/2025

## 2025-07-23 ENCOUNTER — APPOINTMENT (OUTPATIENT)
Dept: GENERAL RADIOLOGY | Facility: HOSPITAL | Age: 80
DRG: 065 | End: 2025-07-23
Payer: MEDICARE

## 2025-07-23 LAB
ANION GAP SERPL CALCULATED.3IONS-SCNC: 13 MMOL/L (ref 5–15)
BASOPHILS # BLD AUTO: 0.01 10*3/MM3 (ref 0–0.2)
BASOPHILS NFR BLD AUTO: 0.1 % (ref 0–1.5)
BUN SERPL-MCNC: 15.4 MG/DL (ref 8–23)
BUN/CREAT SERPL: 18.6 (ref 7–25)
CALCIUM SPEC-SCNC: 10 MG/DL (ref 8.6–10.5)
CHLORIDE SERPL-SCNC: 105 MMOL/L (ref 98–107)
CO2 SERPL-SCNC: 22 MMOL/L (ref 22–29)
CREAT SERPL-MCNC: 0.83 MG/DL (ref 0.57–1)
DEPRECATED RDW RBC AUTO: 41.6 FL (ref 37–54)
EGFRCR SERPLBLD CKD-EPI 2021: 70.9 ML/MIN/1.73
EOSINOPHIL # BLD AUTO: 0.57 10*3/MM3 (ref 0–0.4)
EOSINOPHIL NFR BLD AUTO: 7.5 % (ref 0.3–6.2)
ERYTHROCYTE [DISTWIDTH] IN BLOOD BY AUTOMATED COUNT: 12.6 % (ref 12.3–15.4)
GLUCOSE SERPL-MCNC: 87 MG/DL (ref 65–99)
HCT VFR BLD AUTO: 41 % (ref 34–46.6)
HGB BLD-MCNC: 13.7 G/DL (ref 12–15.9)
IMM GRANULOCYTES # BLD AUTO: 0.04 10*3/MM3 (ref 0–0.05)
IMM GRANULOCYTES NFR BLD AUTO: 0.5 % (ref 0–0.5)
LYMPHOCYTES # BLD AUTO: 1.8 10*3/MM3 (ref 0.7–3.1)
LYMPHOCYTES NFR BLD AUTO: 23.8 % (ref 19.6–45.3)
MCH RBC QN AUTO: 30.2 PG (ref 26.6–33)
MCHC RBC AUTO-ENTMCNC: 33.4 G/DL (ref 31.5–35.7)
MCV RBC AUTO: 90.5 FL (ref 79–97)
MONOCYTES # BLD AUTO: 0.53 10*3/MM3 (ref 0.1–0.9)
MONOCYTES NFR BLD AUTO: 7 % (ref 5–12)
NEUTROPHILS NFR BLD AUTO: 4.61 10*3/MM3 (ref 1.7–7)
NEUTROPHILS NFR BLD AUTO: 61.1 % (ref 42.7–76)
NRBC BLD AUTO-RTO: 0 /100 WBC (ref 0–0.2)
PLATELET # BLD AUTO: 202 10*3/MM3 (ref 140–450)
PMV BLD AUTO: 10.3 FL (ref 6–12)
POTASSIUM SERPL-SCNC: 3.7 MMOL/L (ref 3.5–5.2)
QT INTERVAL: 358 MS
QTC INTERVAL: 459 MS
RBC # BLD AUTO: 4.53 10*6/MM3 (ref 3.77–5.28)
SODIUM SERPL-SCNC: 140 MMOL/L (ref 136–145)
WBC NRBC COR # BLD AUTO: 7.56 10*3/MM3 (ref 3.4–10.8)

## 2025-07-23 PROCEDURE — 25010000002 METOPROLOL TARTRATE 5 MG/5ML SOLUTION

## 2025-07-23 PROCEDURE — 97530 THERAPEUTIC ACTIVITIES: CPT

## 2025-07-23 PROCEDURE — 74230 X-RAY XM SWLNG FUNCJ C+: CPT

## 2025-07-23 PROCEDURE — 92611 MOTION FLUOROSCOPY/SWALLOW: CPT

## 2025-07-23 PROCEDURE — 74230 X-RAY XM SWLNG FUNCJ C+: CPT | Performed by: RADIOLOGY

## 2025-07-23 PROCEDURE — 99232 SBSQ HOSP IP/OBS MODERATE 35: CPT | Performed by: NURSE PRACTITIONER

## 2025-07-23 PROCEDURE — 80048 BASIC METABOLIC PNL TOTAL CA: CPT | Performed by: STUDENT IN AN ORGANIZED HEALTH CARE EDUCATION/TRAINING PROGRAM

## 2025-07-23 PROCEDURE — 85025 COMPLETE CBC W/AUTO DIFF WBC: CPT | Performed by: STUDENT IN AN ORGANIZED HEALTH CARE EDUCATION/TRAINING PROGRAM

## 2025-07-23 PROCEDURE — 99232 SBSQ HOSP IP/OBS MODERATE 35: CPT | Performed by: STUDENT IN AN ORGANIZED HEALTH CARE EDUCATION/TRAINING PROGRAM

## 2025-07-23 PROCEDURE — 92610 EVALUATE SWALLOWING FUNCTION: CPT

## 2025-07-23 RX ORDER — CLOPIDOGREL BISULFATE 75 MG/1
75 TABLET ORAL DAILY
Status: DISCONTINUED | OUTPATIENT
Start: 2025-07-23 | End: 2025-07-24 | Stop reason: HOSPADM

## 2025-07-23 RX ORDER — METOPROLOL TARTRATE 1 MG/ML
5 INJECTION, SOLUTION INTRAVENOUS ONCE
Status: COMPLETED | OUTPATIENT
Start: 2025-07-23 | End: 2025-07-23

## 2025-07-23 RX ADMIN — GABAPENTIN 100 MG: 100 CAPSULE ORAL at 20:59

## 2025-07-23 RX ADMIN — OLANZAPINE 2.5 MG: 5 TABLET, FILM COATED ORAL at 20:59

## 2025-07-23 RX ADMIN — LIDOCAINE 1 PATCH: 4 PATCH TOPICAL at 11:30

## 2025-07-23 RX ADMIN — Medication 10 ML: at 22:11

## 2025-07-23 RX ADMIN — ASPIRIN 300 MG: 300 SUPPOSITORY RECTAL at 11:31

## 2025-07-23 RX ADMIN — CLOPIDOGREL BISULFATE 75 MG: 75 TABLET, FILM COATED ORAL at 20:59

## 2025-07-23 RX ADMIN — ATORVASTATIN CALCIUM 40 MG: 40 TABLET, FILM COATED ORAL at 20:59

## 2025-07-23 RX ADMIN — METOROPROLOL TARTRATE 5 MG: 5 INJECTION, SOLUTION INTRAVENOUS at 03:52

## 2025-07-23 RX ADMIN — DIVALPROEX SODIUM 125 MG: 125 CAPSULE, COATED PELLETS ORAL at 20:59

## 2025-07-23 RX ADMIN — MEMANTINE HYDROCHLORIDE 5 MG: 5 TABLET, FILM COATED ORAL at 20:59

## 2025-07-23 RX ADMIN — SENNOSIDES 2 TABLET: 8.6 TABLET, FILM COATED ORAL at 20:59

## 2025-07-23 NOTE — PLAN OF CARE
Goal Outcome Evaluation:            The patient has been resting this shift. Patient is disoriented to person, place, time and situation. No signs or symptoms of acute distress noted at this time. Plan of care ongoing.

## 2025-07-23 NOTE — THERAPY TREATMENT NOTE
Patient Name: Catherine Snyder  : 1945    MRN: [id number]                              Today's Date: 2025       Admit Date: 2025    Visit Dx:     ICD-10-CM ICD-9-CM   1. Cerebrovascular accident (CVA), unspecified mechanism  I63.9 434.91     Patient Active Problem List   Diagnosis    HCAP (healthcare-associated pneumonia)    UTI (urinary tract infection)    Altered mental status    Cerebrovascular accident (CVA)    Aggressive behavior    Hyperlipidemia    Hypertension    Dementia with psychosis    Anticoagulated    Urinary tract infection    CVA (cerebral vascular accident)     Past Medical History:   Diagnosis Date    Alzheimer disease     Alzheimer's disease     Anxiety     Anxiety disorder, unspecified     Cerebrovascular accident (CVA)     Constipation     Deafness     Delirium due to known physiological condition     Depression     Difficulty in walking, not elsewhere classified     Gastro-esophageal reflux disease with esophagitis     GERD (gastroesophageal reflux disease)     Hyperlipidemia     Hyperlipidemia     Hypertension     Major depressive disorder, single episode     Muscle weakness (generalized)     Other lack of coordination     Paranoid personality (disorder)     Shared psychotic disorder     Unspecified dementia without behavioral disturbance     Unspecified hearing loss, unspecified ear     Unspecified lack of coordination     Unspecified psychosis not due to a substance or known physiological condition      History reviewed. No pertinent surgical history.   General Information       Row Name 25 1406          OT Time and Intention    Subjective Information no complaints  -     Document Type therapy note (daily note)  -     Mode of Treatment individual therapy;occupational therapy  -     Patient Effort fair  -     Comment Patient agreeable to therapy.  -       Row Name 25 1406          General Information    Patient Profile Reviewed yes  -     Existing  Precautions/Restrictions fall  -     Barriers to Rehab previous functional deficit;cognitive status  -KP       Row Name 07/23/25 1406          Safety Issues/Impairments Affecting Functional Mobility    Safety Issues Affecting Function (Mobility) ability to follow commands;awareness of need for assistance  -     Impairments Affecting Function (Mobility) strength;balance;cognition;endurance/activity tolerance;range of motion (ROM)  -     Cognitive Impairments, Mobility Safety/Performance awareness, need for assistance;insight into deficits/self-awareness  -KP               User Key  (r) = Recorded By, (t) = Taken By, (c) = Cosigned By      Initials Name Provider Type    Rosemary Hou OT Occupational Therapist                     Mobility/ADL's       Row Name 07/23/25 1412          Bed Mobility    Supine-Sit [place] (Bed Mobility) moderate assist (50% patient effort)  -     Sit-Supine [place] (Bed Mobility) maximum assist (25% patient effort)  -     Bed Mobility, Safety Issues cognitive deficits limit understanding  -     Assistive Device (Bed Mobility) head of bed elevated  -       Row Name 07/23/25 1412          Mobility    Extremity Weight-bearing Status left lower extremity  -     Left Lower Extremity (Weight-bearing Status) non weight-bearing (NWB)  -               User Key  (r) = Recorded By, (t) = Taken By, (c) = Cosigned By      Initials Name Provider Type    Rosemary Hou OT Occupational Therapist                   Obj/Interventions       Row Name 07/23/25 1414          Motor Skills    Motor Skills functional endurance  -     Functional Endurance fair  -       Row Name 07/23/25 1414          Balance    Balance Assessment sitting static balance  -     Static Sitting Balance contact guard;minimal assist  -     Position, Sitting Balance unsupported;sitting edge of bed  -     Balance Interventions sitting;static;minimal challenge;occupation based/functional task  -                User Key  (r) = Recorded By, (t) = Taken By, (c) = Cosigned By      Initials Name Provider Type    Rosemary Hou OT Occupational Therapist                   Goals/Plan    No documentation.                  Clinical Impression       Row Name 07/23/25 1415          Pain Scale: FACES Pre/Post-Treatment    Pain: FACES Scale, Pretreatment 0-->no hurt  -KP     Posttreatment Pain Rating 0-->no hurt  -       Row Name 07/23/25 1415          Plan of Care Review    Plan of Care Reviewed With patient  -     Progress improving  -     Outcome Evaluation Patient agreeable to therapy. She was pleasant and able to sit at edge of bed unsupported. She was alert and tolerated well.  -       Row Name 07/23/25 1415          Therapy Plan Review/Discharge Plan (OT)    Anticipated Discharge Disposition (OT) skilled nursing facility  -       Row Name 07/23/25 1415          Positioning and Restraints    Pre-Treatment Position in bed  -     Post Treatment Position bed  -     In Bed fowlers;call light within reach;encouraged to call for assist;exit alarm on  -               User Key  (r) = Recorded By, (t) = Taken By, (c) = Cosigned By      Initials Name Provider Type    Rosemary Hou OT Occupational Therapist                   Outcome Measures       Row Name 07/23/25 0820          How much help from another person do you currently need...    Turning from your back to your side while in flat bed without using bedrails? 1  -SR     Moving from lying on back to sitting on the side of a flat bed without bedrails? 1  -SR     Moving to and from a bed to a chair (including a wheelchair)? 1  -SR     Standing up from a chair using your arms (e.g., wheelchair, bedside chair)? 1  -SR     Climbing 3-5 steps with a railing? 1  -SR     To walk in hospital room? 1  -SR     AM-PAC 6 Clicks Score (PT) 6  -SR               User Key  (r) = Recorded By, (t) = Taken By, (c) = Cosigned By      Initials Name Provider Type    SR  Luciana Hale, RN Registered Nurse                      OT Recommendation and Plan  Planned Therapy Interventions (OT): activity tolerance training, BADL retraining, functional balance retraining, patient/caregiver education/training, passive ROM/stretching, occupation/activity based interventions, ROM/therapeutic exercise, neuromuscular control/coordination retraining, strengthening exercise, transfer/mobility retraining  Therapy Frequency (OT): other (see comments) (PRN)  Plan of Care Review  Plan of Care Reviewed With: patient  Progress: improving  Outcome Evaluation: Patient agreeable to therapy. She was pleasant and able to sit at edge of bed unsupported. She was alert and tolerated well.     Time Calculation:         Time Calculation- OT       Row Name 07/23/25 1418             Time Calculation- OT    OT Received On 07/23/25  -                User Key  (r) = Recorded By, (t) = Taken By, (c) = Cosigned By      Initials Name Provider Type    Rosemary Hou OT Occupational Therapist                  Therapy Charges for Today       Code Description Service Date Service Provider Modifiers Qty    15873276334 HC OT THERAPEUTIC ACT EA 15 MIN 7/22/2025 Rosemary Sequeira OT GO 1    85208688328 HC OT THERAPEUTIC ACT EA 15 MIN 7/23/2025 Rosemary Sequeira OT GO 1                 Rosemary Sequeira OT  7/23/2025

## 2025-07-23 NOTE — MBS/VFSS/FEES
"Acute Care - Speech Language Pathology   Swallow Re-Evaluation  [facility]  MODIFIED BARIUM SWALLOW STUDY     Patient Name: Catherine Snyder  : 1945  MRN: [id number]  Today's Date: 2025  Onset of Illness/Injury or Date of Surgery: 25     Referring Physician: Leroy      Admit Date: 2025    Visit Dx:     ICD-10-CM ICD-9-CM   1. Cerebrovascular accident (CVA), unspecified mechanism  I63.9 434.91     Patient Active Problem List   Diagnosis    HCAP (healthcare-associated pneumonia)    UTI (urinary tract infection)    Altered mental status    Cerebrovascular accident (CVA)    Aggressive behavior    Hyperlipidemia    Hypertension    Dementia with psychosis    Anticoagulated    Urinary tract infection    CVA (cerebral vascular accident)     Past Medical History:   Diagnosis Date    Alzheimer disease     Alzheimer's disease     Anxiety     Anxiety disorder, unspecified     Cerebrovascular accident (CVA)     Constipation     Deafness     Delirium due to known physiological condition     Depression     Difficulty in walking, not elsewhere classified     Gastro-esophageal reflux disease with esophagitis     GERD (gastroesophageal reflux disease)     Hyperlipidemia     Hyperlipidemia     Hypertension     Major depressive disorder, single episode     Muscle weakness (generalized)     Other lack of coordination     Paranoid personality (disorder)     Shared psychotic disorder     Unspecified dementia without behavioral disturbance     Unspecified hearing loss, unspecified ear     Unspecified lack of coordination     Unspecified psychosis not due to a substance or known physiological condition      History reviewed. No pertinent surgical history.    Catherine Snyder presented to the radiology suite this am from 3S to participate in an instrumental MBS to objectively re-evaluate safety/efficacy of swallowing fnx, determine safest/least restrictive diet, candidacy for po intake.     Per chart review, \"past medical " "history is significant for advanced Alzheimer's dementia with psychosis, prior history of CVA, HTN, HLD, GERD, recent admission for UTI and Hx of ESBL UTI, recent frequent falls resulting in left lower extremity fracture-currently casted.     Patient was seen at bedside with nurse Meggan Denise RN present.  Patient was awake, alert, but not oriented.  Patient is deaf and does not communicate or follow instructions.  Nursing staff is attempting to place a second IV while the patient was being combative and spitting at them.  Patient was sent to our hospital ER secondary to a fall at the nursing home where she has been a full-time resident for a significant period of time.  They had concern for the patient's showing signs of right hip/leg pain.  Patient does not have any family members or healthcare surrogates present at her hospitalization today and is unable to provide her own history due to Alzheimer's dementia.  ER workup revealed a head CT suggesting a recent stroke.  Neurology was consulted while the patient was in the ER and it was determined that the patient should be admitted for further workup.     ED, vital signs were /79   Pulse 68   Temp 98.3 °F (36.8 °C) (Oral)   Resp 17   Ht 172.7 cm (68\")   Wt 79.4 kg (175 lb 0.7 oz)   SpO2 93%   BMI 26.62 kg/m²   ED workup significant for:   CT head: Focal low-density in the right parietal lobe, with loss of gray-white matter differentiation concerning for age-indeterminate infarct, but possible acute/subacute.  If indicated further evaluation with MRI is recommended.  No midline shift or mass effect.  No hydrocephalus.  No evidence of intracranial hemorrhage.  Moderate chronic small vessel ischemic disease.  MRI brain: Brain: Multiple focal areas of restricted diffusion identified in the right parietal lobe and right frontal lobe most consistent with subacute infarct possibly due to shower emboli.  Mild to moderate degenerative brain volume loss.  " "Moderate chronic small vessel ischemic type changes in the white matter.  No acute intracranial hemorrhage, mass, hydrocephalus.  No shift in the midline structure.  Pan scan of the spine revealed no acute fractures or dislocations  X-rays of left and right tib-fib, right femur, left forearm, bilateral hips and pelvis revealed no new acute fractures.  Glucose 102, creatinine 1.27, BUN 21.4, BUN/creatinine ratio 16.9, WBC 9.51, valproic acid 21.3.\"     She was referred per stroke protocol. Per review of EMR, patient is familiar to SLP Department from recent [facility] admission w/ UTI, recommended least restrictive modified po diet of mechanical ground consistencies, thin liquids.     She was participated in clinical dysphagia assessments 7/21/25 and 7/22/25, w/ patient evidencing w/ significant ams, fluctuating a/a status, and poor participation w/ refusal of all attempted po trials. She was reassessed this am w/ improving status, significantly improved acceptance of po trials, intermittent overt s/s aspiration w/ thin water trials, recommended for instrumental MBS.       Social History     Socioeconomic History    Marital status:    Tobacco Use    Smoking status: Never    Smokeless tobacco: Never   Vaping Use    Vaping status: Never Used   Substance and Sexual Activity    Alcohol use: No    Drug use: No    Sexual activity: Not Currently     Partners: Male      Imaging:  No recent chest xray available for review.     Labs:  Sodium  140  07/23 0827  Potassium  3.7  07/23 0827  Chloride  105  07/23 0827  CO2  22.0  07/23 0827  BUN  15.4  07/23 0827  Creatinine  0.83  07/23 0827  Glucose  87  07/23 0827  Hemoglobin  13.7  07/23 0827  Hematocrit  41.0  07/23 0827  WBC  7.56  07/23 0827  Platelets  202  07/23 0827  PTT  27.7  07/21 0543  Protime  14.1  07/21 0543  INR  1.03  07/21 0543     Diet Orders (active) (From admission, onward)       Start     Ordered    07/23/25 0959  NPO Diet NPO Type: Strict NPO, Ice Chips  " Diet Effective Now        Comments: Ice chip/water protocol for oral  hydration/comfort    07/23/25 0959                  She was observed on ra w/o complications.     Risks and benefits of the procedure were explained w/ patient was cooperative to participate. Proceed per protocol.     Patient was positioned upright and centered in a soft strap supportive chair to accept multiple po presentations of crumbled cracker in puree, puree, honey thick, nectar thick, and thin liquids via spoon, cup and straw, along w/ whole placebo pill in puree. Patient was unable to self provide po trials 2/2 ams.      All views are from the lateral plane.     Facial/oral structures were symmetrical upon observation. No obvious lingual deviation, unable to elicit protrusion. Oral mucosa were mildly xerostomic, pink and clean. Secretions were clear, slightly tacky, and controlled. OROM/[place] appeared generally weak. She did not attempt to imitate oral postures. Gag was not assessed. Volitional cough could not be assessed as patient was unable imitate and no spontaneous cough evidenced across this evaluation. Vocal quality could not be assessed, she was nonverbal across this evaluation. Patient was a/a and cooperative to participate and accept all po presentations during evaluation. She continued w/ significant ams, did not follow directives or attempt to communicate or interact. Prior documentation noted family to have reported patient w/ premorbid baseline non-communicative status 2/2 baseline ams and deafness. Per acute on chronic significant ams, patient unable to functionally participate in formal speech language cognitive assessment.      Upon po presentations, decreased bolus anticipation w/ mildly weak but adequate labial seal for bolus clearance via spoon bowl, cup rim stability and suction via straw. No significant anterior loss evidenced. Bolus formation, manipulation, and control were moderately weak in general, marked lingual  pumping evidenced w/ all consistencies, w/ significantly increased oral prep time/pumping w/ crumbled solid in puree, mixed phasic/rotary mastication pattern. A-p transit was moderately-moderately severely delayed, more so w/ thicker viscosities and consistencies, however, w/o significant oral residue. Tongue base retraction and linguavelar seal were generally weak w/ vallecular pooling w/ puree and honey thick liquids, no significant premature spillage. No laryngeal penetration or aspiration evidenced before the swallow.     Pharyngeal swallow was mildly delayed w/ adequate hyolaryngeal elevation and epiglottic inversion. Intermittent transient laryngeal penetration occurred during the swallow w/ thin liquid via spoon and straw, clearing upon completion of the swallow w/o significant residue. Pharyngeal contraction was adequate w/ only trace vallecular and pyriform sinus residue intermittently across consistencies. No other laryngeal penetration or aspiration evidenced during or after the swallow.     She was able to manipulate and swallow whole placebo pill in puree, w/ significantly delayed a-p transit.                Penetration-Aspiration Scale Scoring  Consistency: Teaspoon Bolus Cup Bolus Straw Bolus   Puree 1     Honey 1     Nectar 1     Thin 1, 2 1 1, 2   Solid 1       *Reference*      Full esophageal sweep revealed no obvious mucosal abnormalities. Motility appeared decreased, delayed transit at the mid esophagus w/ placebo pill, clearing w/ additional puree trial. No obvious retrograde flow noted.      Impression: Per this re-evaluation, Ms. Snyder presented w/ a moderate-moderately severe oral dysphagia, wfl pharyngeal swallow w/ intermittent transient laryngeal penetration during the swallow w/ thin liquids via spoon and straw, clearing upon completion of the swallow, no evidence of aspiration across this evaluation.     [facility]'s [facility], with her improved overall status and participation, is felt to be  most likely representative of at or near patient's premorbid baseline swallowing fnx. She is felt to most benefit from least restrictive modified po diet of mechanical ground consistencies, thin liquids, 1:1 feeding assistance, fully a/a and upright and centered positioning for all po intake, medications crushed in puree.     SLP Recommendation and Plan  1. Mechanical ground consistencies, thin liquids.   2. Medications crushed in puree.   3. 1:1 feeding assistance.   4. Fully a/a, upright and centered for all po intake.   5. Universal aspiration and NORA precautions.  6. Oral care protocol.  SLP to f/u for diet safety/acceptance.     D/w RN results and recommendations w/ verbal understanding and agreement.     Communicated results and recommendations to Dr. Riddle and Palliative care team.     Thank you for allowing me to participate in the care of your patient-  Mechelle Vaca M.A., CCC-SLP      EDUCATION  The patient has been educated in the following areas:   Dysphagia (Swallowing Impairment) Oral Care/Hydration Modified Diet Instruction.      Time Calculation:    Time Calculation- SLP       Row Name 07/23/25 1112             Time Calculation- SLP    SLP - Next Appointment 07/24/25  -RIK                User Key  (r) = Recorded By, (t) = Taken By, (c) = Cosigned By      Initials Name Provider Type     Mechelle Vaca MA,CCC-SLP Speech and Language Pathologist                  Therapy Charges for Today       Code Description Service Date Service Provider Modifiers Qty    60338633238 HC ST EVAL ORAL PHARYNG SWALLOW 4 7/22/2025 Mechelle Vaca MA,CCC-SLP GN 1    [id number] HC ST EVAL ORAL PHARYNG SWALLOW 3 7/23/2025 Mechelle Vaca MA,CCC-SLP GN 1    [id number] HC ST MOTION FLUORO EVAL SWALLOW 8 7/23/2025 Mechelle Vaca MA,CCC-SLP GN 1            Mechelle Vaca MA,CCC-SLP  7/23/2025

## 2025-07-23 NOTE — PROGRESS NOTES
[facility] HOSPITALIST PROGRESS NOTE     Patient Identification:  Name:  Catherine Snyder  Age:  79 y.o.  Sex:  female  :  1945  MRN:  [id number]  Visit Number:  [id number]  ROOM: [address] [place] [address]     Primary Care Provider:  Ricky Alamo MD    Length of stay in inpatient status:  3    Subjective     Chief Compliant:    Chief Complaint   Patient presents with    Fall     Pt fell at nursing home ems states pt has right sided pain pt unable to communicate areas of pain to me pt has broke left leg from previous fall. Pt palpated without any signs of pain.        History of Presenting Illness: Patient seen and evaluated in follow-up for acute ischemic stroke in the setting of known Alzheimer's dementia with psychosis.  Patient at time of evaluation somnolent and lethargic.  Patient with some improvement patient today and seen by SLP and placed on modified diet.  PT and OT working with patient as well.    Objective     Current Hospital Meds:  aspirin, 81 mg, Oral, Daily   Or  aspirin, 300 mg, Rectal, Daily  atorvastatin, 40 mg, Oral, Nightly  [START ON 2025] cholecalciferol, 50,000 Units, Oral, Weekly  Divalproex Sodium, 125 mg, Oral, BID  docusate sodium, 100 mg, Oral, Daily  ferrous sulfate, 325 mg, Oral, Daily  gabapentin, 100 mg, Oral, Nightly  Lidocaine, 1 patch, Transdermal, Q24H  memantine, 5 mg, Oral, Q12H  OLANZapine, 2.5 mg, Oral, Nightly  polyethylene glycol, 17 g, Oral, Daily  senna, 2 tablet, Oral, BID  sodium chloride, 10 mL, Intravenous, Q12H  sodium chloride, 10 mL, Intravenous, Q12H         ----------------------------------------------------------------------------------------------------------------------  Vital Signs:  Temp:  [97.7 °F (36.5 °C)-99 °F (37.2 °C)] 98.6 °F (37 °C)  Heart Rate:  [71-96] 83  Resp:  [18-20] 18  BP: (150-185)/(74-94) 150/74  SpO2:  [90 %-97 %] 95 %  on   ;   Device (Oxygen Therapy): room air  Body mass index is 25.12 kg/m².      Intake/Output Summary  "(Last 24 hours) at 7/23/2025 1850  Last data filed at 7/23/2025 1803  Gross per 24 hour   Intake 320 ml   Output 750 ml   Net -430 ml      ----------------------------------------------------------------------------------------------------------------------  Physical exam:  Constitutional: Chronically ill-appearing elderly adult female resting in bed in no distress.      HENT:  Head:  Normocephalic and atraumatic.  Mouth:  Moist mucous membranes.    Eyes:  Conjunctivae and EOM are normal. No scleral icterus.    Cardiovascular:  Normal rate, regular rhythm and normal heart sounds with no murmur.  Pulmonary/Chest:  No respiratory distress, no wheezes, no crackles, with normal breath sounds and good air movement.  Abdominal:  Soft.  Bowel sounds are normal.  No distension and no tenderness.   Musculoskeletal:  No tenderness, and no deformity.  No red or swollen joints anywhere.  Moves all extremities spontaneously.  Decreased muscle tone noted.  Neurological: Awake and alert but nonverbal unable to assess orientation and noncooperative with exam.  Does not follow commands.  No focal deficits on limited exam.  Skin:  Skin is warm and dry. No rash or lesion noted. No pallor.   Peripheral vascular:  Pulses in all 4 extremities with no clubbing, no cyanosis, no edema.  Psychiatric: Appropriate mood and affect, pleasant.   ----------------------------------------------------------------------------------------------------------------------  WBC/HGB/HCT/PLT   7.56/13.7/41.0/202 (07/23 0827)  BUN/CREAT/GLUC/ALT/AST/JEFFY/LIP    15.4/0.83/87/--/--/--/-- (07/23 0827)  LYTES - Na/K/Cl/CO2: 140/3.7/105/22.0 (07/23 0827)        No results found for: \"URINECX\"  No results found for: \"BLOODCX\"    I have personally looked at the labs and they are summarized above.  ----------------------------------------------------------------------------------------------------------------------  Detailed radiology reports for the last 24 hours:  FL " Video Swallow Single Contrast  Result Date: 7/23/2025      Please see the speech pathologist's report for additional information.  This report was finalized on 7/23/2025 10:53 AM by Dr. Teo Araujo MD.      CT Angiogram Head  Result Date: 7/22/2025    No acute findings in the arteries of the head/brain.  This report was finalized on 7/22/2025 2:34 PM by Dr. eTo Araujo MD.      CT Angiogram Carotids  Result Date: 7/22/2025    Mild bilateral ICA calcific stenosis.  This report was finalized on 7/22/2025 2:33 PM by Dr. Teo Araujo MD.      Assessment & Plan      Acute ischemic stroke  Advanced Alzheimer's dementia with psychosis    - Patient presenting after fall at outside skilled nursing facility due to concerns for right hip/leg pain in the setting of patient with recent fall resulting in left lower extremity fracture currently in cast.    -CT of the head performed in ER given recent fall incidentally finding evidence of acute stroke and teleneurology services consulted and recommending inpatient admission.    - Patient with rectal aspirin ordered, currently unable to have clearance by speech therapy due to altered level of consciousness for safe oral intake despite being administered medications crushed in applesauce at SNF.  Continue with rectal aspirin as needed until cleared for oral intake.    - Goals of care discussion had with family by previous hospitalist and patient DNR/DNI.  Given advanced age, Alzheimer's, palliative care was consulted and discussed with family however family not interested in any palliative or hospice care at this time.    - Expected return back to skilled nursing facility pending improvement in mental status to have oral intake versus other ongoing goals of care conversations with POA.    - Concern for possible embolic stroke, continuous cardiac monitoring.  Echocardiogram ordered and notable for hyperdynamic EF with grade 1 impaired laxation but negative saline bubble test with  elevated RVSP greater than 55 mmHg consistent with pulmonary hypertension.    - CT angiogram of head and neck without evidence of any acute findings in the head and brain with only mild bilateral ICA stenosis.    - Holter monitor at discharge and will add Plavix to aspirin as patient is having some improvement in oral intake.  Although still limited cooperative weakness.    ИВАН, resolved    - Likely secondary to dementia and poor oral intake, baseline creatinine around 0.9 initially presenting with 1.27 resolved with volume repletion with IV fluids and improved to 0.74    - Avoid nephrotoxic medications and monitor intake, currently unable to have oral intake due to altered mental status.    Chronic:  Hearing loss  Hyperlipidemia  Hypertension  Iron deficiency anemia  Constipation    Copied text in portions of the note has been reviewed and is accurate as of .07/23/25    VTE Prophylaxis:     Active VTE Prophylaxis  Mechanical:        Start        07/20/25 8368  Maintain Sequential Compression Device  Continuous                          Select Pharmacologic VTE Prophylaxis if Desired & Appropriate       Disposition return to SNF once medically improved and stable, possibly tomorrow.    Master Riddle DO  [facility]  07/23/25  18:50 EDT

## 2025-07-23 NOTE — THERAPY RE-EVALUATION
Acute Care - Speech Language Pathology   Swallow Re-Assessment  [facility]     Patient Name: Catherine Snyder  : 1945  MRN: [id number]  Today's Date: 2025  Onset of Illness/Injury or Date of Surgery: 25     Referring Physician: Leroy      Admit Date: 2025    Visit Dx:     ICD-10-CM ICD-9-CM   1. Cerebrovascular accident (CVA), unspecified mechanism  I63.9 434.91     Patient Active Problem List   Diagnosis    HCAP (healthcare-associated pneumonia)    UTI (urinary tract infection)    Altered mental status    Cerebrovascular accident (CVA)    Aggressive behavior    Hyperlipidemia    Hypertension    Dementia with psychosis    Anticoagulated    Urinary tract infection    CVA (cerebral vascular accident)     Past Medical History:   Diagnosis Date    Alzheimer disease     Alzheimer's disease     Anxiety     Anxiety disorder, unspecified     Cerebrovascular accident (CVA)     Constipation     Deafness     Delirium due to known physiological condition     Depression     Difficulty in walking, not elsewhere classified     Gastro-esophageal reflux disease with esophagitis     GERD (gastroesophageal reflux disease)     Hyperlipidemia     Hyperlipidemia     Hypertension     Major depressive disorder, single episode     Muscle weakness (generalized)     Other lack of coordination     Paranoid personality (disorder)     Shared psychotic disorder     Unspecified dementia without behavioral disturbance     Unspecified hearing loss, unspecified ear     Unspecified lack of coordination     Unspecified psychosis not due to a substance or known physiological condition      History reviewed. No pertinent surgical history.    Ms. Snyder was seen at bedside this am on 3S for continued dysphagia reassessment.     She was resting upon SLP entry, easily awakened w/ tactile stimuli and repositioning upright and centered in bed. She continued w/ significant ams, however, made eye contact w/ SLP and was able to visually  track. She did not follow directives, did not attempt to interact or communicate w/ SLP. No vocalizations or verbalizations evidenced across this reassessment.      She was observed on ra w/o complications.      Patient was positioned upright and centered in bed to accept multiple po presentations of single ice chips via spoon and thin water via spoon, cup and straw. Further consistencies deferred pending instrumental dysphagia evaluation. Patient did not attempt to self provide po trials.       Upon po presentations, decreased bolus anticipation (improved as reassessment progressed) w/ adequate acceptance today. This is a significant improvement from prior SLP encounters. Bolus formation, manipulation and control were generally weak w/ intermittent anterior oral holding across consistencies, lingual pumping w/ ice chips. A-p transit was moderately delayed, no significant oral residue appreciated. Suspect premature spillage w/ thin liquids. No overt s/s aspiration evidenced before the swallow.      Pharyngeal swallow was mildly-moderately delayed, w/ generally weak hyolaryngeal elevation per palpation. Intermittent delayed cough and throat clear elicited post thin liquid trials via spoon, cup and straw. Cough was weak in intensity, nonproductive. Further consistencies deferred pending instrumental dysphagia evaluation.      Impression:  Per this reassessment, Ms. Snyder presented w/ significant improvements in participation and po acceptance today. She did evidence w/ a moderately severe oral dysphagia, suspect at least a mild-moderate pharyngeal dysphagia w/ intermittent overt s/s aspiration w/ thin liquids across presentation styles. Per this status, and significantly improved participation today, she is felt to most benefit from instrumental MBS to objectively evaluate swallowing fnx, determine candidacy for po intake, safest/least restrictive po diet.       Recommend continuing NPO except ice chip/water  protocol pending MBS, meds via non-oral method, universal aspiration precautions, oral care protocol. SLP to f/u for MBS tentatively today w/ further recs  pending.     SLP Recommendation and Plan  1. NPO except ice chip/water protocol.  2. Medications via non-oral method.    3. Universal aspiration precautions.   4. NORA precautions.  5. Oral care protocol.  6. MBS tentatively today w/ further recs  pending.     D/w RN patient status w/ verbal agreement.       Thank you for allowing me to participate in the care of your patient-  Mechelle Vaca M.A., CCC-SLP      EDUCATION  The patient has been educated in the following areas:   Dysphagia (Swallowing Impairment) Oral Care/Hydration NPO rationale.      Time Calculation:       Therapy Charges for Today       Code Description Service Date Service Provider Modifiers Qty    [id number] HC ST EVAL ORAL PHARYNG SWALLOW 4 7/22/2025 Mechelle Vaca MA,CCC-SLP GN 1    [id number] HC ST EVAL ORAL PHARYNG SWALLOW 3 7/23/2025 Mechelle Vaca MA,CCC-SLP GN 1            Mechelle Vaca MA,CCC-SLP  7/23/2025

## 2025-07-23 NOTE — PLAN OF CARE
Goal Outcome Evaluation:           Problem: Adult Inpatient Plan of Care  Goal: Absence of Hospital-Acquired Illness or Injury  Intervention: Prevent Skin Injury  Recent Flowsheet Documentation  Taken 7/22/2025 1905 by Cb Santo RN  Body Position:   turned   right  Skin Protection: incontinence pads utilized     Problem: Fall Injury Risk  Goal: Absence of Fall and Fall-Related Injury  Outcome: Progressing

## 2025-07-23 NOTE — PROGRESS NOTES
Stroke Progress Note       Chief Complaint: Encephalopathy    Subjective    Subjective     Subjective:  No acute events reported overnight.  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  On exam patient was awake, but nonverbal.  She tracks with her eyes, follows simple commands.  Per SLP notes patient much more interactive.  Plan for MBS.    Review of Systems   Unable to perform ROS: Dementia          Objective    Objective      Temp:  [97.7 °F (36.5 °C)-99 °F (37.2 °C)] 98.8 °F (37.1 °C)  Heart Rate:  [71-96] 83  Resp:  [16-20] 18  BP: (160-189)/(79-94) 170/94    Neurological Exam  Mental Status  Awake. Patient is nonverbal. Language: Intermittently follows simple commands, squeezes my fingers.  Patient is deaf, but reportedly able to read lips.  She would not answer any of my orientation questions.    Cranial Nerves  CN III, IV, VI: Pupils equal round and reactive to light bilaterally.  CN VII: Full and symmetric facial movement.    Motor  Decreased muscle bulk throughout. Normal muscle tone. No abnormal involuntary movements.  Difficult to measure strength but she was moving all extremities purposefully.    Coordination    No obvious dysmetria.    Gait    Not assessed.      Physical Exam  Vitals and nursing note reviewed.   Constitutional:       General: She is awake. She is not in acute distress.     Appearance: She is ill-appearing.   HENT:      Head: Normocephalic.      Mouth/Throat:      Pharynx: Oropharynx is clear.   Eyes:      Pupils: Pupils are equal, round, and reactive to light.   Cardiovascular:      Rate and Rhythm: Normal rate.   Pulmonary:      Effort: Pulmonary effort is normal. No respiratory distress.   Musculoskeletal:      Comments: Cast to LLE   Skin:     General: Skin is warm and dry.   Psychiatric:         Mood and Affect: Mood normal.         Behavior: Behavior normal.         Hospital Meds:  Scheduled- aspirin, 81 mg, Oral, Daily   Or  aspirin, 300 mg, Rectal,  Daily  atorvastatin, 40 mg, Oral, Nightly  [START ON 7/27/2025] cholecalciferol, 50,000 Units, Oral, Weekly  Divalproex Sodium, 125 mg, Oral, BID  docusate sodium, 100 mg, Oral, Daily  ferrous sulfate, 325 mg, Oral, Daily  gabapentin, 100 mg, Oral, Nightly  Lidocaine, 1 patch, Transdermal, Q24H  memantine, 5 mg, Oral, Q12H  OLANZapine, 2.5 mg, Oral, Nightly  polyethylene glycol, 17 g, Oral, Daily  senna, 2 tablet, Oral, BID  sodium chloride, 10 mL, Intravenous, Q12H  sodium chloride, 10 mL, Intravenous, Q12H      Infusions-     PRNs-   acetaminophen **OR** acetaminophen    aluminum-magnesium hydroxide-simethicone    senna-docusate sodium **AND** polyethylene glycol **AND** bisacodyl **AND** bisacodyl    cloNIDine    hydrALAZINE    [Held by provider] hydrALAZINE    HYDROcodone-acetaminophen    magnesium hydroxide    nitroglycerin    ondansetron    [COMPLETED] Insert Peripheral IV **AND** sodium chloride    sodium chloride    sodium chloride    sodium chloride    sodium chloride    Results Review:    I reviewed the patient's new clinical results.    Imaging Results (Last 24 Hours)       Procedure Component Value Units Date/Time    FL Video Swallow Single Contrast - In process [[id number]] Resulted: 07/23/25 1030     Updated: 07/23/25 1045    This result has not been signed. Information might be incomplete.      CT Angiogram Head [[id number]] Collected: 07/22/25 1433     Updated: 07/22/25 1436    Narrative:      EXAM:    CT Angiography Head With Intravenous Contrast     EXAM DATE:    7/22/2025 2:33 PM     CLINICAL HISTORY:    stroke; I63.9-Cerebral infarction, unspecified     TECHNIQUE:    Axial computed tomographic angiography images of the head with  intravenous contrast. LVO analysis was performed with [employer] software.   This CT exam was performed using one or more of the following dose  reduction techniques:  automated exposure control, adjustment of the mA  and/or kV according to patient size, and/or use of  iterative  reconstruction technique.    MIP reconstructed images were created and reviewed.     COMPARISON:    No relevant prior studies available.     FINDINGS:    Right internal carotid artery:  No acute findings.  Intracranial  segment is patent with no significant stenosis.  No aneurysm.    Right anterior cerebral artery:  Unremarkable as visualized.  No  occlusion or significant stenosis.  No aneurysm.    Right middle cerebral artery:  Unremarkable as visualized.  No  occlusion or significant stenosis.  No aneurysm.    Right posterior cerebral artery:  Unremarkable as visualized.  No  occlusion or significant stenosis.  No aneurysm.    Right vertebral artery:  Unremarkable as visualized.       Left internal carotid artery:  No acute findings.  Intracranial  segment is patent with no significant stenosis.  No aneurysm.    Left anterior cerebral artery:  Unremarkable as visualized.  No  occlusion or significant stenosis.  No aneurysm.    Left middle cerebral artery:  Unremarkable as visualized.  No  occlusion or significant stenosis.  No aneurysm.    Left posterior cerebral artery:  Unremarkable as visualized.  No  occlusion or significant stenosis.  No aneurysm.    Left vertebral artery:  Unremarkable as visualized.       Basilar artery:  Unremarkable as visualized.  No occlusion or  significant stenosis.  No aneurysm.       Impression:        No acute findings in the arteries of the head/brain.     This report was finalized on 7/22/2025 2:34 PM by Dr. Teo Araujo MD.       CT Angiogram Carotids [[id number]] Collected: 07/22/25 1431     Updated: 07/22/25 1435    Narrative:      EXAM:    CT Angiography Neck With Intravenous Contrast     EXAM DATE:    7/22/2025 2:31 PM     CLINICAL HISTORY:    stroke; I63.9-Cerebral infarction, unspecified     TECHNIQUE:    Axial computed tomographic angiography images of the neck with  intravenous contrast.  This CT exam was performed using one or more of  the following dose  reduction techniques:  automated exposure control,  adjustment of the mA and/or kV according to patient size, and/or use of  iterative reconstruction technique.    MIP reconstructed images were created and reviewed.     COMPARISON:    None.     FINDINGS:      VASCULATURE:    Right common carotid artery:  Unremarkable as visualized.  No  occlusion or significant stenosis.  No dissection.    Right internal carotid artery:  Mild bilateral ICA calcific stenosis.   No dissection.    Right external carotid artery:  Unremarkable as visualized.  No  occlusion.    Right vertebral artery:  Unremarkable as visualized.  No occlusion or  significant stenosis.  No dissection.       Left common carotid artery:  Unremarkable as visualized.  No occlusion  or significant stenosis.  No dissection.    Left internal carotid artery:  See above.    Left external carotid artery:  Unremarkable as visualized.  No  occlusion.    Left vertebral artery:  Unremarkable as visualized.  No occlusion or  significant stenosis.  No dissection.    Other vasculature:  Atherosclerotic disease.      NECK:    Bones/joints:  Unremarkable as visualized.  No acute fracture.    Soft tissues:  Unremarkable as visualized.    Lung apices:  Clear.      CAROTID STENOSIS REFERENCE USING NASCET CRITERIA:    % ICA stenosis = (1 - narrowest ICA diameter/diameter of distal  cervical ICA) x 100.    Mild - <50% stenosis.    Moderate - 50-69% stenosis.    Severe - 70-94% stenosis.    Near occlusion - 95-99% stenosis.    Occluded - 100% stenosis.       Impression:        Mild bilateral ICA calcific stenosis.     This report was finalized on 7/22/2025 2:33 PM by Dr. Teo Araujo MD.             Results for orders placed during the hospital encounter of 07/20/25    Adult Transthoracic Echo Complete W/ Cont if Necessary Per Protocol (With Agitated Saline) 07/22/2025 10:42 AM    Interpretation Summary    Left ventricular systolic function is hyperdynamic (EF > 70%). Left  ventricular ejection fraction appears to be greater than 70%.    Left ventricular diastolic function is consistent with (grade I) impaired relaxation.    Saline test results are negative.    Estimated right ventricular systolic pressure from tricuspid regurgitation is markedly elevated (>55 mmHg).    Aortic root calcification.    CT Angiogram Head  Result Date: 7/22/2025    No acute findings in the arteries of the head/brain.  This report was finalized on 7/22/2025 2:34 PM by Dr. Teo Araujo MD.      CT Angiogram Carotids  Result Date: 7/22/2025    Mild bilateral ICA calcific stenosis.  This report was finalized on 7/22/2025 2:33 PM by Dr. Teo Araujo MD.      EEG  Result Date: 7/21/2025  Diffuse cerebral dysfunction of mild degree but nonspecific.  This is most commonly seen due to toxic/metabolic or hypoxemic cause No evidence for epilepsy is present This report is transcribed using the Dragon dictation system.      US Carotid Bilateral  Result Date: 7/21/2025    Mild multifocal bilateral ICA plaque.  This report was finalized on 7/21/2025 8:20 AM by Dr. Teo Araujo MD.      XR Hip With or Without Pelvis 2 - 3 View Left  Result Date: 7/20/2025    No acute osseous or articular abnormality in the LEFT hip.    This report was finalized on 7/20/2025 9:18 PM by Dr. Madhav Lang MD.      MRI Brain Without Contrast  Result Date: 7/20/2025   Multiple focal areas of restricted diffusion identified in the right parietal lobe and right frontal lobe most consistent with subacute infarcts possibly due to shower emboli. Mild to moderate generalized brain volume loss. Moderate chronic small vessel ischemic type changes in the white matter. No acute intracranial hemorrhage, mass, or hydrocephalus. No shift in midline structures.  This report was finalized on 7/20/2025 9:14 PM by Leodan Khan MD.      XR Femur 2 View Left  Addendum Date: 7/20/2025  ADDENDUM:  Please note corrections to the original report reflected in  the addended report below:  EXAM:   XR LEFT femur, 2 Views  EXAM DATE:   7/20/2025 9:46 AM  CLINICAL HISTORY:   Fall with injury  TECHNIQUE:   Frontal and lateral views of the LEFT femur.  COMPARISON:   No relevant prior studies available.  FINDINGS:   Bones/joints:  Unremarkable.  No acute fracture.  No dislocation.   Soft tissues:  Unremarkable.  IMPRESSION:       No acute fracture or dislocation.  This report was finalized on 7/20/2025 4:38 PM by Dr. Madhav Lang MD.      Result Date: 7/20/2025    No acute fracture or dislocation.  This report was finalized on 7/20/2025 10:24 AM by Dr. Madhav Lang MD.      XR Tibia Fibula 2 View Left  Result Date: 7/20/2025  1.  No displaced fracture or dislocation of the tibia or fibula identified. 2.  Fracture of the fifth metatarsal, incompletely imaged. 3.  Cast device obscures fine bone detail.  This report was finalized on 7/20/2025 10:25 AM by Dr. Madhav Lang MD.      CT Head Without Contrast  Result Date: 7/20/2025  1.  Focal low-density in the right parietal lobe, image #30, axial series, with loss of gray-white matter differentiation concerning for age-indeterminate infarct, but possibly acute/subacute. If indicated, further evaluation with MRI is recommended. 2.  No midline shift or mass effect.  No hydrocephalus. 3.  No evidence of intracranial hemorrhage. 4.  Moderate chronic small vessel ischemic disease.  This report was finalized on 7/20/2025 10:55 AM by Dr. Madhav Lang MD.      XR Tibia Fibula 2 View Right  Result Date: 7/20/2025    No acute fracture or dislocation.  This report was finalized on 7/20/2025 10:34 AM by Dr. Madhav Lang MD.      XR Forearm 2 View Left  Result Date: 7/20/2025    No acute findings in the left forearm.  This report was finalized on 7/20/2025 10:35 AM by Dr. Madhav Lang MD.      CT Cervical Spine Without Contrast  Result Date: 7/20/2025  1.  No acute fracture or traumatic malalignment identified. 2.  Degenerative changes  cervical spine as described.  This report was finalized on 7/20/2025 10:50 AM by Dr. Madhav Lang MD.      CT Pelvis Without Contrast  Result Date: 7/20/2025    No acute fracture or dislocation identified.  This report was finalized on 7/20/2025 10:51 AM by Dr. Madhav Lang MD.      CT Lumbar Spine Without Contrast  Result Date: 7/20/2025  1.  No acute fracture or traumatic malalignment identified. 2.  Degenerative changes lumbar spine as described.  This report was finalized on 7/20/2025 10:52 AM by Dr. Madhav Lang MD.         Lab Results   Component Value Date    HGBA1C 5.43 07/21/2025      Lab Results   Component Value Date    CHOL 131 07/21/2025    CHLPL 191 03/02/2014    TRIG 115 07/21/2025    HDL 32 (L) 07/21/2025    LDL 78 07/21/2025      Lab Results   Component Value Date    WBC 7.56 07/23/2025    HGB 13.7 07/23/2025    HCT 41.0 07/23/2025    MCV 90.5 07/23/2025     07/23/2025      Lab Results   Component Value Date    GLUCOSE 87 07/23/2025    BUN 15.4 07/23/2025    CREATININE 0.83 07/23/2025    EGFRIFNONA 36 (L) 10/14/2021    BCR 18.6 07/23/2025    K 3.7 07/23/2025    CO2 22.0 07/23/2025    CALCIUM 10.0 07/23/2025    ALBUMIN 3.5 07/21/2025    AST 18 07/21/2025    ALT 10 07/21/2025      Lab Results   Component Value Date    TSH 2.300 07/21/2025     Lab Results   Component Value Date    [id number] 826 07/21/2025     Lab Results   Component Value Date    FOLATE 7.85 07/21/2025             Assessment/Plan     Assessment/Plan:  79-year-old female with PMHx significant for advanced Alzheimer's with psychosis, prior CVA, hypertension, hyperlipidemia, recent admission for UTI, and frequent falls most recently a fall resulting in left leg fracture presented to the ED after a fall and complaints of right sided pain.  ED workup included a CT head which showed a area concerning for recent ischemia in right parietal lobe.  MRI in the ED showed multiple areas of restricted diffusion in right parietal and  right frontal lobe consistent with subacute infarcts.  Carotid ultrasound with mild plaque in the ICAs bilaterally.    #Acute ischemic stroke right parietal lobe, embolic stroke with undetermined source  #Advanced Alzheimer's  #Significant hearing loss      - CTA without LVO, left M2 stenosis, left PCA stenosis (moderate)  - Continue aspirin 81 mg daily, likely benefit from addition of Plavix when tolerating PO  - Atorvastatin 40 mg nightly  - Palliative care consulted and following  - TTE with EF greater than 70%, negative bubble study  - Holter at discharge    The case was discussed with Dr. Tong, and will discuss with primary team    Madhav Fairchild, MARYBETH  07/23/25  10:46 EDT

## 2025-07-24 ENCOUNTER — APPOINTMENT (OUTPATIENT)
Dept: RESPIRATORY THERAPY | Facility: HOSPITAL | Age: 80
DRG: 065 | End: 2025-07-24
Payer: MEDICARE

## 2025-07-24 VITALS
HEIGHT: 67 IN | WEIGHT: 160.8 LBS | DIASTOLIC BLOOD PRESSURE: 82 MMHG | OXYGEN SATURATION: 96 % | BODY MASS INDEX: 25.24 KG/M2 | SYSTOLIC BLOOD PRESSURE: 164 MMHG | HEART RATE: 65 BPM | TEMPERATURE: 98.8 F | RESPIRATION RATE: 16 BRPM

## 2025-07-24 LAB
ANION GAP SERPL CALCULATED.3IONS-SCNC: 10.7 MMOL/L (ref 5–15)
BASOPHILS # BLD AUTO: 0 10*3/MM3 (ref 0–0.2)
BASOPHILS NFR BLD AUTO: 0 % (ref 0–1.5)
BUN SERPL-MCNC: 21.4 MG/DL (ref 8–23)
BUN/CREAT SERPL: 25.2 (ref 7–25)
CALCIUM SPEC-SCNC: 9.6 MG/DL (ref 8.6–10.5)
CHLORIDE SERPL-SCNC: 105 MMOL/L (ref 98–107)
CO2 SERPL-SCNC: 22.3 MMOL/L (ref 22–29)
CREAT SERPL-MCNC: 0.85 MG/DL (ref 0.57–1)
DEPRECATED RDW RBC AUTO: 41.8 FL (ref 37–54)
EGFRCR SERPLBLD CKD-EPI 2021: 68.9 ML/MIN/1.73
EOSINOPHIL # BLD AUTO: 0.66 10*3/MM3 (ref 0–0.4)
EOSINOPHIL NFR BLD AUTO: 9.1 % (ref 0.3–6.2)
ERYTHROCYTE [DISTWIDTH] IN BLOOD BY AUTOMATED COUNT: 12.7 % (ref 12.3–15.4)
GLUCOSE SERPL-MCNC: 85 MG/DL (ref 65–99)
HCT VFR BLD AUTO: 38.9 % (ref 34–46.6)
HGB BLD-MCNC: 13.1 G/DL (ref 12–15.9)
IMM GRANULOCYTES # BLD AUTO: 0.03 10*3/MM3 (ref 0–0.05)
IMM GRANULOCYTES NFR BLD AUTO: 0.4 % (ref 0–0.5)
LYMPHOCYTES # BLD AUTO: 2.33 10*3/MM3 (ref 0.7–3.1)
LYMPHOCYTES NFR BLD AUTO: 32 % (ref 19.6–45.3)
MCH RBC QN AUTO: 30.4 PG (ref 26.6–33)
MCHC RBC AUTO-ENTMCNC: 33.7 G/DL (ref 31.5–35.7)
MCV RBC AUTO: 90.3 FL (ref 79–97)
MONOCYTES # BLD AUTO: 0.55 10*3/MM3 (ref 0.1–0.9)
MONOCYTES NFR BLD AUTO: 7.6 % (ref 5–12)
NEUTROPHILS NFR BLD AUTO: 3.71 10*3/MM3 (ref 1.7–7)
NEUTROPHILS NFR BLD AUTO: 50.9 % (ref 42.7–76)
NRBC BLD AUTO-RTO: 0 /100 WBC (ref 0–0.2)
PLATELET # BLD AUTO: 213 10*3/MM3 (ref 140–450)
PMV BLD AUTO: 10.3 FL (ref 6–12)
POTASSIUM SERPL-SCNC: 3.5 MMOL/L (ref 3.5–5.2)
RBC # BLD AUTO: 4.31 10*6/MM3 (ref 3.77–5.28)
SODIUM SERPL-SCNC: 138 MMOL/L (ref 136–145)
WBC NRBC COR # BLD AUTO: 7.28 10*3/MM3 (ref 3.4–10.8)

## 2025-07-24 PROCEDURE — 93246 EXT ECG>7D<15D RECORDING: CPT

## 2025-07-24 PROCEDURE — 85025 COMPLETE CBC W/AUTO DIFF WBC: CPT | Performed by: STUDENT IN AN ORGANIZED HEALTH CARE EDUCATION/TRAINING PROGRAM

## 2025-07-24 PROCEDURE — 99232 SBSQ HOSP IP/OBS MODERATE 35: CPT | Performed by: NURSE PRACTITIONER

## 2025-07-24 PROCEDURE — 92610 EVALUATE SWALLOWING FUNCTION: CPT

## 2025-07-24 PROCEDURE — 80048 BASIC METABOLIC PNL TOTAL CA: CPT | Performed by: STUDENT IN AN ORGANIZED HEALTH CARE EDUCATION/TRAINING PROGRAM

## 2025-07-24 PROCEDURE — 99239 HOSP IP/OBS DSCHRG MGMT >30: CPT | Performed by: STUDENT IN AN ORGANIZED HEALTH CARE EDUCATION/TRAINING PROGRAM

## 2025-07-24 RX ORDER — ATORVASTATIN CALCIUM 40 MG/1
40 TABLET, FILM COATED ORAL NIGHTLY
Qty: 30 TABLET | Refills: 0 | Status: SHIPPED | OUTPATIENT
Start: 2025-07-24 | End: 2025-08-23

## 2025-07-24 RX ORDER — CLOPIDOGREL BISULFATE 75 MG/1
75 TABLET ORAL DAILY
Qty: 20 TABLET | Refills: 0 | Status: SHIPPED | OUTPATIENT
Start: 2025-07-25 | End: 2025-08-14

## 2025-07-24 RX ORDER — CLOPIDOGREL BISULFATE 75 MG/1
75 TABLET ORAL DAILY
Qty: 29 TABLET | Refills: 0 | Status: SHIPPED | OUTPATIENT
Start: 2025-07-25 | End: 2025-07-24

## 2025-07-24 RX ADMIN — FERROUS SULFATE TAB 325 MG (65 MG ELEMENTAL FE) 325 MG: 325 (65 FE) TAB at 10:27

## 2025-07-24 RX ADMIN — LIDOCAINE 1 PATCH: 4 PATCH TOPICAL at 12:38

## 2025-07-24 RX ADMIN — DIVALPROEX SODIUM 125 MG: 125 CAPSULE, COATED PELLETS ORAL at 10:27

## 2025-07-24 RX ADMIN — POLYETHYLENE GLYCOL 3350 17 G: 17 POWDER, FOR SOLUTION ORAL at 10:28

## 2025-07-24 RX ADMIN — MEMANTINE HYDROCHLORIDE 5 MG: 5 TABLET, FILM COATED ORAL at 10:27

## 2025-07-24 RX ADMIN — CLOPIDOGREL BISULFATE 75 MG: 75 TABLET, FILM COATED ORAL at 10:27

## 2025-07-24 RX ADMIN — ASPIRIN 81 MG CHEWABLE TABLET 81 MG: 81 TABLET CHEWABLE at 10:27

## 2025-07-24 RX ADMIN — DOCUSATE SODIUM 100 MG: 100 CAPSULE, LIQUID FILLED ORAL at 10:27

## 2025-07-24 RX ADMIN — Medication 10 ML: at 10:55

## 2025-07-24 RX ADMIN — SENNOSIDES 2 TABLET: 8.6 TABLET, FILM COATED ORAL at 10:28

## 2025-07-24 NOTE — DISCHARGE INSTR - APPOINTMENTS
Pt  has  an  apointment  with  patricia delacruz  at  [facility]   for  oct  24  at  2 :30  pt  discharged  back  to  [facility]  and  rehab

## 2025-07-24 NOTE — PROGRESS NOTES
Stroke Progress Note       Chief Complaint: Encephalopathy    Subjective    Subjective     Subjective:  No acute events reported overnight.  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  On exam patient was awake, but nonverbal, she attempts to speak but just mumbles.  She tracks with her eyes, follows simple commands.  More alert today.  Has been started on diet per SLP.  Plan to discharge back to nursing home today.    Review of Systems   Unable to perform ROS: Dementia          Objective    Objective      Temp:  [98.4 °F (36.9 °C)-98.8 °F (37.1 °C)] 98.8 °F (37.1 °C)  Heart Rate:  [65-83] 65  Resp:  [16-20] 16  BP: (140-167)/(60-98) 164/82    Neurological Exam  Mental Status  Awake. Patient is nonverbal. Language: Intermittently follows simple commands, squeezes my fingers.  Patient is deaf, but reportedly able to read lips.  She would not answer any of my orientation questions.    Cranial Nerves  CN III, IV, VI: Pupils equal round and reactive to light bilaterally.  CN VII: Full and symmetric facial movement.    Motor  Decreased muscle bulk throughout. Normal muscle tone. No abnormal involuntary movements.  Difficult to measure strength but she was moving all extremities purposefully.    Coordination    No obvious dysmetria.    Gait    Not assessed.      Physical Exam  Vitals and nursing note reviewed.   Constitutional:       General: She is awake. She is not in acute distress.     Appearance: She is ill-appearing.   HENT:      Head: Normocephalic.      Mouth/Throat:      Pharynx: Oropharynx is clear.   Eyes:      Pupils: Pupils are equal, round, and reactive to light.   Cardiovascular:      Rate and Rhythm: Normal rate.   Pulmonary:      Effort: Pulmonary effort is normal. No respiratory distress.   Musculoskeletal:      Comments: Cast to LLE   Skin:     General: Skin is warm and dry.   Psychiatric:         Mood and Affect: Mood normal.         Behavior: Behavior normal.         Hospital  Meds:  Scheduled- aspirin, 81 mg, Oral, Daily   Or  aspirin, 300 mg, Rectal, Daily  atorvastatin, 40 mg, Oral, Nightly  [START ON 7/27/2025] cholecalciferol, 50,000 Units, Oral, Weekly  clopidogrel, 75 mg, Oral, Daily  Divalproex Sodium, 125 mg, Oral, BID  docusate sodium, 100 mg, Oral, Daily  ferrous sulfate, 325 mg, Oral, Daily  gabapentin, 100 mg, Oral, Nightly  Lidocaine, 1 patch, Transdermal, Q24H  memantine, 5 mg, Oral, Q12H  OLANZapine, 2.5 mg, Oral, Nightly  polyethylene glycol, 17 g, Oral, Daily  senna, 2 tablet, Oral, BID  sodium chloride, 10 mL, Intravenous, Q12H  sodium chloride, 10 mL, Intravenous, Q12H      Infusions-     PRNs-   acetaminophen **OR** acetaminophen    aluminum-magnesium hydroxide-simethicone    senna-docusate sodium **AND** polyethylene glycol **AND** bisacodyl **AND** bisacodyl    hydrALAZINE    HYDROcodone-acetaminophen    magnesium hydroxide    nitroglycerin    ondansetron    [COMPLETED] Insert Peripheral IV **AND** sodium chloride    sodium chloride    sodium chloride    sodium chloride    sodium chloride    Results Review:    I reviewed the patient's new clinical results.    Imaging Results (Last 24 Hours)       ** No results found for the last 24 hours. **          Results for orders placed during the hospital encounter of 07/20/25    Adult Transthoracic Echo Complete W/ Cont if Necessary Per Protocol (With Agitated Saline) 07/22/2025 10:42 AM    Interpretation Summary    Left ventricular systolic function is hyperdynamic (EF > 70%). Left ventricular ejection fraction appears to be greater than 70%.    Left ventricular diastolic function is consistent with (grade I) impaired relaxation.    Saline test results are negative.    Estimated right ventricular systolic pressure from tricuspid regurgitation is markedly elevated (>55 mmHg).    Aortic root calcification.    FL Video Swallow Single Contrast  Result Date: 7/23/2025      Please see the speech pathologist's report for  additional information.  This report was finalized on 7/23/2025 10:53 AM by Dr. Teo Araujo MD.      CT Angiogram Head  Result Date: 7/22/2025    No acute findings in the arteries of the head/brain.  This report was finalized on 7/22/2025 2:34 PM by Dr. Teo Araujo MD.      CT Angiogram Carotids  Result Date: 7/22/2025    Mild bilateral ICA calcific stenosis.  This report was finalized on 7/22/2025 2:33 PM by Dr. Teo Araujo MD.      EEG  Result Date: 7/21/2025  Diffuse cerebral dysfunction of mild degree but nonspecific.  This is most commonly seen due to toxic/metabolic or hypoxemic cause No evidence for epilepsy is present This report is transcribed using the Dragon dictation system.      US Carotid Bilateral  Result Date: 7/21/2025    Mild multifocal bilateral ICA plaque.  This report was finalized on 7/21/2025 8:20 AM by Dr. Teo Araujo MD.      XR Hip With or Without Pelvis 2 - 3 View Left  Result Date: 7/20/2025    No acute osseous or articular abnormality in the LEFT hip.    This report was finalized on 7/20/2025 9:18 PM by Dr. Madhav Lang MD.      MRI Brain Without Contrast  Result Date: 7/20/2025   Multiple focal areas of restricted diffusion identified in the right parietal lobe and right frontal lobe most consistent with subacute infarcts possibly due to shower emboli. Mild to moderate generalized brain volume loss. Moderate chronic small vessel ischemic type changes in the white matter. No acute intracranial hemorrhage, mass, or hydrocephalus. No shift in midline structures.  This report was finalized on 7/20/2025 9:14 PM by Leodan Khan MD.      XR Femur 2 View Left  Addendum Date: 7/20/2025  ADDENDUM:  Please note corrections to the original report reflected in the addended report below:  EXAM:   XR LEFT femur, 2 Views  EXAM DATE:   7/20/2025 9:46 AM  CLINICAL HISTORY:   Fall with injury  TECHNIQUE:   Frontal and lateral views of the LEFT femur.  COMPARISON:   No relevant prior studies  available.  FINDINGS:   Bones/joints:  Unremarkable.  No acute fracture.  No dislocation.   Soft tissues:  Unremarkable.  IMPRESSION:       No acute fracture or dislocation.  This report was finalized on 7/20/2025 4:38 PM by Dr. Madhav Lang MD.      Result Date: 7/20/2025    No acute fracture or dislocation.  This report was finalized on 7/20/2025 10:24 AM by Dr. Madhav Lang MD.      XR Tibia Fibula 2 View Left  Result Date: 7/20/2025  1.  No displaced fracture or dislocation of the tibia or fibula identified. 2.  Fracture of the fifth metatarsal, incompletely imaged. 3.  Cast device obscures fine bone detail.  This report was finalized on 7/20/2025 10:25 AM by Dr. Madhav Lang MD.      CT Head Without Contrast  Result Date: 7/20/2025  1.  Focal low-density in the right parietal lobe, image #30, axial series, with loss of gray-white matter differentiation concerning for age-indeterminate infarct, but possibly acute/subacute. If indicated, further evaluation with MRI is recommended. 2.  No midline shift or mass effect.  No hydrocephalus. 3.  No evidence of intracranial hemorrhage. 4.  Moderate chronic small vessel ischemic disease.  This report was finalized on 7/20/2025 10:55 AM by Dr. Madhav Lang MD.      XR Tibia Fibula 2 View Right  Result Date: 7/20/2025    No acute fracture or dislocation.  This report was finalized on 7/20/2025 10:34 AM by Dr. Madhav Lang MD.      XR Forearm 2 View Left  Result Date: 7/20/2025    No acute findings in the left forearm.  This report was finalized on 7/20/2025 10:35 AM by Dr. Madhav Lang MD.      CT Cervical Spine Without Contrast  Result Date: 7/20/2025  1.  No acute fracture or traumatic malalignment identified. 2.  Degenerative changes cervical spine as described.  This report was finalized on 7/20/2025 10:50 AM by Dr. Madhav Lang MD.      CT Pelvis Without Contrast  Result Date: 7/20/2025    No acute fracture or dislocation identified.  This report was  finalized on 7/20/2025 10:51 AM by Dr. Madhav Lang MD.      CT Lumbar Spine Without Contrast  Result Date: 7/20/2025  1.  No acute fracture or traumatic malalignment identified. 2.  Degenerative changes lumbar spine as described.  This report was finalized on 7/20/2025 10:52 AM by Dr. Madhav Lang MD.         Lab Results   Component Value Date    HGBA1C 5.43 07/21/2025      Lab Results   Component Value Date    CHOL 131 07/21/2025    CHLPL 191 03/02/2014    TRIG 115 07/21/2025    HDL 32 (L) 07/21/2025    LDL 78 07/21/2025      Lab Results   Component Value Date    WBC 7.28 07/24/2025    HGB 13.1 07/24/2025    HCT 38.9 07/24/2025    MCV 90.3 07/24/2025     07/24/2025      Lab Results   Component Value Date    GLUCOSE 85 07/24/2025    BUN 21.4 07/24/2025    CREATININE 0.85 07/24/2025    EGFRIFNONA 36 (L) 10/14/2021    BCR 25.2 (H) 07/24/2025    K 3.5 07/24/2025    CO2 22.3 07/24/2025    CALCIUM 9.6 07/24/2025    ALBUMIN 3.5 07/21/2025    AST 18 07/21/2025    ALT 10 07/21/2025      Lab Results   Component Value Date    TSH 2.300 07/21/2025     Lab Results   Component Value Date    [id number] 826 07/21/2025     Lab Results   Component Value Date    FOLATE 7.85 07/21/2025             Assessment/Plan     Assessment/Plan:  79-year-old female with PMHx significant for advanced Alzheimer's with psychosis, prior CVA, hypertension, hyperlipidemia, recent admission for UTI, and frequent falls most recently a fall resulting in left leg fracture presented to the ED after a fall and complaints of right sided pain.  ED workup included a CT head which showed a area concerning for recent ischemia in right parietal lobe.  MRI in the ED showed multiple areas of restricted diffusion in right parietal and right frontal lobe consistent with subacute infarcts.  Carotid ultrasound with mild plaque in the ICAs bilaterally.    #Acute ischemic stroke right parietal lobe, embolic stroke with undetermined source  #Advanced  Alzheimer's  #Significant hearing loss      - CTA without LVO, left M2 stenosis, left PCA stenosis (moderate)  - Continue aspirin 81 mg daily in addition to Plavix 75 mg daily x 3 weeks, to be followed by aspirin monotherapy  - Atorvastatin 40 mg nightly  - Palliative care consulted and following  - TTE with EF greater than 70%, negative bubble study  - Holter at discharge    The case was discussed with the patient and Dr. Riddle.  Tele-Neurology will sign off for now, please feel free to call with any questions or concerns.  Thank you again for the consult.    MARYBETH Thomas  07/24/25  12:18 EDT    This was an audio and video enabled telemedicine encounter.  Dr. Hernandez present for encounter via telemedicine.  Verbal consent taken.

## 2025-07-24 NOTE — DISCHARGE PLACEMENT REQUEST
"Shonda Snyder (79 y.o. Female)       Date of Birth   1945    Social Security Number       Address   [address] [facility] [address]    Home Phone   [phone]    MRN   [id number]       Oriental orthodox   None    Marital Status                               Admission Date   7/20/2025    Admission Type   Emergency    Admitting Provider   Christofer Harper MD    Attending Provider   Master Riddle DO    Department, Room/Bed   [address], 3307/1S       Discharge Date       Discharge Disposition       Discharge Destination                                 Attending Provider: Master Riddle DO    Allergies: No Known Allergies    Isolation: Contact   Infection: ESBL E coli (01/27/22)   Code Status: No CPR    Ht: 170.2 cm (67\")   Wt: 72.9 kg (160 lb 12.8 oz)    Admission Cmt: None   Principal Problem: CVA (cerebral vascular accident) [I63.9]                   Active Insurance as of 7/20/2025       Primary Coverage       Payor Plan Insurance Group Employer/Plan Group    MEDICARE MEDICARE B ONLY        Payor Plan Address Payor Plan Phone Number Payor Plan Fax Number Effective Dates    [address] [address] [phone]  7/1/2008 - None Entered    [facility] [id number]         Subscriber Name Subscriber Birth Date Member ID       SHONDA SNYDER 1945 [id number]               Secondary Coverage       Payor Plan Insurance Group Employer/Plan Group    [place]        Payor Plan Address Payor Plan Phone Number Payor Plan Fax Number Effective Dates    [address] [phone]  12/15/2016 - None Entered    [facility] [id number]         Subscriber Name Subscriber Birth Date Member ID       SHONDA SNYDER 1945 [id number]                     Emergency Contacts        (Rel.) Home Phone Work Phone Mobile Phone    Gita Snyder (Grandchild) [phone] -- [phone]              Emergency Contact Information       Name Relation Home Work Mobile    " Gita Snyder [phone]          Other Contacts    None on File       Insurance Information                  MEDICARE/MEDICARE B ONLY Phone: [phone]    Subscriber: Catherine Snyder Subscriber#: [id number]    Group#: -- Precert#: --    Authorization#: -- Effective Date: --        [place] Phone: [phone]    Subscriber: Catherine Snyder Subscriber#: [id number]    Group#: -- Precert#: [id number]    Authorization#: -- Effective Date: --             History & Physical        Ad Hernandez PA-C at 25 0039       Attestation signed by Christofer Harper MD at 25 0310      I have discussed this patient with Ad Hernandez PA-C, and have reviewed this documentation and agree. Would also add that patient has mild acute renal insufficiency with creatinine of 1.27 (up from baseline around 0.9) so will hydrate with IV fluids and monitor renal response.                           [facility]  History & Physical    Patient Identification:  Name:  Catherine Snyder  Age:  79 y.o.  Sex:  female  :  1945  MRN:  [id number]   Visit Number:  [id number]  Admit Date: 2025   Primary Care Physician:  Ricky Alamo MD    Subjective       Chief Complaint   Patient presents with    Fall       History of presenting illness:    Catherine Snyder is a 79 y.o. female who presented for further evaluation of fall at SNF, right sided pain    Past medical history is significant for advanced Alzheimer's dementia with psychosis, prior history of CVA, HTN, HLD, GERD, recent admission for UTI and Hx of ESBL UTI, recent frequent falls resulting in left lower extremity fracture-currently casted.    Patient was seen at bedside with nurse Meggan Denise RN present.  Patient was awake, alert, but not oriented.  Patient is deaf and does not communicate or follow instructions.  Nursing staff is attempting to place a second IV while  "the patient was being combative and spitting at them.  Patient was sent to our hospital ER secondary to a fall at the nursing home where she has been a full-time resident for a significant period of time.  They had concern for the patient's showing signs of right hip/leg pain.  Patient does not have any family members or healthcare surrogates present at her hospitalization today and is unable to provide her own history due to Alzheimer's dementia.  ER workup revealed a head CT suggesting a recent stroke.  Neurology was consulted while the patient was in the ER and it was determined that the patient should be admitted for further workup.    ED, vital signs were /79   Pulse 68   Temp 98.3 °F (36.8 °C) (Oral)   Resp 17   Ht 172.7 cm (68\")   Wt 79.4 kg (175 lb 0.7 oz)   SpO2 93%   BMI 26.62 kg/m²   ED workup significant for:   CT head: Focal low-density in the right parietal lobe, with loss of gray-white matter differentiation concerning for age-indeterminate infarct, but possible acute/subacute.  If indicated further evaluation with MRI is recommended.  No midline shift or mass effect.  No hydrocephalus.  No evidence of intracranial hemorrhage.  Moderate chronic small vessel ischemic disease.  MRI brain: Brain: Multiple focal areas of restricted diffusion identified in the right parietal lobe and right frontal lobe most consistent with subacute infarct possibly due to shower emboli.  Mild to moderate degenerative brain volume loss.  Moderate chronic small vessel ischemic type changes in the white matter.  No acute intracranial hemorrhage, mass, hydrocephalus.  No shift in the midline structure.  Pan scan of the spine revealed no acute fractures or dislocations  X-rays of left and right tib-fib, right femur, left forearm, bilateral hips and pelvis revealed no new acute fractures.  Glucose 102, creatinine 1.27, BUN 21.4, BUN/creatinine ratio 16.9, WBC 9.51, valproic acid 21.3,    Known Emergency Department " medications received prior to my evaluation included:  Medications   atorvastatin (LIPITOR) tablet 80 mg (80 mg Oral Given 7/20/25 2244)   sodium chloride 0.9 % infusion (75 mL/hr Intravenous New Bag 7/21/25 0026)   hydrALAZINE (APRESOLINE) tablet 10 mg (has no administration in time range)   LORazepam (ATIVAN) injection 0.5 mg (0.5 mg Intravenous Given 7/20/25 1434)   LORazepam (ATIVAN) injection 0.5 mg (0.5 mg Intravenous Given 7/20/25 1717)   sodium chloride 0.9 % bolus 500 mL (0 mL Intravenous Stopped 7/20/25 2059)   cloNIDine (CATAPRES) tablet 0.1 mg (0.1 mg Oral Given 7/20/25 2352)       Room location at the time of my evaluation was 311-2.     ---------------------------------------------------------------------------------------------------------------------   Review of Systems   Unable to perform ROS: Dementia      ---------------------------------------------------------------------------------------------------------------------   Past Medical History:   Diagnosis Date    Alzheimer disease     Alzheimer's disease     Anxiety     Anxiety disorder, unspecified     Cerebrovascular accident (CVA)     Constipation     Deafness     Delirium due to known physiological condition     Depression     Difficulty in walking, not elsewhere classified     Gastro-esophageal reflux disease with esophagitis     GERD (gastroesophageal reflux disease)     Hyperlipidemia     Hyperlipidemia     Hypertension     Major depressive disorder, single episode     Muscle weakness (generalized)     Other lack of coordination     Paranoid personality (disorder)     Shared psychotic disorder     Unspecified dementia without behavioral disturbance     Unspecified hearing loss, unspecified ear     Unspecified lack of coordination     Unspecified psychosis not due to a substance or known physiological condition      No past surgical history on file.  Family History   Family history unknown: Yes     Social History     Socioeconomic History     Marital status:    Tobacco Use    Smoking status: Never    Smokeless tobacco: Never   Vaping Use    Vaping status: Never Used   Substance and Sexual Activity    Alcohol use: No    Drug use: No    Sexual activity: Not Currently     Partners: Male     ---------------------------------------------------------------------------------------------------------------------   Allergies:  Patient has no known allergies.  ---------------------------------------------------------------------------------------------------------------------   Home medications:  Medications below are reported home medications pulling from within the system; at this time, these medications have not been reconciled unless otherwise specified and are in the verification process for further verifcation as current home medications.  (Not in a hospital admission)    Hospital Scheduled Meds:  aspirin, 81 mg, Oral, Daily   Or  aspirin, 300 mg, Rectal, Daily  atorvastatin, 80 mg, Oral, Nightly  sodium chloride, 10 mL, Intravenous, Q12H    sodium chloride, 75 mL/hr, Last Rate: 75 mL/hr (07/21/25 0026)    Current listed hospital scheduled medications may not yet reflect those currently placed in orders that are signed and held awaiting patient's arrival to floor.   ---------------------------------------------------------------------------------------------------------------------     Objective     Vital Signs:  Temp:  [98.3 °F (36.8 °C)] 98.3 °F (36.8 °C)  Heart Rate:  [] 68  Resp:  [17] 17  BP: (132-184)/(65-98) 161/79      07/20/25  0923   Weight: 79.4 kg (175 lb 0.7 oz)     Body mass index is 26.62 kg/m².  ---------------------------------------------------------------------------------------------------------------------     Physical Exam  Vitals reviewed.   Constitutional:       General: She is not in acute distress.     Appearance: Normal appearance.   HENT:      Head: Normocephalic and atraumatic.      Right Ear: Decreased hearing  noted.      Left Ear: Decreased hearing noted.      Nose: Nose normal.      Mouth/Throat:      Mouth: Mucous membranes are moist.   Eyes:      Conjunctiva/sclera: Conjunctivae normal.      Pupils: Pupils are equal, round, and reactive to light.   Cardiovascular:      Rate and Rhythm: Normal rate and regular rhythm.      Pulses: Normal pulses.      Heart sounds: Normal heart sounds. No murmur heard.  Pulmonary:      Effort: Pulmonary effort is normal. No respiratory distress.      Breath sounds: Normal breath sounds. No wheezing or rales.   Abdominal:      General: There is no distension.      Palpations: Abdomen is soft.      Tenderness: There is no abdominal tenderness.   Musculoskeletal:         General: No deformity.      Right lower leg: No edema.      Left lower leg: No edema.      Comments: Left lower extremity in a cast with good capillary refill.   Skin:     General: Skin is warm and dry.      Findings: No erythema, lesion or rash.   Neurological:      Mental Status: She is alert. Mental status is at baseline. She is confused.   Psychiatric:         Mood and Affect: Affect is labile.         Speech: She is noncommunicative.         Behavior: Behavior is uncooperative, agitated and combative.         Cognition and Memory: Cognition is impaired.         ---------------------------------------------------------------------------------------------------------------------   Results from last 7 days   Lab Units 07/20/25  1035 07/15/25  0158   WBC 10*3/mm3 9.51 8.18   HEMOGLOBIN g/dL 14.1 14.1   HEMATOCRIT % 43.0 42.3   MCV fL 92.3 91.4   MCHC g/dL 32.8 33.3   PLATELETS 10*3/mm3 187 208         Results from last 7 days   Lab Units 07/20/25  1035 07/15/25  0158   SODIUM mmol/L 142 140   POTASSIUM mmol/L 4.7 3.5   CHLORIDE mmol/L 105 103   CO2 mmol/L 27.0 23.2   BUN mg/dL 21.4 26.1*   CREATININE mg/dL 1.27* 0.99   CALCIUM mg/dL 10.5 9.6   GLUCOSE mg/dL 102* 91   ALBUMIN g/dL 3.9  --    BILIRUBIN mg/dL 0.5  --    ALK  "PHOS U/L 102  --    AST (SGOT) U/L 20  --    ALT (SGPT) U/L 11  --    Estimated Creatinine Clearance: 38.4 mL/min (A) (by C-G formula based on SCr of 1.27 mg/dL (H)).  No results found for: \"AMMONIA\"          Lab Results   Component Value Date    HGBA1C 6.20 (H) 10/16/2021     Lab Results   Component Value Date    TSH 0.934 07/11/2025    FREET4 1.07 11/10/2022     No results found for: \"PREGTESTUR\", \"PREGSERUM\", \"HCG\", \"HCGQUANT\"  Pain Management Panel  More data exists         Latest Ref Rng & Units 7/11/2025 11/10/2022   Pain Management Panel   Amphetamine, Urine Qual Negative Negative  Negative    Barbiturates Screen, Urine Negative Negative  Negative    Benzodiazepine Screen, Urine Negative Negative  Negative    Buprenorphine, Screen, Urine Negative Negative  Negative    Cocaine Screen, Urine Negative Negative  Negative    Fentanyl, Urine Negative Negative  -   Methadone Screen , Urine Negative Negative  Negative    Methamphetamine, Ur Negative Negative  Negative      No results found for: \"BLOODCX\"  No results found for: \"URINECX\"  No results found for: \"WOUNDCX\"  No results found for: \"STOOLCX\"      ---------------------------------------------------------------------------------------------------------------------  Imaging Results (Last 7 Days)       Procedure Component Value Units Date/Time    XR Hip With or Without Pelvis 2 - 3 View Left [[id number]] Collected: 07/20/25 1027     Updated: 07/20/25 2120    Narrative:      EXAM:    XR LEFT hip With Pelvis When Performed, 2 or 3 Views     EXAM DATE:    7/20/2025 9:46 AM     CLINICAL HISTORY:    Fall with injury     TECHNIQUE:    Two or three views of the LEFT hip with pelvis when performed.     COMPARISON:    No relevant prior studies available.     FINDINGS:    Bones/joints:  Unremarkable.  No acute fracture.  No dislocation.    Soft tissues:  Unremarkable.       Impression:        No acute osseous or articular abnormality in the LEFT hip.           This report " was finalized on 7/20/2025 9:18 PM by Dr. Madhav Lang MD.       MRI Brain Without Contrast [[id number]] Collected: 07/20/25 2111     Updated: 07/20/25 2116    Narrative:         Procedure: MRI examination of the brain performed without IV contrast on  July 20, 2025.  Examination performed utilizing T1, T2, FLAIR, and  diffusion sequences with ADC mapping with imaging performed in the  axial, sagittal, and coronal planes.     HISTORY: Evaluate for stroke.     COMPARISON: None.     FINDINGS:     Mild to moderate generalized brain volume loss.  Moderate chronic small vessel ischemic type changes in the white matter.  No acute hemorrhage.  Normal-sized the sella.  No tonsillar ectopia.  No hydrocephalus.  Mild mucosal thickening in the paranasal sinuses.  Clear mastoid air cells.  Multiple focal areas of restricted diffusion identified in the right  parietal lobe including the cortex and subcortical white matter and deep  white matter.  Additional focus of restricted diffusion identified at the right frontal  lobe seen best on image 12, series 6.  These areas show corresponding low signal on the ADC map consistent with  subacute infarcts.  Infarcts could be of embolic source.  Additional  cortical focus of restricted diffusion noted in the lateral aspect of  the upper right frontal lobe as seen on image 17, series 6       Impression:         Multiple focal areas of restricted diffusion identified in the right  parietal lobe and right frontal lobe most consistent with subacute  infarcts possibly due to shower emboli.  Mild to moderate generalized brain volume loss.  Moderate chronic small vessel ischemic type changes in the white matter.  No acute intracranial hemorrhage, mass, or hydrocephalus.  No shift in midline structures.     This report was finalized on 7/20/2025 9:14 PM by Leodan Khan MD.       XR Femur 2 View Right [[id number]] Collected: 07/20/25 1024     Updated: 07/20/25 1640    Addenda:        ADDENDUM:      Please note corrections to the original report reflected in the addended  report below:     EXAM:    XR LEFT femur, 2 Views     EXAM DATE:    7/20/2025 9:46 AM     CLINICAL HISTORY:    Fall with injury     TECHNIQUE:    Frontal and lateral views of the LEFT femur.     COMPARISON:    No relevant prior studies available.     FINDINGS:    Bones/joints:  Unremarkable.  No acute fracture.  No dislocation.    Soft tissues:  Unremarkable.     IMPRESSION:         No acute fracture or dislocation.     This report was finalized on 7/20/2025 4:38 PM by Dr. Madhav Lang MD.     Signed: 07/20/25 1638 by Madhav Lang MD    Narrative:      EXAM:    XR Right Femur, 2 Views     EXAM DATE:    7/20/2025 9:46 AM     CLINICAL HISTORY:    Fall with injury     TECHNIQUE:    Frontal and lateral views of the right femur.     COMPARISON:    No relevant prior studies available.     FINDINGS:    Bones/joints:  Unremarkable.  No acute fracture.  No dislocation.    Soft tissues:  Unremarkable.       Impression:        No acute fracture or dislocation.     This report was finalized on 7/20/2025 10:24 AM by Dr. Madhav Lang MD.       XR Tibia Fibula 2 View Left [[id number]] Collected: 07/20/25 1025     Updated: 07/20/25 1316    Narrative:      EXAM:    XR Left Tibia and Fibula, 2 Views     EXAM DATE:    7/20/2025 9:46 AM     CLINICAL HISTORY:    Recent fracture with fall     TECHNIQUE:    Frontal and lateral views of the left tibia and fibula.     COMPARISON:    No relevant prior studies available.     FINDINGS:    Bones/joints:  Fracture of the fifth metatarsal, incompletely imaged.   No displaced fracture or dislocation of the tibia or fibula identified.    Soft tissues:  Unremarkable.  No radiopaque foreign body.    Tubes, lines and devices:  Cast device obscures fine bone detail.       Impression:      1.  No displaced fracture or dislocation of the tibia or fibula  identified.  2.  Fracture of the fifth metatarsal, incompletely  imaged.  3.  Cast device obscures fine bone detail.     This report was finalized on 7/20/2025 10:25 AM by Dr. Madhav Lang MD.       CT Head Without Contrast [[id number]] Collected: 07/20/25 1052     Updated: 07/20/25 1106    Narrative:      EXAM:    CT Head Without Intravenous Contrast     EXAM DATE:    7/20/2025 9:53 AM     CLINICAL HISTORY:    trauma     TECHNIQUE:    Axial computed tomography images of the head/brain without intravenous  contrast.  Sagittal and coronal reformatted images were created and  reviewed.  This CT exam was performed using one or more of the following  dose reduction techniques:  automated exposure control, adjustment of  the mA and/or kV according to patient size, and/or use of iterative  reconstruction technique.     COMPARISON:    7/11/2025     FINDINGS:    Brain and extra-axial spaces:  Focal low-density in the right parietal  lobe, image #30, axial series, with loss of gray-white matter  differentiation may represent age-indeterminate infarct. If indicated,  further evaluation with MRI is recommended.  Moderate chronic small  vessel ischemic disease.  No midline shift or mass effect.  No evidence  of intracranial hemorrhage.    Bones/joints:  Unremarkable.  No acute fracture.    Soft tissues:  Unremarkable.    Sinuses:  Unremarkable as visualized.  No acute sinusitis.    Mastoid air cells:  Unremarkable as visualized.  No mastoid effusion.       Impression:      1.  Focal low-density in the right parietal lobe, image #30, axial  series, with loss of gray-white matter differentiation concerning for  age-indeterminate infarct, but possibly acute/subacute. If indicated,  further evaluation with MRI is recommended.  2.  No midline shift or mass effect.  No hydrocephalus.  3.  No evidence of intracranial hemorrhage.  4.  Moderate chronic small vessel ischemic disease.     This report was finalized on 7/20/2025 10:55 AM by Dr. Madhav Lang MD.       XR Tibia Fibula 2 View Right  [[id number]] Collected: 07/20/25 1034     Updated: 07/20/25 1106    Narrative:      EXAM:    XR Right Tibia and Fibula, 2 Views     EXAM DATE:    7/20/2025 9:46 AM     CLINICAL HISTORY:    Fall with injury     TECHNIQUE:    Frontal and lateral views of the right tibia and fibula.     COMPARISON:    No relevant prior studies available.     FINDINGS:    Bones/joints:  Unremarkable.  No acute fracture or dislocation.    Soft tissues:  Unremarkable.  No radiopaque foreign body.       Impression:        No acute fracture or dislocation.     This report was finalized on 7/20/2025 10:34 AM by Dr. Madhav Lang MD.       XR Forearm 2 View Left [[id number]] Collected: 07/20/25 1035     Updated: 07/20/25 1106    Narrative:      EXAM:    XR Left Forearm, 2 Views     EXAM DATE:    7/20/2025 10:23 AM     CLINICAL HISTORY:    injury with pain     TECHNIQUE:    Frontal and lateral views of the left forearm.     COMPARISON:    No relevant prior studies available.     FINDINGS:    Bones/joints:  Unremarkable.  No acute fracture.  No dislocation.    Soft tissues:  Unremarkable.       Impression:        No acute findings in the left forearm.     This report was finalized on 7/20/2025 10:35 AM by Dr. Madhav Lang MD.       CT Cervical Spine Without Contrast [[id number]] Collected: 07/20/25 1047     Updated: 07/20/25 1106    Narrative:      EXAM:    CT Cervical Spine Without Intravenous Contrast     EXAM DATE:    7/20/2025 9:53 AM     CLINICAL HISTORY:    fall at NH     TECHNIQUE:    Axial computed tomography images of the cervical spine without  intravenous contrast.  Sagittal and coronal reformatted images were  created and reviewed.  This CT exam was performed using one or more of  the following dose reduction techniques:  automated exposure control,  adjustment of the mA and/or kV according to patient size, and/or use of  iterative reconstruction technique.     COMPARISON:    No relevant prior studies available.     FINDINGS:     Vertebrae:  Degenerative facet arthropathy throughout the cervical  spine.  No acute fracture or traumatic malalignment identified.    Discs/spinal canal/neural foramina:  Degenerative disc disease  throughout the cervical spine.  Multilevel neuroforaminal stenosis and  mild central canal stenosis most pronounced C3/4 through C6/7.   Congenital fusion of C2-C3.    Soft tissues:  Unremarkable.    Vasculature:  Carotid artery calcifications.       Impression:      1.  No acute fracture or traumatic malalignment identified.  2.  Degenerative changes cervical spine as described.     This report was finalized on 7/20/2025 10:50 AM by Dr. Madhav Lang MD.       CT Pelvis Without Contrast [[id number]] Collected: 07/20/25 1050     Updated: 07/20/25 1105    Narrative:      EXAM:    CT Pelvis Without Intravenous Contrast     EXAM DATE:    7/20/2025 9:54 AM     CLINICAL HISTORY:    fall with injury     TECHNIQUE:    Axial computed tomography images of the pelvis without intravenous  contrast.  Sagittal and coronal reformatted images were created and  reviewed.  This CT exam was performed using one or more of the following  dose reduction techniques:  automated exposure control, adjustment of  the mA and/or kV according to patient size, and/or use of iterative  reconstruction technique.     COMPARISON:    No relevant prior studies available.     FINDINGS:    Bowel:  Mild diverticulosis.  No obstruction.  No mucosal thickening.    Appendix:  No findings to suggest acute appendicitis.    Intraperitoneal space:  Unremarkable.  No free air.  No pelvic free  fluid.    Bladder:  Layering stones or calcification in the dependent portion of  urinary bladder.    Reproductive:  Unremarkable as visualized.    Bones/joints:  No acute fracture.  No dislocation.    Soft tissues:  Unremarkable.  No pelvic soft tissue hematomas are  identified.    Vasculature:  Unremarkable.  No lower abdominal aortic aneurysm.    Lymph nodes:  Unremarkable.  " No enlarged lymph nodes.       Impression:        No acute fracture or dislocation identified.     This report was finalized on 7/20/2025 10:51 AM by Dr. Madhav Lang MD.       CT Lumbar Spine Without Contrast [[id number]] Collected: 07/20/25 1051     Updated: 07/20/25 1105    Narrative:      EXAM:    CT Lumbar Spine Without Intravenous Contrast     EXAM DATE:    7/20/2025 9:54 AM     CLINICAL HISTORY:    fall with injury     TECHNIQUE:    Axial computed tomography images of the lumbar spine without  intravenous contrast.  Sagittal and coronal reformatted images were  created and reviewed.  This CT exam was performed using one or more of  the following dose reduction techniques:  automated exposure control,  adjustment of the mA and/or kV according to patient size, and/or use of  iterative reconstruction technique.     COMPARISON:    7/11/2025     FINDINGS:    Vertebrae:  Stable Schmorl's node inferior endplate of L2 vertebral  body.  Degenerative facet arthropathy throughout the lumbar spine, most  prominent in the lower lumbar spine.  Multilevel facet arthropathy.  No  acute fracture or traumatic malalignment identified.    Discs/spinal canal/neural foramina:  Central canal and neuroforaminal  stenosis L2/3 through L5/S1.  Degenerative disc disease throughout the  lumbar spine.    Soft tissues:  Unremarkable.       Impression:      1.  No acute fracture or traumatic malalignment identified.  2.  Degenerative changes lumbar spine as described.     This report was finalized on 7/20/2025 10:52 AM by Dr. Madhav Lang MD.               Cultures:  No results found for: \"BLOODCX\", \"URINECX\", \"WOUNDCX\", \"MRSACX\", \"RESPCX\", \"STOOLCX\"    Last echocardiogram:  Results for orders placed during the hospital encounter of 09/21/17    Adult Transthoracic Echo Complete W/ Cont if Necessary Per Protocol 09/23/2017  2:37 PM    Interpretation Summary  · The study is technically adequate for diagnosis.  · Left ventricular " systolic function is normal. Estimated EF = 60%.  · There are no significant valvular abnormalities noted.  · There is no evidence of pericardial effusion.  · Mild pulmonary hypertension is present.    Compared to the study of 3/1/2014, there are no significant changes.      I have personally reviewed the above radiology images and read the final radiology report on 07/21/25  ---------------------------------------------------------------------------------------------------------------------  Assessment / Plan     Active Hospital Problems    Diagnosis  POA    **CVA (cerebral vascular accident) [I63.9]  Yes       ASSESSMENT/PLAN:  Acute ischemic stroke  Advanced Alzheimer's dementia with psychosis  TeleNeurology consulted; recommendations as below  Patient will be admitted to the hospitalist service on cardiac telemetry monitoring.  Due to patient failing the bedside swallow the initial plan was to give aspirin as a rectal suppository.  However after further discussion with the patient's family by phone, revealed that she can take pills crushed in applesauce.  Patient fought the ER nursing staff and did not allow for rectal aspirin to be given, therefore we will order orally crushed in applesauce.  Gentle maintenance IV fluids.  Blood pressure goal is to keep SBP between 140-160 and DBP between 80-90.  Morning labs to includes lipid profile TSH, sedimentation rate, hemoglobin A1c, vitamin B12 folic acid levels.  Transthoracic echocardiogram with bubble study to look for PFO.  And carotid ultrasound to look for any carotid blockage.   An EEG has been requested to make sure there is no underlying seizure disorder.  PT/OT/SLP to evaluate the patient to see if she would be safe to go back to the SNF at discharge.  She will be followed up tomorrow by MARYBETH Dsouza with the inpatient telemedicine service       Chronic Conditions  Deafness  Hyperlipidemia  Iron deficiency anemia  Constipation  Essential  hypertension  Plan to resume home antihypertensive regimen once med rec is completed by pharmacy.  Holding parameters to prevent hypotension and bradycardia.      ----------  -DVT prophylaxis: SCUDs  -Activity: Up with assistance  -Expected length of stay: INPATIENT status due to the need for care which can only be reasonably provided in an hospital setting such as aggressive/expedited ancillary services and/or consultation services, the necessity for IV medications, close physician monitoring and/or the possible need for procedures.  In such, I feel patient’s risk for adverse outcomes and need for care warrant INPATIENT evaluation and predict the patient’s care encounter to likely last beyond 2 midnights.    -Disposition: Pending clinical course     High risk secondary to acute ischemic stroke complicated by advanced Alzheimer's dementia    Code Status and Medical Interventions: CPR (Attempt to Resuscitate); Full Support   Ordered at: 07/20/25 5737     Code Status (Patient has no pulse and is not breathing):    CPR (Attempt to Resuscitate)     Medical Interventions (Patient has pulse or is breathing):    Full Support       Ad Hernandez PA-C   07/21/25  00:40 EDT      Electronically signed by Christofer Harper MD at 07/21/25 0310       Lines, Drains & Airways       Active LDAs       Name Placement date Placement time Site Days    Peripheral IV 07/20/25 1738 20 G Left Antecubital 07/20/25  1738  Antecubital  3    External Urinary Catheter --  --  --  --                  Current Facility-Administered Medications   Medication Dose Route Frequency Provider Last Rate Last Admin    acetaminophen (TYLENOL) tablet 650 mg  650 mg Oral Q4H PRN Christofer Harper MD        Or    acetaminophen (TYLENOL) suppository 650 mg  650 mg Rectal Q4H PRN Christofer Harper MD        aluminum-magnesium hydroxide-simethicone (MAALOX MAX) 400-400-40 MG/5ML suspension 7.5 mL  7.5 mL Oral Q4H PRN Christofer Harper MD         aspirin chewable tablet 81 mg  81 mg Oral Daily Master Riddle DO        Or    aspirin suppository 300 mg  300 mg Rectal Daily Master Riddle DO   300 mg at 07/23/25 1131    atorvastatin (LIPITOR) tablet 40 mg  40 mg Oral Nightly Moon Lucas MD   40 mg at 07/23/25 2059    sennosides-docusate (PERICOLACE) 8.6-50 MG per tablet 2 tablet  2 tablet Oral BID PRN Christofer Harper MD        And    polyethylene glycol (MIRALAX) packet 17 g  17 g Oral Daily PRN Christofer Harper MD        And    bisacodyl (DULCOLAX) EC tablet 5 mg  5 mg Oral Daily PRN Christofer Harper MD        And    bisacodyl (DULCOLAX) suppository 10 mg  10 mg Rectal Daily PRN Christofer Harper MD        [START ON 7/27/2025] cholecalciferol (VITAMIN D3) capsule 50,000 Units  50,000 Units Oral Weekly Rasheed Rivera DO        clopidogrel (PLAVIX) tablet 75 mg  75 mg Oral Daily Master Riddle DO   75 mg at 07/23/25 2059    Divalproex Sodium (DEPAKOTE SPRINKLE) capsule 125 mg  125 mg Oral BID Rasheed Rivera DO   125 mg at 07/23/25 2059    docusate sodium (COLACE) capsule 100 mg  100 mg Oral Daily Rasheed Rivera DO        ferrous sulfate tablet 325 mg  325 mg Oral Daily Rasheed Rivera DO        gabapentin (NEURONTIN) capsule 100 mg  100 mg Oral Nightly Rasheed Rivera DO   100 mg at 07/23/25 2059    hydrALAZINE (APRESOLINE) tablet 10 mg  10 mg Oral Q8H PRN Master Riddle DO        HYDROcodone-acetaminophen (NORCO) 5-325 MG per tablet 1 tablet  1 tablet Oral Q6H PRN Rasheed Rivera DO        Lidocaine 4 % 1 patch  1 patch Transdermal Q24H Rasheed Rivera DO   1 patch at 07/23/25 1130    magnesium hydroxide (MILK OF MAGNESIA) suspension 10 mL  10 mL Oral Daily PRN Rasheed Rivera DO        memantine (NAMENDA) tablet 5 mg  5 mg Oral Q12H Rasheed Rivera DO   5 mg at 07/23/25 2059    nitroglycerin (NITROSTAT) SL tablet 0.4 mg  0.4  mg Sublingual Q5 Min PRN Christofer Harper MD        OLANZapine (zyPREXA) tablet 2.5 mg  2.5 mg Oral Nightly Rasheed Rivera DO   2.5 mg at 07/23/25 2059    ondansetron (ZOFRAN) injection 4 mg  4 mg Intravenous Q6H PRN Christofer Harper MD        polyethylene glycol (MIRALAX) packet 17 g  17 g Oral Daily Rasheed Rivera DO        senna (SENOKOT) tablet 2 tablet  2 tablet Oral BID Rasheed Rivera DO   2 tablet at 07/23/25 2059    sodium chloride 0.9 % flush 10 mL  10 mL Intravenous PRN Christofer Harper MD        sodium chloride 0.9 % flush 10 mL  10 mL Intravenous Q12H Christofer Harper MD   10 mL at 07/23/25 2211    sodium chloride 0.9 % flush 10 mL  10 mL Intravenous PRN Christofer Harper MD        sodium chloride 0.9 % flush 10 mL  10 mL Intravenous Q12H Christofer Harper MD   10 mL at 07/23/25 2211    sodium chloride 0.9 % flush 10 mL  10 mL Intravenous PRN Christofer Harper MD        sodium chloride 0.9 % infusion 40 mL  40 mL Intravenous Christofer Koroma MD        sodium chloride 0.9 % infusion 40 mL  40 mL Intravenous Christofer Koroma MD         Lab Results (most recent)       Procedure Component Value Units Date/Time    Basic Metabolic Panel [[id number]]  (Abnormal) Collected: 07/24/25 0042    Specimen: Blood Updated: 07/24/25 0146     Glucose 85 mg/dL      BUN 21.4 mg/dL      Creatinine 0.85 mg/dL      Sodium 138 mmol/L      Potassium 3.5 mmol/L      Chloride 105 mmol/L      CO2 22.3 mmol/L      Calcium 9.6 mg/dL      BUN/Creatinine Ratio 25.2     Anion Gap 10.7 mmol/L      eGFR 68.9 mL/min/1.73     Narrative:      GFR Categories in Chronic Kidney Disease (CKD)              GFR Category          GFR (mL/min/1.73)    Interpretation  G1                    90 or greater        Normal or high (1)  G2                    60-89                Mild decrease (1)  G3a                   45-59                Mild to moderate  decrease  G3b                   30-44                Moderate to severe decrease  G4                    15-29                Severe decrease  G5                    14 or less           Kidney failure    (1)In the absence of evidence of kidney disease, neither GFR category G1 or G2 fulfill the criteria for CKD.    eGFR calculation 2021 CKD-EPI creatinine equation, which does not include race as a factor    CBC & Differential [[id number]]  (Abnormal) Collected: 07/24/25 0042    Specimen: Blood Updated: 07/24/25 0127    Narrative:      The following orders were created for panel order CBC & Differential.  Procedure                               Abnormality         Status                     ---------                               -----------         ------                     CBC Auto Differential[[id number]]        Abnormal            Final result                 Please view results for these tests on the individual orders.    CBC Auto Differential [[id number]]  (Abnormal) Collected: 07/24/25 0042    Specimen: Blood Updated: 07/24/25 0127     WBC 7.28 10*3/mm3      RBC 4.31 10*6/mm3      Hemoglobin 13.1 g/dL      Hematocrit 38.9 %      MCV 90.3 fL      MCH 30.4 pg      MCHC 33.7 g/dL      RDW 12.7 %      RDW-SD 41.8 fl      MPV 10.3 fL      Platelets 213 10*3/mm3      Neutrophil % 50.9 %      Lymphocyte % 32.0 %      Monocyte % 7.6 %      Eosinophil % 9.1 %      Basophil % 0.0 %      Immature Grans % 0.4 %      Neutrophils, Absolute 3.71 10*3/mm3      Lymphocytes, Absolute 2.33 10*3/mm3      Monocytes, Absolute 0.55 10*3/mm3      Eosinophils, Absolute 0.66 10*3/mm3      Basophils, Absolute 0.00 10*3/mm3      Immature Grans, Absolute 0.03 10*3/mm3      nRBC 0.0 /100 WBC     Basic Metabolic Panel [[id number]]  (Normal) Collected: 07/23/25 0827    Specimen: Blood Updated: 07/23/25 0941     Glucose 87 mg/dL      BUN 15.4 mg/dL      Creatinine 0.83 mg/dL      Sodium 140 mmol/L      Potassium 3.7 mmol/L      Chloride 105 mmol/L       CO2 22.0 mmol/L      Calcium 10.0 mg/dL      BUN/Creatinine Ratio 18.6     Anion Gap 13.0 mmol/L      eGFR 70.9 mL/min/1.73     Narrative:      GFR Categories in Chronic Kidney Disease (CKD)              GFR Category          GFR (mL/min/1.73)    Interpretation  G1                    90 or greater        Normal or high (1)  G2                    60-89                Mild decrease (1)  G3a                   45-59                Mild to moderate decrease  G3b                   30-44                Moderate to severe decrease  G4                    15-29                Severe decrease  G5                    14 or less           Kidney failure    (1)In the absence of evidence of kidney disease, neither GFR category G1 or G2 fulfill the criteria for CKD.    eGFR calculation 2021 CKD-EPI creatinine equation, which does not include race as a factor    CBC & Differential [[id number]]  (Abnormal) Collected: 07/23/25 0827    Specimen: Blood Updated: 07/23/25 0857    Narrative:      The following orders were created for panel order CBC & Differential.  Procedure                               Abnormality         Status                     ---------                               -----------         ------                     CBC Auto Differential[[id number]]        Abnormal            Final result                 Please view results for these tests on the individual orders.    CBC Auto Differential [[id number]]  (Abnormal) Collected: 07/23/25 0827    Specimen: Blood Updated: 07/23/25 0857     WBC 7.56 10*3/mm3      RBC 4.53 10*6/mm3      Hemoglobin 13.7 g/dL      Hematocrit 41.0 %      MCV 90.5 fL      MCH 30.2 pg      MCHC 33.4 g/dL      RDW 12.6 %      RDW-SD 41.6 fl      MPV 10.3 fL      Platelets 202 10*3/mm3      Neutrophil % 61.1 %      Lymphocyte % 23.8 %      Monocyte % 7.0 %      Eosinophil % 7.5 %      Basophil % 0.1 %      Immature Grans % 0.5 %      Neutrophils, Absolute 4.61 10*3/mm3      Lymphocytes, Absolute  1.80 10*3/mm3      Monocytes, Absolute 0.53 10*3/mm3      Eosinophils, Absolute 0.57 10*3/mm3      Basophils, Absolute 0.01 10*3/mm3      Immature Grans, Absolute 0.04 10*3/mm3      nRBC 0.0 /100 WBC     POC Glucose Once [[id number]]  (Normal) Collected: 07/22/25 1120    Specimen: Blood Updated: 07/22/25 1123     Glucose 92 mg/dL      Comment: Serial Number: [id number]:  [id number]       Urinalysis, Microscopic Only - Urine, Clean Catch [[id number]]  (Abnormal) Collected: 07/21/25 1618    Specimen: Urine, Clean Catch Updated: 07/21/25 1634     RBC, UA 0-2 /HPF      WBC, UA 3-5 /HPF      Comment: Urine culture not indicated.        Bacteria, UA Trace /HPF      Squamous Epithelial Cells, UA 0-2 /HPF      Hyaline Casts, UA 0-2 /LPF      Methodology Automated Microscopy    Urinalysis With Culture If Indicated - Urine, Clean Catch [[id number]]  (Abnormal) Collected: 07/21/25 1618    Specimen: Urine, Clean Catch Updated: 07/21/25 1634     Color, UA Yellow     Appearance, UA Clear     pH, UA 6.0     Specific Gravity, UA 1.023     Glucose, UA Negative     Ketones, UA Trace     Bilirubin, UA Negative     Blood, UA Negative     Protein, UA Negative     Leuk Esterase, UA Trace     Nitrite, UA Negative     Urobilinogen, UA 1.0 E.U./dL    Narrative:      In absence of clinical symptoms, the presence of pyuria, bacteria, and/or nitrites on the urinalysis result does not correlate with infection.    Ammonia [[id number]]  (Normal) Collected: 07/21/25 1236    Specimen: Blood Updated: 07/21/25 1308     Ammonia 14 umol/L     Vitamin B12 [[id number]]  (Normal) Collected: 07/21/25 0543    Specimen: Blood Updated: 07/21/25 1252     Vitamin B-12 826 pg/mL     Narrative:      Results may be falsely increased if patient taking Biotin.      Folate [[id number]]  (Normal) Collected: 07/21/25 0543    Specimen: Blood Updated: 07/21/25 1252     Folate 7.85 ng/mL     Narrative:      Results may be falsely increased if patient taking Biotin.       Comprehensive Metabolic Panel [[id number]]  (Abnormal) Collected: 07/21/25 0543    Specimen: Blood Updated: 07/21/25 0615     Glucose 88 mg/dL      BUN 18.3 mg/dL      Creatinine 0.89 mg/dL      Sodium 142 mmol/L      Potassium 3.6 mmol/L      Chloride 107 mmol/L      CO2 27.0 mmol/L      Calcium 9.6 mg/dL      Total Protein 5.5 g/dL      Albumin 3.5 g/dL      ALT (SGPT) 10 U/L      AST (SGOT) 18 U/L      Alkaline Phosphatase 94 U/L      Total Bilirubin 0.6 mg/dL      Globulin 2.0 gm/dL      A/G Ratio 1.8 g/dL      BUN/Creatinine Ratio 20.6     Anion Gap 8.0 mmol/L      eGFR 65.2 mL/min/1.73     Narrative:      GFR Categories in Chronic Kidney Disease (CKD)              GFR Category          GFR (mL/min/1.73)    Interpretation  G1                    90 or greater        Normal or high (1)  G2                    60-89                Mild decrease (1)  G3a                   45-59                Mild to moderate decrease  G3b                   30-44                Moderate to severe decrease  G4                    15-29                Severe decrease  G5                    14 or less           Kidney failure    (1)In the absence of evidence of kidney disease, neither GFR category G1 or G2 fulfill the criteria for CKD.    eGFR calculation 2021 CKD-EPI creatinine equation, which does not include race as a factor    Lipid Panel [[id number]]  (Abnormal) Collected: 07/21/25 0543    Specimen: Blood Updated: 07/21/25 0615     Total Cholesterol 131 mg/dL      Triglycerides 115 mg/dL      HDL Cholesterol 32 mg/dL      LDL Cholesterol  78 mg/dL      VLDL Cholesterol 21 mg/dL      LDL/HDL Ratio 2.38    Narrative:      Cholesterol Reference Ranges  (U.S. Department of Health and Human Services ATP III Classifications)    Desirable          <200 mg/dL  Borderline High    200-239 mg/dL  High Risk          >240 mg/dL      Triglyceride Reference Ranges  (U.S. Department of Health and Human Services ATP III Classifications)    Normal            <150 mg/dL  Borderline High  150-199 mg/dL  High             200-499 mg/dL  Very High        >500 mg/dL    HDL Reference Ranges  (U.S. Department of Health and Human Services ATP III Classifications)    Low     <40 mg/dl (major risk factor for CHD)  High    >60 mg/dl ('negative' risk factor for CHD)        LDL Reference Ranges  (U.S. Department of Health and Human Services ATP III Classifications)    Optimal          <100 mg/dL  Near Optimal     100-129 mg/dL  Borderline High  130-159 mg/dL  High             160-189 mg/dL  Very High        >189 mg/dL    LDL is calculated using the NIH LDL-C calculation.      TSH [[id number]]  (Normal) Collected: 07/21/25 0543    Specimen: Blood Updated: 07/21/25 0615     TSH 2.300 uIU/mL     POC Glucose Q6H [[id number]] Collected: 07/21/25 0611    Specimen: Blood Updated: 07/21/25 0613     Glucose 75 mg/dL      Comment: Serial Number: [id number]:  [id number]        Michelle Comment 1 Notified Patients RN    Hemoglobin A1c [[id number]]  (Normal) Collected: 07/21/25 0543    Specimen: Blood Updated: 07/21/25 0603     Hemoglobin A1C 5.43 %     Narrative:      Hemoglobin A1C Ranges:    Increased Risk for Diabetes  5.7% to 6.4%  Diabetes                     >= 6.5%  Diabetic Goal                < 7.0%    aPTT [[id number]]  (Normal) Collected: 07/21/25 0543    Specimen: Blood Updated: 07/21/25 0559     PTT 27.7 seconds     Narrative:      PTT Heparin Therapeutic Range:  45 - 116 seconds      Protime-INR [[id number]]  (Normal) Collected: 07/21/25 0543    Specimen: Blood Updated: 07/21/25 0559     Protime 14.1 Seconds      INR 1.03    Narrative:      Suggested INR therapeutic range for stable oral anticoagulant therapy:    Low Intensity therapy:   1.5-2.0  Moderate Intensity therapy:   2.0-3.0  High Intensity therapy:   2.5-4.0    Sedimentation Rate [[id number]]  (Normal) Collected: 07/21/25 0543    Specimen: Blood Updated: 07/21/25 0550     Sed Rate 3 mm/hr     CBC (No Diff) [[id number]]   (Normal) Collected: 07/21/25 0543    Specimen: Blood Updated: 07/21/25 0549     WBC 6.17 10*3/mm3      RBC 4.11 10*6/mm3      Hemoglobin 12.7 g/dL      Hematocrit 37.6 %      MCV 91.5 fL      MCH 30.9 pg      MCHC 33.8 g/dL      RDW 12.4 %      RDW-SD 41.2 fl      MPV 10.4 fL      Platelets 163 10*3/mm3     Comprehensive Metabolic Panel [[id number]]  (Abnormal) Collected: 07/20/25 1035    Specimen: Blood Updated: 07/20/25 1105     Glucose 102 mg/dL      BUN 21.4 mg/dL      Creatinine 1.27 mg/dL      Sodium 142 mmol/L      Potassium 4.7 mmol/L      Chloride 105 mmol/L      CO2 27.0 mmol/L      Calcium 10.5 mg/dL      Total Protein 6.1 g/dL      Albumin 3.9 g/dL      ALT (SGPT) 11 U/L      AST (SGOT) 20 U/L      Alkaline Phosphatase 102 U/L      Total Bilirubin 0.5 mg/dL      Globulin 2.2 gm/dL      A/G Ratio 1.8 g/dL      BUN/Creatinine Ratio 16.9     Anion Gap 10.0 mmol/L      eGFR 42.6 mL/min/1.73     Narrative:      GFR Categories in Chronic Kidney Disease (CKD)              GFR Category          GFR (mL/min/1.73)    Interpretation  G1                    90 or greater        Normal or high (1)  G2                    60-89                Mild decrease (1)  G3a                   45-59                Mild to moderate decrease  G3b                   30-44                Moderate to severe decrease  G4                    15-29                Severe decrease  G5                    14 or less           Kidney failure    (1)In the absence of evidence of kidney disease, neither GFR category G1 or G2 fulfill the criteria for CKD.    eGFR calculation 2021 CKD-EPI creatinine equation, which does not include race as a factor    Valproic Acid Level, Total [[id number]]  (Abnormal) Collected: 07/20/25 1035    Specimen: Blood Updated: 07/20/25 1100     Valproic Acid 21.3 mcg/mL     Narrative:      Therapeutic Ranges for Valproic Acid    Epilepsy:       mcg/ml  Bipolar/[place]  up to 125 mcg/ml      [place] Draw [[id number]]  Collected: 07/20/25 1035    Specimen: Blood Updated: 07/20/25 1045    Narrative:      The following orders were created for panel order [place] Draw.  Procedure                               Abnormality         Status                     ---------                               -----------         ------                     Green Top (Gel)[[id number]]                                  Final result               Lavender Top[[id number]]                                     Final result               Gold Top - SST[[id number]]                                   Final result               Light Blue Top[[id number]]                                   Final result                 Please view results for these tests on the individual orders.    Green Top (Gel) [[id number]] Collected: 07/20/25 1035    Specimen: Blood Updated: 07/20/25 1045     Extra Tube Hold for add-ons.     Comment: Auto resulted.       Lavender Top [[id number]] Collected: 07/20/25 1035    Specimen: Blood Updated: 07/20/25 1045     Extra Tube hold for add-on     Comment: Auto resulted       Gold Top - SST [[id number]] Collected: 07/20/25 1035    Specimen: Blood Updated: 07/20/25 1045     Extra Tube Hold for add-ons.     Comment: Auto resulted.       Light Blue Top [[id number]] Collected: 07/20/25 1035    Specimen: Blood Updated: 07/20/25 1045     Extra Tube Hold for add-ons.     Comment: Auto resulted             Orders (active)        Start     Ordered    07/27/25 0900  cholecalciferol (VITAMIN D3) capsule 50,000 Units  Weekly         07/21/25 1028    07/23/25 1900  clopidogrel (PLAVIX) tablet 75 mg  Daily         07/23/25 1853    07/23/25 1120  Diet: Regular/House; Feeding Assistance - Nursing; Texture: Mechanical Ground (NDD 2); Fluid Consistency: Thin (IDDSI 0)  Diet Effective Now         07/23/25 1120    07/23/25 1120  Must Be Up For Meals  Until Discontinued        Comments: Upright in bed to 90 degrees for all meals/po intake.    07/23/25 1120    07/23/25 1120  " Diet Instructions To Nursing  Until Discontinued        Comments: Only provide po when fully a/a to accept  Upright and centered positioning for all po intake  1:1 feeding assistance  Meds crushed in puree    07/23/25 1120 07/22/25 2030  aspirin chewable tablet 81 mg  Daily        Placed in \"Or\" Linked Group    07/22/25 1936 07/22/25 2030  aspirin suppository 300 mg  Daily        Placed in \"Or\" Linked Group    07/22/25 1936 07/22/25 1936  Activity As Tolerated  Until Discontinued         07/22/25 1936 07/22/25 1936  Notify Provider  Continuous        Comments: Open Order Report to View Parameters Requiring Provider Notification    07/22/25 1936 07/21/25 2115  sodium chloride 0.9 % infusion  Continuous         07/21/25 2018 07/21/25 2100  gabapentin (NEURONTIN) capsule 100 mg  Nightly         07/21/25 1028    07/21/25 2100  OLANZapine (zyPREXA) tablet 2.5 mg  Nightly         07/21/25 1028    07/21/25 2100  atorvastatin (LIPITOR) tablet 40 mg  Nightly         07/21/25 1140    07/21/25 1207  Code Status and Medical Interventions: No CPR (Do Not Attempt to Resuscitate); Limited Support; No intubation (DNI), No dialysis, No cardioversion; spoke to granddaughter OTP  Continuous         07/21/25 1206    07/21/25 1115  senna (SENOKOT) tablet 2 tablet  2 Times Daily         07/21/25 1028    07/21/25 1115  ferrous sulfate tablet 325 mg  Daily         07/21/25 1028    07/21/25 1115  memantine (NAMENDA) tablet 5 mg  Every 12 Hours Scheduled         07/21/25 1028    07/21/25 1115  docusate sodium (COLACE) capsule 100 mg  Daily         07/21/25 1028    07/21/25 1115  Lidocaine 4 % 1 patch  Every 24 Hours         07/21/25 1028    07/21/25 1115  Divalproex Sodium (DEPAKOTE SPRINKLE) capsule 125 mg  2 Times Daily         07/21/25 1028    07/21/25 1115  polyethylene glycol (MIRALAX) packet 17 g  Daily         07/21/25 1028    07/21/25 1027  HYDROcodone-acetaminophen (NORCO) 5-325 MG per tablet 1 tablet  Every 6 " Hours PRN         07/21/25 1028    07/21/25 1027  magnesium hydroxide (MILK OF MAGNESIA) suspension 10 mL  Daily PRN         07/21/25 1028    07/21/25 0858  SLP Consult: Eval & Treat Communication Disorder, Swallow Disorder  Once        Comments: Per stroke protocol.    07/21/25 0858    07/21/25 0800  Vital Signs  Every 8 Hours        Comments: Per per hospital policy    07/21/25 0050    07/21/25 0800  Oral Care  2 Times Daily       07/21/25 0050    07/21/25 0600  Incentive Spirometry  Every 4 Hours While Awake       07/20/25 2219    07/21/25 0400  Neuro Checks  Every 4 Hours       07/21/25 0050    07/21/25 0145  sodium chloride 0.9 % flush 10 mL  Every 12 Hours Scheduled         07/21/25 0050    07/21/25 0100  Strict Intake & Output  Every Hour       07/21/25 0050    07/21/25 0051  Notify Physician (with Parameters)  Until Discontinued         07/21/25 0050    07/21/25 0051  Continuous Cardiac Monitoring  Continuous        Comments: Follow Standing Orders As Outlined in Process Instructions (Open Order Report to View Full Instructions)    07/21/25 0050    07/21/25 0051  Maintain IV Access  Continuous         07/21/25 0050    07/21/25 0051  Telemetry - Place Orders & Notify Provider of Results When Patient Experiences Acute Chest Pain, Dysrhythmia or Respiratory Distress  Continuous        Comments: Open Order Report to View Parameters Requiring Provider Notification    07/21/25 0050    07/21/25 0051  May Be Off Telemetry for Tests  Continuous         07/21/25 0050    07/21/25 0051  Continuous Pulse Oximetry  Continuous         07/21/25 0050    07/21/25 0051  Daily Weights  Daily       07/21/25 0050    07/21/25 0050  sodium chloride 0.9 % flush 10 mL  As Needed         07/21/25 0050    07/21/25 0050  sodium chloride 0.9 % infusion 40 mL  As Needed         07/21/25 0050    07/21/25 0050  nitroglycerin (NITROSTAT) SL tablet 0.4 mg  Every 5 Minutes PRN         07/21/25 0050    07/21/25 0050  sennosides-docusate  "(PERICOLACE) 8.6-50 MG per tablet 2 tablet  2 Times Daily PRN        Placed in \"And\" Linked Group    07/21/25 0050    07/21/25 0050  polyethylene glycol (MIRALAX) packet 17 g  Daily PRN        Placed in \"And\" Linked Group    07/21/25 0050    07/21/25 0050  bisacodyl (DULCOLAX) EC tablet 5 mg  Daily PRN        Placed in \"And\" Linked Group    07/21/25 0050    07/21/25 0050  bisacodyl (DULCOLAX) suppository 10 mg  Daily PRN        Placed in \"And\" Linked Group    07/21/25 0050    07/21/25 0017  sodium chloride 0.9 % infusion  Continuous         07/21/25 0001    07/21/25 0005  hydrALAZINE (APRESOLINE) tablet 10 mg  Every 8 Hours PRN         07/21/25 0005    07/21/25 0000  Intake and Output  Every 4 Hours       07/20/25 2219 07/20/25 2235  sodium chloride 0.9 % flush 10 mL  Every 12 Hours Scheduled         07/20/25 2219 07/20/25 2226  Hospitalist (on-call MD unless specified)  Once        Specialty:  Hospitalist  Provider:  (Not yet assigned)    07/20/25 2225 07/20/25 2219  Maintain Sequential Compression Device  Continuous         07/20/25 2219 07/20/25 2218  RN to Place Order SLP Consult - Eval & Treat Choosing Reason of RN Dysphagia Screen Failed  Per Order Details        Comments: RN to Place Order SLP Consult - Eval & Treat Choosing Reason of RN Dysphagia Screen Failed    07/20/25 2219 07/20/25 2218  Nurse to Call MD or Nutrition Services for Diet if Patient Passes Dysphagia Screen  Once         07/20/25 2219 07/20/25 2218  NIHSS Assessment  Every Shift      Comments: Turn off all sedation medications prior to performing assessment. Assessment to be performed upon admission, transfer to another unit, discharge, and with neurological decline. If NIHSS change is greater than or equal to 4 and/or neurological decline is noted notify physician.    07/20/25 2219 07/20/25 2218  Oxygen Therapy- Nasal Cannula; 2 LPM  Continuous         07/20/25 2219 07/20/25 2218  OT Consult: Eval & Treat  Once      " "   25 221  PT Consult: Eval & Treat As Tolerated  Once         259    25 221  Insert Peripheral IV  Once         25  sodium chloride 0.9 % flush 10 mL  As Needed         25 221  sodium chloride 0.9 % infusion 40 mL  As Needed         25 221  acetaminophen (TYLENOL) tablet 650 mg  Every 4 Hours PRN        Placed in \"Or\" Linked Group    25 221  acetaminophen (TYLENOL) suppository 650 mg  Every 4 Hours PRN        Placed in \"Or\" Linked Group    25 221  ondansetron (ZOFRAN) injection 4 mg  Every 6 Hours PRN         25  aluminum-magnesium hydroxide-simethicone (MAALOX MAX) 400-400-40 MG/5ML suspension 7.5 mL  Every 4 Hours PRN         25 22125 1539  Insert Peripheral IV  Once        Placed in \"And\" Linked Group    25 1539    25 1538  sodium chloride 0.9 % flush 10 mL  As Needed        Placed in \"And\" Linked Group    25 1539    Unscheduled  Order CT Head Without Contrast for Neurological Decline  As Needed       25 2219    Unscheduled  Up With Assistance  As Needed       25 0050    Unscheduled  Assist With Feeding Patient  As Needed       25 1120                     Physician Progress Notes (last 24 hours)        Master Riddle DO at 25 1850              [facility]     Patient Identification:  Name:  Catherine Snyder  Age:  79 y.o.  Sex:  female  :  1945  MRN:  [id number]  Visit Number:  [id number]  ROOM: [address] [place] [address]     Primary Care Provider:  Ricky Alamo MD    Length of stay in inpatient status:  3    Subjective     Chief Compliant:    Chief Complaint   Patient presents with    Fall     Pt fell at nursing home ems states pt has right sided pain pt unable to communicate areas of pain to me pt has broke left leg from previous " fall. Pt palpated without any signs of pain.        History of Presenting Illness: Patient seen and evaluated in follow-up for acute ischemic stroke in the setting of known Alzheimer's dementia with psychosis.  Patient at time of evaluation somnolent and lethargic.  Patient with some improvement patient today and seen by SLP and placed on modified diet.  PT and OT working with patient as well.    Objective     Current Hospital Meds:  aspirin, 81 mg, Oral, Daily   Or  aspirin, 300 mg, Rectal, Daily  atorvastatin, 40 mg, Oral, Nightly  [START ON 7/27/2025] cholecalciferol, 50,000 Units, Oral, Weekly  Divalproex Sodium, 125 mg, Oral, BID  docusate sodium, 100 mg, Oral, Daily  ferrous sulfate, 325 mg, Oral, Daily  gabapentin, 100 mg, Oral, Nightly  Lidocaine, 1 patch, Transdermal, Q24H  memantine, 5 mg, Oral, Q12H  OLANZapine, 2.5 mg, Oral, Nightly  polyethylene glycol, 17 g, Oral, Daily  senna, 2 tablet, Oral, BID  sodium chloride, 10 mL, Intravenous, Q12H  sodium chloride, 10 mL, Intravenous, Q12H         ----------------------------------------------------------------------------------------------------------------------  Vital Signs:  Temp:  [97.7 °F (36.5 °C)-99 °F (37.2 °C)] 98.6 °F (37 °C)  Heart Rate:  [71-96] 83  Resp:  [18-20] 18  BP: (150-185)/(74-94) 150/74  SpO2:  [90 %-97 %] 95 %  on   ;   Device (Oxygen Therapy): room air  Body mass index is 25.12 kg/m².      Intake/Output Summary (Last 24 hours) at 7/23/2025 1850  Last data filed at 7/23/2025 1803  Gross per 24 hour   Intake 320 ml   Output 750 ml   Net -430 ml      ----------------------------------------------------------------------------------------------------------------------  Physical exam:  Constitutional: Chronically ill-appearing elderly adult female resting in bed in no distress.      HENT:  Head:  Normocephalic and atraumatic.  Mouth:  Moist mucous membranes.    Eyes:  Conjunctivae and EOM are normal. No scleral icterus.    Cardiovascular:   "Normal rate, regular rhythm and normal heart sounds with no murmur.  Pulmonary/Chest:  No respiratory distress, no wheezes, no crackles, with normal breath sounds and good air movement.  Abdominal:  Soft.  Bowel sounds are normal.  No distension and no tenderness.   Musculoskeletal:  No tenderness, and no deformity.  No red or swollen joints anywhere.  Moves all extremities spontaneously.  Decreased muscle tone noted.  Neurological: Awake and alert but nonverbal unable to assess orientation and noncooperative with exam.  Does not follow commands.  No focal deficits on limited exam.  Skin:  Skin is warm and dry. No rash or lesion noted. No pallor.   Peripheral vascular:  Pulses in all 4 extremities with no clubbing, no cyanosis, no edema.  Psychiatric: Appropriate mood and affect, pleasant.   ----------------------------------------------------------------------------------------------------------------------  WBC/HGB/HCT/PLT   7.56/13.7/41.0/202 (07/23 0827)  BUN/CREAT/GLUC/ALT/AST/JEFFY/LIP    15.4/0.83/87/--/--/--/-- (07/23 0827)  LYTES - Na/K/Cl/CO2: 140/3.7/105/22.0 (07/23 0827)        No results found for: \"URINECX\"  No results found for: \"BLOODCX\"    I have personally looked at the labs and they are summarized above.  ----------------------------------------------------------------------------------------------------------------------  Detailed radiology reports for the last 24 hours:  FL Video Swallow Single Contrast  Result Date: 7/23/2025      Please see the speech pathologist's report for additional information.  This report was finalized on 7/23/2025 10:53 AM by Dr. Teo Araujo MD.      CT Angiogram Head  Result Date: 7/22/2025    No acute findings in the arteries of the head/brain.  This report was finalized on 7/22/2025 2:34 PM by Dr. Teo Araujo MD.      CT Angiogram Carotids  Result Date: 7/22/2025    Mild bilateral ICA calcific stenosis.  This report was finalized on 7/22/2025 2:33 PM by Dr. Bruce " MD Van.      Assessment & Plan      Acute ischemic stroke  Advanced Alzheimer's dementia with psychosis    - Patient presenting after fall at outside skilled nursing facility due to concerns for right hip/leg pain in the setting of patient with recent fall resulting in left lower extremity fracture currently in cast.    -CT of the head performed in ER given recent fall incidentally finding evidence of acute stroke and teleneurology services consulted and recommending inpatient admission.    - Patient with rectal aspirin ordered, currently unable to have clearance by speech therapy due to altered level of consciousness for safe oral intake despite being administered medications crushed in applesauce at SNF.  Continue with rectal aspirin as needed until cleared for oral intake.    - Goals of care discussion had with family by previous hospitalist and patient DNR/DNI.  Given advanced age, Alzheimer's, palliative care was consulted and discussed with family however family not interested in any palliative or hospice care at this time.    - Expected return back to skilled nursing facility pending improvement in mental status to have oral intake versus other ongoing goals of care conversations with POA.    - Concern for possible embolic stroke, continuous cardiac monitoring.  Echocardiogram ordered and notable for hyperdynamic EF with grade 1 impaired laxation but negative saline bubble test with elevated RVSP greater than 55 mmHg consistent with pulmonary hypertension.    - CT angiogram of head and neck without evidence of any acute findings in the head and brain with only mild bilateral ICA stenosis.    - Holter monitor at discharge and will add Plavix to aspirin as patient is having some improvement in oral intake.  Although still limited cooperative weakness.    ИВАН, resolved    - Likely secondary to dementia and poor oral intake, baseline creatinine around 0.9 initially presenting with 1.27 resolved with volume  repletion with IV fluids and improved to 0.74    - Avoid nephrotoxic medications and monitor intake, currently unable to have oral intake due to altered mental status.    Chronic:  Hearing loss  Hyperlipidemia  Hypertension  Iron deficiency anemia  Constipation    Copied text in portions of the note has been reviewed and is accurate as of .07/23/25    VTE Prophylaxis:     Active VTE Prophylaxis  Mechanical:        Start        07/20/25 2219  Maintain Sequential Compression Device  Continuous                          Select Pharmacologic VTE Prophylaxis if Desired & Appropriate       Disposition return to SNF once medically improved and stable, possibly tomorrow.    Master Riddle DO  [facility]  07/23/25  18:50 EDT      Electronically signed by Master Riddle DO at 07/23/25 1905       Madhav Fairchild APRN at 07/23/25 1046          Stroke Progress Note       Chief Complaint: Encephalopathy    Subjective    Subjective     Subjective:  No acute events reported overnight.  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  On exam patient was awake, but nonverbal.  She tracks with her eyes, follows simple commands.  Per SLP notes patient much more interactive.  Plan for MBS.    Review of Systems   Unable to perform ROS: Dementia         Objective    Objective      Temp:  [97.7 °F (36.5 °C)-99 °F (37.2 °C)] 98.8 °F (37.1 °C)  Heart Rate:  [71-96] 83  Resp:  [16-20] 18  BP: (160-189)/(79-94) 170/94    Neurological Exam  Mental Status  Awake. Patient is nonverbal. Language: Intermittently follows simple commands, squeezes my fingers.  Patient is deaf, but reportedly able to read lips.  She would not answer any of my orientation questions.    Cranial Nerves  CN III, IV, VI: Pupils equal round and reactive to light bilaterally.  CN VII: Full and symmetric facial movement.    Motor  Decreased muscle bulk throughout. Normal muscle tone. No abnormal involuntary movements.  Difficult to  measure strength but she was moving all extremities purposefully.    Coordination    No obvious dysmetria.    Gait    Not assessed.      Physical Exam  Vitals and nursing note reviewed.   Constitutional:       General: She is awake. She is not in acute distress.     Appearance: She is ill-appearing.   HENT:      Head: Normocephalic.      Mouth/Throat:      Pharynx: Oropharynx is clear.   Eyes:      Pupils: Pupils are equal, round, and reactive to light.   Cardiovascular:      Rate and Rhythm: Normal rate.   Pulmonary:      Effort: Pulmonary effort is normal. No respiratory distress.   Musculoskeletal:      Comments: Cast to LLE   Skin:     General: Skin is warm and dry.   Psychiatric:         Mood and Affect: Mood normal.         Behavior: Behavior normal.         Hospital Meds:  Scheduled- aspirin, 81 mg, Oral, Daily   Or  aspirin, 300 mg, Rectal, Daily  atorvastatin, 40 mg, Oral, Nightly  [START ON 7/27/2025] cholecalciferol, 50,000 Units, Oral, Weekly  Divalproex Sodium, 125 mg, Oral, BID  docusate sodium, 100 mg, Oral, Daily  ferrous sulfate, 325 mg, Oral, Daily  gabapentin, 100 mg, Oral, Nightly  Lidocaine, 1 patch, Transdermal, Q24H  memantine, 5 mg, Oral, Q12H  OLANZapine, 2.5 mg, Oral, Nightly  polyethylene glycol, 17 g, Oral, Daily  senna, 2 tablet, Oral, BID  sodium chloride, 10 mL, Intravenous, Q12H  sodium chloride, 10 mL, Intravenous, Q12H      Infusions-     PRNs-   acetaminophen **OR** acetaminophen    aluminum-magnesium hydroxide-simethicone    senna-docusate sodium **AND** polyethylene glycol **AND** bisacodyl **AND** bisacodyl    cloNIDine    hydrALAZINE    [Held by provider] hydrALAZINE    HYDROcodone-acetaminophen    magnesium hydroxide    nitroglycerin    ondansetron    [COMPLETED] Insert Peripheral IV **AND** sodium chloride    sodium chloride    sodium chloride    sodium chloride    sodium chloride    Results Review:    I reviewed the patient's new clinical results.    Imaging Results (Last 24  Hours)       Procedure Component Value Units Date/Time    FL Video Swallow Single Contrast - In process [[id number]] Resulted: 07/23/25 1030     Updated: 07/23/25 1045    This result has not been signed. Information might be incomplete.      CT Angiogram Head [[id number]] Collected: 07/22/25 1433     Updated: 07/22/25 1436    Narrative:      EXAM:    CT Angiography Head With Intravenous Contrast     EXAM DATE:    7/22/2025 2:33 PM     CLINICAL HISTORY:    stroke; I63.9-Cerebral infarction, unspecified     TECHNIQUE:    Axial computed tomographic angiography images of the head with  intravenous contrast. LVO analysis was performed with [employer] software.   This CT exam was performed using one or more of the following dose  reduction techniques:  automated exposure control, adjustment of the mA  and/or kV according to patient size, and/or use of iterative  reconstruction technique.    MIP reconstructed images were created and reviewed.     COMPARISON:    No relevant prior studies available.     FINDINGS:    Right internal carotid artery:  No acute findings.  Intracranial  segment is patent with no significant stenosis.  No aneurysm.    Right anterior cerebral artery:  Unremarkable as visualized.  No  occlusion or significant stenosis.  No aneurysm.    Right middle cerebral artery:  Unremarkable as visualized.  No  occlusion or significant stenosis.  No aneurysm.    Right posterior cerebral artery:  Unremarkable as visualized.  No  occlusion or significant stenosis.  No aneurysm.    Right vertebral artery:  Unremarkable as visualized.       Left internal carotid artery:  No acute findings.  Intracranial  segment is patent with no significant stenosis.  No aneurysm.    Left anterior cerebral artery:  Unremarkable as visualized.  No  occlusion or significant stenosis.  No aneurysm.    Left middle cerebral artery:  Unremarkable as visualized.  No  occlusion or significant stenosis.  No aneurysm.    Left posterior cerebral  artery:  Unremarkable as visualized.  No  occlusion or significant stenosis.  No aneurysm.    Left vertebral artery:  Unremarkable as visualized.       Basilar artery:  Unremarkable as visualized.  No occlusion or  significant stenosis.  No aneurysm.       Impression:        No acute findings in the arteries of the head/brain.     This report was finalized on 7/22/2025 2:34 PM by Dr. Teo Araujo MD.       CT Angiogram Carotids [[id number]] Collected: 07/22/25 1431     Updated: 07/22/25 1435    Narrative:      EXAM:    CT Angiography Neck With Intravenous Contrast     EXAM DATE:    7/22/2025 2:31 PM     CLINICAL HISTORY:    stroke; I63.9-Cerebral infarction, unspecified     TECHNIQUE:    Axial computed tomographic angiography images of the neck with  intravenous contrast.  This CT exam was performed using one or more of  the following dose reduction techniques:  automated exposure control,  adjustment of the mA and/or kV according to patient size, and/or use of  iterative reconstruction technique.    MIP reconstructed images were created and reviewed.     COMPARISON:    None.     FINDINGS:      VASCULATURE:    Right common carotid artery:  Unremarkable as visualized.  No  occlusion or significant stenosis.  No dissection.    Right internal carotid artery:  Mild bilateral ICA calcific stenosis.   No dissection.    Right external carotid artery:  Unremarkable as visualized.  No  occlusion.    Right vertebral artery:  Unremarkable as visualized.  No occlusion or  significant stenosis.  No dissection.       Left common carotid artery:  Unremarkable as visualized.  No occlusion  or significant stenosis.  No dissection.    Left internal carotid artery:  See above.    Left external carotid artery:  Unremarkable as visualized.  No  occlusion.    Left vertebral artery:  Unremarkable as visualized.  No occlusion or  significant stenosis.  No dissection.    Other vasculature:  Atherosclerotic disease.      NECK:     Bones/joints:  Unremarkable as visualized.  No acute fracture.    Soft tissues:  Unremarkable as visualized.    Lung apices:  Clear.      CAROTID STENOSIS REFERENCE USING NASCET CRITERIA:    % ICA stenosis = (1 - narrowest ICA diameter/diameter of distal  cervical ICA) x 100.    Mild - <50% stenosis.    Moderate - 50-69% stenosis.    Severe - 70-94% stenosis.    Near occlusion - 95-99% stenosis.    Occluded - 100% stenosis.       Impression:        Mild bilateral ICA calcific stenosis.     This report was finalized on 7/22/2025 2:33 PM by Dr. Teo Araujo MD.             Results for orders placed during the hospital encounter of 07/20/25    Adult Transthoracic Echo Complete W/ Cont if Necessary Per Protocol (With Agitated Saline) 07/22/2025 10:42 AM    Interpretation Summary    Left ventricular systolic function is hyperdynamic (EF > 70%). Left ventricular ejection fraction appears to be greater than 70%.    Left ventricular diastolic function is consistent with (grade I) impaired relaxation.    Saline test results are negative.    Estimated right ventricular systolic pressure from tricuspid regurgitation is markedly elevated (>55 mmHg).    Aortic root calcification.    CT Angiogram Head  Result Date: 7/22/2025    No acute findings in the arteries of the head/brain.  This report was finalized on 7/22/2025 2:34 PM by Dr. Teo Araujo MD.      CT Angiogram Carotids  Result Date: 7/22/2025    Mild bilateral ICA calcific stenosis.  This report was finalized on 7/22/2025 2:33 PM by Dr. Teo Araujo MD.      EEG  Result Date: 7/21/2025  Diffuse cerebral dysfunction of mild degree but nonspecific.  This is most commonly seen due to toxic/metabolic or hypoxemic cause No evidence for epilepsy is present This report is transcribed using the Dragon dictation system.      US Carotid Bilateral  Result Date: 7/21/2025    Mild multifocal bilateral ICA plaque.  This report was finalized on 7/21/2025 8:20 AM by Dr. Teo Araujo  MD.      XR Hip With or Without Pelvis 2 - 3 View Left  Result Date: 7/20/2025    No acute osseous or articular abnormality in the LEFT hip.    This report was finalized on 7/20/2025 9:18 PM by Dr. Madhav Lang MD.      MRI Brain Without Contrast  Result Date: 7/20/2025   Multiple focal areas of restricted diffusion identified in the right parietal lobe and right frontal lobe most consistent with subacute infarcts possibly due to shower emboli. Mild to moderate generalized brain volume loss. Moderate chronic small vessel ischemic type changes in the white matter. No acute intracranial hemorrhage, mass, or hydrocephalus. No shift in midline structures.  This report was finalized on 7/20/2025 9:14 PM by Leodan Khan MD.      XR Femur 2 View Left  Addendum Date: 7/20/2025  ADDENDUM:  Please note corrections to the original report reflected in the addended report below:  EXAM:   XR LEFT femur, 2 Views  EXAM DATE:   7/20/2025 9:46 AM  CLINICAL HISTORY:   Fall with injury  TECHNIQUE:   Frontal and lateral views of the LEFT femur.  COMPARISON:   No relevant prior studies available.  FINDINGS:   Bones/joints:  Unremarkable.  No acute fracture.  No dislocation.   Soft tissues:  Unremarkable.  IMPRESSION:       No acute fracture or dislocation.  This report was finalized on 7/20/2025 4:38 PM by Dr. Madhav Lang MD.      Result Date: 7/20/2025    No acute fracture or dislocation.  This report was finalized on 7/20/2025 10:24 AM by Dr. Madhav Lang MD.      XR Tibia Fibula 2 View Left  Result Date: 7/20/2025  1.  No displaced fracture or dislocation of the tibia or fibula identified. 2.  Fracture of the fifth metatarsal, incompletely imaged. 3.  Cast device obscures fine bone detail.  This report was finalized on 7/20/2025 10:25 AM by Dr. Madhav Lang MD.      CT Head Without Contrast  Result Date: 7/20/2025  1.  Focal low-density in the right parietal lobe, image #30, axial series, with loss of gray-white matter  differentiation concerning for age-indeterminate infarct, but possibly acute/subacute. If indicated, further evaluation with MRI is recommended. 2.  No midline shift or mass effect.  No hydrocephalus. 3.  No evidence of intracranial hemorrhage. 4.  Moderate chronic small vessel ischemic disease.  This report was finalized on 7/20/2025 10:55 AM by Dr. Madhav Lang MD.      XR Tibia Fibula 2 View Right  Result Date: 7/20/2025    No acute fracture or dislocation.  This report was finalized on 7/20/2025 10:34 AM by Dr. Madhav Lang MD.      XR Forearm 2 View Left  Result Date: 7/20/2025    No acute findings in the left forearm.  This report was finalized on 7/20/2025 10:35 AM by Dr. Madhav Lang MD.      CT Cervical Spine Without Contrast  Result Date: 7/20/2025  1.  No acute fracture or traumatic malalignment identified. 2.  Degenerative changes cervical spine as described.  This report was finalized on 7/20/2025 10:50 AM by Dr. Madhav Lang MD.      CT Pelvis Without Contrast  Result Date: 7/20/2025    No acute fracture or dislocation identified.  This report was finalized on 7/20/2025 10:51 AM by Dr. Madhav Lang MD.      CT Lumbar Spine Without Contrast  Result Date: 7/20/2025  1.  No acute fracture or traumatic malalignment identified. 2.  Degenerative changes lumbar spine as described.  This report was finalized on 7/20/2025 10:52 AM by Dr. Madhav Lang MD.         Lab Results   Component Value Date    HGBA1C 5.43 07/21/2025      Lab Results   Component Value Date    CHOL 131 07/21/2025    CHLPL 191 03/02/2014    TRIG 115 07/21/2025    HDL 32 (L) 07/21/2025    LDL 78 07/21/2025      Lab Results   Component Value Date    WBC 7.56 07/23/2025    HGB 13.7 07/23/2025    HCT 41.0 07/23/2025    MCV 90.5 07/23/2025     07/23/2025      Lab Results   Component Value Date    GLUCOSE 87 07/23/2025    BUN 15.4 07/23/2025    CREATININE 0.83 07/23/2025    EGFRIFNONA 36 (L) 10/14/2021    BCR 18.6 07/23/2025     K 3.7 07/23/2025    CO2 22.0 07/23/2025    CALCIUM 10.0 07/23/2025    ALBUMIN 3.5 07/21/2025    AST 18 07/21/2025    ALT 10 07/21/2025      Lab Results   Component Value Date    TSH 2.300 07/21/2025     Lab Results   Component Value Date    NFZHBDZX79 826 07/21/2025     Lab Results   Component Value Date    FOLATE 7.85 07/21/2025            Assessment/Plan     Assessment/Plan:  79-year-old female with PMHx significant for advanced Alzheimer's with psychosis, prior CVA, hypertension, hyperlipidemia, recent admission for UTI, and frequent falls most recently a fall resulting in left leg fracture presented to the ED after a fall and complaints of right sided pain.  ED workup included a CT head which showed a area concerning for recent ischemia in right parietal lobe.  MRI in the ED showed multiple areas of restricted diffusion in right parietal and right frontal lobe consistent with subacute infarcts.  Carotid ultrasound with mild plaque in the ICAs bilaterally.    #Acute ischemic stroke right parietal lobe, embolic stroke with undetermined source  #Advanced Alzheimer's  #Significant hearing loss      - CTA without LVO, left M2 stenosis, left PCA stenosis (moderate)  - Continue aspirin 81 mg daily, likely benefit from addition of Plavix when tolerating PO  - Atorvastatin 40 mg nightly  - Palliative care consulted and following  - TTE with EF greater than 70%, negative bubble study  - Holter at discharge    The case was discussed with Dr. Tong, and will discuss with primary team    MARYBETH Thomas  07/23/25  10:46 EDT      Electronically signed by Madhav Fairchild APRN at 07/23/25 1102       Consult Notes (last 24 hours)  Notes from 07/23/25 0940 through 07/24/25 0940   No notes of this type exist for this encounter.          Physical Therapy Notes (most recent note)        Ana Gonzalez, PT at 07/23/25 1403  Version 1 of 1         Acute Care - Physical Therapy Treatment Note  AMBER Gambino     Patient  Name: Catherine Snyder  : 1945  MRN: [id number]  Today's Date: 2025   Onset of Illness/Injury or Date of Surgery: 25  Visit Dx:     ICD-10-CM ICD-9-CM   1. Cerebrovascular accident (CVA), unspecified mechanism  I63.9 434.91     Patient Active Problem List   Diagnosis    HCAP (healthcare-associated pneumonia)    UTI (urinary tract infection)    Altered mental status    Cerebrovascular accident (CVA)    Aggressive behavior    Hyperlipidemia    Hypertension    Dementia with psychosis    Anticoagulated    Urinary tract infection    CVA (cerebral vascular accident)     Past Medical History:   Diagnosis Date    Alzheimer disease     Alzheimer's disease     Anxiety     Anxiety disorder, unspecified     Cerebrovascular accident (CVA)     Constipation     Deafness     Delirium due to known physiological condition     Depression     Difficulty in walking, not elsewhere classified     Gastro-esophageal reflux disease with esophagitis     GERD (gastroesophageal reflux disease)     Hyperlipidemia     Hyperlipidemia     Hypertension     Major depressive disorder, single episode     Muscle weakness (generalized)     Other lack of coordination     Paranoid personality (disorder)     Shared psychotic disorder     Unspecified dementia without behavioral disturbance     Unspecified hearing loss, unspecified ear     Unspecified lack of coordination     Unspecified psychosis not due to a substance or known physiological condition      History reviewed. No pertinent surgical history.  PT Assessment (Last 12 Hours)       PT Evaluation and Treatment       Row Name 25 135          Physical Therapy Time and Intention    Subjective Information no complaints  -CT     Document Type therapy note (daily note)  -CT     Mode of Treatment individual therapy;physical therapy  -CT     Comment sat pt EOB this date  -CT       Row Name 25 9323          General Information    Patient Profile Reviewed yes  -CT     Existing  Precautions/Restrictions fall  -CT     Limitations/Impairments safety/cognitive  -CT       Row Name 07/23/25 1355          Home Use of Assistive/Adaptive Equipment    Equipment Currently Used at Home hospital bed  -CT       Row Name 07/23/25 1353          Pain Scale: FACES Pre/Post-Treatment    Pain: FACES Scale, Pretreatment 0-->no hurt  -CT     Posttreatment Pain Rating 0-->no hurt  -CT       Row Name 07/23/25 1357          Cognition    Affect/Mental Status (Cognition) unable/difficult to assess  -CT     Behavioral Issues (Cognition) unable/difficult to assess  -CT     Orientation Status (Cognition) unable/difficult to assess  -CT       Row Name 07/23/25 135          Mobility    Extremity Weight-bearing Status left lower extremity  -CT     Left Lower Extremity (Weight-bearing Status) non weight-bearing (NWB)  no weight bearing orders but pt in hard cast  -CT       Row Name 07/23/25 1352          Bed Mobility    Bed Mobility rolling left;rolling right  -CT     Rolling Left [place] (Bed Mobility) maximum assist (25% patient effort)  -CT     Rolling Right [place] (Bed Mobility) maximum assist (25% patient effort)  -CT     Supine-Sit [place] (Bed Mobility) moderate assist (50% patient effort)  -CT     Sit-Supine [place] (Bed Mobility) maximum assist (25% patient effort)  -CT     Bed Mobility, Safety Issues cognitive deficits limit understanding  -CT       Row Name 07/23/25 1355          Coping    Trust Relationship/Rapport care explained  -CT       Row Name 07/23/25 1355          Plan of Care Review    Plan of Care Reviewed With patient  -CT       Row Name 07/23/25 1358          Positioning and Restraints    Pre-Treatment Position in bed  -CT     Post Treatment Position bed  -CT     In Bed fowlers;call light within reach;encouraged to call for assist;exit alarm on;side rails up x3;pillow between legs  -CT       Row Name 07/23/25 1354          Therapy Assessment/Plan (PT)    Rehab Potential (PT)  good  -CT     Criteria for Skilled Interventions Met (PT) yes  -CT     Therapy Frequency (PT) other (see comments)  per pt tolerance  -CT               User Key  (r) = Recorded By, (t) = Taken By, (c) = Cosigned By      Initials Name Provider Type    CT Ana Gonzalez PT Physical Therapist                      PT Recommendation and Plan  Anticipated Discharge Disposition (PT): skilled nursing facility  Planned Therapy Interventions (PT): balance training, bed mobility training, gait training, manual therapy techniques, motor coordination training, neuromuscular re-education, patient/family education, postural re-education, strengthening, transfer training  Therapy Frequency (PT): other (see comments) (per pt tolerance)  Plan of Care Reviewed With: patient       Time Calculation:    PT Charges       Row Name 25 1402             Time Calculation    PT Received On 25  -CT         Time Calculation- PT    Total Timed Code Minutes- PT 28 minute(s)  -CT                User Key  (r) = Recorded By, (t) = Taken By, (c) = Cosigned By      Initials Name Provider Type    CT Ana Gonzalez PT Physical Therapist                  Therapy Charges for Today       Code Description Service Date Service Provider Modifiers Qty    28769403940  PT THERAPEUTIC ACT EA 15 MIN 2025 Ana Gonzalez, PT GP 2            PT G-Codes  Outcome Measure Options: Modified [place]  AM-PAC 6 Clicks Score (PT): 6  Modified [place] Scale: 5 - Severe disability.  Bedridden, incontinent, and requiring constant nursing care and attention.    Ana Gonzalez PT  2025      Electronically signed by Ana Gonzalez, PT at 25 1403          Occupational Therapy Notes (most recent note)        Rosemary Sequeira, OT at 25 1418          Patient Name: Catherine Snyder  : 1945    MRN: [id number]                              Today's Date: 2025       Admit Date: 2025    Visit Dx:     ICD-10-CM ICD-9-CM   1. Cerebrovascular  accident (CVA), unspecified mechanism  I63.9 434.91     Patient Active Problem List   Diagnosis    HCAP (healthcare-associated pneumonia)    UTI (urinary tract infection)    Altered mental status    Cerebrovascular accident (CVA)    Aggressive behavior    Hyperlipidemia    Hypertension    Dementia with psychosis    Anticoagulated    Urinary tract infection    CVA (cerebral vascular accident)     Past Medical History:   Diagnosis Date    Alzheimer disease     Alzheimer's disease     Anxiety     Anxiety disorder, unspecified     Cerebrovascular accident (CVA)     Constipation     Deafness     Delirium due to known physiological condition     Depression     Difficulty in walking, not elsewhere classified     Gastro-esophageal reflux disease with esophagitis     GERD (gastroesophageal reflux disease)     Hyperlipidemia     Hyperlipidemia     Hypertension     Major depressive disorder, single episode     Muscle weakness (generalized)     Other lack of coordination     Paranoid personality (disorder)     Shared psychotic disorder     Unspecified dementia without behavioral disturbance     Unspecified hearing loss, unspecified ear     Unspecified lack of coordination     Unspecified psychosis not due to a substance or known physiological condition      History reviewed. No pertinent surgical history.   General Information       Row Name 07/23/25 1406          OT Time and Intention    Subjective Information no complaints  -     Document Type therapy note (daily note)  -     Mode of Treatment individual therapy;occupational therapy  -     Patient Effort fair  -     Comment Patient agreeable to therapy.  -       Row Name 07/23/25 1406          General Information    Patient Profile Reviewed yes  -     Existing Precautions/Restrictions fall  -     Barriers to Rehab previous functional deficit;cognitive status  -       Row Name 07/23/25 1406          Safety Issues/Impairments Affecting Functional Mobility     Safety Issues Affecting Function (Mobility) ability to follow commands;awareness of need for assistance  -     Impairments Affecting Function (Mobility) strength;balance;cognition;endurance/activity tolerance;range of motion (ROM)  -     Cognitive Impairments, Mobility Safety/Performance awareness, need for assistance;insight into deficits/self-awareness  -               User Key  (r) = Recorded By, (t) = Taken By, (c) = Cosigned By      Initials Name Provider Type    Rosemary Hou OT Occupational Therapist                     Mobility/ADL's       Row Name 07/23/25 1412          Bed Mobility    Supine-Sit [place] (Bed Mobility) moderate assist (50% patient effort)  -     Sit-Supine [place] (Bed Mobility) maximum assist (25% patient effort)  -     Bed Mobility, Safety Issues cognitive deficits limit understanding  -     Assistive Device (Bed Mobility) head of bed elevated  -       Row Name 07/23/25 1412          Mobility    Extremity Weight-bearing Status left lower extremity  -     Left Lower Extremity (Weight-bearing Status) non weight-bearing (NWB)  -               User Key  (r) = Recorded By, (t) = Taken By, (c) = Cosigned By      Initials Name Provider Type    Rosemary Hou OT Occupational Therapist                   Obj/Interventions       Row Name 07/23/25 1414          Motor Skills    Motor Skills functional endurance  -     Functional Endurance fair  -       Row Name 07/23/25 1414          Balance    Balance Assessment sitting static balance  -     Static Sitting Balance contact guard;minimal assist  -     Position, Sitting Balance unsupported;sitting edge of bed  -     Balance Interventions sitting;static;minimal challenge;occupation based/functional task  -               User Key  (r) = Recorded By, (t) = Taken By, (c) = Cosigned By      Initials Name Provider Type    Rosemary Hou OT Occupational Therapist                   Goals/Plan    No  documentation.                  Clinical Impression       [facility] Name 07/23/25 1415          Pain Scale: FACES Pre/Post-Treatment    Pain: FACES Scale, Pretreatment 0-->no hurt  -     Posttreatment Pain Rating 0-->no hurt  -[facility] Name 07/23/25 1415          Plan of Care Review    Plan of Care Reviewed With patient  -     Progress improving  -     Outcome Evaluation Patient agreeable to therapy. She was pleasant and able to sit at edge of bed unsupported. She was alert and tolerated well.  -[facility] Name 07/23/25 1415          Therapy Plan Review/Discharge Plan (OT)    Anticipated Discharge Disposition (OT) skilled nursing facility  [facility] Name 07/23/25 1415          Positioning and Restraints    Pre-Treatment Position in bed  -     Post Treatment Position bed  -     In Bed fowlers;call light within reach;encouraged to call for assist;exit alarm on  [facility]               User Key  (r) = Recorded By, (t) = Taken By, (c) = Cosigned By      Initials Name Provider Type     Rosemary Sequeira OT Occupational Therapist                   Outcome Measures       [facility] Name 07/23/25 0820          How much help from another person do you currently need...    Turning from your back to your side while in flat bed without using bedrails? 1  -SR     Moving from lying on back to sitting on the side of a flat bed without bedrails? 1  -SR     Moving to and from a bed to a chair (including a wheelchair)? 1  -SR     Standing up from a chair using your arms (e.g., wheelchair, bedside chair)? 1  -SR     Climbing 3-5 steps with a railing? 1  -SR     To walk in hospital room? 1  -SR     AM-PAC 6 Clicks Score (PT) 6  -SR               User Key  (r) = Recorded By, (t) = Taken By, (c) = Cosigned By      Initials Name Provider Type    SR Luciana Hale, RN Registered Nurse                      OT Recommendation and Plan  Planned Therapy Interventions (OT): activity tolerance training, BADL retraining, functional balance  retraining, patient/caregiver education/training, passive ROM/stretching, occupation/activity based interventions, ROM/therapeutic exercise, neuromuscular control/coordination retraining, strengthening exercise, transfer/mobility retraining  Therapy Frequency (OT): other (see comments) (PRN)  Plan of Care Review  Plan of Care Reviewed With: patient  Progress: improving  Outcome Evaluation: Patient agreeable to therapy. She was pleasant and able to sit at edge of bed unsupported. She was alert and tolerated well.     Time Calculation:         Time Calculation- OT       Row Name 25 1418             Time Calculation- OT    OT Received On 25  -                User Key  (r) = Recorded By, (t) = Taken By, (c) = Cosigned By      Initials Name Provider Type     Rosemary Sequeira OT Occupational Therapist                  Therapy Charges for Today       Code Description Service Date Service Provider Modifiers Qty    27995437801 HC OT THERAPEUTIC ACT EA 15 MIN 2025 Rosemary Sequeira OT GO 1    [id number] HC OT THERAPEUTIC ACT EA 15 MIN 2025 Rosemary Sequeira OT GO 1                 Rosemary Sequeira OT  2025    Electronically signed by Rosemary Sequeira OT at 25 1419          Speech Language Pathology Notes (most recent note)        Mechelle Vaca MA,CCC-SLP at 25 1113          Acute Care - Speech Language Pathology   Swallow Re-Evaluation  [facility]  MODIFIED BARIUM SWALLOW STUDY     Patient Name: Catherine Snyder  : 1945  MRN: [id number]  Today's Date: 2025  Onset of Illness/Injury or Date of Surgery: 25     Referring Physician: Leroy      Admit Date: 2025    Visit Dx:     ICD-10-CM ICD-9-CM   1. Cerebrovascular accident (CVA), unspecified mechanism  I63.9 434.91     Patient Active Problem List   Diagnosis    HCAP (healthcare-associated pneumonia)    UTI (urinary tract infection)    Altered mental status    Cerebrovascular accident (CVA)    Aggressive behavior  "   Hyperlipidemia    Hypertension    Dementia with psychosis    Anticoagulated    Urinary tract infection    CVA (cerebral vascular accident)     Past Medical History:   Diagnosis Date    Alzheimer disease     Alzheimer's disease     Anxiety     Anxiety disorder, unspecified     Cerebrovascular accident (CVA)     Constipation     Deafness     Delirium due to known physiological condition     Depression     Difficulty in walking, not elsewhere classified     Gastro-esophageal reflux disease with esophagitis     GERD (gastroesophageal reflux disease)     Hyperlipidemia     Hyperlipidemia     Hypertension     Major depressive disorder, single episode     Muscle weakness (generalized)     Other lack of coordination     Paranoid personality (disorder)     Shared psychotic disorder     Unspecified dementia without behavioral disturbance     Unspecified hearing loss, unspecified ear     Unspecified lack of coordination     Unspecified psychosis not due to a substance or known physiological condition      History reviewed. No pertinent surgical history.    Catherine Snyder presented to the radiology suite this am from 3S to participate in an instrumental MBS to objectively re-evaluate safety/efficacy of swallowing fnx, determine safest/least restrictive diet, candidacy for po intake.     Per chart review, \"past medical history is significant for advanced Alzheimer's dementia with psychosis, prior history of CVA, HTN, HLD, GERD, recent admission for UTI and Hx of ESBL UTI, recent frequent falls resulting in left lower extremity fracture-currently casted.     Patient was seen at bedside with nurse Meggan Denise RN present.  Patient was awake, alert, but not oriented.  Patient is deaf and does not communicate or follow instructions.  Nursing staff is attempting to place a second IV while the patient was being combative and spitting at them.  Patient was sent to our hospital ER secondary to a fall at the nursing home where she " "has been a full-time resident for a significant period of time.  They had concern for the patient's showing signs of right hip/leg pain.  Patient does not have any family members or healthcare surrogates present at her hospitalization today and is unable to provide her own history due to Alzheimer's dementia.  ER workup revealed a head CT suggesting a recent stroke.  Neurology was consulted while the patient was in the ER and it was determined that the patient should be admitted for further workup.     ED, vital signs were /79   Pulse 68   Temp 98.3 °F (36.8 °C) (Oral)   Resp 17   Ht 172.7 cm (68\")   Wt 79.4 kg (175 lb 0.7 oz)   SpO2 93%   BMI 26.62 kg/m²   ED workup significant for:   CT head: Focal low-density in the right parietal lobe, with loss of gray-white matter differentiation concerning for age-indeterminate infarct, but possible acute/subacute.  If indicated further evaluation with MRI is recommended.  No midline shift or mass effect.  No hydrocephalus.  No evidence of intracranial hemorrhage.  Moderate chronic small vessel ischemic disease.  MRI brain: Brain: Multiple focal areas of restricted diffusion identified in the right parietal lobe and right frontal lobe most consistent with subacute infarct possibly due to shower emboli.  Mild to moderate degenerative brain volume loss.  Moderate chronic small vessel ischemic type changes in the white matter.  No acute intracranial hemorrhage, mass, hydrocephalus.  No shift in the midline structure.  Pan scan of the spine revealed no acute fractures or dislocations  X-rays of left and right tib-fib, right femur, left forearm, bilateral hips and pelvis revealed no new acute fractures.  Glucose 102, creatinine 1.27, BUN 21.4, BUN/creatinine ratio 16.9, WBC 9.51, valproic acid 21.3.\"     She was referred per stroke protocol. Per review of EMR, patient is familiar to SLP Department from recent [facility] admission w/ UTI, recommended least restrictive " modified po diet of mechanical ground consistencies, thin liquids.     She was participated in clinical dysphagia assessments 7/21/25 and 7/22/25, w/ patient evidencing w/ significant ams, fluctuating a/a status, and poor participation w/ refusal of all attempted po trials. She was reassessed this am w/ improving status, significantly improved acceptance of po trials, intermittent overt s/s aspiration w/ thin water trials, recommended for instrumental MBS.       Social History     Socioeconomic History    Marital status:    Tobacco Use    Smoking status: Never    Smokeless tobacco: Never   Vaping Use    Vaping status: Never Used   Substance and Sexual Activity    Alcohol use: No    Drug use: No    Sexual activity: Not Currently     Partners: Male      Imaging:  No recent chest xray available for review.     Labs:  Sodium  140  07/23 0827  Potassium  3.7  07/23 0827  Chloride  105  07/23 0827  CO2  22.0  07/23 0827  BUN  15.4  07/23 0827  Creatinine  0.83  07/23 0827  Glucose  87  07/23 0827  Hemoglobin  13.7  07/23 0827  Hematocrit  41.0  07/23 0827  WBC  7.56  07/23 0827  Platelets  202  07/23 0827  PTT  27.7  07/21 0543  Protime  14.1  07/21 0543  INR  1.03  07/21 0543     Diet Orders (active) (From admission, onward)       Start     Ordered    07/23/25 0959  NPO Diet NPO Type: Strict NPO, Ice Chips  Diet Effective Now        Comments: Ice chip/water protocol for oral  hydration/comfort    07/23/25 0959                  She was observed on ra w/o complications.     Risks and benefits of the procedure were explained w/ patient was cooperative to participate. Proceed per protocol.     Patient was positioned upright and centered in a soft strap supportive chair to accept multiple po presentations of crumbled cracker in puree, puree, honey thick, nectar thick, and thin liquids via spoon, cup and straw, along w/ whole placebo pill in puree. Patient was unable to self provide po trials 2/2 ams.      All views  are from the lateral plane.     Facial/oral structures were symmetrical upon observation. No obvious lingual deviation, unable to elicit protrusion. Oral mucosa were mildly xerostomic, pink and clean. Secretions were clear, slightly tacky, and controlled. OROM/[place] appeared generally weak. She did not attempt to imitate oral postures. Gag was not assessed. Volitional cough could not be assessed as patient was unable imitate and no spontaneous cough evidenced across this evaluation. Vocal quality could not be assessed, she was nonverbal across this evaluation. Patient was a/a and cooperative to participate and accept all po presentations during evaluation. She continued w/ significant ams, did not follow directives or attempt to communicate or interact. Prior documentation noted family to have reported patient w/ premorbid baseline non-communicative status 2/2 baseline ams and deafness. Per acute on chronic significant ams, patient unable to functionally participate in formal speech language cognitive assessment.      Upon po presentations, decreased bolus anticipation w/ mildly weak but adequate labial seal for bolus clearance via spoon bowl, cup rim stability and suction via straw. No significant anterior loss evidenced. Bolus formation, manipulation, and control were moderately weak in general, marked lingual pumping evidenced w/ all consistencies, w/ significantly increased oral prep time/pumping w/ crumbled solid in puree, mixed phasic/rotary mastication pattern. A-p transit was moderately-moderately severely delayed, more so w/ thicker viscosities and consistencies, however, w/o significant oral residue. Tongue base retraction and linguavelar seal were generally weak w/ vallecular pooling w/ puree and honey thick liquids, no significant premature spillage. No laryngeal penetration or aspiration evidenced before the swallow.     Pharyngeal swallow was mildly delayed w/ adequate hyolaryngeal elevation and  epiglottic inversion. Intermittent transient laryngeal penetration occurred during the swallow w/ thin liquid via spoon and straw, clearing upon completion of the swallow w/o significant residue. Pharyngeal contraction was adequate w/ only trace vallecular and pyriform sinus residue intermittently across consistencies. No other laryngeal penetration or aspiration evidenced during or after the swallow.     She was able to manipulate and swallow whole placebo pill in puree, w/ significantly delayed a-p transit.                Penetration-Aspiration Scale Scoring  Consistency: Teaspoon Bolus Cup Bolus Straw Bolus   Puree 1     Honey 1     Nectar 1     Thin 1, 2 1 1, 2   Solid 1       *Reference*      Full esophageal sweep revealed no obvious mucosal abnormalities. Motility appeared decreased, delayed transit at the mid esophagus w/ placebo pill, clearing w/ additional puree trial. No obvious retrograde flow noted.      Impression: Per this re-evaluation, Ms. Snyder presented w/ a moderate-moderately severe oral dysphagia, wfl pharyngeal swallow w/ intermittent transient laryngeal penetration during the swallow w/ thin liquids via spoon and straw, clearing upon completion of the swallow, no evidence of aspiration across this evaluation.     Today's MBS, with her improved overall status and participation, is felt to be most likely representative of at or near patient's premorbid baseline swallowing fnx. She is felt to most benefit from least restrictive modified po diet of mechanical ground consistencies, thin liquids, 1:1 feeding assistance, fully a/a and upright and centered positioning for all po intake, medications crushed in puree.     SLP Recommendation and Plan  1. Mechanical ground consistencies, thin liquids.   2. Medications crushed in puree.   3. 1:1 feeding assistance.   4. Fully a/a, upright and centered for all po intake.   5. Universal aspiration and NORA precautions.  6. Oral care protocol.  SLP to f/u  for diet safety/acceptance.     D/w RN results and recommendations w/ verbal understanding and agreement.     Communicated results and recommendations to Dr. Riddle and Palliative care team.     Thank you for allowing me to participate in the care of your patient-  Mechelle Vaca M.A., CCC-SLP      EDUCATION  The patient has been educated in the following areas:   Dysphagia (Swallowing Impairment) Oral Care/Hydration Modified Diet Instruction.      Time Calculation:    Time Calculation- SLP       Row Name 07/23/25 1112             Time Calculation- SLP    SLP - Next Appointment 07/24/25  -                User Key  (r) = Recorded By, (t) = Taken By, (c) = Cosigned By      Initials Name Provider Type     Mechelle Vaca MA,CCC-SLP Speech and Language Pathologist                  Therapy Charges for Today       Code Description Service Date Service Provider Modifiers Qty    55736425414 HC ST EVAL ORAL PHARYNG SWALLOW 4 7/22/2025 Mechelle Vaca MA,CCC-SLP GN 1    [id number] HC ST EVAL ORAL PHARYNG SWALLOW 3 7/23/2025 Mechelle Vaca MA,CCC-SLP GN 1    [id number] HC ST MOTION FLUORO EVAL SWALLOW 8 7/23/2025 Mechelle Vaca MA,CCC-SLP GN 1            Mechelle Vaca MA,CCC-SLP  7/23/2025    Electronically signed by Mechelle Vaca MA,CCC-SLP at 07/23/25 1529       ADL Documentation (most recent)      Flowsheet Row Most Recent Value   Transferring 4 - completely dependent   Toileting 3 - assistive equipment and person   Bathing 2 - assistive person   Dressing 2 - assistive person   Eating 2 - assistive person   Communication 2 - difficulty understanding (not related to language barrier)   Swallowing 2 - difficulty swallowing liquids/foods   Equipment Currently Used at Home hospital bed            Discharge Summary    No notes of this type exist for this encounter.       Discharge Order (From admission, onward)      None

## 2025-07-24 NOTE — DISCHARGE PLACEMENT REQUEST
"Shonda Snyder (79 y.o. Female)       Date of Birth   1945    Social Security Number       Address   [address] [facility] [address]    Home Phone   [phone]    MRN   [id number]       Zoroastrian   None    Marital Status                               Admission Date   7/20/2025    Admission Type   Emergency    Admitting Provider   Christofer Harper MD    Attending Provider   Master Riddle DO    Department, Room/Bed   [address], 3307/1S       Discharge Date       Discharge Disposition   Skilled Nursing Facility (DC - External)    Discharge Destination                                 Attending Provider: Master Riddle DO    Allergies: No Known Allergies    Isolation: Contact   Infection: ESBL E coli (01/27/22)   Code Status: No CPR    Ht: 170.2 cm (67\")   Wt: 72.9 kg (160 lb 12.8 oz)    Admission Cmt: None   Principal Problem: CVA (cerebral vascular accident) [I63.9]                   Active Insurance as of 7/20/2025       Primary Coverage       Payor Plan Insurance Group Employer/Plan Group    MEDICARE MEDICARE B ONLY        Payor Plan Address Payor Plan Phone Number Payor Plan Fax Number Effective Dates    [address] [address] [phone]  7/1/2008 - None Entered    [facility] [id number]         Subscriber Name Subscriber Birth Date Member ID       SHONDA SNYDER 1945 [id number]               Secondary Coverage       Payor Plan Insurance Group Employer/Plan Group    [place]        Payor Plan Address Payor Plan Phone Number Payor Plan Fax Number Effective Dates    [address] [phone]  12/15/2016 - None Entered    [facility] [id number]         Subscriber Name Subscriber Birth Date Member ID       SHONDA SNYDER 1945 [id number]                     Emergency Contacts        (Rel.) Home Phone Work Phone Mobile Phone    Gita Snyder (Grandchild) [phone] -- [phone]        After Visit Summary  Shonda Snyder  MRN: " "[id number]    Icon primary diagnosis          Stroke   Icon Date   7/20/2025 - 7/24/2025   Icon Location   [facility]   Icon Checklist header    Your Next Steps    Icon destination   Destination    [facility] [place]   Services: Nursing Home   [address]   [place] [address] [phone]    Icon do   Do    Icon Checkbox     these medications from [employer]. - [place], [place] - [address]  [phone] [facility] [phone] FX  atorvastatin  clopidogrel  Icon Location   Go    Jul 24 EXTENDED MONITOR VISIT 12:00 PM   Arrive by 11:45 AM  [facility] CARDIOPULMONARY   [address]   [place] [address] [phone]   You have more future appointments. Please review your full appointment list.  [employer] Sign-Up    Send messages to your doctor, view your test results, renew your prescriptions, schedule appointments, and more.   Go to [email or web address] click \"Sign Up Now\", and enter your personal activation code: [email or web address]. Activation code expires 8/10/2025.  After Visit Summary  Instructions    Icon medication changes this visit           Your medications have changed    Icon medications to start taking   START taking:  clopidogrel (PLAVIX)   Start taking on: July 25, 2025  Icon medications to change how you take   CHANGE how you take:  atorvastatin (LIPITOR) -- medication strength, how much to take  Review your updated medication list below.  Your Next Steps  Icon destination   Destination  Icon do   Do  Icon Location   Go      If you have any questions about your recovery, please call the [facility] Nurse Call Center at [phone]. A registered nurse is available 24 hours a day 7 days a week to assist you.   If you have any COVID-19 related questions, please call [phone].     Conversations that Matter: Have you thought about who would speak for you if you could not speak for yourself?   "   Consider appointing someone to make healthcare decisions for you if you are unable to do so. We can help! Call our Advance Care Planning helpline at [phone], or visit   [email or web address].  Icon activity    Activity instructions    Resume activity as tolerated.      Icon diet    Diet instructions    Resume diet as tolerated.      Icon Orders placed today                  Other instructions    Discharge Follow-up with PCP   Currently Documented PCP:   Ricky Alamo MD   PCP Phone Number:   [phone]   Follow Up Details:   1 week follow up  Discharge Follow-up with Specialty: Stroke Clinic; 3 Months   Specialty:   Stroke Clinic  Follow Up:   3 Months  Follow Up Details:   2-3 month follow up with stroke clinic  What's Next    What's Next          Follow up with Ricky Alamo  1 week follow up [address]   [facility] [address]  [phone]          Follow up with Patricia Delacruz in 1 week  pt  has  an  apointment  with  patricia delacruz  for oct 24  at  2 ;30  at  [facility] [address] [facility] [address]  [phone]   Jul 24 EXTENDED MONITOR VISIT  Thursday Jul 24, 2025 12:00 PM (Arrive by 11:45 AM) [facility]  [address] [facility] [address] [phone]   Oct 24 Hospital Follow Up with Patricia Delacruz  Friday Oct 24, 2025 2:30 PM [facility]  [address] [facility] [id number] [phone]   Icon Orders placed today                    Additional Information        Pt  has  an  apointment  with  patricia delacruz  at  [facility]   for  oct  24  at  2 :30  pt  discharged  back  to  [facility]  and  rehab               Continuing Care    Icon destination    Destination    [facility]  Services: Nursing Home   Address: [address] [place] [address], [facility] [address]   Phone: [phone]     Your Allergies  Date Reviewed: 7/20/2025  Your Allergies   No active allergies     Patient Belongings  Returned    Document Return of Belongings Flowsheet    Were the patient bedside belongings sent home? --   Medication Retrieved from Unit & Sent Home --   Belongings Sent to Safe --   Belongings sent with: --   Belongings Retrieved from Security & Sent Home --   Medication Retrieved from Unit & Sent Home --   Medications Retrieved from Pharmacy & Sent Home --         MyChart Signup    [facility] [employer] allows you to send messages to your doctor, view your test results, renew your prescriptions, schedule appointments, and more. To sign up, go to Real Intent and click on the Sign Up Now link in the New User? box. Enter your M[employer]Tix Activation Code exactly as it appears below along with the last four digits of your Social Security Number and your Date of Birth () to complete the sign-up process. If you do not sign up before the expiration date, you must request a new code.     [employer]ix Activation Code: [email or web address]  Expires: 8/10/2025  4:10 PM     If you have questions, you can email [email or web address] or call [phone] to talk to our M[employer] staff. Remember, MogoTix is NOT to be used for urgent needs. For medical emergencies, dial 911.     Medication List  Medication List     Morning Around Noon Evening Bedtime As Needed    acetaminophen 500 MG tablet  Commonly known as: TYLENOL  Take 1 tablet by mouth Every 6 (Six) Hours As Needed for Mild Pain or Fever. Indications: Pain  For: Pain     1 tablet    aspirin 81 MG chewable tablet  Chew 1 tablet Daily. Indications: Disease involving Lipid Deposits in the Arteries  For: Disease involving Lipid Deposits in the Arteries  Last time this was given: 81 mg on 2025 10:27 AM 1 tablet       Icon medications to change how you take   atorvastatin 40 MG tablet  Commonly known as: LIPITOR  Take 1 tablet by mouth Every Night for 30 days.  What changed:   medication strength  how much to take  Last time this was given: 40 mg on   2025  8:59 PM  Signed by: Master Riddle    1 tablet     cloNIDine 0.1 MG tablet  Commonly known as: CATAPRES  Take 1 tablet by mouth Every 8 (Eight) Hours As Needed for High Blood Pressure.  Last time this was given: 0.1 mg on July 22, 2025 12:30 AM     1 tablet   Icon medications to start taking   clopidogrel 75 MG tablet  Commonly known as: PLAVIX  Start taking on: July 25, 2025  Take 1 tablet by mouth Daily for 20 days.  Last time this was given: 75 mg on July 24, 2025 10:27 AM  Signed by: Master Riddle Wait until Jul 25     1 tablet        Divalproex Sodium 125 MG capsule  Commonly known as: DEPAKOTE SPRINKLE  Take 1 capsule by mouth 2 (Two) Times a Day.  Last time this was given: 125 mg on July 24, 2025 10:27 AM 1 capsule   1 capsule     docusate sodium 100 MG capsule  Commonly known as: COLACE  Take 1 capsule by mouth Daily.  Last time this was given: 100 mg on July 24, 2025 10:27 AM 1 capsule        ferrous sulfate 325 (65 FE) MG tablet  Take 1 tablet by mouth Daily. Indications: Iron Deficiency  For: Iron Deficiency  Last time this was given: 325 mg on July 24, 2025 10:27 AM 1 tablet        gabapentin 100 MG capsule  Commonly known as: NEURONTIN  Take 1 capsule by mouth Every Night.  Last time this was given: 100 mg on July 23, 2025  8:59 PM    1 capsule     HYDROcodone-acetaminophen 5-325 MG per tablet  Commonly known as: NORCO  Take 1 tablet by mouth Every 6 (Six) Hours As Needed (Pain).  For diagnoses: Displaced fracture of fifth metatarsal bone, left foot, initial encounter for closed fracture  Signed by: Panda Jones     1 tablet    Lidocaine 4 %  Place 1 patch on the skin as directed by provider Daily. Remove & Discard patch within 12 hours or as directed by MD  Last time this was given: 1 patch on July 23, 2025 11:30 AM    Place 1 patch on the skin as directed by provider Daily. Remove & Discard patch within 12 hours or as directed by MD    magnesium hydroxide 400 MG/5ML suspension  Commonly known  as: MILK OF MAGNESIA  Take 30 mL by mouth Daily As Needed for Constipation. Indications: constipation  For: constipation     30 mL    memantine 5 MG tablet  Commonly known as: NAMENDA  Take 1 tablet by mouth Every 12 (Twelve) Hours.  For: Alzheimer's Disease  Last time this was given: 5 mg on July 24, 2025 10:27 AM  Signed by: Ricky Alamo 1 tablet   1 tablet     MiraLax Mix-In [place] 17 g packet  Take 17 g by mouth Daily.  Generic drug: polyethylene glycol  Last time this was given: 17 g on July 24, 2025 10:28 AM 17 g        OLANZapine 2.5 MG tablet  Commonly known as: zyPREXA  Take 1 tablet by mouth Every Night.  Last time this was given: 2.5 mg on July 23, 2025  8:59 PM    1 tablet     senna 8.6 MG tablet  Commonly known as: SENOKOT  Take 2 tablets by mouth 2 (Two) Times a Day. Indications: Constipation  For: Constipation  Last time this was given: 2 tablets on July 24, 2025 10:28 AM 2 tablets   2 tablets     vitamin D 1.25 MG (59317 UT) capsule capsule  Commonly known as: ERGOCALCIFEROL  Take 1 capsule by mouth 1 (One) Time Per Week.    Take 1 capsule by mouth 1 (One) Time Per Week.     Where to  your medications    [employer] medication  information                   these medications at [employer]. - [place], [place] - [facility] - [phone] [place] [phone] FX    atorvastatin  clopidogrel  Address: [address] [place] [address]   Phone: [phone]     Always carry an updated list of your medications with you. If there is an emergency, a responder can quickly see what medications you are taking. Take this paperwork with you the next time you see your health care provider.          Ricardo Sign-Up          Stroke Symptoms    Call 911 or have someone take you to the Emergency Department if you have any of the following:  Sudden numbness or weakness of your face, arm or leg especially on one side of the body  Sudden confusion, difficulty speaking or trouble understanding   Changes in your  vision or loss of sight in one eye  Sudden severe headache with no known cause  Sudden dizziness, trouble walking, loss of balance or coordination     It is important to seek emergency care right away if you have further stroke symptoms. If you get emergency help quickly, the powerful clot-dissolving medicines can reduce the disabilities caused by a stroke.      For more information:  American Stroke Association  5-781-8-STROKE  www.strokeassociation.org  Suicidal Feelings    If you feel like life is too tough and are thinking of suicide or injuring yourself, get help right away!  Call or text [phone] to speak to someone.     Patient Experience    Thank you for choosing [facility]. You may receive a survey following your visit. Please take a moment to share what went well, where we need improvement, and which staff members deserve recognition. We value your input.        Discharge Summary    No notes of this type exist for this encounter.       Discharge Order (From admission, onward)       Start     Ordered    07/24/25 1042  Discharge patient  Once        Expected Discharge Date: 07/24/25   Discharge Disposition: Skilled Nursing Facility (DC - External)   Physician of Record for Attribution - Please select from Treatment Team: TEODORO TILLMAN [[id number]]   Review needed by CMO to determine Physician of Record: No      Question Answer Comment   Physician of Record for Attribution - Please select from Treatment Team TEODORO TILLMAN    Review needed by CMO to determine Physician of Record No        07/24/25 1040

## 2025-07-24 NOTE — PLAN OF CARE
Goal Outcome Evaluation:                 Call[facility]. Relayed to Aruna that Holter monitor will need to be mailed in 14 days. IV and tele removed.

## 2025-07-24 NOTE — CASE MANAGEMENT/SOCIAL WORK
Discharge Planning Assessment  [facility]     Patient Name: Catherine Snyder  MRN: [id number]  Today's Date: 7/24/2025    Admit Date: 7/20/2025    Plan: [facility].  Facility aware and agreeable per Ivan.  SS sent AVS to facility and provided RN number to call report.       Discharge Plan       Row Name 07/24/25 1223       Plan    Plan Pt to be discharged back [facility] date.  Facility aware and agreeable per Ivan.  SS sent AVS to facility and provided RN number to call report.      Row Name 07/24/25 1039                  Continued Care and Services - Admitted Since 7/20/2025       Destination Coordination complete.      Service Provider Request Status Services Address Phone Fax Patient Preferred    [facility] & [address] [address] [phone] --                      ERIC Piper

## 2025-07-24 NOTE — THERAPY RE-EVALUATION
"Acute Care - Speech Language Pathology   Swallow Re-Assessment [facility]     Patient Name: Catherine Snyder  : 1945  MRN: [id number]  Today's Date: 2025  Onset of Illness/Injury or Date of Surgery: 25     Referring Physician: Leroy      Admit Date: 2025    Ms. Snyder was assessed for continued modified po diet safety/acceptance.     She is s/p MBS 25 with findings of:   \"Ms. Snyder presented w/ a moderate-moderately severe oral dysphagia, wfl pharyngeal swallow w/ intermittent transient laryngeal penetration during the swallow w/ thin liquids via spoon and straw, clearing upon completion of the swallow, no evidence of aspiration across this evaluation.      Today's MBS, with her improved overall status and participation, is felt to be most likely representative of at or near patient's premorbid baseline swallowing fnx. She is felt to most benefit from least restrictive modified po diet of mechanical ground consistencies, thin liquids, 1:1 feeding assistance, fully a/a and upright and centered positioning for all po intake, medications crushed in puree.\"    She is reported to po intake and acceptance. No overt s/s aspiration. No silent aspiration suspected. She is tentative for discharge back to [facility] on this date. She remains afebrile, on room air, w/o leukocytosis.     Visit Dx:     ICD-10-CM ICD-9-CM   1. Cerebrovascular accident (CVA), unspecified mechanism  I63.9 434.91   2. Paroxysmal atrial fibrillation  I48.0 427.31     Patient Active Problem List   Diagnosis    HCAP (healthcare-associated pneumonia)    UTI (urinary tract infection)    Altered mental status    Cerebrovascular accident (CVA)    Aggressive behavior    Hyperlipidemia    Hypertension    Dementia with psychosis    Anticoagulated    Urinary tract infection    CVA (cerebral vascular accident)     Past Medical History:   Diagnosis Date    Alzheimer disease     Alzheimer's disease     " Anxiety     Anxiety disorder, unspecified     Cerebrovascular accident (CVA)     Constipation     Deafness     Delirium due to known physiological condition     Depression     Difficulty in walking, not elsewhere classified     Gastro-esophageal reflux disease with esophagitis     GERD (gastroesophageal reflux disease)     Hyperlipidemia     Hyperlipidemia     Hypertension     Major depressive disorder, single episode     Muscle weakness (generalized)     Other lack of coordination     Paranoid personality (disorder)     Shared psychotic disorder     Unspecified dementia without behavioral disturbance     Unspecified hearing loss, unspecified ear     Unspecified lack of coordination     Unspecified psychosis not due to a substance or known physiological condition      History reviewed. No pertinent surgical history.    EDUCATION  The patient has been educated in the following areas:   Dysphagia (Swallowing Impairment) Oral Care/Hydration Modified Diet Instruction.     Impression: Ms. Snyder continues to accept her least restrictive modified po diet w/o overt s/s aspiration.No silent aspiration suspected. No odynophagia. She is tentative to [facility].     SLP Recommendation and Plan   1. Continue least restrictive modified po diet of mechanical soft textures, ground meats, thin liquids.   2. Medications crushed in puree.   3. 1:1 feeding assistance.   4. Fully a/a, upright and centered for all po intake.   5. Universal aspiration.  6. NORA precautions.  7. Oral care protocol.    Thank you for allowing me to participate in the care of  your patient-  Fadumo Fairchild M.S., CCC/SLP                                                                                                  Time Calculation:                Fadumo Fairchild, MS CCC-SLP  7/24/2025

## 2025-07-24 NOTE — DISCHARGE SUMMARY
[facility]ITAL[facility] DISCHARGE SUMMARY    Patient Identification:  Name:  Catherine Snyder  Age:  79 y.o.  Sex:  female  :  1945  MRN:  [id number]  Visit Number:  [id number]    Date of Admission: 2025  Date of Discharge:  2025     PCP: Ricky Alamo MD    DISCHARGE DIAGNOSIS  Acute ischemic stroke  Advanced Alzheimer's dementia with psychosis  ИВАН, resolved  Chronic hearing loss  Hx hyperlipidemia  Hx hypertension  Hx iron deficiency anemia  Hx constipation    CONSULTS   Neurology    PROCEDURES PERFORMED      HOSPITAL COURSE  Patient is a 79 y.o. female presented to [facility] after recurrent fall at outside skilled nursing facility.  Patient with recent fall and resultant left lower extremity fracture.  Please see the admitting history and physical for further details.      Patient upon presentation awake alert but not oriented patient clinically deaf unable to communicate or follow instructions in the setting of known Alzheimer's dementia with psychosis.  During the course of her evaluation the emergency department given her recent fall and what appeared to be some guarding of likely right hip/leg pain a head CT was obtained which showed evidence of recent acute stroke and neurology was consulted in the emergency department with recommendation to admit for further workup and evaluation and patient subsequently admitted.  Postadmission patient frequently refusing medications or being agitated and combative consistent with her James dementia with psychosis history and did on her 2 occasions require some medication to help calm her down.  Ultimately though patient improving not requiring any acute interventions and able to finally be cooperative enough to have video swallow evaluation and placed on appropriate modified diet.  Given confirmed evidence of stroke and already being on aspirin Plavix was added with a planned total course of 21 days of Plavix before  de-escalation back to aspirin monotherapy given patient's advanced age and increased risk of bleeding complications.  Neurology recommended Holter monitor and this was placed prior to her discharge.  Echocardiogram obtained showed no evidence of thrombus and bubble study was negative.  CT angiogram of head and neck showed only mild bilateral SCA stenosis.  Given patient's current quality of life and chronic issues now coupled with recent stroke with known history of intermittent poor medication compliance due to refusals palliative care was consulted and did discuss with family particularly patient's granddaughter who is POA however they expressed 0 interest in the services at this time and remained hyperfocused on wanting their grandmother to be able to eat.  Only by time of discharge patient taking medications and being compliant and having oral intake and as she was already a permanent resident at outside skilled nursing facility with casted lower extremity due to inability to undergo surgical interventions she is felt to achieve maximal benefit of continued inpatient hospitalization and subsequently discharged back to outside skilled nursing facility with the above-noted total planned days of dual antiplatelet therapy with aspirin and Plavix before de-escalation to aspirin monotherapy as well as an increase in statin.      VITAL SIGNS:  Temp:  [98.4 °F (36.9 °C)-98.8 °F (37.1 °C)] 98.8 °F (37.1 °C)  Heart Rate:  [70-83] 70  Resp:  [18-20] 20  BP: (140-167)/(60-98) 167/77  SpO2:  [92 %-97 %] 97 %  on   ;   Device (Oxygen Therapy): room air    Body mass index is 25.18 kg/m².  Wt Readings from Last 3 Encounters:   07/24/25 72.9 kg (160 lb 12.8 oz)   07/11/25 79.4 kg (175 lb 0.7 oz)   07/11/25 79.4 kg (175 lb 0.7 oz)       PHYSICAL EXAM:  Constitutional: Chronically ill-appearing elderly adult female resting in bed in no distress.      HENT:  Head:  Normocephalic and atraumatic.  Mouth:  Moist mucous membranes.     Eyes:  Conjunctivae and EOM are normal. No scleral icterus.    Cardiovascular:  Normal rate, regular rhythm and normal heart sounds with no murmur.  Pulmonary/Chest:  No respiratory distress, no wheezes, no crackles, with normal breath sounds and good air movement.  Abdominal:  Soft.  Bowel sounds are normal.  No distension and no tenderness.   Musculoskeletal:  No tenderness, and no deformity.  No red or swollen joints anywhere.  Moves all extremities spontaneously.  Decreased muscle tone noted.  Cast to left lower extremity.  Neurological: Awake and alert but nonverbal unable to assess orientation and noncooperative with exam.  Does not follow commands.  No focal deficits on limited exam.  Does appear to move all extremities spontaneously  Skin:  Skin is warm and dry. No rash or lesion noted. No pallor.   Peripheral vascular:  Pulses in all 4 extremities with no clubbing, no cyanosis, no edema.  Psychiatric: Appropriate mood and affect, pleasant.     DISCHARGE DISPOSITION   Stable    DISCHARGE MEDICATIONS:     Discharge Medications        New Medications        Instructions Start Date   clopidogrel 75 MG tablet  Commonly known as: PLAVIX   75 mg, Oral, Daily   Start Date: July 25, 2025            Changes to Medications        Instructions Start Date   atorvastatin 40 MG tablet  Commonly known as: LIPITOR  What changed:   medication strength  how much to take   40 mg, Oral, Nightly             Continue These Medications        Instructions Start Date   acetaminophen 500 MG tablet  Commonly known as: TYLENOL   500 mg, Every 6 Hours PRN      aspirin 81 MG chewable tablet   81 mg, Daily      cloNIDine 0.1 MG tablet  Commonly known as: CATAPRES   0.1 mg, Oral, Every 8 Hours PRN      Divalproex Sodium 125 MG capsule  Commonly known as: DEPAKOTE SPRINKLE   125 mg, 2 Times Daily      docusate sodium 100 MG capsule  Commonly known as: COLACE   100 mg, Daily      ferrous sulfate 325 (65 FE) MG tablet   325 mg, Daily       gabapentin 100 MG capsule  Commonly known as: NEURONTIN   100 mg, Oral, Nightly      HYDROcodone-acetaminophen 5-325 MG per tablet  Commonly known as: NORCO   1 tablet, Oral, Every 6 Hours PRN      Lidocaine 4 %   1 patch, Every 24 Hours      magnesium hydroxide 400 MG/5ML suspension  Commonly known as: MILK OF MAGNESIA   30 mL, Daily PRN      memantine 5 MG tablet  Commonly known as: NAMENDA   5 mg, Oral, Every 12 Hours Scheduled      MiraLax Mix-In [facility] 17 g packet  Generic drug: polyethylene glycol   17 g, Daily      OLANZapine 2.5 MG tablet  Commonly known as: zyPREXA   2.5 mg, Nightly      senna 8.6 MG tablet  Commonly known as: SENOKOT   2 tablets, 2 Times Daily      vitamin D 1.25 MG (54669 UT) capsule capsule  Commonly known as: ERGOCALCIFEROL   50,000 Units, Weekly               Diet Instructions    Resume diet as tolerated.          Activity Instructions    Resume activity as tolerated.          Additional Instructions for the Follow-ups that You Need to Schedule       Discharge Follow-up with PCP   As directed       Currently Documented PCP:    Ricky Alamo MD    PCP Phone Number:    [phone]     Follow Up Details: 1 week follow up        Discharge Follow-up with Specialty: Stroke Clinic; 3 Months   As directed      Specialty: Stroke Clinic   Follow Up: 3 Months   Follow Up Details: 2-3 month follow up with stroke clinic               Follow-up Information       Ricky Alamo MD .    Specialty: Internal Medicine  Why: 1 week follow up  Contact information:  [address] [facility] [address]  [phone]               Patricia Delacruz APRN Follow up in 1 week(s).    Specialty: Neurology  Why: pt  has  an  apointment  with  patricia delacruz  for oct 24  at  2 ;30  at  [facility]  Contact information:  [address] [facility] [facility] [place] [address]  [phone]                              TEST  RESULTS PENDING AT DISCHARGE       CODE STATUS  Code Status and Medical Interventions: No CPR (Do  Not Attempt to Resuscitate); Limited Support; No intubation (DNI), No dialysis, No cardioversion; spoke to granddaughter OTP   Ordered at: 07/21/25 1206     Code Status (Patient has no pulse and is not breathing):    No CPR (Do Not Attempt to Resuscitate)     Medical Interventions (Patient has pulse or is breathing):    Limited Support     Medical Intervention Limits:    No intubation (DNI)       No dialysis       No cardioversion     Comments:    spoke to granddaughter OTP     Level Of Support Discussed With:    Next of Kin (If No Surrogate)       Health Care Surrogate       The ASCVD Risk score (Jovany ESTEVEZ, et al., 2019) failed to calculate for the following reasons:    The 2019 ASCVD risk score is only valid for ages 40 to 79    Risk score cannot be calculated because patient has a medical history suggesting prior/existing ASCVD     Master Riddle DO  [facility] Hospitalist  07/24/25  11:33 EDT    Please note that this discharge summary required more than 30 minutes to complete.

## 2025-07-24 NOTE — PLAN OF CARE
Goal Outcome Evaluation:           Problem: Fall Injury Risk  Goal: Absence of Fall and Fall-Related Injury  Intervention: Promote Injury-Free Environment  Recent Flowsheet Documentation  Taken 7/23/2025 2300 by Cb Santo RN  Safety Promotion/Fall Prevention: safety round/check completed  Taken 7/23/2025 2100 by Cb Santo RN  Safety Promotion/Fall Prevention: safety round/check completed  Taken 7/23/2025 1913 by Cb Santo RN  Safety Promotion/Fall Prevention: safety round/check completed  Taken 7/23/2025 1900 by Cb Santo RN  Safety Promotion/Fall Prevention: safety round/check completed     Problem: Adult Inpatient Plan of Care  Goal: Absence of Hospital-Acquired Illness or Injury  Intervention: Identify and Manage Fall Risk  Recent Flowsheet Documentation  Taken 7/23/2025 2300 by Cb Santo RN  Safety Promotion/Fall Prevention: safety round/check completed  Taken 7/23/2025 2100 by Cb Santo RN  Safety Promotion/Fall Prevention: safety round/check completed  Taken 7/23/2025 1913 by Cb Santo RN  Safety Promotion/Fall Prevention: safety round/check completed  Taken 7/23/2025 1900 by Cb Santo RN  Safety Promotion/Fall Prevention: safety round/check completed  Intervention: Prevent Skin Injury  Recent Flowsheet Documentation  Taken 7/23/2025 1913 by Cb Santo RN  Body Position:   left   side-lying  Skin Protection: incontinence pads utilized  Intervention: Prevent Infection  Recent Flowsheet Documentation  Taken 7/23/2025 2300 by Cb Santo RN  Infection Prevention: rest/sleep promoted  Taken 7/23/2025 1913 by Cb Santo RN  Infection Prevention: hand hygiene promoted

## 2025-08-02 ENCOUNTER — APPOINTMENT (OUTPATIENT)
Dept: MRI IMAGING | Facility: HOSPITAL | Age: 80
End: 2025-08-02
Payer: MEDICARE

## 2025-08-02 ENCOUNTER — HOSPITAL ENCOUNTER (EMERGENCY)
Facility: HOSPITAL | Age: 80
Discharge: ANOTHER HOSPITAL | End: 2025-08-03
Payer: MEDICARE

## 2025-08-02 ENCOUNTER — APPOINTMENT (OUTPATIENT)
Dept: CT IMAGING | Facility: HOSPITAL | Age: 80
End: 2025-08-02
Payer: MEDICARE

## 2025-08-02 DIAGNOSIS — A41.9 SEPSIS, DUE TO UNSPECIFIED ORGANISM, UNSPECIFIED WHETHER ACUTE ORGAN DYSFUNCTION PRESENT: ICD-10-CM

## 2025-08-02 DIAGNOSIS — N30.01 ACUTE CYSTITIS WITH HEMATURIA: ICD-10-CM

## 2025-08-02 DIAGNOSIS — R79.89 ELEVATED TROPONIN: ICD-10-CM

## 2025-08-02 DIAGNOSIS — N17.9 AKI (ACUTE KIDNEY INJURY): Primary | ICD-10-CM

## 2025-08-02 LAB
A-A DO2: 55 MMHG (ref 0–300)
A-A DO2: 85.4 MMHG (ref 0–300)
ALBUMIN SERPL-MCNC: 3.7 G/DL (ref 3.5–5.2)
ALBUMIN/GLOB SERPL: 1.9 G/DL
ALP SERPL-CCNC: 124 U/L (ref 39–117)
ALT SERPL W P-5'-P-CCNC: 23 U/L (ref 1–33)
AMPHET+METHAMPHET UR QL: NEGATIVE
AMPHETAMINES UR QL: NEGATIVE
ANION GAP SERPL CALCULATED.3IONS-SCNC: 16.3 MMOL/L (ref 5–15)
ANION GAP SERPL CALCULATED.3IONS-SCNC: 19.3 MMOL/L (ref 5–15)
APAP SERPL-MCNC: <5 MCG/ML (ref 0–30)
APTT PPP: 29.6 SECONDS (ref 24.5–35.9)
ARTERIAL PATENCY WRIST A: ABNORMAL
ARTERIAL PATENCY WRIST A: POSITIVE
AST SERPL-CCNC: 33 U/L (ref 1–32)
ATMOSPHERIC PRESS: 729 MMHG
ATMOSPHERIC PRESS: 730 MMHG
BACTERIA UR QL AUTO: ABNORMAL /HPF
BARBITURATES UR QL SCN: NEGATIVE
BASE EXCESS BLDA CALC-SCNC: -4.4 MMOL/L (ref 0–2)
BASE EXCESS BLDA CALC-SCNC: -5.2 MMOL/L (ref 0–2)
BASOPHILS # BLD AUTO: 0 10*3/MM3 (ref 0–0.2)
BASOPHILS NFR BLD AUTO: 0 % (ref 0–1.5)
BDY SITE: ABNORMAL
BDY SITE: ABNORMAL
BENZODIAZ UR QL SCN: NEGATIVE
BILIRUB SERPL-MCNC: 1 MG/DL (ref 0–1.2)
BILIRUB UR QL STRIP: NEGATIVE
BUN SERPL-MCNC: 27.4 MG/DL (ref 8–23)
BUN SERPL-MCNC: 27.6 MG/DL (ref 8–23)
BUN/CREAT SERPL: 19.6 (ref 7–25)
BUN/CREAT SERPL: 19.6 (ref 7–25)
BUPRENORPHINE SERPL-MCNC: NEGATIVE NG/ML
CALCIUM SPEC-SCNC: 10 MG/DL (ref 8.6–10.5)
CALCIUM SPEC-SCNC: 8.9 MG/DL (ref 8.6–10.5)
CANNABINOIDS SERPL QL: NEGATIVE
CHLORIDE SERPL-SCNC: 105 MMOL/L (ref 98–107)
CHLORIDE SERPL-SCNC: 108 MMOL/L (ref 98–107)
CLARITY UR: ABNORMAL
CO2 BLDA-SCNC: 18.7 MMOL/L (ref 22–33)
CO2 BLDA-SCNC: 20.6 MMOL/L (ref 22–33)
CO2 SERPL-SCNC: 17.7 MMOL/L (ref 22–29)
CO2 SERPL-SCNC: 19.7 MMOL/L (ref 22–29)
COCAINE UR QL: NEGATIVE
COHGB MFR BLD: 1.3 % (ref 0–5)
COHGB MFR BLD: 2 % (ref 0–5)
COLOR UR: ABNORMAL
CREAT SERPL-MCNC: 1.4 MG/DL (ref 0.57–1)
CREAT SERPL-MCNC: 1.41 MG/DL (ref 0.57–1)
CRP SERPL-MCNC: 0.75 MG/DL (ref 0–0.5)
D-LACTATE SERPL-SCNC: 6.5 MMOL/L (ref 0.5–2)
D-LACTATE SERPL-SCNC: 7.1 MMOL/L (ref 0.5–2)
DEPRECATED RDW RBC AUTO: 42.4 FL (ref 37–54)
EGFRCR SERPLBLD CKD-EPI 2021: 37.3 ML/MIN/1.73
EGFRCR SERPLBLD CKD-EPI 2021: 37.6 ML/MIN/1.73
EOSINOPHIL # BLD AUTO: 0.01 10*3/MM3 (ref 0–0.4)
EOSINOPHIL NFR BLD AUTO: 0.2 % (ref 0.3–6.2)
ERYTHROCYTE [DISTWIDTH] IN BLOOD BY AUTOMATED COUNT: 12.9 % (ref 12.3–15.4)
ERYTHROCYTE [SEDIMENTATION RATE] IN BLOOD: 2 MM/HR (ref 0–30)
ETHANOL BLD-MCNC: <10 MG/DL (ref 0–10)
ETHANOL UR QL: <0.01 %
FENTANYL UR-MCNC: NEGATIVE NG/ML
GAS FLOW AIRWAY: 2 LPM
GEN 5 1HR TROPONIN T REFLEX: 186 NG/L
GLOBULIN UR ELPH-MCNC: 2 GM/DL
GLUCOSE BLDC GLUCOMTR-MCNC: 167 MG/DL (ref 70–130)
GLUCOSE SERPL-MCNC: 104 MG/DL (ref 65–99)
GLUCOSE SERPL-MCNC: 170 MG/DL (ref 65–99)
GLUCOSE UR STRIP-MCNC: NEGATIVE MG/DL
HCO3 BLDA-SCNC: 17.8 MMOL/L (ref 20–26)
HCO3 BLDA-SCNC: 19.6 MMOL/L (ref 20–26)
HCT VFR BLD AUTO: 40.8 % (ref 34–46.6)
HCT VFR BLD CALC: 37.4 % (ref 38–51)
HCT VFR BLD CALC: 38.3 % (ref 38–51)
HGB BLD-MCNC: 13.6 G/DL (ref 12–15.9)
HGB BLDA-MCNC: 12.2 G/DL (ref 13.5–17.5)
HGB BLDA-MCNC: 12.5 G/DL (ref 13.5–17.5)
HGB UR QL STRIP.AUTO: ABNORMAL
HOLD SPECIMEN: NORMAL
HOLD SPECIMEN: NORMAL
HYALINE CASTS UR QL AUTO: ABNORMAL /LPF
IMM GRANULOCYTES # BLD AUTO: 0.02 10*3/MM3 (ref 0–0.05)
IMM GRANULOCYTES NFR BLD AUTO: 0.4 % (ref 0–0.5)
INHALED O2 CONCENTRATION: 21 %
INHALED O2 CONCENTRATION: 28 %
INR PPP: 1.17 (ref 0.9–1.1)
KETONES UR QL STRIP: ABNORMAL
LEUKOCYTE ESTERASE UR QL STRIP.AUTO: ABNORMAL
LYMPHOCYTES # BLD AUTO: 0.32 10*3/MM3 (ref 0.7–3.1)
LYMPHOCYTES NFR BLD AUTO: 7 % (ref 19.6–45.3)
Lab: ABNORMAL
Lab: ABNORMAL
MCH RBC QN AUTO: 30.5 PG (ref 26.6–33)
MCHC RBC AUTO-ENTMCNC: 33.3 G/DL (ref 31.5–35.7)
MCV RBC AUTO: 91.5 FL (ref 79–97)
METHADONE UR QL SCN: NEGATIVE
METHGB BLD QL: 0.2 % (ref 0–3)
METHGB BLD QL: <-0.1 % (ref 0–3)
MODALITY: ABNORMAL
MODALITY: ABNORMAL
MONOCYTES # BLD AUTO: 0.01 10*3/MM3 (ref 0.1–0.9)
MONOCYTES NFR BLD AUTO: 0.2 % (ref 5–12)
NEUTROPHILS NFR BLD AUTO: 4.22 10*3/MM3 (ref 1.7–7)
NEUTROPHILS NFR BLD AUTO: 92.2 % (ref 42.7–76)
NITRITE UR QL STRIP: POSITIVE
NRBC BLD AUTO-RTO: 0 /100 WBC (ref 0–0.2)
OPIATES UR QL: NEGATIVE
OXYCODONE UR QL SCN: NEGATIVE
OXYHGB MFR BLDV: 89.8 % (ref 94–99)
OXYHGB MFR BLDV: 93.9 % (ref 94–99)
PCO2 BLDA: 27 MM HG (ref 35–45)
PCO2 BLDA: 31.9 MM HG (ref 35–45)
PCO2 TEMP ADJ BLD: ABNORMAL MM[HG]
PCO2 TEMP ADJ BLD: ABNORMAL MM[HG]
PCP UR QL SCN: NEGATIVE
PH BLDA: 7.4 PH UNITS (ref 7.35–7.45)
PH BLDA: 7.43 PH UNITS (ref 7.35–7.45)
PH UR STRIP.AUTO: 6.5 [PH] (ref 5–8)
PH, TEMP CORRECTED: ABNORMAL
PH, TEMP CORRECTED: ABNORMAL
PLATELET # BLD AUTO: 120 10*3/MM3 (ref 140–450)
PMV BLD AUTO: 10.4 FL (ref 6–12)
PO2 BLDA: 58 MM HG (ref 83–108)
PO2 BLDA: 69.8 MM HG (ref 83–108)
PO2 TEMP ADJ BLD: ABNORMAL MM[HG]
PO2 TEMP ADJ BLD: ABNORMAL MM[HG]
POTASSIUM SERPL-SCNC: 4.1 MMOL/L (ref 3.5–5.2)
POTASSIUM SERPL-SCNC: 4.2 MMOL/L (ref 3.5–5.2)
PROCALCITONIN SERPL-MCNC: 10.7 NG/ML (ref 0–0.25)
PROT SERPL-MCNC: 5.7 G/DL (ref 6–8.5)
PROT UR QL STRIP: ABNORMAL
PROTHROMBIN TIME: 15.6 SECONDS (ref 12.5–15.2)
RBC # BLD AUTO: 4.46 10*6/MM3 (ref 3.77–5.28)
RBC # UR STRIP: ABNORMAL /HPF
REF LAB TEST METHOD: ABNORMAL
SALICYLATES SERPL-MCNC: <0.5 MG/DL
SAO2 % BLDCOA: 91.2 % (ref 94–99)
SAO2 % BLDCOA: 94.3 % (ref 94–99)
SODIUM SERPL-SCNC: 142 MMOL/L (ref 136–145)
SODIUM SERPL-SCNC: 144 MMOL/L (ref 136–145)
SP GR UR STRIP: 1.01 (ref 1–1.03)
SQUAMOUS #/AREA URNS HPF: ABNORMAL /HPF
TRICYCLICS UR QL SCN: NEGATIVE
TROPONIN T % DELTA: 18
TROPONIN T NUMERIC DELTA: 29 NG/L
TROPONIN T SERPL HS-MCNC: 157 NG/L
UFH PPP CHRO-ACNC: 0.73 IU/ML (ref 0.3–0.7)
UFH PPP CHRO-ACNC: <0.1 IU/ML (ref 0.3–0.7)
UROBILINOGEN UR QL STRIP: ABNORMAL
VALPROATE SERPL-MCNC: 6.4 MCG/ML (ref 50–125)
VENTILATOR MODE: ABNORMAL
VENTILATOR MODE: ABNORMAL
WBC # UR STRIP: ABNORMAL /HPF
WBC NRBC COR # BLD AUTO: 4.58 10*3/MM3 (ref 3.4–10.8)
WHOLE BLOOD HOLD COAG: NORMAL
WHOLE BLOOD HOLD SPECIMEN: NORMAL

## 2025-08-02 PROCEDURE — 85652 RBC SED RATE AUTOMATED: CPT

## 2025-08-02 PROCEDURE — 93005 ELECTROCARDIOGRAM TRACING: CPT | Performed by: PHYSICIAN ASSISTANT

## 2025-08-02 PROCEDURE — 84145 PROCALCITONIN (PCT): CPT

## 2025-08-02 PROCEDURE — 87077 CULTURE AEROBIC IDENTIFY: CPT

## 2025-08-02 PROCEDURE — 36415 COLL VENOUS BLD VENIPUNCTURE: CPT

## 2025-08-02 PROCEDURE — 80307 DRUG TEST PRSMV CHEM ANLYZR: CPT

## 2025-08-02 PROCEDURE — 96367 TX/PROPH/DG ADDL SEQ IV INF: CPT

## 2025-08-02 PROCEDURE — 36600 WITHDRAWAL OF ARTERIAL BLOOD: CPT

## 2025-08-02 PROCEDURE — 74176 CT ABD & PELVIS W/O CONTRAST: CPT

## 2025-08-02 PROCEDURE — 25010000002 HEPARIN (PORCINE) 25000-0.45 UT/250ML-% SOLUTION

## 2025-08-02 PROCEDURE — 25010000002 VANCOMYCIN 5 G RECONSTITUTED SOLUTION

## 2025-08-02 PROCEDURE — 85520 HEPARIN ASSAY: CPT

## 2025-08-02 PROCEDURE — 99285 EMERGENCY DEPT VISIT HI MDM: CPT

## 2025-08-02 PROCEDURE — 81001 URINALYSIS AUTO W/SCOPE: CPT

## 2025-08-02 PROCEDURE — 84484 ASSAY OF TROPONIN QUANT: CPT

## 2025-08-02 PROCEDURE — 51702 INSERT TEMP BLADDER CATH: CPT

## 2025-08-02 PROCEDURE — 93010 ELECTROCARDIOGRAM REPORT: CPT | Performed by: INTERNAL MEDICINE

## 2025-08-02 PROCEDURE — 82805 BLOOD GASES W/O2 SATURATION: CPT

## 2025-08-02 PROCEDURE — 83605 ASSAY OF LACTIC ACID: CPT

## 2025-08-02 PROCEDURE — 80179 DRUG ASSAY SALICYLATE: CPT

## 2025-08-02 PROCEDURE — 85025 COMPLETE CBC W/AUTO DIFF WBC: CPT

## 2025-08-02 PROCEDURE — 96365 THER/PROPH/DIAG IV INF INIT: CPT

## 2025-08-02 PROCEDURE — 85610 PROTHROMBIN TIME: CPT

## 2025-08-02 PROCEDURE — 82948 REAGENT STRIP/BLOOD GLUCOSE: CPT

## 2025-08-02 PROCEDURE — 25810000003 SEPSIS FLUID NS 0.9 % SOLUTION

## 2025-08-02 PROCEDURE — 25810000003 SODIUM CHLORIDE 0.9 % SOLUTION

## 2025-08-02 PROCEDURE — 93005 ELECTROCARDIOGRAM TRACING: CPT

## 2025-08-02 PROCEDURE — 71250 CT THORAX DX C-: CPT

## 2025-08-02 PROCEDURE — 82375 ASSAY CARBOXYHB QUANT: CPT

## 2025-08-02 PROCEDURE — 87154 CUL TYP ID BLD PTHGN 6+ TRGT: CPT

## 2025-08-02 PROCEDURE — 83050 HGB METHEMOGLOBIN QUAN: CPT

## 2025-08-02 PROCEDURE — 85730 THROMBOPLASTIN TIME PARTIAL: CPT

## 2025-08-02 PROCEDURE — 96376 TX/PRO/DX INJ SAME DRUG ADON: CPT

## 2025-08-02 PROCEDURE — 25010000002 PIPERACILLIN SOD-TAZOBACTAM PER 1 G

## 2025-08-02 PROCEDURE — 86140 C-REACTIVE PROTEIN: CPT

## 2025-08-02 PROCEDURE — 96366 THER/PROPH/DIAG IV INF ADDON: CPT

## 2025-08-02 PROCEDURE — 87186 SC STD MICRODIL/AGAR DIL: CPT

## 2025-08-02 PROCEDURE — 25010000002 HEPARIN (PORCINE) PER 1000 UNITS

## 2025-08-02 PROCEDURE — 70551 MRI BRAIN STEM W/O DYE: CPT

## 2025-08-02 PROCEDURE — 80053 COMPREHEN METABOLIC PANEL: CPT

## 2025-08-02 PROCEDURE — 70498 CT ANGIOGRAPHY NECK: CPT

## 2025-08-02 PROCEDURE — 87040 BLOOD CULTURE FOR BACTERIA: CPT

## 2025-08-02 PROCEDURE — 80164 ASSAY DIPROPYLACETIC ACD TOT: CPT

## 2025-08-02 PROCEDURE — 82077 ASSAY SPEC XCP UR&BREATH IA: CPT

## 2025-08-02 PROCEDURE — 80143 DRUG ASSAY ACETAMINOPHEN: CPT

## 2025-08-02 PROCEDURE — 87086 URINE CULTURE/COLONY COUNT: CPT

## 2025-08-02 PROCEDURE — 70450 CT HEAD/BRAIN W/O DYE: CPT

## 2025-08-02 PROCEDURE — 70496 CT ANGIOGRAPHY HEAD: CPT

## 2025-08-02 PROCEDURE — 25510000001 IOPAMIDOL PER 1 ML

## 2025-08-02 PROCEDURE — 70551 MRI BRAIN STEM W/O DYE: CPT | Performed by: RADIOLOGY

## 2025-08-02 RX ORDER — VANCOMYCIN/0.9 % SOD CHLORIDE 1.5G/250ML
20 PLASTIC BAG, INJECTION (ML) INTRAVENOUS ONCE
Status: COMPLETED | OUTPATIENT
Start: 2025-08-02 | End: 2025-08-02

## 2025-08-02 RX ORDER — IOPAMIDOL 755 MG/ML
100 INJECTION, SOLUTION INTRAVASCULAR
Status: COMPLETED | OUTPATIENT
Start: 2025-08-02 | End: 2025-08-02

## 2025-08-02 RX ORDER — HEPARIN SODIUM 5000 [USP'U]/ML
4000 INJECTION, SOLUTION INTRAVENOUS; SUBCUTANEOUS ONCE
Status: COMPLETED | OUTPATIENT
Start: 2025-08-02 | End: 2025-08-02

## 2025-08-02 RX ORDER — SODIUM CHLORIDE 0.9 % (FLUSH) 0.9 %
10 SYRINGE (ML) INJECTION AS NEEDED
Status: DISCONTINUED | OUTPATIENT
Start: 2025-08-02 | End: 2025-08-03 | Stop reason: HOSPADM

## 2025-08-02 RX ORDER — HEPARIN SODIUM 10000 [USP'U]/100ML
10 INJECTION, SOLUTION INTRAVENOUS
Status: DISCONTINUED | OUTPATIENT
Start: 2025-08-02 | End: 2025-08-02

## 2025-08-02 RX ORDER — HEPARIN SODIUM 10000 [USP'U]/100ML
10 INJECTION, SOLUTION INTRAVENOUS
Status: DISCONTINUED | OUTPATIENT
Start: 2025-08-02 | End: 2025-08-03 | Stop reason: HOSPADM

## 2025-08-02 RX ORDER — HEPARIN SODIUM 5000 [USP'U]/ML
25 INJECTION, SOLUTION INTRAVENOUS; SUBCUTANEOUS AS NEEDED
Status: DISCONTINUED | OUTPATIENT
Start: 2025-08-02 | End: 2025-08-02

## 2025-08-02 RX ORDER — NOREPINEPHRINE BITARTRATE 0.03 MG/ML
.02-.3 INJECTION, SOLUTION INTRAVENOUS
Status: DISCONTINUED | OUTPATIENT
Start: 2025-08-02 | End: 2025-08-03 | Stop reason: HOSPADM

## 2025-08-02 RX ORDER — HEPARIN SODIUM 5000 [USP'U]/ML
50 INJECTION, SOLUTION INTRAVENOUS; SUBCUTANEOUS AS NEEDED
Status: DISCONTINUED | OUTPATIENT
Start: 2025-08-02 | End: 2025-08-02

## 2025-08-02 RX ADMIN — HEPARIN SODIUM 4000 UNITS: 5000 INJECTION, SOLUTION INTRAVENOUS; SUBCUTANEOUS at 12:24

## 2025-08-02 RX ADMIN — HEPARIN SODIUM 12 UNITS/KG/HR: 10000 INJECTION, SOLUTION INTRAVENOUS at 12:24

## 2025-08-02 RX ADMIN — NOREPINEPHRINE BITARTRATE 0.02 MCG/KG/MIN: 32 SOLUTION INTRAVENOUS at 18:47

## 2025-08-02 RX ADMIN — IOPAMIDOL 70 ML: 755 INJECTION, SOLUTION INTRAVENOUS at 17:04

## 2025-08-02 RX ADMIN — PIPERACILLIN AND TAZOBACTAM 3.38 G: 3; .375 INJECTION, POWDER, FOR SOLUTION INTRAVENOUS at 09:03

## 2025-08-02 RX ADMIN — SODIUM CHLORIDE 2187 ML: 9 INJECTION, SOLUTION INTRAVENOUS at 09:03

## 2025-08-02 RX ADMIN — SODIUM CHLORIDE 1500 MG: 9 INJECTION, SOLUTION INTRAVENOUS at 10:15

## 2025-08-03 ENCOUNTER — APPOINTMENT (OUTPATIENT)
Dept: GENERAL RADIOLOGY | Facility: HOSPITAL | Age: 80
End: 2025-08-03
Payer: MEDICARE

## 2025-08-03 VITALS
BODY MASS INDEX: 25.22 KG/M2 | WEIGHT: 160.72 LBS | HEIGHT: 67 IN | SYSTOLIC BLOOD PRESSURE: 119 MMHG | DIASTOLIC BLOOD PRESSURE: 70 MMHG | RESPIRATION RATE: 20 BRPM | TEMPERATURE: 99.6 F | HEART RATE: 93 BPM | OXYGEN SATURATION: 96 %

## 2025-08-03 LAB
ALBUMIN SERPL-MCNC: 3 G/DL (ref 3.5–5.2)
ALBUMIN/GLOB SERPL: 1.5 G/DL
ALP SERPL-CCNC: 100 U/L (ref 39–117)
ALT SERPL W P-5'-P-CCNC: 21 U/L (ref 1–33)
ANION GAP SERPL CALCULATED.3IONS-SCNC: 12.5 MMOL/L (ref 5–15)
AST SERPL-CCNC: 42 U/L (ref 1–32)
BACTERIA BLD CULT: ABNORMAL
BACTERIA ID TEST ISLT QL CULT: ABNORMAL
BILIRUB SERPL-MCNC: 0.6 MG/DL (ref 0–1.2)
BOTTLE TYPE: ABNORMAL
BUN SERPL-MCNC: 32.6 MG/DL (ref 8–23)
BUN/CREAT SERPL: 25.7 (ref 7–25)
CALCIUM SPEC-SCNC: 8.7 MG/DL (ref 8.6–10.5)
CHLORIDE SERPL-SCNC: 111 MMOL/L (ref 98–107)
CO2 SERPL-SCNC: 21.5 MMOL/L (ref 22–29)
CREAT SERPL-MCNC: 1.27 MG/DL (ref 0.57–1)
D-LACTATE SERPL-SCNC: 3.4 MMOL/L (ref 0.5–2)
DEPRECATED RDW RBC AUTO: 44.6 FL (ref 37–54)
DEPRECATED RDW RBC AUTO: 44.9 FL (ref 37–54)
EGFRCR SERPLBLD CKD-EPI 2021: 42.3 ML/MIN/1.73
ERYTHROCYTE [DISTWIDTH] IN BLOOD BY AUTOMATED COUNT: 13.3 % (ref 12.3–15.4)
ERYTHROCYTE [DISTWIDTH] IN BLOOD BY AUTOMATED COUNT: 13.5 % (ref 12.3–15.4)
GLOBULIN UR ELPH-MCNC: 2 GM/DL
GLUCOSE SERPL-MCNC: 96 MG/DL (ref 65–99)
HCT VFR BLD AUTO: 33.9 % (ref 34–46.6)
HCT VFR BLD AUTO: 36.2 % (ref 34–46.6)
HGB BLD-MCNC: 11.4 G/DL (ref 12–15.9)
HGB BLD-MCNC: 12.1 G/DL (ref 12–15.9)
LARGE PLATELETS: ABNORMAL
LYMPHOCYTES # BLD MANUAL: 2 10*3/MM3 (ref 0.7–3.1)
LYMPHOCYTES # BLD MANUAL: 3.96 10*3/MM3 (ref 0.7–3.1)
LYMPHOCYTES NFR BLD MANUAL: 1 % (ref 5–12)
LYMPHOCYTES NFR BLD MANUAL: 3 % (ref 5–12)
MCH RBC QN AUTO: 30.6 PG (ref 26.6–33)
MCH RBC QN AUTO: 30.9 PG (ref 26.6–33)
MCHC RBC AUTO-ENTMCNC: 33.4 G/DL (ref 31.5–35.7)
MCHC RBC AUTO-ENTMCNC: 33.6 G/DL (ref 31.5–35.7)
MCV RBC AUTO: 91.1 FL (ref 79–97)
MCV RBC AUTO: 92.3 FL (ref 79–97)
METAMYELOCYTES NFR BLD MANUAL: 1 % (ref 0–0)
METAMYELOCYTES NFR BLD MANUAL: 5 % (ref 0–0)
MONOCYTES # BLD: 0.25 10*3/MM3 (ref 0.1–0.9)
MONOCYTES # BLD: 0.99 10*3/MM3 (ref 0.1–0.9)
MYELOCYTES NFR BLD MANUAL: 1 % (ref 0–0)
NEUTROPHILS # BLD AUTO: 21.2 10*3/MM3 (ref 1.7–7)
NEUTROPHILS # BLD AUTO: 27.69 10*3/MM3 (ref 1.7–7)
NEUTROPHILS NFR BLD MANUAL: 78 % (ref 42.7–76)
NEUTROPHILS NFR BLD MANUAL: 85 % (ref 42.7–76)
NEUTS BAND NFR BLD MANUAL: 6 % (ref 0–5)
PLATELET # BLD AUTO: 107 10*3/MM3 (ref 140–450)
PLATELET # BLD AUTO: 126 10*3/MM3 (ref 140–450)
PMV BLD AUTO: 11.4 FL (ref 6–12)
PMV BLD AUTO: 11.6 FL (ref 6–12)
POTASSIUM SERPL-SCNC: 4.3 MMOL/L (ref 3.5–5.2)
PROT SERPL-MCNC: 5 G/DL (ref 6–8.5)
QT INTERVAL: 314 MS
QT INTERVAL: 332 MS
QTC INTERVAL: 432 MS
QTC INTERVAL: 441 MS
RBC # BLD AUTO: 3.72 10*6/MM3 (ref 3.77–5.28)
RBC # BLD AUTO: 3.92 10*6/MM3 (ref 3.77–5.28)
RBC MORPH BLD: NORMAL
RBC MORPH BLD: NORMAL
SMALL PLATELETS BLD QL SMEAR: ABNORMAL
SMALL PLATELETS BLD QL SMEAR: ABNORMAL
SODIUM SERPL-SCNC: 145 MMOL/L (ref 136–145)
UFH PPP CHRO-ACNC: 0.46 IU/ML (ref 0.3–0.7)
VARIANT LYMPHS NFR BLD MANUAL: 12 % (ref 19.6–45.3)
VARIANT LYMPHS NFR BLD MANUAL: 8 % (ref 19.6–45.3)
WBC NRBC COR # BLD AUTO: 24.94 10*3/MM3 (ref 3.4–10.8)
WBC NRBC COR # BLD AUTO: 32.96 10*3/MM3 (ref 3.4–10.8)

## 2025-08-03 PROCEDURE — 25010000002 DIAZEPAM PER 5 MG

## 2025-08-03 PROCEDURE — 71045 X-RAY EXAM CHEST 1 VIEW: CPT | Performed by: RADIOLOGY

## 2025-08-03 PROCEDURE — 85007 BL SMEAR W/DIFF WBC COUNT: CPT

## 2025-08-03 PROCEDURE — 96366 THER/PROPH/DIAG IV INF ADDON: CPT

## 2025-08-03 PROCEDURE — 96375 TX/PRO/DX INJ NEW DRUG ADDON: CPT

## 2025-08-03 PROCEDURE — 85025 COMPLETE CBC W/AUTO DIFF WBC: CPT

## 2025-08-03 PROCEDURE — 71045 X-RAY EXAM CHEST 1 VIEW: CPT

## 2025-08-03 PROCEDURE — 25010000002 PIPERACILLIN SOD-TAZOBACTAM PER 1 G

## 2025-08-03 PROCEDURE — 80053 COMPREHEN METABOLIC PANEL: CPT

## 2025-08-03 PROCEDURE — C1751 CATH, INF, PER/CENT/MIDLINE: HCPCS

## 2025-08-03 PROCEDURE — 83605 ASSAY OF LACTIC ACID: CPT

## 2025-08-03 PROCEDURE — 85520 HEPARIN ASSAY: CPT

## 2025-08-03 RX ORDER — OLANZAPINE 2.5 MG/1
2.5 TABLET, FILM COATED ORAL NIGHTLY
Status: CANCELLED | OUTPATIENT
Start: 2025-08-03

## 2025-08-03 RX ORDER — CLONIDINE HYDROCHLORIDE 0.1 MG/1
0.1 TABLET ORAL EVERY 8 HOURS PRN
Status: CANCELLED | OUTPATIENT
Start: 2025-08-03

## 2025-08-03 RX ORDER — POLYETHYLENE GLYCOL 3350 17 G/17G
17 POWDER, FOR SOLUTION ORAL DAILY
Status: CANCELLED | OUTPATIENT
Start: 2025-08-03

## 2025-08-03 RX ORDER — DIAZEPAM 10 MG/2ML
5 INJECTION, SOLUTION INTRAMUSCULAR; INTRAVENOUS ONCE
Status: COMPLETED | OUTPATIENT
Start: 2025-08-03 | End: 2025-08-03

## 2025-08-03 RX ORDER — FERROUS SULFATE 325(65) MG
325 TABLET ORAL DAILY
Status: CANCELLED | OUTPATIENT
Start: 2025-08-03

## 2025-08-03 RX ORDER — GABAPENTIN 100 MG/1
100 CAPSULE ORAL NIGHTLY
Status: CANCELLED | OUTPATIENT
Start: 2025-08-03

## 2025-08-03 RX ORDER — SENNOSIDES 8.6 MG/1
2 TABLET ORAL 2 TIMES DAILY
Status: CANCELLED | OUTPATIENT
Start: 2025-08-03

## 2025-08-03 RX ORDER — DIVALPROEX SODIUM 125 MG/1
125 CAPSULE, COATED PELLETS ORAL 2 TIMES DAILY
Status: CANCELLED | OUTPATIENT
Start: 2025-08-03

## 2025-08-03 RX ORDER — MEMANTINE HYDROCHLORIDE 5 MG/1
5 TABLET ORAL EVERY 12 HOURS SCHEDULED
Status: CANCELLED | OUTPATIENT
Start: 2025-08-03

## 2025-08-03 RX ORDER — ACETAMINOPHEN 500 MG
500 TABLET ORAL EVERY 6 HOURS
Status: CANCELLED | OUTPATIENT
Start: 2025-08-03

## 2025-08-03 RX ORDER — HYDROCODONE BITARTRATE AND ACETAMINOPHEN 5; 325 MG/1; MG/1
1 TABLET ORAL EVERY 6 HOURS PRN
Refills: 0 | Status: CANCELLED | OUTPATIENT
Start: 2025-08-03

## 2025-08-03 RX ORDER — CLOPIDOGREL BISULFATE 75 MG/1
75 TABLET ORAL DAILY
Status: CANCELLED | OUTPATIENT
Start: 2025-08-03 | End: 2025-08-14

## 2025-08-03 RX ORDER — ASPIRIN 81 MG/1
81 TABLET, CHEWABLE ORAL DAILY
Status: CANCELLED | OUTPATIENT
Start: 2025-08-03

## 2025-08-03 RX ORDER — LIDOCAINE 4 G/G
1 PATCH TOPICAL EVERY 24 HOURS
Status: CANCELLED | OUTPATIENT
Start: 2025-08-03

## 2025-08-03 RX ORDER — DOCUSATE SODIUM 100 MG/1
100 CAPSULE, LIQUID FILLED ORAL DAILY
Status: CANCELLED | OUTPATIENT
Start: 2025-08-03

## 2025-08-03 RX ORDER — ATORVASTATIN CALCIUM 40 MG/1
40 TABLET, FILM COATED ORAL NIGHTLY
Status: CANCELLED | OUTPATIENT
Start: 2025-08-03 | End: 2025-08-22

## 2025-08-03 RX ADMIN — PIPERACILLIN AND TAZOBACTAM 3.38 G: 3; .375 INJECTION, POWDER, FOR SOLUTION INTRAVENOUS at 04:54

## 2025-08-03 RX ADMIN — DIAZEPAM 5 MG: 5 INJECTION, SOLUTION INTRAMUSCULAR; INTRAVENOUS at 09:02

## 2025-08-04 LAB — BACTERIA SPEC AEROBE CULT: ABNORMAL

## 2025-08-05 LAB
BACTERIA SPEC AEROBE CULT: ABNORMAL
BACTERIA SPEC AEROBE CULT: ABNORMAL
GRAM STN SPEC: ABNORMAL
GRAM STN SPEC: ABNORMAL
ISOLATED FROM: ABNORMAL
ISOLATED FROM: ABNORMAL